# Patient Record
Sex: FEMALE | Race: WHITE | NOT HISPANIC OR LATINO | Employment: OTHER | URBAN - METROPOLITAN AREA
[De-identification: names, ages, dates, MRNs, and addresses within clinical notes are randomized per-mention and may not be internally consistent; named-entity substitution may affect disease eponyms.]

---

## 2017-01-06 ENCOUNTER — HOSPITAL ENCOUNTER (OUTPATIENT)
Dept: PHYSICAL THERAPY | Facility: HOSPITAL | Age: 52
Setting detail: RECURRING SERIES
Discharge: HOME | End: 2017-02-02
Attending: FAMILY MEDICINE | Admitting: FAMILY MEDICINE

## 2017-01-06 ENCOUNTER — HOSPITAL ENCOUNTER (OUTPATIENT)
Dept: PAIN MEDICINE | Facility: HOSPITAL | Age: 52
Discharge: HOME | End: 2017-01-06
Attending: PAIN MEDICINE | Admitting: PAIN MEDICINE

## 2017-02-17 ENCOUNTER — HOSPITAL ENCOUNTER (OUTPATIENT)
Dept: PAIN MEDICINE | Facility: HOSPITAL | Age: 52
Discharge: HOME | End: 2017-02-17
Attending: PAIN MEDICINE | Admitting: PAIN MEDICINE

## 2017-04-11 ENCOUNTER — HOSPITAL ENCOUNTER (OUTPATIENT)
Dept: OTHER | Facility: HOSPITAL | Age: 52
Setting detail: SPECIMEN
Discharge: HOME | End: 2017-04-11
Attending: NURSE PRACTITIONER | Admitting: NURSE PRACTITIONER

## 2017-04-11 LAB
ALBUMIN SERPL-MCNC: 3.6 G/DL (ref 3.5–4.8)
ALBUMIN/GLOB SERPL: 1.2 {RATIO} (ref 1–1.7)
ALP SERPL-CCNC: 101 IU/L (ref 32–91)
ALT SERPL-CCNC: 42 IU/L (ref 14–54)
ANION GAP SERPL CALC-SCNC: 13.3 MMOL/L (ref 10–20)
AST SERPL-CCNC: 38 IU/L (ref 15–41)
BASOPHILS # BLD AUTO: 0.2 10*3/UL (ref 0–0.2)
BASOPHILS NFR BLD AUTO: 1 % (ref 0–2)
BILIRUB SERPL-MCNC: 0.4 MG/DL (ref 0.3–1.2)
BUN SERPL-MCNC: 7 MG/DL (ref 8–20)
BUN/CREAT SERPL: 10 (ref 5.4–26.2)
CALCIUM SERPL-MCNC: 9.6 MG/DL (ref 8.9–10.3)
CHLORIDE SERPL-SCNC: 107 MMOL/L (ref 101–111)
CHOLEST SERPL-MCNC: 192 MG/DL
CHOLEST/HDLC SERPL: 6.8 {RATIO}
CONV CO2: 26 MMOL/L (ref 22–32)
CONV LDL CHOLESTEROL DIRECT: 88 MG/DL (ref 0–100)
CONV TOTAL PROTEIN: 6.6 G/DL (ref 6.1–7.9)
CREAT UR-MCNC: 0.7 MG/DL (ref 0.4–1)
DIFFERENTIAL METHOD BLD: (no result)
EOSINOPHIL # BLD AUTO: 0.2 10*3/UL (ref 0–0.3)
EOSINOPHIL # BLD AUTO: 2 % (ref 0–3)
ERYTHROCYTE [DISTWIDTH] IN BLOOD BY AUTOMATED COUNT: 14.8 % (ref 11.5–14.5)
GLOBULIN UR ELPH-MCNC: 3 G/DL (ref 2.5–3.8)
GLUCOSE SERPL-MCNC: 91 MG/DL (ref 65–99)
HCT VFR BLD AUTO: 45.9 % (ref 35–49)
HDLC SERPL-MCNC: 28 MG/DL
HGB BLD-MCNC: 15.6 G/DL (ref 12–15)
LDLC/HDLC SERPL: 3.1 {RATIO}
LIPID INTERPRETATION: ABNORMAL
LYMPHOCYTES # BLD AUTO: 3.4 10*3/UL (ref 0.8–4.8)
LYMPHOCYTES NFR BLD AUTO: 28 % (ref 18–42)
MCH RBC QN AUTO: 31 PG (ref 26–32)
MCHC RBC AUTO-ENTMCNC: 34 G/DL (ref 32–36)
MCV RBC AUTO: 91.2 FL (ref 80–94)
MONOCYTES # BLD AUTO: 0.5 10*3/UL (ref 0.1–1.3)
MONOCYTES NFR BLD AUTO: 4 % (ref 2–11)
NEUTROPHILS # BLD AUTO: 8.1 10*3/UL (ref 2.3–8.6)
NEUTROPHILS NFR BLD AUTO: 65 % (ref 50–75)
NRBC BLD AUTO-RTO: 0 /100{WBCS}
NRBC/RBC NFR BLD MANUAL: 0 10*3/UL
PLATELET # BLD AUTO: 271 10*3/UL (ref 150–450)
PMV BLD AUTO: 8.6 FL (ref 7.4–10.4)
POTASSIUM SERPL-SCNC: 4.3 MMOL/L (ref 3.6–5.1)
RBC # BLD AUTO: 5.04 10*6/UL (ref 4–5.4)
SODIUM SERPL-SCNC: 142 MMOL/L (ref 136–144)
TRIGL SERPL-MCNC: 476 MG/DL
VLDLC SERPL CALC-MCNC: 75.3 MG/DL
WBC # BLD AUTO: 12.4 10*3/UL (ref 4.5–11.5)

## 2017-04-17 ENCOUNTER — HOSPITAL ENCOUNTER (OUTPATIENT)
Dept: PAIN MEDICINE | Facility: HOSPITAL | Age: 52
Discharge: HOME | End: 2017-04-17
Attending: PAIN MEDICINE | Admitting: PAIN MEDICINE

## 2017-04-24 ENCOUNTER — HOSPITAL ENCOUNTER (OUTPATIENT)
Dept: WOUND CARE | Facility: HOSPITAL | Age: 52
Discharge: HOME | End: 2017-04-24
Attending: SURGERY | Admitting: SURGERY

## 2017-04-25 ENCOUNTER — HOSPITAL ENCOUNTER (OUTPATIENT)
Dept: ONCOLOGY | Facility: CLINIC | Age: 52
Discharge: HOME | End: 2017-04-25
Attending: INTERNAL MEDICINE | Admitting: INTERNAL MEDICINE

## 2017-04-25 LAB
ALBUMIN SERPL-MCNC: 3.9 G/DL (ref 3.5–4.8)
ALBUMIN/GLOB SERPL: 1.1 {RATIO} (ref 1–1.7)
ALP SERPL-CCNC: 102 IU/L (ref 32–91)
ALT SERPL-CCNC: 37 IU/L (ref 14–54)
ANION GAP SERPL CALC-SCNC: 16 MMOL/L (ref 10–20)
AST SERPL-CCNC: 27 IU/L (ref 15–41)
BILIRUB SERPL-MCNC: 0.4 MG/DL (ref 0.3–1.2)
BUN SERPL-MCNC: 10 MG/DL (ref 8–20)
BUN/CREAT SERPL: 14.3 (ref 5.4–26.2)
CALCIUM SERPL-MCNC: 9.6 MG/DL (ref 8.9–10.3)
CHLORIDE SERPL-SCNC: 104 MMOL/L (ref 101–111)
CONV CO2: 25 MMOL/L (ref 22–32)
CONV TOTAL PROTEIN: 7.3 G/DL (ref 6.1–7.9)
CREAT UR-MCNC: 0.7 MG/DL (ref 0.4–1)
GLOBULIN UR ELPH-MCNC: 3.4 G/DL (ref 2.5–3.8)
GLUCOSE SERPL-MCNC: 86 MG/DL (ref 65–99)
LDH SERPL-CCNC: 159 IU/L (ref 98–192)
POTASSIUM SERPL-SCNC: 4 MMOL/L (ref 3.6–5.1)
SODIUM SERPL-SCNC: 141 MMOL/L (ref 136–144)
URATE SERPL-MCNC: 5.3 MG/DL (ref 2.6–8)
VIT B12 SERPL-MCNC: 337 PG/ML (ref 180–914)

## 2017-04-26 LAB
ANA SER QL IA: NORMAL

## 2017-05-01 ENCOUNTER — HOSPITAL ENCOUNTER (OUTPATIENT)
Dept: WOUND CARE | Facility: HOSPITAL | Age: 52
Discharge: HOME | End: 2017-05-01
Attending: SURGERY | Admitting: SURGERY

## 2017-05-08 ENCOUNTER — HOSPITAL ENCOUNTER (OUTPATIENT)
Dept: WOUND CARE | Facility: HOSPITAL | Age: 52
Discharge: HOME | End: 2017-05-08
Attending: SURGERY | Admitting: SURGERY

## 2017-05-09 ENCOUNTER — HOSPITAL ENCOUNTER (OUTPATIENT)
Dept: ONCOLOGY | Facility: CLINIC | Age: 52
Discharge: HOME | End: 2017-05-09
Attending: INTERNAL MEDICINE | Admitting: INTERNAL MEDICINE

## 2017-05-15 ENCOUNTER — HOSPITAL ENCOUNTER (OUTPATIENT)
Dept: WOUND CARE | Facility: HOSPITAL | Age: 52
Discharge: HOME | End: 2017-05-15
Attending: SURGERY | Admitting: SURGERY

## 2017-05-22 ENCOUNTER — HOSPITAL ENCOUNTER (OUTPATIENT)
Dept: WOUND CARE | Facility: HOSPITAL | Age: 52
Discharge: HOME | End: 2017-05-22
Attending: SURGERY | Admitting: SURGERY

## 2017-06-01 ENCOUNTER — HOSPITAL ENCOUNTER (OUTPATIENT)
Dept: OTHER | Facility: HOSPITAL | Age: 52
Setting detail: SPECIMEN
Discharge: HOME | End: 2017-06-01
Attending: NURSE PRACTITIONER | Admitting: NURSE PRACTITIONER

## 2017-06-01 LAB
BACTERIA SPEC AEROBE CULT: NORMAL
GRAM STN SPEC: NORMAL
Lab: NORMAL
MICRO REPORT STATUS: NORMAL
SPECIMEN SOURCE: NORMAL

## 2017-06-05 ENCOUNTER — HOSPITAL ENCOUNTER (OUTPATIENT)
Dept: WOUND CARE | Facility: HOSPITAL | Age: 52
Discharge: HOME | End: 2017-06-05
Attending: SURGERY | Admitting: SURGERY

## 2017-06-19 ENCOUNTER — HOSPITAL ENCOUNTER (OUTPATIENT)
Dept: PAIN MEDICINE | Facility: HOSPITAL | Age: 52
Discharge: HOME | End: 2017-06-19
Attending: PAIN MEDICINE | Admitting: PAIN MEDICINE

## 2017-08-15 ENCOUNTER — HOSPITAL ENCOUNTER (OUTPATIENT)
Dept: ONCOLOGY | Facility: CLINIC | Age: 52
Discharge: HOME | End: 2017-08-15
Attending: INTERNAL MEDICINE | Admitting: INTERNAL MEDICINE

## 2017-08-17 ENCOUNTER — HOSPITAL ENCOUNTER (OUTPATIENT)
Dept: ULTRASOUND IMAGING | Facility: HOSPITAL | Age: 52
Discharge: HOME | End: 2017-08-17
Attending: INTERNAL MEDICINE | Admitting: INTERNAL MEDICINE

## 2017-08-21 ENCOUNTER — HOSPITAL ENCOUNTER (OUTPATIENT)
Dept: PAIN MEDICINE | Facility: HOSPITAL | Age: 52
Discharge: HOME | End: 2017-08-21
Attending: PAIN MEDICINE | Admitting: PAIN MEDICINE

## 2017-10-16 ENCOUNTER — HOSPITAL ENCOUNTER (OUTPATIENT)
Dept: PAIN MEDICINE | Facility: HOSPITAL | Age: 52
Discharge: HOME | End: 2017-10-16
Attending: PAIN MEDICINE | Admitting: PAIN MEDICINE

## 2017-10-25 ENCOUNTER — HOSPITAL ENCOUNTER (OUTPATIENT)
Dept: MRI IMAGING | Facility: HOSPITAL | Age: 52
Discharge: HOME | End: 2017-10-25
Attending: NURSE PRACTITIONER | Admitting: NURSE PRACTITIONER

## 2017-11-15 ENCOUNTER — HOSPITAL ENCOUNTER (OUTPATIENT)
Dept: ORTHOPEDIC SURGERY | Facility: CLINIC | Age: 52
Discharge: HOME | End: 2017-11-15
Attending: ORTHOPAEDIC SURGERY | Admitting: ORTHOPAEDIC SURGERY

## 2017-12-01 ENCOUNTER — HOSPITAL ENCOUNTER (OUTPATIENT)
Dept: ONCOLOGY | Facility: HOSPITAL | Age: 52
Discharge: HOME | End: 2017-12-01
Attending: INTERNAL MEDICINE | Admitting: INTERNAL MEDICINE

## 2017-12-01 ENCOUNTER — HOSPITAL ENCOUNTER (OUTPATIENT)
Dept: ONCOLOGY | Facility: CLINIC | Age: 52
Setting detail: INFUSION SERIES
Discharge: HOME | End: 2017-12-01
Attending: INTERNAL MEDICINE | Admitting: INTERNAL MEDICINE

## 2017-12-01 ENCOUNTER — CLINICAL SUPPORT (OUTPATIENT)
Dept: ONCOLOGY | Facility: HOSPITAL | Age: 52
End: 2017-12-01

## 2017-12-01 NOTE — PROGRESS NOTES
PATIENTS ONCOLOGY RECORD LOCATED IN [facility]      Subjective     Name:  PIOTR NAVARRETE     Date:  2017  [address]  [address] [address]  Home: [phone]  :  1965 AGE:  52 y.o.        RECORDS OBTAINED:  Patients Oncology Record is located in [facility]

## 2017-12-05 ENCOUNTER — HOSPITAL ENCOUNTER (OUTPATIENT)
Dept: OTHER | Facility: HOSPITAL | Age: 52
Setting detail: SPECIMEN
Discharge: HOME | End: 2017-12-05
Attending: NURSE PRACTITIONER | Admitting: NURSE PRACTITIONER

## 2017-12-05 LAB
ALBUMIN SERPL-MCNC: 3.9 G/DL (ref 3.5–4.8)
ALBUMIN/GLOB SERPL: 1.3 {RATIO} (ref 1–1.7)
ALP SERPL-CCNC: 100 IU/L (ref 32–91)
ALT SERPL-CCNC: 29 IU/L (ref 14–54)
ANION GAP SERPL CALC-SCNC: 13.7 MMOL/L (ref 10–20)
AST SERPL-CCNC: 26 IU/L (ref 15–41)
BASOPHILS # BLD AUTO: 0.1 10*3/UL (ref 0–0.2)
BASOPHILS NFR BLD AUTO: 1 % (ref 0–2)
BILIRUB SERPL-MCNC: 0.4 MG/DL (ref 0.3–1.2)
BUN SERPL-MCNC: 9 MG/DL (ref 8–20)
BUN/CREAT SERPL: 11.3 (ref 5.4–26.2)
CALCIUM SERPL-MCNC: 9.1 MG/DL (ref 8.9–10.3)
CHLORIDE SERPL-SCNC: 102 MMOL/L (ref 101–111)
CHOLEST SERPL-MCNC: 206 MG/DL
CHOLEST/HDLC SERPL: 4.8 {RATIO}
CONV CO2: 27 MMOL/L (ref 22–32)
CONV LDL CHOLESTEROL DIRECT: 125 MG/DL (ref 0–100)
CONV TOTAL PROTEIN: 7 G/DL (ref 6.1–7.9)
CREAT UR-MCNC: 0.8 MG/DL (ref 0.4–1)
DIFFERENTIAL METHOD BLD: (no result)
EOSINOPHIL # BLD AUTO: 0.2 10*3/UL (ref 0–0.3)
EOSINOPHIL # BLD AUTO: 2 % (ref 0–3)
ERYTHROCYTE [DISTWIDTH] IN BLOOD BY AUTOMATED COUNT: 15 % (ref 11.5–14.5)
FOLATE SERPL-MCNC: 14.1 NG/ML (ref 5.9–24.8)
GLOBULIN UR ELPH-MCNC: 3.1 G/DL (ref 2.5–3.8)
GLUCOSE SERPL-MCNC: 88 MG/DL (ref 65–99)
HCT VFR BLD AUTO: 49 % (ref 35–49)
HDLC SERPL-MCNC: 43 MG/DL
HGB BLD-MCNC: 16.5 G/DL (ref 12–15)
IRON SATN MFR SERPL: 18 % (ref 15–50)
IRON SERPL-MCNC: 62 UG/DL (ref 28–170)
LDLC/HDLC SERPL: 2.9 {RATIO}
LIPID INTERPRETATION: ABNORMAL
LYMPHOCYTES # BLD AUTO: 3 10*3/UL (ref 0.8–4.8)
LYMPHOCYTES NFR BLD AUTO: 28 % (ref 18–42)
MAGNESIUM SERPL-MCNC: 2.2 MG/DL (ref 1.8–2.5)
MCH RBC QN AUTO: 31 PG (ref 26–32)
MCHC RBC AUTO-ENTMCNC: 33.7 G/DL (ref 32–36)
MCV RBC AUTO: 91.9 FL (ref 80–94)
MONOCYTES # BLD AUTO: 0.5 10*3/UL (ref 0.1–1.3)
MONOCYTES NFR BLD AUTO: 5 % (ref 2–11)
NEUTROPHILS # BLD AUTO: 7 10*3/UL (ref 2.3–8.6)
NEUTROPHILS NFR BLD AUTO: 64 % (ref 50–75)
NRBC BLD AUTO-RTO: 0 /100{WBCS}
NRBC/RBC NFR BLD MANUAL: 0 10*3/UL
PLATELET # BLD AUTO: 288 10*3/UL (ref 150–450)
PMV BLD AUTO: 7.7 FL (ref 7.4–10.4)
POTASSIUM SERPL-SCNC: 4.7 MMOL/L (ref 3.6–5.1)
RBC # BLD AUTO: 5.33 10*6/UL (ref 4–5.4)
SODIUM SERPL-SCNC: 138 MMOL/L (ref 136–144)
TIBC SERPL-MCNC: 353 UG/DL (ref 228–428)
TRIGL SERPL-MCNC: 147 MG/DL
TSH SERPL-ACNC: 1.07 UIU/ML (ref 0.34–5.6)
VIT B12 SERPL-MCNC: 313 PG/ML (ref 180–914)
VLDLC SERPL CALC-MCNC: 38.8 MG/DL
WBC # BLD AUTO: 10.8 10*3/UL (ref 4.5–11.5)

## 2017-12-29 ENCOUNTER — HOSPITAL ENCOUNTER (OUTPATIENT)
Dept: CARDIOLOGY | Facility: HOSPITAL | Age: 52
Discharge: HOME | End: 2017-12-29

## 2018-03-08 ENCOUNTER — HOSPITAL ENCOUNTER (OUTPATIENT)
Dept: OTHER | Facility: HOSPITAL | Age: 53
Discharge: HOME | End: 2018-03-08
Attending: NURSE PRACTITIONER | Admitting: NURSE PRACTITIONER

## 2018-04-02 ENCOUNTER — HOSPITAL ENCOUNTER (OUTPATIENT)
Dept: ONCOLOGY | Facility: CLINIC | Age: 53
Setting detail: INFUSION SERIES
Discharge: HOME | End: 2018-04-02
Attending: INTERNAL MEDICINE | Admitting: INTERNAL MEDICINE

## 2018-04-02 ENCOUNTER — CLINICAL SUPPORT (OUTPATIENT)
Dept: ONCOLOGY | Facility: HOSPITAL | Age: 53
End: 2018-04-02

## 2018-04-02 NOTE — PROGRESS NOTES
PATIENTS ONCOLOGY RECORD LOCATED IN [facility]      Subjective     Name:  PIOTR NAVARRETE     Date:  2018  [address] [address]  Home: [phone]  :  1965 AGE:  52 y.o.        RECORDS OBTAINED:  Patients Oncology Record is located in [facility]

## 2018-04-17 ENCOUNTER — HOSPITAL ENCOUNTER (OUTPATIENT)
Dept: SLEEP MEDICINE | Facility: HOSPITAL | Age: 53
Discharge: HOME | End: 2018-04-17
Attending: PSYCHIATRY & NEUROLOGY | Admitting: PSYCHIATRY & NEUROLOGY

## 2018-06-04 ENCOUNTER — HOSPITAL ENCOUNTER (OUTPATIENT)
Dept: OTHER | Facility: HOSPITAL | Age: 53
Setting detail: SPECIMEN
Discharge: HOME | End: 2018-06-04
Attending: NURSE PRACTITIONER | Admitting: NURSE PRACTITIONER

## 2018-06-04 LAB
ALBUMIN SERPL-MCNC: 4 G/DL (ref 3.5–4.8)
ALBUMIN/GLOB SERPL: 1.3 {RATIO} (ref 1–1.7)
ALP SERPL-CCNC: 102 IU/L (ref 32–91)
ALT SERPL-CCNC: 39 IU/L (ref 14–54)
ANION GAP SERPL CALC-SCNC: 11 MMOL/L (ref 10–20)
AST SERPL-CCNC: 30 IU/L (ref 15–41)
BASOPHILS # BLD AUTO: 0.1 10*3/UL (ref 0–0.2)
BASOPHILS NFR BLD AUTO: 1 % (ref 0–2)
BILIRUB SERPL-MCNC: 0.4 MG/DL (ref 0.3–1.2)
BUN SERPL-MCNC: 7 MG/DL (ref 8–20)
BUN/CREAT SERPL: 11.7 (ref 5.4–26.2)
CALCIUM SERPL-MCNC: 9.5 MG/DL (ref 8.9–10.3)
CHLORIDE SERPL-SCNC: 105 MMOL/L (ref 101–111)
CHOLEST SERPL-MCNC: 204 MG/DL
CHOLEST/HDLC SERPL: 6.3 {RATIO}
CONV CO2: 27 MMOL/L (ref 22–32)
CONV LDL CHOLESTEROL DIRECT: 111 MG/DL (ref 0–100)
CONV TOTAL PROTEIN: 7 G/DL (ref 6.1–7.9)
CREAT UR-MCNC: 0.6 MG/DL (ref 0.4–1)
DIFFERENTIAL METHOD BLD: (no result)
EOSINOPHIL # BLD AUTO: 0.2 10*3/UL (ref 0–0.3)
EOSINOPHIL # BLD AUTO: 2 % (ref 0–3)
ERYTHROCYTE [DISTWIDTH] IN BLOOD BY AUTOMATED COUNT: 14.4 % (ref 11.5–14.5)
GLOBULIN UR ELPH-MCNC: 3 G/DL (ref 2.5–3.8)
GLUCOSE SERPL-MCNC: 96 MG/DL (ref 65–99)
HCT VFR BLD AUTO: 48.8 % (ref 35–49)
HDLC SERPL-MCNC: 32 MG/DL
HGB BLD-MCNC: 16.5 G/DL (ref 12–15)
IRON SATN MFR SERPL: 13 % (ref 15–50)
IRON SERPL-MCNC: 46 UG/DL (ref 28–170)
LDLC/HDLC SERPL: 3.5 {RATIO}
LIPID INTERPRETATION: ABNORMAL
LYMPHOCYTES # BLD AUTO: 3.3 10*3/UL (ref 0.8–4.8)
LYMPHOCYTES NFR BLD AUTO: 28 % (ref 18–42)
MCH RBC QN AUTO: 30.4 PG (ref 26–32)
MCHC RBC AUTO-ENTMCNC: 33.7 G/DL (ref 32–36)
MCV RBC AUTO: 90.1 FL (ref 80–94)
MONOCYTES # BLD AUTO: 0.6 10*3/UL (ref 0.1–1.3)
MONOCYTES NFR BLD AUTO: 5 % (ref 2–11)
NEUTROPHILS # BLD AUTO: 7.5 10*3/UL (ref 2.3–8.6)
NEUTROPHILS NFR BLD AUTO: 64 % (ref 50–75)
NRBC BLD AUTO-RTO: 0 /100{WBCS}
NRBC/RBC NFR BLD MANUAL: 0 10*3/UL
PLATELET # BLD AUTO: 289 10*3/UL (ref 150–450)
PMV BLD AUTO: 8.6 FL (ref 7.4–10.4)
POTASSIUM SERPL-SCNC: 4 MMOL/L (ref 3.6–5.1)
RBC # BLD AUTO: 5.42 10*6/UL (ref 4–5.4)
SODIUM SERPL-SCNC: 139 MMOL/L (ref 136–144)
TIBC SERPL-MCNC: 354 UG/DL (ref 228–428)
TRIGL SERPL-MCNC: 387 MG/DL
VLDLC SERPL CALC-MCNC: 60.6 MG/DL
WBC # BLD AUTO: 11.7 10*3/UL (ref 4.5–11.5)

## 2018-07-12 ENCOUNTER — HOSPITAL ENCOUNTER (OUTPATIENT)
Dept: MAMMOGRAPHY | Facility: HOSPITAL | Age: 53
Discharge: HOME | End: 2018-07-12
Attending: NURSE PRACTITIONER | Admitting: NURSE PRACTITIONER

## 2018-08-06 ENCOUNTER — HOSPITAL ENCOUNTER (OUTPATIENT)
Dept: ONCOLOGY | Facility: CLINIC | Age: 53
Setting detail: INFUSION SERIES
Discharge: HOME | End: 2018-08-06
Attending: INTERNAL MEDICINE | Admitting: INTERNAL MEDICINE

## 2018-08-06 ENCOUNTER — CLINICAL SUPPORT (OUTPATIENT)
Dept: ONCOLOGY | Facility: HOSPITAL | Age: 53
End: 2018-08-06

## 2018-08-06 NOTE — PROGRESS NOTES
PATIENTS ONCOLOGY RECORD LOCATED IN [facility]      Subjective     Name:  PIOTR NAVARRETE     Date:  2018  [address]  [address] [facility] [address]  Home: [phone]  :  1965 AGE:  52 y.o.        RECORDS OBTAINED:  Patients Oncology Record is located in [facility]

## 2018-08-24 ENCOUNTER — CLINICAL SUPPORT (OUTPATIENT)
Dept: ONCOLOGY | Facility: HOSPITAL | Age: 53
End: 2018-08-24

## 2018-08-24 ENCOUNTER — HOSPITAL ENCOUNTER (OUTPATIENT)
Dept: ONCOLOGY | Facility: CLINIC | Age: 53
Setting detail: INFUSION SERIES
Discharge: HOME | End: 2018-08-24
Attending: INTERNAL MEDICINE | Admitting: INTERNAL MEDICINE

## 2018-08-24 NOTE — PROGRESS NOTES
PATIENTS ONCOLOGY RECORD LOCATED IN [facility]      Subjective     Name:  PIOTR NAVARRETE     Date:  2018  [address]  [address] [address]  Home: [phone]  :  1965 AGE:  52 y.o.        RECORDS OBTAINED:  Patients Oncology Record is located in [facility]

## 2018-09-06 ENCOUNTER — CLINICAL SUPPORT (OUTPATIENT)
Dept: ONCOLOGY | Facility: HOSPITAL | Age: 53
End: 2018-09-06

## 2018-09-06 ENCOUNTER — HOSPITAL ENCOUNTER (OUTPATIENT)
Dept: ONCOLOGY | Facility: CLINIC | Age: 53
Setting detail: INFUSION SERIES
Discharge: HOME | End: 2018-09-06
Attending: INTERNAL MEDICINE | Admitting: INTERNAL MEDICINE

## 2018-09-06 NOTE — PROGRESS NOTES
PATIENTS ONCOLOGY RECORD LOCATED IN [facility]      Subjective     Name:  PIOTR NAVARRETE     Date:  2018  [address]  [address] [facility] [address]  Home: [phone]  :  1965 AGE:  52 y.o.        RECORDS OBTAINED:  Patients Oncology Record is located in [facility]

## 2018-12-04 ENCOUNTER — HOSPITAL ENCOUNTER (OUTPATIENT)
Dept: OTHER | Facility: HOSPITAL | Age: 53
Setting detail: SPECIMEN
Discharge: HOME | End: 2018-12-04
Attending: NURSE PRACTITIONER | Admitting: NURSE PRACTITIONER

## 2018-12-04 LAB
ALBUMIN SERPL-MCNC: 3.9 G/DL (ref 3.5–4.8)
ALBUMIN/GLOB SERPL: 1.1 {RATIO} (ref 1–1.7)
ALP SERPL-CCNC: 112 IU/L (ref 32–91)
ALT SERPL-CCNC: 35 IU/L (ref 14–54)
ANION GAP SERPL CALC-SCNC: 13.8 MMOL/L (ref 10–20)
AST SERPL-CCNC: 30 IU/L (ref 15–41)
BASOPHILS # BLD AUTO: 0.1 10*3/UL (ref 0–0.2)
BASOPHILS NFR BLD AUTO: 1 % (ref 0–2)
BILIRUB SERPL-MCNC: 0.9 MG/DL (ref 0.3–1.2)
BUN SERPL-MCNC: 8 MG/DL (ref 8–20)
BUN/CREAT SERPL: 11.4 (ref 5.4–26.2)
CALCIUM SERPL-MCNC: 9.4 MG/DL (ref 8.9–10.3)
CHLORIDE SERPL-SCNC: 100 MMOL/L (ref 101–111)
CHOLEST SERPL-MCNC: 213 MG/DL
CHOLEST/HDLC SERPL: 5.2 {RATIO}
CONV CO2: 27 MMOL/L (ref 22–32)
CONV LDL CHOLESTEROL DIRECT: 124 MG/DL (ref 0–100)
CONV TOTAL PROTEIN: 7.3 G/DL (ref 6.1–7.9)
CREAT UR-MCNC: 0.7 MG/DL (ref 0.4–1)
DIFFERENTIAL METHOD BLD: (no result)
EOSINOPHIL # BLD AUTO: 0.2 10*3/UL (ref 0–0.3)
EOSINOPHIL # BLD AUTO: 1 % (ref 0–3)
ERYTHROCYTE [DISTWIDTH] IN BLOOD BY AUTOMATED COUNT: 14.9 % (ref 11.5–14.5)
GLOBULIN UR ELPH-MCNC: 3.4 G/DL (ref 2.5–3.8)
GLUCOSE SERPL-MCNC: 84 MG/DL (ref 65–99)
HCT VFR BLD AUTO: 49.5 % (ref 35–49)
HDLC SERPL-MCNC: 41 MG/DL
HGB BLD-MCNC: 16.5 G/DL (ref 12–15)
LDLC/HDLC SERPL: 3.1 {RATIO}
LIPID INTERPRETATION: ABNORMAL
LYMPHOCYTES # BLD AUTO: 4.1 10*3/UL (ref 0.8–4.8)
LYMPHOCYTES NFR BLD AUTO: 32 % (ref 18–42)
MCH RBC QN AUTO: 30.2 PG (ref 26–32)
MCHC RBC AUTO-ENTMCNC: 33.4 G/DL (ref 32–36)
MCV RBC AUTO: 90.7 FL (ref 80–94)
MONOCYTES # BLD AUTO: 0.6 10*3/UL (ref 0.1–1.3)
MONOCYTES NFR BLD AUTO: 5 % (ref 2–11)
NEUTROPHILS # BLD AUTO: 7.8 10*3/UL (ref 2.3–8.6)
NEUTROPHILS NFR BLD AUTO: 61 % (ref 50–75)
NRBC BLD AUTO-RTO: 0 /100{WBCS}
NRBC/RBC NFR BLD MANUAL: 0 10*3/UL
PLATELET # BLD AUTO: 321 10*3/UL (ref 150–450)
PMV BLD AUTO: 8 FL (ref 7.4–10.4)
POTASSIUM SERPL-SCNC: 3.8 MMOL/L (ref 3.6–5.1)
RBC # BLD AUTO: 5.46 10*6/UL (ref 4–5.4)
SODIUM SERPL-SCNC: 137 MMOL/L (ref 136–144)
TRIGL SERPL-MCNC: 271 MG/DL
TSH SERPL-ACNC: 0.93 UIU/ML (ref 0.34–5.6)
VIT B12 SERPL-MCNC: 314 PG/ML (ref 180–914)
VLDLC SERPL CALC-MCNC: 48 MG/DL
WBC # BLD AUTO: 12.9 10*3/UL (ref 4.5–11.5)

## 2018-12-05 LAB
25(OH)D3 SERPL-MCNC: 27 NG/ML (ref 30–100)
HBA1C MFR BLD: 6.1 % (ref 0–5.6)

## 2018-12-06 ENCOUNTER — CLINICAL SUPPORT (OUTPATIENT)
Dept: ONCOLOGY | Facility: HOSPITAL | Age: 53
End: 2018-12-06

## 2018-12-06 ENCOUNTER — HOSPITAL ENCOUNTER (OUTPATIENT)
Dept: ONCOLOGY | Facility: CLINIC | Age: 53
Setting detail: INFUSION SERIES
Discharge: HOME | End: 2018-12-06
Attending: INTERNAL MEDICINE | Admitting: INTERNAL MEDICINE

## 2018-12-06 NOTE — PROGRESS NOTES
[facility]      Subjective     Name:  PIOTR NAVARRETE     Date:  2018  Address:  [address] [place] [address]  Home: [unfilled]  :  1965 AGE:  53 y.o.        RECORDS OBTAINED:  Patients Oncology Record is located in [facility]

## 2019-02-22 ENCOUNTER — HOSPITAL ENCOUNTER (OUTPATIENT)
Dept: ONCOLOGY | Facility: CLINIC | Age: 54
Setting detail: INFUSION SERIES
Discharge: HOME | End: 2019-02-22
Attending: INTERNAL MEDICINE | Admitting: INTERNAL MEDICINE

## 2019-02-22 ENCOUNTER — CLINICAL SUPPORT (OUTPATIENT)
Dept: ONCOLOGY | Facility: HOSPITAL | Age: 54
End: 2019-02-22

## 2019-02-22 NOTE — PROGRESS NOTES
[facility]      Subjective     Name:  PIOTR NAVARRETE     Date:  2019  Address:  [address] [place] [address]  Home: [unfilled]  :  1965 AGE:  53 y.o.        RECORDS OBTAINED:  Patients Oncology Record is located in [facility]

## 2019-03-07 ENCOUNTER — CLINICAL SUPPORT (OUTPATIENT)
Dept: ONCOLOGY | Facility: HOSPITAL | Age: 54
End: 2019-03-07

## 2019-03-07 ENCOUNTER — HOSPITAL ENCOUNTER (OUTPATIENT)
Dept: ONCOLOGY | Facility: CLINIC | Age: 54
Setting detail: INFUSION SERIES
Discharge: HOME | End: 2019-03-07
Attending: INTERNAL MEDICINE | Admitting: INTERNAL MEDICINE

## 2019-03-07 NOTE — PROGRESS NOTES
[facility]      Subjective     Name:  PIOTR NAVARRETE     Date:  2019  Address:  [address] [place] [address]  Home: [unfilled]  :  1965 AGE:  53 y.o.        RECORDS OBTAINED:  Patients Oncology Record is located in [facility]

## 2019-03-11 ENCOUNTER — HOSPITAL ENCOUNTER (OUTPATIENT)
Dept: ONCOLOGY | Facility: CLINIC | Age: 54
Setting detail: INFUSION SERIES
Discharge: HOME | End: 2019-03-11
Attending: INTERNAL MEDICINE | Admitting: INTERNAL MEDICINE

## 2019-03-11 ENCOUNTER — CLINICAL SUPPORT (OUTPATIENT)
Dept: ONCOLOGY | Facility: HOSPITAL | Age: 54
End: 2019-03-11

## 2019-03-11 NOTE — PROGRESS NOTES
[facility]      Subjective     Name:  PIOTR NAVARRETE     Date:  2019  Address:  [address] [place] [address]  Home: [unfilled]  :  1965 AGE:  53 y.o.        RECORDS OBTAINED:  Patients Oncology Record is located in [facility]

## 2019-03-19 ENCOUNTER — HOSPITAL ENCOUNTER (OUTPATIENT)
Dept: CARDIOLOGY | Facility: HOSPITAL | Age: 54
Discharge: HOME | End: 2019-03-19
Attending: SURGERY | Admitting: SURGERY

## 2019-04-11 ENCOUNTER — OFFICE (AMBULATORY)
Dept: URBAN - METROPOLITAN AREA CLINIC 64 | Facility: CLINIC | Age: 54
End: 2019-04-11

## 2019-04-11 VITALS
HEART RATE: 64 BPM | SYSTOLIC BLOOD PRESSURE: 144 MMHG | HEIGHT: 69 IN | WEIGHT: 200 LBS | DIASTOLIC BLOOD PRESSURE: 82 MMHG

## 2019-04-11 DIAGNOSIS — E61.1 IRON DEFICIENCY: ICD-10-CM

## 2019-04-11 DIAGNOSIS — R13.10 DYSPHAGIA, UNSPECIFIED: ICD-10-CM

## 2019-04-11 DIAGNOSIS — Z86.010 PERSONAL HISTORY OF COLONIC POLYPS: ICD-10-CM

## 2019-04-11 PROCEDURE — 99213 OFFICE O/P EST LOW 20 MIN: CPT | Performed by: NURSE PRACTITIONER

## 2019-05-16 ENCOUNTER — ON CAMPUS - OUTPATIENT (AMBULATORY)
Dept: URBAN - METROPOLITAN AREA HOSPITAL 85 | Facility: HOSPITAL | Age: 54
End: 2019-05-16

## 2019-05-16 ENCOUNTER — HOSPITAL ENCOUNTER (OUTPATIENT)
Dept: GASTROENTEROLOGY | Facility: HOSPITAL | Age: 54
Setting detail: HOSPITAL OUTPATIENT SURGERY
Discharge: HOME | End: 2019-05-16
Attending: INTERNAL MEDICINE | Admitting: INTERNAL MEDICINE

## 2019-05-16 DIAGNOSIS — D50.8 OTHER IRON DEFICIENCY ANEMIAS: ICD-10-CM

## 2019-05-16 DIAGNOSIS — D12.3 BENIGN NEOPLASM OF TRANSVERSE COLON: ICD-10-CM

## 2019-05-16 DIAGNOSIS — R13.10 DYSPHAGIA, UNSPECIFIED: ICD-10-CM

## 2019-05-16 DIAGNOSIS — D12.2 BENIGN NEOPLASM OF ASCENDING COLON: ICD-10-CM

## 2019-05-16 DIAGNOSIS — K64.4 RESIDUAL HEMORRHOIDAL SKIN TAGS: ICD-10-CM

## 2019-05-16 DIAGNOSIS — K21.0 GASTRO-ESOPHAGEAL REFLUX DISEASE WITH ESOPHAGITIS: ICD-10-CM

## 2019-05-16 DIAGNOSIS — K57.30 DIVERTICULOSIS OF LARGE INTESTINE WITHOUT PERFORATION OR ABS: ICD-10-CM

## 2019-05-16 PROCEDURE — 45380 COLONOSCOPY AND BIOPSY: CPT | Mod: 59 | Performed by: INTERNAL MEDICINE

## 2019-05-16 PROCEDURE — 43239 EGD BIOPSY SINGLE/MULTIPLE: CPT | Performed by: INTERNAL MEDICINE

## 2019-05-16 PROCEDURE — 43450 DILATE ESOPHAGUS 1/MULT PASS: CPT | Performed by: INTERNAL MEDICINE

## 2019-05-16 PROCEDURE — 45385 COLONOSCOPY W/LESION REMOVAL: CPT | Performed by: INTERNAL MEDICINE

## 2019-06-28 RX ORDER — UBIDECARENONE 75 MG
CAPSULE ORAL DAILY
COMMUNITY
Start: 2019-02-27 | End: 2019-08-06 | Stop reason: SDUPTHER

## 2019-06-28 RX ORDER — AMITRIPTYLINE HYDROCHLORIDE 25 MG/1
25 TABLET, FILM COATED ORAL
Refills: 3 | COMMUNITY
Start: 2019-05-07

## 2019-06-28 RX ORDER — METOPROLOL SUCCINATE 25 MG/1
25 TABLET, EXTENDED RELEASE ORAL DAILY
Refills: 3 | COMMUNITY
Start: 2019-04-29 | End: 2020-04-28 | Stop reason: SDUPTHER

## 2019-06-28 RX ORDER — ASPIRIN 81 MG/1
81 TABLET ORAL DAILY
COMMUNITY
Start: 2018-02-13

## 2019-06-28 RX ORDER — LORATADINE 10 MG/1
TABLET ORAL DAILY
COMMUNITY
Start: 2016-11-22 | End: 2020-04-28

## 2019-06-28 RX ORDER — HYDROCODONE BITARTRATE AND ACETAMINOPHEN 10; 325 MG/1; MG/1
1 TABLET ORAL EVERY 6 HOURS PRN
Refills: 0 | COMMUNITY
Start: 2019-05-15 | End: 2023-01-18 | Stop reason: SDUPTHER

## 2019-07-03 ENCOUNTER — APPOINTMENT (OUTPATIENT)
Dept: ONCOLOGY | Facility: CLINIC | Age: 54
End: 2019-07-03

## 2019-08-06 RX ORDER — UBIDECARENONE 75 MG
100 CAPSULE ORAL DAILY
Qty: 30 TABLET | Refills: 5 | Status: SHIPPED | OUTPATIENT
Start: 2019-08-06 | End: 2020-02-02

## 2019-12-09 ENCOUNTER — OFFICE VISIT (OUTPATIENT)
Dept: FAMILY MEDICINE CLINIC | Facility: CLINIC | Age: 54
End: 2019-12-09

## 2019-12-09 VITALS
DIASTOLIC BLOOD PRESSURE: 75 MMHG | SYSTOLIC BLOOD PRESSURE: 114 MMHG | HEIGHT: 69 IN | HEART RATE: 95 BPM | WEIGHT: 203 LBS | BODY MASS INDEX: 30.07 KG/M2 | OXYGEN SATURATION: 96 %

## 2019-12-09 DIAGNOSIS — M54.42 CHRONIC MIDLINE LOW BACK PAIN WITH BILATERAL SCIATICA: ICD-10-CM

## 2019-12-09 DIAGNOSIS — K12.1 STOMATITIS: Primary | ICD-10-CM

## 2019-12-09 DIAGNOSIS — G89.29 CHRONIC MIDLINE LOW BACK PAIN WITH BILATERAL SCIATICA: ICD-10-CM

## 2019-12-09 DIAGNOSIS — R59.0 LYMPHADENOPATHY, OCCIPITAL: ICD-10-CM

## 2019-12-09 DIAGNOSIS — M54.41 CHRONIC MIDLINE LOW BACK PAIN WITH BILATERAL SCIATICA: ICD-10-CM

## 2019-12-09 PROCEDURE — 99214 OFFICE O/P EST MOD 30 MIN: CPT | Performed by: NURSE PRACTITIONER

## 2019-12-09 PROCEDURE — 80053 COMPREHEN METABOLIC PANEL: CPT | Performed by: NURSE PRACTITIONER

## 2019-12-09 PROCEDURE — 85027 COMPLETE CBC AUTOMATED: CPT | Performed by: NURSE PRACTITIONER

## 2019-12-09 RX ORDER — MULTIVIT-MIN/FOLIC ACID/LUTEIN 500-250MCG
1 TABLET,CHEWABLE ORAL DAILY
Qty: 90 EACH | Refills: 0 | Status: SHIPPED | OUTPATIENT
Start: 2019-12-09 | End: 2020-01-09

## 2019-12-09 RX ORDER — FLUTICASONE PROPIONATE 50 MCG
2 SPRAY, SUSPENSION (ML) NASAL DAILY
Qty: 1 BOTTLE | Refills: 3 | Status: SHIPPED | OUTPATIENT
Start: 2019-12-09 | End: 2020-04-03

## 2019-12-09 RX ORDER — FLUTICASONE PROPIONATE 50 MCG
SPRAY, SUSPENSION (ML) NASAL
COMMUNITY
Start: 2017-12-21 | End: 2019-12-09 | Stop reason: SDUPTHER

## 2019-12-09 RX ORDER — CHLORHEXIDINE GLUCONATE 0.12 MG/ML
RINSE ORAL
Qty: 473 ML | Refills: 0 | Status: SHIPPED | OUTPATIENT
Start: 2019-12-09 | End: 2021-09-21

## 2019-12-09 RX ORDER — METHYLPREDNISOLONE 4 MG/1
TABLET ORAL
Qty: 21 TABLET | Refills: 0 | Status: SHIPPED | OUTPATIENT
Start: 2019-12-09 | End: 2020-01-09

## 2019-12-09 RX ORDER — POLYMYXIN B SULFATE AND TRIMETHOPRIM 1; 10000 MG/ML; [USP'U]/ML
SOLUTION OPHTHALMIC
Refills: 0 | COMMUNITY
Start: 2019-11-24 | End: 2021-01-22

## 2019-12-09 NOTE — PROGRESS NOTES
"  Rahel Ramsey is a 54 y.o. female.     Chief Complaint   Patient presents with   • Stye     In left eye, f/u   • Sore     Lesion in mouth and hip.  [facility] gave her eye drops and antibiotic.     • Dizziness   • Cyst     On head, was told she might need an xray   • Excessive Sweating     Says that her feet will be \"ice cold, but she will be pouring sweat\"   • Abnormal Calcium     says that she had trouble swallowing calcium/vit d pills and can't use chewables.  Is there something else oyu recommend?       History of Present Illness Patient seen for complaints above,She reports she reports she had teeth pulled, now has dentures, can't chew gummies, She is concerned about a knot on the back of her head, dizziness and sores in her mouth, Stye improving.         Subjective     Visit Vitals  /75   Pulse 95   Ht 175.3 cm (69.02\")   Wt 92.1 kg (203 lb)   SpO2 96%   BMI 29.96 kg/m²       The following portions of the patient's history were reviewed and updated as appropriate: allergies, current medications, past family history, past medical history, past social history, past surgical history and problem list.    Review of Systems   Constitutional: Negative for chills, fatigue and fever.   HENT: Positive for mouth sores. Negative for dental problem, ear pain, sinus pressure and sore throat.    Eyes: Negative for visual disturbance.   Respiratory: Negative for cough, shortness of breath and wheezing.    Gastrointestinal: Negative for abdominal pain, blood in stool, constipation, diarrhea, nausea, vomiting and GERD.   Genitourinary: Negative for difficulty urinating, frequency, urgency and urinary incontinence.   Musculoskeletal: Positive for arthralgias and back pain. Negative for gait problem, joint swelling, myalgias and neck pain.   Skin: Negative for dry skin, pallor and rash.   Neurological: Negative for dizziness, seizures, speech difficulty and weakness.   Hematological: Negative for adenopathy. "   Psychiatric/Behavioral: Negative for sleep disturbance, depressed mood and stress. The patient is nervous/anxious.        Objective     Physical Exam   Constitutional: She is oriented to person, place, and time. She appears well-developed and well-nourished. No distress.   HENT:   Head: Normocephalic and atraumatic.   Posterior occipital lymph nodes enlarged bilaterally and tender to palpation.  Hard palate with stomatitis, erythemic and painful   Eyes: Pupils are equal, round, and reactive to light. Conjunctivae are normal.   Neck: Normal range of motion. Neck supple. No JVD present. No thyromegaly present.   Cardiovascular: Normal rate, regular rhythm and normal heart sounds.   No murmur heard.  Pulmonary/Chest: Effort normal and breath sounds normal.   Abdominal: Soft. Bowel sounds are normal. She exhibits no distension. There is no tenderness.   Musculoskeletal: Normal range of motion. She exhibits tenderness ( Low back TTP, limited range of motion). She exhibits no edema.   Neurological: She is alert and oriented to person, place, and time. No sensory deficit.   Skin: Skin is warm and dry. No rash noted. She is not diaphoretic. No erythema.   Psychiatric: She has a normal mood and affect. Her behavior is normal. Judgment normal.         Assessment/Plan   Rahel was seen today for stye, sore, dizziness, cyst, excessive sweating and abnormal calcium.    Diagnoses and all orders for this visit:    Stomatitis  Start chlorhexidine mouthwash    Lymphadenopathy, occipital  -     Comprehensive Metabolic Panel  -     CBC (No Diff)  Start Medrol Dosepak for enlarged lymph nodes, return in 2 to 4 weeks if worse or no better    Chronic midline low back pain with bilateral sciatica  -     LYRICA 200 MG capsule; Take 1 capsule by mouth 2 (Two) Times a Day.  Pain is stable with Lyrica, refill    Other orders  -     chlorhexidine (PERIDEX) 0.12 % solution; Swish and spit 15 ml BID for 7 days, then as needed  -     fluticasone  (FLONASE) 50 MCG/ACT nasal spray; 2 sprays into the nostril(s) as directed by provider Daily.  -     methylPREDNISolone (MEDROL, ALEXANDER,) 4 MG tablet; Take as directed on package instructions.  -     Calcium Carbonate-Vit D-Min (CALCIUM 600+D PLUS MINERALS) 600-400 MG-UNIT chewable tablet; Chew 1 tablet Daily.      Cbc, cmp today. Will notify patient results.             Glucose   Date Value Ref Range Status   12/04/2018 84 65 - 99 mg/dL Final     BUN   Date Value Ref Range Status   12/04/2018 8 8 - 20 mg/dL Final     Creatinine   Date Value Ref Range Status   12/04/2018 0.7 0.4 - 1.0 mg/dl Final     Sodium   Date Value Ref Range Status   12/04/2018 137 136 - 144 mmol/L Final     Potassium   Date Value Ref Range Status   12/04/2018 3.8 3.6 - 5.1 mmol/L Final     Chloride   Date Value Ref Range Status   12/04/2018 100 (L) 101 - 111 mmol/L Final     CO2   Date Value Ref Range Status   12/04/2018 27 22 - 32 mmol/L Final     Calcium   Date Value Ref Range Status   12/04/2018 9.4 8.9 - 10.3 mg/dL Final     Total Protein   Date Value Ref Range Status   12/04/2018 7.3 6.1 - 7.9 g/dL Final     Albumin   Date Value Ref Range Status   12/04/2018 3.9 3.5 - 4.8 g/dL Final     ALT (SGPT)   Date Value Ref Range Status   12/04/2018 35 14 - 54 IU/L Final     AST (SGOT)   Date Value Ref Range Status   12/04/2018 30 15 - 41 IU/L Final     Alkaline Phosphatase   Date Value Ref Range Status   12/04/2018 112 (H) 32 - 91 IU/L Final     Total Bilirubin   Date Value Ref Range Status   12/04/2018 0.9 0.3 - 1.2 mg/dL Final     A/G Ratio   Date Value Ref Range Status   12/04/2018 1.1 1.0 - 1.7 Final     BUN/Creatinine Ratio   Date Value Ref Range Status   12/04/2018 11.4 5.4 - 26.2 Final     Anion Gap   Date Value Ref Range Status   12/04/2018 13.8 10 - 20 Final

## 2019-12-10 LAB
ALBUMIN SERPL-MCNC: 4.2 G/DL (ref 3.5–5.2)
ALBUMIN/GLOB SERPL: 1.2 G/DL
ALP SERPL-CCNC: 102 U/L (ref 39–117)
ALT SERPL W P-5'-P-CCNC: 31 U/L (ref 1–33)
ANION GAP SERPL CALCULATED.3IONS-SCNC: 12.3 MMOL/L (ref 5–15)
AST SERPL-CCNC: 24 U/L (ref 1–32)
BILIRUB SERPL-MCNC: 0.3 MG/DL (ref 0.2–1.2)
BUN BLD-MCNC: 11 MG/DL (ref 6–20)
BUN/CREAT SERPL: 14.5 (ref 7–25)
CALCIUM SPEC-SCNC: 9.4 MG/DL (ref 8.6–10.5)
CHLORIDE SERPL-SCNC: 102 MMOL/L (ref 98–107)
CO2 SERPL-SCNC: 26.7 MMOL/L (ref 22–29)
CREAT BLD-MCNC: 0.76 MG/DL (ref 0.57–1)
DEPRECATED RDW RBC AUTO: 46.1 FL (ref 37–54)
ERYTHROCYTE [DISTWIDTH] IN BLOOD BY AUTOMATED COUNT: 13.9 % (ref 12.3–15.4)
GFR SERPL CREATININE-BSD FRML MDRD: 79 ML/MIN/1.73
GLOBULIN UR ELPH-MCNC: 3.5 GM/DL
GLUCOSE BLD-MCNC: 88 MG/DL (ref 65–99)
HCT VFR BLD AUTO: 49 % (ref 34–46.6)
HGB BLD-MCNC: 16.7 G/DL (ref 12–15.9)
MCH RBC QN AUTO: 30.8 PG (ref 26.6–33)
MCHC RBC AUTO-ENTMCNC: 34.1 G/DL (ref 31.5–35.7)
MCV RBC AUTO: 90.4 FL (ref 79–97)
PLATELET # BLD AUTO: 300 10*3/MM3 (ref 140–450)
PMV BLD AUTO: 9.6 FL (ref 6–12)
POTASSIUM BLD-SCNC: 4 MMOL/L (ref 3.5–5.2)
PROT SERPL-MCNC: 7.7 G/DL (ref 6–8.5)
RBC # BLD AUTO: 5.42 10*6/MM3 (ref 3.77–5.28)
SODIUM BLD-SCNC: 141 MMOL/L (ref 136–145)
WBC NRBC COR # BLD: 12.89 10*3/MM3 (ref 3.4–10.8)

## 2020-01-03 ENCOUNTER — HOSPITAL ENCOUNTER (EMERGENCY)
Facility: HOSPITAL | Age: 55
Discharge: HOME | End: 2020-01-04
Attending: EMERGENCY MEDICINE | Admitting: EMERGENCY MEDICINE

## 2020-01-03 ENCOUNTER — APPOINTMENT (OUTPATIENT)
Dept: CT IMAGING | Facility: HOSPITAL | Age: 55
End: 2020-01-03

## 2020-01-03 VITALS
BODY MASS INDEX: 29.58 KG/M2 | RESPIRATION RATE: 17 BRPM | HEIGHT: 69 IN | DIASTOLIC BLOOD PRESSURE: 77 MMHG | OXYGEN SATURATION: 98 % | TEMPERATURE: 98 F | SYSTOLIC BLOOD PRESSURE: 122 MMHG | HEART RATE: 83 BPM | WEIGHT: 199.74 LBS

## 2020-01-03 DIAGNOSIS — M54.2 NECK PAIN: ICD-10-CM

## 2020-01-03 DIAGNOSIS — R91.8 HILAR MASS: Primary | ICD-10-CM

## 2020-01-03 LAB
ANION GAP SERPL CALCULATED.3IONS-SCNC: 13 MMOL/L (ref 5–15)
BASOPHILS # BLD AUTO: 0.1 10*3/MM3 (ref 0–0.2)
BASOPHILS NFR BLD AUTO: 1.4 % (ref 0–1.5)
BUN BLD-MCNC: 10 MG/DL (ref 6–20)
BUN/CREAT SERPL: 12.7 (ref 7–25)
CALCIUM SPEC-SCNC: 10.2 MG/DL (ref 8.6–10.5)
CHLORIDE SERPL-SCNC: 100 MMOL/L (ref 98–107)
CO2 SERPL-SCNC: 28 MMOL/L (ref 22–29)
CREAT BLD-MCNC: 0.79 MG/DL (ref 0.57–1)
CRP SERPL-MCNC: 2.34 MG/DL (ref 0–0.5)
DEPRECATED RDW RBC AUTO: 45.5 FL (ref 37–54)
EOSINOPHIL # BLD AUTO: 0.2 10*3/MM3 (ref 0–0.4)
EOSINOPHIL NFR BLD AUTO: 2 % (ref 0.3–6.2)
ERYTHROCYTE [DISTWIDTH] IN BLOOD BY AUTOMATED COUNT: 14.3 % (ref 12.3–15.4)
ERYTHROCYTE [SEDIMENTATION RATE] IN BLOOD: 15 MM/HR (ref 0–30)
GFR SERPL CREATININE-BSD FRML MDRD: 76 ML/MIN/1.73
GLUCOSE BLD-MCNC: 163 MG/DL (ref 65–99)
HCT VFR BLD AUTO: 49 % (ref 34–46.6)
HGB BLD-MCNC: 16.9 G/DL (ref 12–15.9)
LYMPHOCYTES # BLD AUTO: 3.7 10*3/MM3 (ref 0.7–3.1)
LYMPHOCYTES NFR BLD AUTO: 35.1 % (ref 19.6–45.3)
MCH RBC QN AUTO: 31.1 PG (ref 26.6–33)
MCHC RBC AUTO-ENTMCNC: 34.4 G/DL (ref 31.5–35.7)
MCV RBC AUTO: 90.4 FL (ref 79–97)
MONOCYTES # BLD AUTO: 0.5 10*3/MM3 (ref 0.1–0.9)
MONOCYTES NFR BLD AUTO: 5.2 % (ref 5–12)
NEUTROPHILS # BLD AUTO: 5.9 10*3/MM3 (ref 1.7–7)
NEUTROPHILS NFR BLD AUTO: 56.3 % (ref 42.7–76)
NRBC BLD AUTO-RTO: 0.1 /100 WBC (ref 0–0.2)
PLATELET # BLD AUTO: 316 10*3/MM3 (ref 140–450)
PMV BLD AUTO: 7.4 FL (ref 6–12)
POTASSIUM BLD-SCNC: 3.9 MMOL/L (ref 3.5–5.2)
RBC # BLD AUTO: 5.42 10*6/MM3 (ref 3.77–5.28)
SODIUM BLD-SCNC: 141 MMOL/L (ref 136–145)
WBC NRBC COR # BLD: 10.5 10*3/MM3 (ref 3.4–10.8)

## 2020-01-03 PROCEDURE — 25010000002 ONDANSETRON PER 1 MG: Performed by: NURSE PRACTITIONER

## 2020-01-03 PROCEDURE — 96375 TX/PRO/DX INJ NEW DRUG ADDON: CPT

## 2020-01-03 PROCEDURE — 85652 RBC SED RATE AUTOMATED: CPT | Performed by: NURSE PRACTITIONER

## 2020-01-03 PROCEDURE — 87040 BLOOD CULTURE FOR BACTERIA: CPT | Performed by: NURSE PRACTITIONER

## 2020-01-03 PROCEDURE — 70491 CT SOFT TISSUE NECK W/DYE: CPT

## 2020-01-03 PROCEDURE — 86140 C-REACTIVE PROTEIN: CPT | Performed by: NURSE PRACTITIONER

## 2020-01-03 PROCEDURE — 96374 THER/PROPH/DIAG INJ IV PUSH: CPT

## 2020-01-03 PROCEDURE — 0 IOPAMIDOL PER 1 ML: Performed by: NURSE PRACTITIONER

## 2020-01-03 PROCEDURE — 85025 COMPLETE CBC W/AUTO DIFF WBC: CPT | Performed by: NURSE PRACTITIONER

## 2020-01-03 PROCEDURE — 72126 CT NECK SPINE W/DYE: CPT

## 2020-01-03 PROCEDURE — 80048 BASIC METABOLIC PNL TOTAL CA: CPT | Performed by: NURSE PRACTITIONER

## 2020-01-03 PROCEDURE — 25010000002 LORAZEPAM PER 2 MG: Performed by: NURSE PRACTITIONER

## 2020-01-03 PROCEDURE — 99283 EMERGENCY DEPT VISIT LOW MDM: CPT

## 2020-01-03 RX ORDER — SODIUM CHLORIDE 0.9 % (FLUSH) 0.9 %
10 SYRINGE (ML) INJECTION AS NEEDED
Status: DISCONTINUED | OUTPATIENT
Start: 2020-01-03 | End: 2020-01-04 | Stop reason: HOSPADM

## 2020-01-03 RX ORDER — ONDANSETRON 2 MG/ML
4 INJECTION INTRAMUSCULAR; INTRAVENOUS ONCE
Status: COMPLETED | OUTPATIENT
Start: 2020-01-03 | End: 2020-01-03

## 2020-01-03 RX ORDER — LORAZEPAM 2 MG/ML
1 INJECTION INTRAMUSCULAR ONCE
Status: COMPLETED | OUTPATIENT
Start: 2020-01-03 | End: 2020-01-03

## 2020-01-03 RX ADMIN — SODIUM CHLORIDE 500 ML: 900 INJECTION, SOLUTION INTRAVENOUS at 21:15

## 2020-01-03 RX ADMIN — LORAZEPAM 1 MG: 2 INJECTION INTRAMUSCULAR; INTRAVENOUS at 21:15

## 2020-01-03 RX ADMIN — IOPAMIDOL 100 ML: 755 INJECTION, SOLUTION INTRAVENOUS at 22:47

## 2020-01-03 RX ADMIN — ONDANSETRON 4 MG: 2 INJECTION INTRAMUSCULAR; INTRAVENOUS at 21:15

## 2020-01-04 NOTE — ED NOTES
Pt states neck pain and swelling since starting steroid pack and worsening over the last few days.      Zuleika Arriaga, RN  01/03/20 2043

## 2020-01-04 NOTE — DISCHARGE INSTRUCTIONS
Follow-up with lung doctor via referral or your family doctor early next week.  Outpatient CT of the chest with IV contrast is needed to clarify a right lung mass.  Cancer needs to be ruled out.  If you cannot get this done next week, return to the emergency department for the test.

## 2020-01-04 NOTE — ED PROVIDER NOTES
Subjective   54-year-old female complains of approximately 6 weeks of right lateral neck pain occasionally rating to her occipital scalp.  Worse with movement.  No associated headache or fever.  Patient also complains of fatigue.  No weakness or numbness to extremities.  Pain is moderate.  Patient was unable to get into her family doctor.          Review of Systems   Constitutional: Positive for fatigue.   Musculoskeletal: Positive for neck pain.   All other systems reviewed and are negative.      Past Medical History:   Diagnosis Date   • COPD (chronic obstructive pulmonary disease) (CMS/HCC)    • History of herniated intervertebral disc    • History of skin cancer     Left lower extremity   • History of UTI    • Hypertension    • Leukocytosis        Allergies   Allergen Reactions   • Morphine Anaphylaxis and Nausea And Vomiting       Past Surgical History:   Procedure Laterality Date   • BACK SURGERY     • DILATATION AND CURETTAGE     • LUMBAR DECOMPRESSION      L4-L5    • PARTIAL HYSTERECTOMY     • SKIN CANCER EXCISION Left     Cao       Family History   Problem Relation Age of Onset   • Breast cancer Mother    • Lung cancer Father    • Lung cancer Other    • Skin cancer Other        Social History     Socioeconomic History   • Marital status:      Spouse name: Not on file   • Number of children: Not on file   • Years of education: Not on file   • Highest education level: Not on file   Tobacco Use   • Smoking status: Current Every Day Smoker     Packs/day: 1.00     Years: 40.00     Pack years: 40.00   • Tobacco comment: currently working on cessation    Substance and Sexual Activity   • Alcohol use: Yes     Comment: occasionally   • Drug use: No           Objective   Physical Exam   Constitutional: She is oriented to person, place, and time. She appears well-developed and well-nourished.   HENT:   Head: Normocephalic and atraumatic.   Mouth/Throat: Oropharynx is clear and moist.   Eyes: Pupils are equal,  round, and reactive to light. Conjunctivae and EOM are normal.   Neck:   Neck with normal inspection, no overlying abscess or cellulitis or edema or erythema or warmth seen.  Nontender, pain with range of motion to right.  Supple no meningismus.  Carotids normal bilaterally   Cardiovascular: Normal rate, regular rhythm, normal heart sounds and intact distal pulses.   Pulmonary/Chest: Effort normal and breath sounds normal.   Abdominal: Soft. Bowel sounds are normal. She exhibits no distension. There is no tenderness.   Musculoskeletal: Normal range of motion. She exhibits no edema.   Neurological: She is alert and oriented to person, place, and time. No cranial nerve deficit.   Motor and sensation intact   Skin: Skin is warm and dry. Capillary refill takes less than 2 seconds.   Psychiatric: She has a normal mood and affect. Her behavior is normal.       Procedures           ED Course                      [place] Coma Scale Score: 15                          MDM  Number of Diagnoses or Management Options  Hilar mass:   Neck pain:   Diagnosis management comments: Results for orders placed or performed during the hospital encounter of 01/03/20  -Basic Metabolic Panel       Result                      Value             Ref Range           Glucose                     163 (H)           65 - 99 mg/dL       BUN                         10                6 - 20 mg/dL        Creatinine                  0.79              0.57 - 1.00 *       Sodium                      141               136 - 145 mm*       Potassium                   3.9               3.5 - 5.2 mm*       Chloride                    100               98 - 107 mmo*       CO2                         28.0              22.0 - 29.0 *       Calcium                     10.2              8.6 - 10.5 m*       eGFR Non African Amer       76                >60 mL/min/1*       BUN/Creatinine Ratio        12.7              7.0 - 25.0          Anion Gap                   13.0               5.0 - 15.0 m*  -Sedimentation Rate       Result                      Value             Ref Range           Sed Rate                    15                0 - 30 mm/hr   -C-reactive Protein       Result                      Value             Ref Range           C-Reactive Protein          2.34 (H)          0.00 - 0.50 *  -CBC Auto Differential       Result                      Value             Ref Range           WBC                         10.50             3.40 - 10.80*       RBC                         5.42 (H)          3.77 - 5.28 *       Hemoglobin                  16.9 (H)          12.0 - 15.9 *       Hematocrit                  49.0 (H)          34.0 - 46.6 %       MCV                         90.4              79.0 - 97.0 *       MCH                         31.1              26.6 - 33.0 *       MCHC                        34.4              31.5 - 35.7 *       RDW                         14.3              12.3 - 15.4 %       RDW-SD                      45.5              37.0 - 54.0 *       MPV                         7.4               6.0 - 12.0 fL       Platelets                   316               140 - 450 10*       Neutrophil %                56.3              42.7 - 76.0 %       Lymphocyte %                35.1              19.6 - 45.3 %       Monocyte %                  5.2               5.0 - 12.0 %        Eosinophil %                2.0               0.3 - 6.2 %         Basophil %                  1.4               0.0 - 1.5 %         Neutrophils, Absolute       5.90              1.70 - 7.00 *       Lymphocytes, Absolute       3.70 (H)          0.70 - 3.10 *       Monocytes, Absolute         0.50              0.10 - 0.90 *       Eosinophils, Absolute       0.20              0.00 - 0.40 *       Basophils, Absolute         0.10              0.00 - 0.20 *       nRBC                        0.1               0.0 - 0.2 /1*  Ct Soft Tissue Neck With Contrast    Result Date: 1/3/2020  1. Right hilar  mass measuring 1.7 cm on axial 82, series 3. There is also right hilar lymphadenopathy on axial 85, series 3, measuring 2 cm in diameter. Recommend dedicated CT chest imaging with contrast to provide additional characterization. 2. No acute abnormality evident within the neck. No significant lymphadenopathy identified. 3. No acute fracture or traumatic subluxation the cervical spine. No significant degenerative changes. No high-grade canal or foraminal stenosis identified. Electronically signed by:  Elias Gillis M.D.  1/3/2020 9:44 PM    Ct Cervical Spine With Contrast    Result Date: 1/3/2020  1. Right hilar mass measuring 1.7 cm on axial 82, series 3. There is also right hilar lymphadenopathy on axial 85, series 3, measuring 2 cm in diameter. Recommend dedicated CT chest imaging with contrast to provide additional characterization. 2. No acute abnormality evident within the neck. No significant lymphadenopathy identified. 3. No acute fracture or traumatic subluxation the cervical spine. No significant degenerative changes. No high-grade canal or foraminal stenosis identified. Electronically signed by:  Elias Gillis M.D.  1/3/2020 9:44 PM      Well, no distress, unable to do CT of the chest with IV contrast secondary to prior contrast bolus.  Patient understands the critical nature of having a CT of the chest done to exclude malignancy which is probable given features and smoking history.  Patient to follow-up with PCP and/or pulmonologist early next week to get this test done.  Patient understands she may return to this ED if it is not done next week to expedite evaluation.  Comfortable with plan.       Amount and/or Complexity of Data Reviewed  Clinical lab tests: reviewed  Tests in the radiology section of CPT®: reviewed        Final diagnoses:   Hilar mass   Neck pain            Naman Wagner MD  01/04/20 0025

## 2020-01-06 ENCOUNTER — EPISODE CHANGES (OUTPATIENT)
Dept: CASE MANAGEMENT | Facility: OTHER | Age: 55
End: 2020-01-06

## 2020-01-06 ENCOUNTER — TELEPHONE (OUTPATIENT)
Dept: FAMILY MEDICINE CLINIC | Facility: CLINIC | Age: 55
End: 2020-01-06

## 2020-01-07 ENCOUNTER — EPISODE CHANGES (OUTPATIENT)
Dept: CASE MANAGEMENT | Facility: OTHER | Age: 55
End: 2020-01-07

## 2020-01-08 LAB — BACTERIA SPEC AEROBE CULT: NORMAL

## 2020-01-09 ENCOUNTER — OFFICE VISIT (OUTPATIENT)
Dept: FAMILY MEDICINE CLINIC | Facility: CLINIC | Age: 55
End: 2020-01-09

## 2020-01-09 VITALS
HEART RATE: 87 BPM | DIASTOLIC BLOOD PRESSURE: 81 MMHG | OXYGEN SATURATION: 96 % | SYSTOLIC BLOOD PRESSURE: 127 MMHG | WEIGHT: 198 LBS | HEIGHT: 69 IN | BODY MASS INDEX: 29.33 KG/M2

## 2020-01-09 DIAGNOSIS — E55.9 VITAMIN D DEFICIENCY: ICD-10-CM

## 2020-01-09 DIAGNOSIS — R91.8 HILAR MASS: Primary | ICD-10-CM

## 2020-01-09 DIAGNOSIS — H65.93 BILATERAL SEROUS OTITIS MEDIA, UNSPECIFIED CHRONICITY: ICD-10-CM

## 2020-01-09 DIAGNOSIS — M54.42 CHRONIC MIDLINE LOW BACK PAIN WITH BILATERAL SCIATICA: ICD-10-CM

## 2020-01-09 DIAGNOSIS — G89.29 CHRONIC MIDLINE LOW BACK PAIN WITH BILATERAL SCIATICA: ICD-10-CM

## 2020-01-09 DIAGNOSIS — M54.41 CHRONIC MIDLINE LOW BACK PAIN WITH BILATERAL SCIATICA: ICD-10-CM

## 2020-01-09 DIAGNOSIS — M40.292 OTHER KYPHOSIS OF CERVICAL REGION: ICD-10-CM

## 2020-01-09 PROCEDURE — 99214 OFFICE O/P EST MOD 30 MIN: CPT | Performed by: NURSE PRACTITIONER

## 2020-01-09 RX ORDER — ERGOCALCIFEROL 1.25 MG/1
50000 CAPSULE ORAL WEEKLY
Qty: 12 CAPSULE | Refills: 0 | Status: SHIPPED | OUTPATIENT
Start: 2020-01-09 | End: 2020-03-26

## 2020-01-09 RX ORDER — AZITHROMYCIN 250 MG/1
TABLET, FILM COATED ORAL
Qty: 6 TABLET | Refills: 0 | Status: SHIPPED | OUTPATIENT
Start: 2020-01-09 | End: 2020-04-28

## 2020-01-09 NOTE — PROGRESS NOTES
"  Rahel Ramsey is a 54 y.o. female.     Chief Complaint   Patient presents with   • Edema     Neck is swelling more, says that it feels \"crunchy\" in the middle.  Pain MGMT  ordered her an MRI   • Lung Mass     Needs CT w/ Contrast   • Stye     Left   • Earache     Left   • Fatigue   • Vitamin D Deficiency     Couldn't get VitD/Calcium chewables from the pharmacy.        History of Present Illness     1. Recent ED visit for neck pain, and needs referral to pulmonologist for mass found on CT of the chest. Denies cp, soa    2. 1 week ear pain, nasal/sinus congestion, chills on/off, and just doesn't feel good.       CT neck    IMPRESSION:     1. Right hilar mass measuring 1.7 cm on axial 82, series 3. There is also right hilar lymphadenopathy on axial 85, series 3, measuring 2 cm in diameter. Recommend dedicated CT chest imaging with contrast to provide additional characterization.     2. No acute abnormality evident within the neck. No significant lymphadenopathy identified.     3. No acute fracture or traumatic subluxation the cervical spine. No significant degenerative changes. No high-grade canal or foraminal stenosis identified.       Subjective     Visit Vitals  /81 (BP Location: Left arm, Patient Position: Sitting, Cuff Size: Adult)   Pulse 87   Ht 175.3 cm (69.02\")   Wt 89.8 kg (198 lb)   SpO2 96%   BMI 29.23 kg/m²       The following portions of the patient's history were reviewed and updated as appropriate: allergies, current medications, past family history, past medical history, past social history, past surgical history and problem list.    Review of Systems   Constitutional: Positive for fatigue. Negative for chills and fever.   HENT: Positive for ear pain. Negative for dental problem, mouth sores, sinus pressure and sore throat.    Eyes: Negative for visual disturbance.   Respiratory: Negative for cough, shortness of breath and wheezing.    Gastrointestinal: Negative for abdominal pain, " blood in stool, constipation, diarrhea, nausea, vomiting and GERD.   Genitourinary: Negative for difficulty urinating, frequency, urgency and urinary incontinence.   Musculoskeletal: Positive for neck pain. Negative for arthralgias, back pain, gait problem, joint swelling and myalgias.   Skin: Negative for dry skin, pallor and rash.   Neurological: Negative for dizziness, seizures, speech difficulty and weakness.   Hematological: Negative for adenopathy.   Psychiatric/Behavioral: Negative for sleep disturbance, depressed mood and stress. The patient is not nervous/anxious.        Objective     Physical Exam   Constitutional: She is oriented to person, place, and time. She appears well-developed and well-nourished. No distress.   HENT:   Head: Normocephalic.   Right Ear: Tympanic membrane is erythematous. A middle ear effusion is present.   Left Ear: Tympanic membrane is erythematous. A middle ear effusion is present.   Eyes: Pupils are equal, round, and reactive to light. Conjunctivae are normal.   Neck: Normal range of motion. Neck supple. No JVD present. No thyromegaly present.   Cardiovascular: Normal rate, regular rhythm and normal heart sounds.   No murmur heard.  Pulmonary/Chest: Effort normal and breath sounds normal. No respiratory distress.   Abdominal: Soft. Bowel sounds are normal. She exhibits no distension. There is no tenderness.   Musculoskeletal: Normal range of motion. She exhibits deformity (abnormal kyphosis of the cervical spine. ). She exhibits no edema or tenderness.   Neurological: She is alert and oriented to person, place, and time. No sensory deficit.   Skin: Skin is warm and dry. No rash noted. She is not diaphoretic. No erythema.   Psychiatric: She has a normal mood and affect. Her behavior is normal. Judgment normal.   Nursing note and vitals reviewed.        Assessment/Plan   Rahel was seen today for edema, lung mass, stye, earache, fatigue and vitamin d deficiency.    Diagnoses and all  orders for this visit:    Hilar mass  -     Ambulatory Referral to Pulmonology  -     CT Chest With Contrast; Future    Vitamin D deficiency    Chronic midline low back pain with bilateral sciatica    Bilateral serous otitis media, unspecified chronicity    Other kyphosis of cervical region  -     Ambulatory Referral to Physical Therapy Evaluate and treat; Soft Tissue Mobilizaton; Strengthening, Stretching, ROM    Other orders  -     vitamin D (ERGOCALCIFEROL) 1.25 MG (32605 UT) capsule capsule; Take 1 capsule by mouth 1 (One) Time Per Week.  -     azithromycin (ZITHROMAX) 250 MG tablet; Take 2 tablets the first day, then 1 tablet daily for 4 days.      Continue ibuprofen, norco, lyrica, start PT for abnormal curve of c-spine.   Referral to pulm and get ct w/ IV contrast for hilar mass.   Zpack, mucinex dm and push fluids  Start weekly vit d, get otc calcium in pill form she can swallow  rec pt to see opth if stye continues.              Glucose   Date Value Ref Range Status   01/03/2020 163 (H) 65 - 99 mg/dL Final     BUN   Date Value Ref Range Status   01/03/2020 10 6 - 20 mg/dL Final     Creatinine   Date Value Ref Range Status   01/03/2020 0.79 0.57 - 1.00 mg/dL Final     Sodium   Date Value Ref Range Status   01/03/2020 141 136 - 145 mmol/L Final     Potassium   Date Value Ref Range Status   01/03/2020 3.9 3.5 - 5.2 mmol/L Final     Chloride   Date Value Ref Range Status   01/03/2020 100 98 - 107 mmol/L Final     CO2   Date Value Ref Range Status   01/03/2020 28.0 22.0 - 29.0 mmol/L Final     Calcium   Date Value Ref Range Status   01/03/2020 10.2 8.6 - 10.5 mg/dL Final     Total Protein   Date Value Ref Range Status   12/09/2019 7.7 6.0 - 8.5 g/dL Final     Albumin   Date Value Ref Range Status   12/09/2019 4.20 3.50 - 5.20 g/dL Final     ALT (SGPT)   Date Value Ref Range Status   12/09/2019 31 1 - 33 U/L Final     AST (SGOT)   Date Value Ref Range Status   12/09/2019 24 1 - 32 U/L Final     Alkaline  Phosphatase   Date Value Ref Range Status   12/09/2019 102 39 - 117 U/L Final     Total Bilirubin   Date Value Ref Range Status   12/09/2019 0.3 0.2 - 1.2 mg/dL Final     eGFR Non  Amer   Date Value Ref Range Status   01/03/2020 76 >60 mL/min/1.73 Final     A/G Ratio   Date Value Ref Range Status   12/04/2018 1.1 1.0 - 1.7 Final     BUN/Creatinine Ratio   Date Value Ref Range Status   01/03/2020 12.7 7.0 - 25.0 Final     Anion Gap   Date Value Ref Range Status   01/03/2020 13.0 5.0 - 15.0 mmol/L Final

## 2020-01-14 ENCOUNTER — TREATMENT (OUTPATIENT)
Dept: PHYSICAL THERAPY | Facility: CLINIC | Age: 55
End: 2020-01-14

## 2020-01-14 DIAGNOSIS — M40.292 OTHER KYPHOSIS, CERVICAL REGION: Primary | ICD-10-CM

## 2020-01-14 DIAGNOSIS — M54.2 CERVICALGIA: ICD-10-CM

## 2020-01-14 PROCEDURE — 97162 PT EVAL MOD COMPLEX 30 MIN: CPT | Performed by: PHYSICAL THERAPIST

## 2020-01-14 PROCEDURE — 97110 THERAPEUTIC EXERCISES: CPT | Performed by: PHYSICAL THERAPIST

## 2020-01-14 NOTE — PROGRESS NOTES
"Physical Therapy Initial Evaluation and Plan of Care    Patient: Rahel Ramsey   : 1965  Diagnosis/ICD-10 Code:  Other kyphosis, cervical region [M40.292]  Referring practitioner: FELIX Fenton  Date of Initial Visit: 2020  Today's Date: 2020  Patient seen for 1 sessions           Subjective Questionnaire: NDI: 31      Subjective Evaluation    History of Present Illness  Mechanism of injury: Patient reports that about 6 weeks ago she started having neck pain after being on medication for multiple infections for her body. Her neck swelled up and she went to the ER where they performed a CT scan. The CT scan was negative for any abnormalities of the neck but they found a lung mass. She saw her primary after that who sent her to PT for exercises for the neck. She states that she has active swelling in the neck and the head and when she lifts her head up the two ends meet and cause a lot of pain. She is waiting at the moment to get an MRI of the neck. Looking up/back, moving the neck side to side, sleeping throughout the night, and sitting for long periods of time tend to aggravate symptoms into the neck. Laying down and using stop pain cream tends to decrease the pain in her neck. She states that her face occasionally with go numb but she believes that is due to taking Lyrica as it is a side effect. Patient denies any numbness/tingling into the arms or hands. The patient states that she does not want to do any therapy or any \"crazy\" exercises before she gets the MRI results.    Quality of life: good    Pain  Current pain ratin  At best pain ratin  At worst pain rating: 10  Quality: dull ache and throbbing  Relieving factors: rest and relaxation  Aggravating factors: overhead activity, lifting, prolonged positioning, outstretched reach, movement, sleeping, standing and repetitive movement  Progression: no change    Hand dominance: right    Patient Goals  Patient goals for therapy: " decreased pain, increased motion, increased strength, independence with ADLs/IADLs and return to sport/leisure activities             Objective       Postural Observations  Seated posture: poor  Standing posture: poor    Additional Postural Observation Details  Increased thoracic kyphosis with excessive cervical extension    Palpation   Left   Hypertonic in the cervical interspinals, cervical paraspinals, intercostals, scalenes, sternocleidomastoid, suboccipitals and upper trapezius.   Tenderness of the cervical interspinals, cervical paraspinals, intercostals, suboccipitals and upper trapezius.     Right   Hypertonic in the cervical interspinals, cervical paraspinals, intercostals, scalenes, sternocleidomastoid, suboccipitals and upper trapezius. Tenderness of the cervical interspinals, cervical paraspinals, intercostals, suboccipitals and upper trapezius.     Tenderness     Additional Tenderness Details  Severe tenderness at the base of the skull    Active Range of Motion   Cervical/Thoracic Spine   Cervical    Flexion: 30 degrees with pain  Extension: 10 degrees with pain  Left lateral flexion: 15 degrees with pain  Right lateral flexion: 15 degrees with pain    Strength/Myotome Testing     Left Shoulder     Planes of Motion   Flexion: 4+   Abduction: 4+     Right Shoulder     Planes of Motion   Flexion: 4+   Abduction: 4+     Left Elbow   Flexion: 4+  Extension: 4+    Right Elbow   Flexion: 4+  Extension: 4+    Left Wrist/Hand   Wrist extension: 4+  Wrist flexion: 4+    Right Wrist/Hand   Wrist extension: 4+  Wrist flexion: 4+    Tests   Cervical     Left   Positive active compression (Clifton).     Right   Positive active compression (Clifton).          Assessment & Plan     Assessment  Impairments: abnormal coordination, abnormal muscle firing, abnormal muscle tone, abnormal or restricted ROM, activity intolerance, impaired physical strength, lacks appropriate home exercise program and pain with  function  Assessment details: The patient is a 54 y.o. female who presents to physical therapy today for cervicalgia. Upon initial evaluation, the patient demonstrates the following impairments: increased pain, increased muscle guarding, poor posture, decreased strength, decreased joint mobility, and decreased ROM. Due to these impairments, the patient is unable to sit or stand for long periods of time, turn her head while driving, and sleep throughout the night without an increase in symptoms. The patient would benefit from skilled PT services to address functional limitations and impairments and to improve patient quality of life.    Prognosis: good  Functional Limitations: carrying objects, lifting, sleeping, walking, uncomfortable because of pain, moving in bed, sitting and standing  Goals  Plan Goals: STG's: 2 weeks   1. Patient will report a reduction in pain by >25%  2. Patient will be able to perform HEP with minimal verbal cues     LTG's: By discharge   1. Patient will report a reduction in pain by >90%  2. Patient will have an improvement on the Neck Disability Index to demonstrate overall improvement in daily functional level  3. Patient will be independent with undergarment dressing without pain   4. Patient will be able to tolerate sitting > 45 minutes without increased pain  5. Patient will demonstrate sufficient cervical AROM to allow patient to turn her head to view blindspots when driving  6. Patient will be able to sleep > 5 hours without waking in pain   7. Patient will be independent with HEP        Plan  Therapy options: will be seen for skilled physical therapy services  Planned modality interventions: cryotherapy, electrical stimulation/Russian stimulation, TENS, thermotherapy (hydrocollator packs) and traction  Planned therapy interventions: abdominal trunk stabilization, body mechanics training, fine motor coordination training, flexibility, functional ROM exercises, home exercise program,  IADL retraining, joint mobilization, manual therapy, neuromuscular re-education, postural training, soft tissue mobilization, spinal/joint mobilization, strengthening, stretching and therapeutic activities  Duration in visits: 12  Treatment plan discussed with: patient        History # of Personal Factors and/or Comorbidities: MODERATE (1-2)  Examination of Body System(s): # of elements: MODERATE (3)  Clinical Presentation: EVOLVING  Clinical Decision Making: MODERATE      Timed:         Manual Therapy:         mins  73292;     Therapeutic Exercise:    10     mins  41336;     Neuromuscular Romie:        mins  80808;    Therapeutic Activity:          mins  12339;     Gait Training:           mins  02760;     Ultrasound:          mins  95802;    Ionto                                   mins   85484  Self Care                            mins   97993  Canalith Repos         mins 92384      Un-Timed:  Electrical Stimulation:         mins  00091 ( );  Dry Needling          mins self-pay  Traction          mins 11295  Low Eval          Mins  14585  Mod Eval     30     Mins  70400  High Eval                            Mins  79994  Re-Eval                               mins  63723        Timed Treatment:   10   mins   Total Treatment:     40   mins    PT SIGNATURE: Giselle Major PT   DATE TREATMENT INITIATED: 1/14/2020    Initial Certification  Certification Period: 4/13/2020  I certify that the therapy services are furnished while this patient is under my care.  The services outlined above are required by this patient, and will be reviewed every 90 days.     PHYSICIAN: Vickie Gomez APRN      DATE:     Please sign and return via fax to [phone].. Thank you, [facility].

## 2020-01-16 ENCOUNTER — EPISODE CHANGES (OUTPATIENT)
Dept: CASE MANAGEMENT | Facility: OTHER | Age: 55
End: 2020-01-16

## 2020-01-17 ENCOUNTER — HOSPITAL ENCOUNTER (OUTPATIENT)
Dept: CT IMAGING | Facility: HOSPITAL | Age: 55
Discharge: HOME | End: 2020-01-17
Admitting: NURSE PRACTITIONER

## 2020-01-17 DIAGNOSIS — R91.8 HILAR MASS: ICD-10-CM

## 2020-01-17 PROCEDURE — 71260 CT THORAX DX C+: CPT

## 2020-01-17 PROCEDURE — 0 IOPAMIDOL PER 1 ML: Performed by: NURSE PRACTITIONER

## 2020-01-17 RX ADMIN — IOPAMIDOL 100 ML: 755 INJECTION, SOLUTION INTRAVENOUS at 12:51

## 2020-01-20 NOTE — PROGRESS NOTES
Called Patient. No answer and no VM set up. Made other  know that I did call her if she calls back. I also created letter in Patient chart to mail out today.

## 2020-01-21 ENCOUNTER — OFFICE VISIT (OUTPATIENT)
Dept: FAMILY MEDICINE CLINIC | Facility: CLINIC | Age: 55
End: 2020-01-21

## 2020-01-21 VITALS
HEIGHT: 69 IN | SYSTOLIC BLOOD PRESSURE: 132 MMHG | HEART RATE: 81 BPM | WEIGHT: 200 LBS | DIASTOLIC BLOOD PRESSURE: 75 MMHG | OXYGEN SATURATION: 99 % | BODY MASS INDEX: 29.62 KG/M2

## 2020-01-21 DIAGNOSIS — Z71.6 TOBACCO ABUSE COUNSELING: ICD-10-CM

## 2020-01-21 DIAGNOSIS — R91.8 ABNORMAL CT SCAN OF LUNG: ICD-10-CM

## 2020-01-21 DIAGNOSIS — R91.1 NODULE OF UPPER LOBE OF RIGHT LUNG: Primary | ICD-10-CM

## 2020-01-21 PROCEDURE — 99213 OFFICE O/P EST LOW 20 MIN: CPT | Performed by: NURSE PRACTITIONER

## 2020-01-21 NOTE — PROGRESS NOTES
"Rahel Ramesy is a 54 y.o. female.     Chief Complaint   Patient presents with   • Discuss CT results       History of Present Illness     Patient here to follow-up on abnormal right upper lobe nodule and right perihilar lung nodule suspicious for metastatic disease.  She is still smoking approximately 1 pack of cigarettes per day    Subjective     Visit Vitals  /75 (BP Location: Left arm, Patient Position: Sitting, Cuff Size: Adult)   Pulse 81   Ht 175.3 cm (69.02\")   Wt 90.7 kg (200 lb)   SpO2 99%   BMI 29.52 kg/m²       The following portions of the patient's history were reviewed and updated as appropriate: allergies, current medications, past family history, past medical history, past social history, past surgical history and problem list.    Review of Systems   Constitutional: Positive for fatigue. Negative for chills and fever.   HENT: Negative for dental problem, ear pain, mouth sores, sinus pressure and sore throat.    Eyes: Negative for visual disturbance.   Respiratory: Negative for cough, shortness of breath and wheezing.    Gastrointestinal: Negative for abdominal pain, blood in stool, constipation, diarrhea, nausea, vomiting and GERD.   Genitourinary: Negative for difficulty urinating, frequency, urgency and urinary incontinence.   Musculoskeletal: Positive for neck pain. Negative for arthralgias, back pain, gait problem, joint swelling and myalgias.   Skin: Negative for dry skin, pallor and rash.   Neurological: Negative for dizziness, seizures, speech difficulty and weakness.   Hematological: Negative for adenopathy.   Psychiatric/Behavioral: Negative for sleep disturbance, depressed mood and stress. The patient is not nervous/anxious.        Objective     Physical Exam   Constitutional: She is oriented to person, place, and time. She appears well-developed and well-nourished. No distress.   HENT:   Head: Normocephalic.   Eyes: Pupils are equal, round, and reactive to light. Conjunctivae " are normal.   Neck: Normal range of motion. Neck supple. No JVD present. No thyromegaly present.   Cardiovascular: Normal rate, regular rhythm and normal heart sounds.   No murmur heard.  Pulmonary/Chest: Effort normal and breath sounds normal. No respiratory distress (diminished throughout).   Abdominal: Soft. Bowel sounds are normal. She exhibits no distension. There is no tenderness.   Musculoskeletal: Normal range of motion. She exhibits deformity (abnormal kyphosis of the cervical spine. ). She exhibits no edema or tenderness.   Neurological: She is alert and oriented to person, place, and time. No sensory deficit.   Skin: Skin is warm and dry. No rash noted. She is not diaphoretic. No erythema.   Psychiatric: She has a normal mood and affect. Her behavior is normal. Judgment normal.   Nursing note and vitals reviewed.        Assessment/Plan   Rahel was seen today for discuss ct results.    Diagnoses and all orders for this visit:    Nodule of upper lobe of right lung  -     NM PET Whole Body; Future    Abnormal CT scan of lung  -     NM PET Whole Body; Future    Tobacco abuse counseling      RUL and right hilar nodules, check pet scan, will likely refer Dr. Yeung for eval  Has already been referred to Dr. Rustam olivia with appt scheduled in February.   Discussed smoking cessation with the patient and she is not interested in quitting at this time           Glucose   Date Value Ref Range Status   01/03/2020 163 (H) 65 - 99 mg/dL Final     BUN   Date Value Ref Range Status   01/03/2020 10 6 - 20 mg/dL Final     Creatinine   Date Value Ref Range Status   01/03/2020 0.79 0.57 - 1.00 mg/dL Final     Sodium   Date Value Ref Range Status   01/03/2020 141 136 - 145 mmol/L Final     Potassium   Date Value Ref Range Status   01/03/2020 3.9 3.5 - 5.2 mmol/L Final     Chloride   Date Value Ref Range Status   01/03/2020 100 98 - 107 mmol/L Final     CO2   Date Value Ref Range Status   01/03/2020 28.0 22.0 - 29.0 mmol/L  Final     Calcium   Date Value Ref Range Status   01/03/2020 10.2 8.6 - 10.5 mg/dL Final     Total Protein   Date Value Ref Range Status   12/09/2019 7.7 6.0 - 8.5 g/dL Final     Albumin   Date Value Ref Range Status   12/09/2019 4.20 3.50 - 5.20 g/dL Final     ALT (SGPT)   Date Value Ref Range Status   12/09/2019 31 1 - 33 U/L Final     AST (SGOT)   Date Value Ref Range Status   12/09/2019 24 1 - 32 U/L Final     Alkaline Phosphatase   Date Value Ref Range Status   12/09/2019 102 39 - 117 U/L Final     Total Bilirubin   Date Value Ref Range Status   12/09/2019 0.3 0.2 - 1.2 mg/dL Final     eGFR Non  Amer   Date Value Ref Range Status   01/03/2020 76 >60 mL/min/1.73 Final     A/G Ratio   Date Value Ref Range Status   12/04/2018 1.1 1.0 - 1.7 Final     BUN/Creatinine Ratio   Date Value Ref Range Status   01/03/2020 12.7 7.0 - 25.0 Final     Anion Gap   Date Value Ref Range Status   01/03/2020 13.0 5.0 - 15.0 mmol/L Final

## 2020-01-28 ENCOUNTER — TELEPHONE (OUTPATIENT)
Dept: FAMILY MEDICINE CLINIC | Facility: CLINIC | Age: 55
End: 2020-01-28

## 2020-01-28 ENCOUNTER — TELEPHONE (OUTPATIENT)
Dept: ONCOLOGY | Facility: CLINIC | Age: 55
End: 2020-01-28

## 2020-01-28 NOTE — TELEPHONE ENCOUNTER
Lizzie with medical group calling to see if PET scan needed a prior auth and if so she wanted to go ahead and get it scheduled. Her callback [phone] at the appointment desk

## 2020-02-04 ENCOUNTER — EPISODE CHANGES (OUTPATIENT)
Dept: CASE MANAGEMENT | Facility: OTHER | Age: 55
End: 2020-02-04

## 2020-02-04 ENCOUNTER — TELEPHONE (OUTPATIENT)
Dept: FAMILY MEDICINE CLINIC | Facility: CLINIC | Age: 55
End: 2020-02-04

## 2020-02-04 DIAGNOSIS — R91.8 HILAR MASS: Primary | ICD-10-CM

## 2020-02-04 DIAGNOSIS — R91.1 NODULE OF UPPER LOBE OF RIGHT LUNG: ICD-10-CM

## 2020-02-05 NOTE — TELEPHONE ENCOUNTER
I wanted the PET scan to be done before I referred.... I reordered PET today and will place referral to [facility]. Pet needs to be completed prior to appt with Dr. Yeung. Thank you.

## 2020-02-06 ENCOUNTER — TELEPHONE (OUTPATIENT)
Dept: FAMILY MEDICINE CLINIC | Facility: CLINIC | Age: 55
End: 2020-02-06

## 2020-02-06 DIAGNOSIS — R91.1 NODULE OF UPPER LOBE OF RIGHT LUNG: Primary | ICD-10-CM

## 2020-02-06 DIAGNOSIS — R91.8 HILAR MASS: ICD-10-CM

## 2020-02-06 NOTE — TELEPHONE ENCOUNTER
Shalonda with the cancer care center called this morning and has asked that you put in a new order for [place]. She said everything you need to see can be seen with order- [facility] to [facility]. [place] [address]. She has asked that I call her and let her know when the order is in as Rahel is scheduled tomorrow. Phone [phone]

## 2020-02-07 ENCOUNTER — HOSPITAL ENCOUNTER (OUTPATIENT)
Dept: PET IMAGING | Facility: HOSPITAL | Age: 55
Discharge: HOME | End: 2020-02-07
Admitting: NURSE PRACTITIONER

## 2020-02-07 DIAGNOSIS — R91.1 NODULE OF UPPER LOBE OF RIGHT LUNG: ICD-10-CM

## 2020-02-07 DIAGNOSIS — R91.8 HILAR MASS: ICD-10-CM

## 2020-02-07 LAB — GLUCOSE BLDC GLUCOMTR-MCNC: 116 MG/DL (ref 70–105)

## 2020-02-07 PROCEDURE — A9552 F18 FDG: HCPCS | Performed by: NURSE PRACTITIONER

## 2020-02-07 PROCEDURE — 0 FLUDEOXYGLUCOSE F18 SOLUTION: Performed by: NURSE PRACTITIONER

## 2020-02-07 PROCEDURE — 82962 GLUCOSE BLOOD TEST: CPT

## 2020-02-07 PROCEDURE — 78815 PET IMAGE W/CT SKULL-THIGH: CPT

## 2020-02-07 RX ADMIN — FLUDEOXYGLUCOSE F18 1 DOSE: 300 INJECTION INTRAVENOUS at 11:09

## 2020-02-11 ENCOUNTER — OFFICE VISIT (OUTPATIENT)
Dept: SURGERY | Facility: CLINIC | Age: 55
End: 2020-02-11

## 2020-02-11 VITALS
BODY MASS INDEX: 29.52 KG/M2 | DIASTOLIC BLOOD PRESSURE: 77 MMHG | SYSTOLIC BLOOD PRESSURE: 124 MMHG | HEART RATE: 85 BPM | OXYGEN SATURATION: 96 % | WEIGHT: 200 LBS | TEMPERATURE: 98.8 F

## 2020-02-11 DIAGNOSIS — R53.83 FATIGUE, UNSPECIFIED TYPE: ICD-10-CM

## 2020-02-11 DIAGNOSIS — R59.0 HILAR ADENOPATHY: Primary | ICD-10-CM

## 2020-02-11 DIAGNOSIS — R91.1 RIGHT UPPER LOBE PULMONARY NODULE: ICD-10-CM

## 2020-02-11 DIAGNOSIS — M51.16 LUMBAR DISC DISEASE WITH RADICULOPATHY: ICD-10-CM

## 2020-02-11 DIAGNOSIS — Z72.0 TOBACCO USE: ICD-10-CM

## 2020-02-11 PROBLEM — I65.21 STENOSIS OF RIGHT CAROTID ARTERY: Status: ACTIVE | Noted: 2018-03-09

## 2020-02-11 PROBLEM — Z85.828 HISTORY OF SKIN CANCER: Status: ACTIVE | Noted: 2017-12-05

## 2020-02-11 PROBLEM — I10 HYPERTENSION: Status: ACTIVE | Noted: 2020-02-11

## 2020-02-11 PROBLEM — E78.5 HYPERLIPIDEMIA: Status: ACTIVE | Noted: 2017-04-11

## 2020-02-11 PROBLEM — M19.011 DEGENERATIVE JOINT DISEASE OF RIGHT ACROMIOCLAVICULAR JOINT: Status: ACTIVE | Noted: 2017-11-15

## 2020-02-11 PROBLEM — R73.9 HYPERGLYCEMIA: Status: ACTIVE | Noted: 2017-04-11

## 2020-02-11 PROBLEM — I48.0 PAROXYSMAL ATRIAL FIBRILLATION: Status: ACTIVE | Noted: 2018-05-11

## 2020-02-11 PROCEDURE — 99204 OFFICE O/P NEW MOD 45 MIN: CPT | Performed by: THORACIC SURGERY (CARDIOTHORACIC VASCULAR SURGERY)

## 2020-02-11 RX ORDER — OMEPRAZOLE 20 MG/1
20 CAPSULE, DELAYED RELEASE ORAL DAILY
COMMUNITY
End: 2021-01-22

## 2020-02-11 NOTE — PROGRESS NOTES
Subjective   Chief Complaint   Patient presents with   • Lung Nodule     new patient hilar mass referral brooke Ramsey is a 54 y.o. female.      History of Present Illness  This patient presented with a stye in the left eye and multiple oral lesions and a skin lesion on her hip back in December.  She was given Keflex and over time her symptoms improved although she continued to have sores in her mouth that were small and punctate.  She also had a feeling of discomfort in the nape of her neck is currently significantly improved.  In the process of her work-up she underwent a CT scan on January 17 which revealed a spiculated nodule in the right upper lobe at the bifurcation between the posterior and anterior segmental bronchi.  She also had right hilar adenopathy with a large node approximately 2 cm adjacent to the pulmonary artery.  She also noted sweats without fever.  She was having headaches.  She does have a long-term history of smoking and continues to smoke.  She underwent a PET CT scan that showed PET activity in these 2 areas within the right upper lobe lung region however both the adenopathy and the pulmonary nodule of decreased in size and the pulmonary nodule is begun to cavitate.  I personally examined all the scans and reviewed the radiology reports.  Note was not made that the pulmonary nodule is getting smaller but it clearly is.  The patient does not have any GI symptoms no hematemesis or melena.    The following portions of the patient's history were reviewed and updated as appropriate: allergies, current medications, past family history, past medical history, past social history, past surgical history and problem list.    I have personally reviewed the following radiographs and reviewed the radiology reports.  My independent finds are   see above           I have reviewed the hospital record and reviewed the accompanying physician notes.  I have discussed the case with the  following physicians:     Past Medical History:   Diagnosis Date   • Allergic    • Arthritis    • COPD (chronic obstructive pulmonary disease) (CMS/HCC)    • Headache    • History of herniated intervertebral disc    • History of skin cancer     Left lower extremity   • Hypertension    • Injury of back    • Leukocytosis      Social History     Social History Narrative   • Not on file     Social History     Tobacco Use   Smoking Status Current Every Day Smoker   • Packs/day: 1.00   • Years: 40.00   • Pack years: 40.00   Smokeless Tobacco Never Used   Tobacco Comment    currently working on cessation      Family History   Problem Relation Age of Onset   • Breast cancer Mother    • Lung cancer Father    • Lung cancer Other    • Skin cancer Other        Current Outpatient Medications:   •  amitriptyline (ELAVIL) 25 MG tablet, Take 25 mg by mouth every night at bedtime., Disp: , Rfl: 3  •  chlorhexidine (PERIDEX) 0.12 % solution, Swish and spit 15 ml BID for 7 days, then as needed, Disp: 473 mL, Rfl: 0  •  Cyanocobalamin (VITAMIN B 12 PO), Take  by mouth., Disp: , Rfl:   •  fluticasone (FLONASE) 50 MCG/ACT nasal spray, 2 sprays into the nostril(s) as directed by provider Daily., Disp: 1 bottle, Rfl: 3  •  HYDROcodone-acetaminophen (NORCO)  MG per tablet, Take 1 tablet by mouth Every 6 (Six) Hours As Needed., Disp: , Rfl: 0  •  LYRICA 200 MG capsule, Take 1 capsule by mouth 2 (Two) Times a Day., Disp: 60 capsule, Rfl: 1  •  metoprolol succinate XL (TOPROL-XL) 25 MG 24 hr tablet, Take 25 mg by mouth Daily., Disp: , Rfl: 3  •  omeprazole (priLOSEC) 20 MG capsule, Take 20 mg by mouth Daily., Disp: , Rfl:   •  trimethoprim-polymyxin b (POLYTRIM) 67815-4.1 UNIT/ML-% ophthalmic solution, ADMINISTER 1 DROP INTO THE LEFT EYE EVERY 3 (THREE) HOURS FOR 7 DAYS. MAX 6 DOSES/24 HRS, Disp: , Rfl: 0  •  vitamin D (ERGOCALCIFEROL) 1.25 MG (92626 UT) capsule capsule, Take 1 capsule by mouth 1 (One) Time Per Week., Disp: 12 capsule,  Rfl: 0  •  aspirin (ADULT ASPIRIN EC LOW STRENGTH) 81 MG EC tablet, 81 mg., Disp: , Rfl:   •  azithromycin (ZITHROMAX) 250 MG tablet, Take 2 tablets the first day, then 1 tablet daily for 4 days., Disp: 6 tablet, Rfl: 0  •  loratadine (CLARITIN) 10 MG tablet, Daily., Disp: , Rfl:       REVIEW OF SYSTEMS  All systems have been reviewed.  Results are scanned into the chart.  Positives in addition to those mentioned above include pain in her back shoulders neck occasional visual flashes and dry mouth.  All other systems are negative.    Objective   /77 (BP Location: Left arm, Patient Position: Sitting, Cuff Size: Adult)   Pulse 85   Temp 98.8 °F (37.1 °C) (Oral)   Wt 90.7 kg (200 lb)   SpO2 96%   BMI 29.52 kg/m²   Physical Exam   Constitutional: She is oriented to person, place, and time. She appears well-developed and well-nourished. No distress.   HENT:   Head: Atraumatic.   Right Ear: External ear normal.   Left Ear: External ear normal.   Nose: Nose normal.   Mouth/Throat: No oropharyngeal exudate.   Small punctate clean lesions on the lips and in her mouth.  Edentulous.  Erythematous mucosa.   Eyes: Pupils are equal, round, and reactive to light. EOM are normal. Right eye exhibits no discharge. Left eye exhibits no discharge. No scleral icterus.   Conjunctivae injected particularly on the left.  I do not clearly see a stye but it may have resolved.   Neck: Normal range of motion. Neck supple. No JVD present. No tracheal deviation present. No thyromegaly present.   Cardiovascular: Normal rate.   Pulmonary/Chest: Effort normal. No stridor. No respiratory distress. She has no wheezes.   Abdominal: Soft. She exhibits no distension. There is no tenderness. There is no guarding.   Musculoskeletal: Normal range of motion. She exhibits tenderness and deformity. She exhibits no edema.   Lymphadenopathy:     She has no cervical adenopathy.   Neurological: She is alert and oriented to person, place, and time. No  cranial nerve deficit.   Skin: Skin is warm and dry. Capillary refill takes less than 2 seconds. No rash noted. She is not diaphoretic. No erythema. No pallor.   Psychiatric: She has a normal mood and affect. Her behavior is normal. Thought content normal.   Vitals reviewed.         Assessment/Plan   #1 pulmonary nodule right upper lobe slightly decreased in size cavitating may be benign  2.  Right hilar adenopathy also appears to be smaller PET active.  May represent inflammation or malignancy  3.  Continued smoking encouraged to quit.  Means to do so afforded  4.  Tiny oral lesions of unclear etiology and history of a stye and a skin lesion that are all improving.  Patient may have a systemic reaction to some undefined infectious process.  We will follow-up at the time of repeat examination review of the CT scan.    After full discussion with the patient and his plan to get a repeat CT scan in early March to assess whether or not the pulmonary nodule and lymphadenopathy continued decrease in size a CT scan with IV contrast will be performed to optimally identify the lymph node and the pulmonary nodule.  I have explained both the possibility of malignancy and of inflammatory process.  Overall I favor an inflammatory process.  Procedures    Franklin Yeung MD  2/11/2020  3:28 PM

## 2020-02-12 ENCOUNTER — EPISODE CHANGES (OUTPATIENT)
Dept: CASE MANAGEMENT | Facility: OTHER | Age: 55
End: 2020-02-12

## 2020-02-12 ENCOUNTER — TREATMENT (OUTPATIENT)
Dept: PHYSICAL THERAPY | Facility: CLINIC | Age: 55
End: 2020-02-12

## 2020-02-12 DIAGNOSIS — M54.2 CERVICALGIA: ICD-10-CM

## 2020-02-12 DIAGNOSIS — M40.292 OTHER KYPHOSIS, CERVICAL REGION: Primary | ICD-10-CM

## 2020-02-12 PROCEDURE — 97140 MANUAL THERAPY 1/> REGIONS: CPT | Performed by: PHYSICAL THERAPIST

## 2020-02-12 PROCEDURE — 97110 THERAPEUTIC EXERCISES: CPT | Performed by: PHYSICAL THERAPIST

## 2020-02-12 NOTE — PROGRESS NOTES
Physical Therapy Daily Progress Note    VISIT#: 2    Subjective   Rahel Ramsey reports that they found slight arthritis in the neck but did not find anything where the spots that stick out are. She has had some pain with the exercises but tries to do them occassionally.  Pain Ratin    Objective     See Exercise, Manual, and Modality Logs for complete treatment.     Patient Education: HEP changes, exercise cueing and rationale    Assessment & Plan     Assessment  Assessment details: The patient presents to therapy today with hypertonicity and tenderness along the cervical extensors which could be due to posture and thoracic kyphosis. The patient responded well to myofascial release with no increase in symptoms. PT and patient discussed patient's HEP which was corrected.        Progress per Plan of Care and Progress strengthening /stabilization /functional activity          Timed:         Manual Therapy:    23     mins  30260;     Therapeutic Exercise:    10     mins  13464;     Neuromuscular Romie:        mins  49126;    Therapeutic Activity:          mins  81631;     Gait Training:           mins  60425;     Ultrasound:          mins  76318;    Ionto                                   mins   46894  Self Care                            mins   41716  Canalith Repos                   mins  75133    Un-Timed:  Electrical Stimulation:         mins  17118 ( );  Dry Needling          mins self-pay  Traction          mins 62538  Low Eval          Mins  86793  Mod Eval          Mins  61623  High Eval                            Mins  28054  Re-Eval                               mins  29625    Timed Treatment:   33   mins   Total Treatment:     43   mins    Giselle Major PT  Physical Therapist  [place] License # [id number]

## 2020-02-21 ENCOUNTER — TELEPHONE (OUTPATIENT)
Dept: FAMILY MEDICINE CLINIC | Facility: CLINIC | Age: 55
End: 2020-02-21

## 2020-02-21 NOTE — TELEPHONE ENCOUNTER
Patient has a standing order for a full body PET scan.  Do you still want her to complete it?  Thanks.

## 2020-02-25 ENCOUNTER — TREATMENT (OUTPATIENT)
Dept: PHYSICAL THERAPY | Facility: CLINIC | Age: 55
End: 2020-02-25

## 2020-02-25 DIAGNOSIS — M40.292 OTHER KYPHOSIS, CERVICAL REGION: Primary | ICD-10-CM

## 2020-02-25 DIAGNOSIS — M54.2 CERVICALGIA: ICD-10-CM

## 2020-02-25 PROCEDURE — 97110 THERAPEUTIC EXERCISES: CPT | Performed by: PHYSICAL THERAPIST

## 2020-02-25 PROCEDURE — 97140 MANUAL THERAPY 1/> REGIONS: CPT | Performed by: PHYSICAL THERAPIST

## 2020-02-25 NOTE — PROGRESS NOTES
Physical Therapy Daily Progress Note    VISIT#: 3    Subjective   Rahel Ramsey reports that her swelling seems to be getting worse in the neck. Her neck is sore today.  Pain Ratin    Objective     See Exercise, Manual, and Modality Logs for complete treatment.     Patient Education: importance of HEP    Assessment & Plan     Assessment  Assessment details: The patient demonstrated tenderness and hypertonicity along the cervical extensors and suboccipitals which responded well to myofascial release. PT to continue progressing per patient tolerance.        Progress per Plan of Care and Progress strengthening /stabilization /functional activity          Timed:         Manual Therapy:    23     mins  92421;     Therapeutic Exercise:    10     mins  91708;     Neuromuscular Romie:        mins  31048;    Therapeutic Activity:          mins  66761;     Gait Training:           mins  36279;     Ultrasound:          mins  79112;    Ionto                                   mins   33998  Self Care                            mins   06502  Canalith Repos                   mins  12153    Un-Timed:  Electrical Stimulation:         mins  48300 ( );  Dry Needling          mins self-pay  Traction          mins 55102  Low Eval          Mins  08429  Mod Eval          Mins  31633  High Eval                            Mins  26914  Re-Eval                               mins  39208    Timed Treatment:   33   mins   Total Treatment:     45   mins    Giselle Major PT  Physical Therapist  [place] License # [id number]

## 2020-02-28 ENCOUNTER — TRANSCRIBE ORDERS (OUTPATIENT)
Dept: ADMINISTRATIVE | Facility: HOSPITAL | Age: 55
End: 2020-02-28

## 2020-02-28 DIAGNOSIS — R06.00 DYSPNEA, UNSPECIFIED TYPE: Primary | ICD-10-CM

## 2020-03-03 ENCOUNTER — TREATMENT (OUTPATIENT)
Dept: PHYSICAL THERAPY | Facility: CLINIC | Age: 55
End: 2020-03-03

## 2020-03-03 ENCOUNTER — TRANSCRIBE ORDERS (OUTPATIENT)
Dept: ADMINISTRATIVE | Facility: HOSPITAL | Age: 55
End: 2020-03-03

## 2020-03-03 DIAGNOSIS — M54.2 CERVICALGIA: ICD-10-CM

## 2020-03-03 DIAGNOSIS — R06.00 DYSPNEA, UNSPECIFIED TYPE: Primary | ICD-10-CM

## 2020-03-03 DIAGNOSIS — M40.292 OTHER KYPHOSIS, CERVICAL REGION: Primary | ICD-10-CM

## 2020-03-03 PROCEDURE — 97140 MANUAL THERAPY 1/> REGIONS: CPT | Performed by: PHYSICAL THERAPIST

## 2020-03-03 PROCEDURE — 97110 THERAPEUTIC EXERCISES: CPT | Performed by: PHYSICAL THERAPIST

## 2020-03-03 NOTE — PROGRESS NOTES
Physical Therapy Daily Progress Note    VISIT#: 4    Subjective   Rahel Ramsey reports that she has some pain today, she doesn't feel as swollen today.  Pain Ratin    Objective     See Exercise, Manual, and Modality Logs for complete treatment.     Patient Education: HEP, exercise cueing    Assessment & Plan     Assessment  Assessment details: The patient was able to tolerate deeper manual therapy well today with no increase in pain. Patient tolerated added exercises in sitting well with no increase in symptoms. PT to continue progressing patient per patient tolerance.        Progress per Plan of Care and Progress strengthening /stabilization /functional activity          Timed:         Manual Therapy:    25     mins  39015;     Therapeutic Exercise:    13     mins  26219;     Neuromuscular Romie:        mins  09462;    Therapeutic Activity:          mins  11228;     Gait Training:           mins  16363;     Ultrasound:          mins  01481;    Ionto                                   mins   81287  Self Care                            mins   18892  Canalith Repos                   mins  56422    Un-Timed:  Electrical Stimulation:         mins  78647 ( );  Dry Needling          mins self-pay  Traction          mins 83543  Low Eval          Mins  27488  Mod Eval          Mins  82310  High Eval                            Mins  05592  Re-Eval                               mins  30463    Timed Treatment:   38   mins   Total Treatment:     48   mins    Giselle Major PT  Physical Therapist  [place] License # [id number]

## 2020-03-05 ENCOUNTER — TREATMENT (OUTPATIENT)
Dept: PHYSICAL THERAPY | Facility: CLINIC | Age: 55
End: 2020-03-05

## 2020-03-05 DIAGNOSIS — M40.292 OTHER KYPHOSIS, CERVICAL REGION: Primary | ICD-10-CM

## 2020-03-05 DIAGNOSIS — M54.2 CERVICALGIA: ICD-10-CM

## 2020-03-05 PROCEDURE — 97110 THERAPEUTIC EXERCISES: CPT | Performed by: PHYSICAL THERAPIST

## 2020-03-05 PROCEDURE — 97140 MANUAL THERAPY 1/> REGIONS: CPT | Performed by: PHYSICAL THERAPIST

## 2020-03-05 NOTE — PROGRESS NOTES
Physical Therapy Daily Progress Note    VISIT#: 5    Subjective   Rahel Ramsey reports that she feels like the swelling in the back of her neck has gone down. She doesn't have as much pain today.  Pain Ratin    Objective     See Exercise, Manual, and Modality Logs for complete treatment.     Patient Education: importance of HEP    Assessment & Plan     Assessment  Assessment details: The patient was able to tolerate deeper manual therapy at today's visit compared to previous visits. She tolerated exercises well, did note that the yellow band is starting to get easier for her. PT to progress at next visit.        Progress per Plan of Care and Progress strengthening /stabilization /functional activity          Timed:         Manual Therapy:    20     mins  47664;     Therapeutic Exercise:    20     mins  38189;     Neuromuscular Romie:        mins  78234;    Therapeutic Activity:          mins  24224;     Gait Training:           mins  30586;     Ultrasound:          mins  22672;    Ionto                                   mins   69007  Self Care                            mins   40482  Canalith Repos                   mins  22826    Un-Timed:  Electrical Stimulation:         mins  01313 ( );  Dry Needling          mins self-pay  Traction          mins 18220  Low Eval          Mins  52825  Mod Eval          Mins  02468  High Eval                            Mins  92448  Re-Eval                               mins  94184    Timed Treatment:   40   mins   Total Treatment:     50   mins    Giselle Major, PT  Physical Therapist  [place] License # [id number]

## 2020-03-09 ENCOUNTER — TREATMENT (OUTPATIENT)
Dept: PHYSICAL THERAPY | Facility: CLINIC | Age: 55
End: 2020-03-09

## 2020-03-09 DIAGNOSIS — M54.2 CERVICALGIA: ICD-10-CM

## 2020-03-09 DIAGNOSIS — M40.292 OTHER KYPHOSIS, CERVICAL REGION: Primary | ICD-10-CM

## 2020-03-09 PROCEDURE — 97140 MANUAL THERAPY 1/> REGIONS: CPT | Performed by: PHYSICAL THERAPIST

## 2020-03-09 PROCEDURE — 97110 THERAPEUTIC EXERCISES: CPT | Performed by: PHYSICAL THERAPIST

## 2020-03-09 NOTE — PROGRESS NOTES
Physical Therapy Daily Progress Note    VISIT#: 6    Subjective   Rahel Ramsey reports that her neck has still been sore. Some of th pain she has today is her normal pain.  Pain Ratin    Objective     See Exercise, Manual, and Modality Logs for complete treatment.     Patient Education: importance of HEP, exercise rationale    Assessment & Plan     Assessment  Assessment details: The patient presents to therapy today with a reduction in cervical spine pain, did have 7/10 overall body pain. She demonstrated tenderness along the cervical extensors which responded well to myofascial release. Patient denied use of ice or heat at PT, will do heat at home.        Progress per Plan of Care and Progress strengthening /stabilization /functional activity          Timed:         Manual Therapy:    23     mins  18694;     Therapeutic Exercise:    17     mins  56301;     Neuromuscular Romie:        mins  75107;    Therapeutic Activity:          mins  42843;     Gait Training:           mins  89336;     Ultrasound:          mins  51423;    Ionto                                   mins   90848  Self Care                            mins   98653  Canalith Repos                   mins  59749    Un-Timed:  Electrical Stimulation:         mins  93729 ( );  Dry Needling          mins self-pay  Traction          mins 79091  Low Eval          Mins  34544  Mod Eval          Mins  96742  High Eval                            Mins  89331  Re-Eval                               mins  83237    Timed Treatment:   40   mins   Total Treatment:     43   mins    Giselle Major, PT  Physical Therapist  [place] License # [id number]

## 2020-03-10 ENCOUNTER — HOSPITAL ENCOUNTER (OUTPATIENT)
Dept: CT IMAGING | Facility: HOSPITAL | Age: 55
Discharge: HOME | End: 2020-03-10
Admitting: THORACIC SURGERY (CARDIOTHORACIC VASCULAR SURGERY)

## 2020-03-10 DIAGNOSIS — R91.1 RIGHT UPPER LOBE PULMONARY NODULE: ICD-10-CM

## 2020-03-10 DIAGNOSIS — R59.0 HILAR ADENOPATHY: ICD-10-CM

## 2020-03-10 LAB — CREAT BLDA-MCNC: 0.7 MG/DL (ref 0.6–1.3)

## 2020-03-10 PROCEDURE — 82565 ASSAY OF CREATININE: CPT

## 2020-03-10 PROCEDURE — 0 IOPAMIDOL PER 1 ML: Performed by: THORACIC SURGERY (CARDIOTHORACIC VASCULAR SURGERY)

## 2020-03-10 PROCEDURE — 71260 CT THORAX DX C+: CPT

## 2020-03-10 RX ADMIN — IOPAMIDOL 100 ML: 755 INJECTION, SOLUTION INTRAVENOUS at 09:10

## 2020-03-11 ENCOUNTER — APPOINTMENT (OUTPATIENT)
Dept: RESPIRATORY THERAPY | Facility: HOSPITAL | Age: 55
End: 2020-03-11

## 2020-03-11 ENCOUNTER — HOSPITAL ENCOUNTER (OUTPATIENT)
Dept: RESPIRATORY THERAPY | Facility: HOSPITAL | Age: 55
Discharge: HOME | End: 2020-03-11
Admitting: INTERNAL MEDICINE

## 2020-03-11 DIAGNOSIS — R06.00 DYSPNEA, UNSPECIFIED TYPE: ICD-10-CM

## 2020-03-11 PROCEDURE — 94729 DIFFUSING CAPACITY: CPT

## 2020-03-11 PROCEDURE — 94640 AIRWAY INHALATION TREATMENT: CPT

## 2020-03-11 PROCEDURE — 94060 EVALUATION OF WHEEZING: CPT

## 2020-03-11 PROCEDURE — 94726 PLETHYSMOGRAPHY LUNG VOLUMES: CPT

## 2020-03-11 RX ORDER — ALBUTEROL SULFATE 2.5 MG/3ML
2.5 SOLUTION RESPIRATORY (INHALATION) ONCE AS NEEDED
Status: COMPLETED | OUTPATIENT
Start: 2020-03-11 | End: 2020-03-11

## 2020-03-11 RX ADMIN — ALBUTEROL SULFATE 2.5 MG: 2.5 SOLUTION RESPIRATORY (INHALATION) at 15:48

## 2020-03-12 ENCOUNTER — OFFICE VISIT (OUTPATIENT)
Dept: SURGERY | Facility: CLINIC | Age: 55
End: 2020-03-12

## 2020-03-12 VITALS
HEIGHT: 69 IN | SYSTOLIC BLOOD PRESSURE: 112 MMHG | TEMPERATURE: 98.3 F | WEIGHT: 198 LBS | DIASTOLIC BLOOD PRESSURE: 76 MMHG | BODY MASS INDEX: 29.33 KG/M2 | OXYGEN SATURATION: 96 % | HEART RATE: 92 BPM

## 2020-03-12 DIAGNOSIS — R91.1 RIGHT UPPER LOBE PULMONARY NODULE: Primary | ICD-10-CM

## 2020-03-12 PROCEDURE — 99214 OFFICE O/P EST MOD 30 MIN: CPT | Performed by: THORACIC SURGERY (CARDIOTHORACIC VASCULAR SURGERY)

## 2020-03-12 NOTE — PROGRESS NOTES
Thoracic surgery progress note    Chief complaint follow-up of pulmonary nodules and hilar adenopathy    The patient returns with a follow-up CT scan dated March 10, 2020 that I personally examined.  I have also reviewed the radiology report.  I concur with it.  The patient has reduction of the right upper lobe pulmonary nodule and mild reduction in size of the hilar adenopathy.  There are no new nodules.    The patient's oral lesions have resolved.  Her skin rash is resolved.  Overall she is feeling better.  No fevers chills or night sweats.  No hemoptysis.    The patient does continue to smoke.  I encouraged her to quit.  Means to do so have been afforded.    On review of systems she has arthritic discomfort in her legs and hot flashes all other systems are reported negative and are scanned into the chart.    On physical examination she is in no distress she walks with a cane in her left hand.  Symmetrical chest expansion no cyanosis clubbing or edema.    Impression #1 right upper lobe pulmonary nodule has decreased in size from 14 mm to 8 mm and is likely benign  2.  Hilar adenopathy remains in place slightly decreased.  3.  Continued smoking encouraged to quit we will follow-up with the patient in that regard.    Plan follow-up CT scan in 6 months to follow-up on numbers 1 and 2

## 2020-03-26 RX ORDER — ERGOCALCIFEROL 1.25 MG/1
50000 CAPSULE ORAL WEEKLY
Qty: 12 CAPSULE | Refills: 0 | Status: SHIPPED | OUTPATIENT
Start: 2020-03-26 | End: 2020-06-11

## 2020-04-03 RX ORDER — FLUTICASONE PROPIONATE 50 MCG
SPRAY, SUSPENSION (ML) NASAL
Qty: 48 ML | Refills: 1 | Status: SHIPPED | OUTPATIENT
Start: 2020-04-03 | End: 2020-09-29

## 2020-04-07 ENCOUNTER — TELEPHONE (OUTPATIENT)
Dept: FAMILY MEDICINE CLINIC | Facility: CLINIC | Age: 55
End: 2020-04-07

## 2020-04-07 RX ORDER — CIPROFLOXACIN 250 MG/1
250 TABLET, FILM COATED ORAL 2 TIMES DAILY
Qty: 10 TABLET | Refills: 0 | Status: SHIPPED | OUTPATIENT
Start: 2020-04-07 | End: 2020-04-12

## 2020-04-07 NOTE — TELEPHONE ENCOUNTER
Patient reports UTI symptoms including urgency, dysuria, and feeling like she has to use the bathroom even though she just used it.  She asked if there is anything you can prescribe without her needing to come in to the office to leave a sample?

## 2020-04-07 NOTE — TELEPHONE ENCOUNTER
I sent cipro for 5 days, she needs to also increase water intake.  Limit caffeine, carbonation, citrus and chocolate.

## 2020-04-08 DIAGNOSIS — M54.42 CHRONIC MIDLINE LOW BACK PAIN WITH BILATERAL SCIATICA: ICD-10-CM

## 2020-04-08 DIAGNOSIS — M54.41 CHRONIC MIDLINE LOW BACK PAIN WITH BILATERAL SCIATICA: ICD-10-CM

## 2020-04-08 DIAGNOSIS — G89.29 CHRONIC MIDLINE LOW BACK PAIN WITH BILATERAL SCIATICA: ICD-10-CM

## 2020-04-28 ENCOUNTER — OFFICE VISIT (OUTPATIENT)
Dept: CARDIOLOGY | Facility: CLINIC | Age: 55
End: 2020-04-28

## 2020-04-28 VITALS
DIASTOLIC BLOOD PRESSURE: 82 MMHG | SYSTOLIC BLOOD PRESSURE: 130 MMHG | WEIGHT: 198 LBS | HEIGHT: 69 IN | BODY MASS INDEX: 29.33 KG/M2 | HEART RATE: 78 BPM

## 2020-04-28 DIAGNOSIS — I10 ESSENTIAL HYPERTENSION: Primary | ICD-10-CM

## 2020-04-28 DIAGNOSIS — E78.2 MIXED HYPERLIPIDEMIA: ICD-10-CM

## 2020-04-28 DIAGNOSIS — I48.0 PAROXYSMAL ATRIAL FIBRILLATION (HCC): ICD-10-CM

## 2020-04-28 PROCEDURE — 99442 PR PHYS/QHP TELEPHONE EVALUATION 11-20 MIN: CPT | Performed by: INTERNAL MEDICINE

## 2020-04-28 RX ORDER — METOPROLOL SUCCINATE 25 MG/1
25 TABLET, EXTENDED RELEASE ORAL DAILY
Qty: 90 TABLET | Refills: 3 | Status: SHIPPED | OUTPATIENT
Start: 2020-04-28 | End: 2022-03-08 | Stop reason: SDUPTHER

## 2020-04-28 NOTE — ASSESSMENT & PLAN NOTE
Currently maintained in sinus rhythm on low-dose metoprolol succinate; not on anticoagulation therapy

## 2020-04-28 NOTE — PROGRESS NOTES
Cardiology Office Visit Note      Referring physician:  Vickie WASHINGTON  You have chosen to receive care through a telephone visit. Do you consent to use a telephone visit for your medical care today? Yes    Reason For Followup: 1 year follow up    HPI:  Rahel Ramsey is a 54 y.o. female. Presents today via telephone for follow up  afib/RVR,  fibromyalgia, HTN, continued tobacco abuse;    cardiac cath, 2016, normal coronary anatomy and well preserved LV function.    Patient states he b/p ranges in the 130's, while heart rate remains recently low controlled in the 70s 80s range.  Patient does little to no exercising daily, due to very severe chronic back pain for which she is medically disabled and walks with a cane.    Today,  denies chest pain, dyspnea, PND, orthopnea, palpitations, near syncope, lower extremity edema or feelings of her heart racing  She reports she has been compliant with prescribed medications. This visit has been rescheduled as a phone visit to comply with patient safety concerns in accordance with CDC recommendations. Total time of discussion was 12 minutes.        Past Medical History:   Diagnosis Date   • Allergic    • Arthritis    • Atrial fibrillation (CMS/HCC)    • COPD (chronic obstructive pulmonary disease) (CMS/HCC)    • Headache    • History of herniated intervertebral disc    • History of skin cancer     Left lower extremity   • Hyperlipidemia    • Hypertension    • Injury of back    • Leukocytosis        Past Surgical History:   Procedure Laterality Date   • BACK SURGERY     • DILATATION AND CURETTAGE     • LUMBAR DECOMPRESSION      L4-L5    • SKIN CANCER EXCISION Left     Cao   • SUBTOTAL HYSTERECTOMY           Current Outpatient Medications:   •  amitriptyline (ELAVIL) 25 MG tablet, Take 25 mg by mouth every night at bedtime., Disp: , Rfl: 3  •  aspirin (ADULT ASPIRIN EC LOW STRENGTH) 81 MG EC tablet, 81 mg., Disp: , Rfl:   •  chlorhexidine (PERIDEX) 0.12 % solution, Swish  and spit 15 ml BID for 7 days, then as needed, Disp: 473 mL, Rfl: 0  •  Cyanocobalamin (VITAMIN B 12 PO), Take  by mouth., Disp: , Rfl:   •  fluticasone (FLONASE) 50 MCG/ACT nasal spray, INHALE 2 PUFFS INTO EACH NOSTRIL EVERY DAY, Disp: 48 mL, Rfl: 1  •  HYDROcodone-acetaminophen (NORCO)  MG per tablet, Take 1 tablet by mouth Every 6 (Six) Hours As Needed., Disp: , Rfl: 0  •  LYRICA 200 MG capsule, TAKE 1 CAPSULE BY MOUTH TWICE A DAY, Disp: 60 capsule, Rfl: 2  •  omeprazole (priLOSEC) 20 MG capsule, Take 20 mg by mouth Daily., Disp: , Rfl:   •  trimethoprim-polymyxin b (POLYTRIM) 16834-7.1 UNIT/ML-% ophthalmic solution, ADMINISTER 1 DROP INTO THE LEFT EYE EVERY 3 (THREE) HOURS FOR 7 DAYS. MAX 6 DOSES/24 HRS, Disp: , Rfl: 0  •  umeclidinium-vilanterol (ANORO ELLIPTA) 62.5-25 MCG/INH aerosol powder  inhaler, Inhale 1 puff Daily., Disp: , Rfl:   •  vitamin D (ERGOCALCIFEROL) 1.25 MG (96058 UT) capsule capsule, TAKE 1 CAPSULE BY MOUTH 1 (ONE) TIME PER WEEK., Disp: 12 capsule, Rfl: 0  •  metoprolol succinate XL (TOPROL-XL) 25 MG 24 hr tablet, Take 1 tablet by mouth Daily., Disp: 90 tablet, Rfl: 3    Social History     Socioeconomic History   • Marital status:      Spouse name: Not on file   • Number of children: Not on file   • Years of education: Not on file   • Highest education level: Not on file   Tobacco Use   • Smoking status: Current Every Day Smoker     Packs/day: 1.00     Years: 40.00     Pack years: 40.00   • Smokeless tobacco: Never Used   • Tobacco comment: currently working on cessation    Substance and Sexual Activity   • Alcohol use: Yes     Comment: occasionally   • Drug use: No   • Sexual activity: Defer       Family History   Problem Relation Age of Onset   • Breast cancer Mother    • Lung cancer Father    • Lung cancer Other    • Skin cancer Other          Review of Systems   General: denies fever, chills, anorexia, weight loss  Eyes: denies blurring, diplopia  Ear/Nose/Throat: denies ear  "pain, nosebleeds, hoarseness  Cardiovascular: See HPI  Respiratory: denies excessive sputum, hemoptysis, wheezing  Gastrointestinal: denies nausea, vomiting, change in bowel habits, abdominal pain  Genitourinary: Denies hematuria or dysuria  Musculoskeletal: Severe chronic back pain-see HPI  Skin: denies rashes, itching, suspicious lesions  Neurologic: denies focal neuro deficits  Psychiatric: denies depression, anxiety  Endocrine: denies cold intolerance, heat intolerance  Hematologic/Lymphatic: denies abnormal bruising, bleeding  Allergic/Immunologic: denies urticaria or persistent infections      Objective     Visit Vitals  /82   Pulse 78   Ht 175.3 cm (69.02\")   Wt 89.8 kg (198 lb)   BMI 29.23 kg/m²           Physical Exam-limited clinical exam description based on patient's observations and reporting to me during today's phone office visit  General:   Borderline   Obese, well developed,, in no acute distress.   HEENT unremarkable; patient denies scleral icterus or changes in visual acuity or oropharyngeal lesions  Neck: Supple without JVD or masses  Lungs: Appears to have satisfactory respiratory rate and pattern with no conversational dyspnea  Cardiac: Regular heart rhythm with normal heart rates and no reports of breakthrough irregular heart beating patterns  Abdomen: Soft without tenderness or masses   Extremities:    no, cyanosis, edema   Neurologic:    no focal sensory or motor deficits  Skin:    intact without lesions or rashes.    Psych:    alert and cooperative; normal mood and affect; normal attention span and concentration.          Procedures      Assessment:   Problems Addressed this Visit        Cardiovascular and Mediastinum    Essential hypertension - Primary     Hypertension is well regulated on metoprolol succinate 25 mg p.o. daily         Mixed hyperlipidemia     Lipid abnormalities are managed by PCP; currently not on statin therapy.         Paroxysmal atrial fibrillation (CMS/HCC)     " Currently maintained in sinus rhythm on low-dose metoprolol succinate; not on anticoagulation therapy                 Plan:  Continue current medications as listed reviewed in detail today.  We will refill her prescription for metoprolol succinate 25 mg p.o. Daily.  I am not advocating anticoagulant therapy with chads score of 2 and risk for falling  We hope to seeing Ms. Ramsey for follow-up in 1 year or sooner if needed    KYRA Yeager MD  4/28/2020 16:24    This report was generated using the Dragon voice recognition system.

## 2020-05-04 ENCOUNTER — TELEPHONE (OUTPATIENT)
Dept: PHYSICAL THERAPY | Facility: CLINIC | Age: 55
End: 2020-05-04

## 2020-05-07 ENCOUNTER — OFFICE VISIT (OUTPATIENT)
Dept: FAMILY MEDICINE CLINIC | Facility: CLINIC | Age: 55
End: 2020-05-07

## 2020-05-07 VITALS
WEIGHT: 204.5 LBS | DIASTOLIC BLOOD PRESSURE: 76 MMHG | BODY MASS INDEX: 30.29 KG/M2 | HEART RATE: 81 BPM | SYSTOLIC BLOOD PRESSURE: 125 MMHG | TEMPERATURE: 98.7 F | HEIGHT: 69 IN | OXYGEN SATURATION: 97 %

## 2020-05-07 DIAGNOSIS — M54.41 CHRONIC MIDLINE LOW BACK PAIN WITH BILATERAL SCIATICA: Primary | ICD-10-CM

## 2020-05-07 DIAGNOSIS — I10 ESSENTIAL HYPERTENSION: ICD-10-CM

## 2020-05-07 DIAGNOSIS — M54.42 CHRONIC MIDLINE LOW BACK PAIN WITH BILATERAL SCIATICA: Primary | ICD-10-CM

## 2020-05-07 DIAGNOSIS — E55.9 VITAMIN D DEFICIENCY: ICD-10-CM

## 2020-05-07 DIAGNOSIS — E78.2 MIXED HYPERLIPIDEMIA: ICD-10-CM

## 2020-05-07 DIAGNOSIS — Z00.00 PREVENTATIVE HEALTH CARE: ICD-10-CM

## 2020-05-07 DIAGNOSIS — Z12.31 BREAST CANCER SCREENING BY MAMMOGRAM: ICD-10-CM

## 2020-05-07 DIAGNOSIS — J44.9 CHRONIC OBSTRUCTIVE PULMONARY DISEASE, UNSPECIFIED COPD TYPE (HCC): ICD-10-CM

## 2020-05-07 DIAGNOSIS — G89.29 CHRONIC MIDLINE LOW BACK PAIN WITH BILATERAL SCIATICA: Primary | ICD-10-CM

## 2020-05-07 DIAGNOSIS — R59.0 HILAR ADENOPATHY: ICD-10-CM

## 2020-05-07 LAB
DEPRECATED RDW RBC AUTO: 43.2 FL (ref 37–54)
ERYTHROCYTE [DISTWIDTH] IN BLOOD BY AUTOMATED COUNT: 13.3 % (ref 12.3–15.4)
HCT VFR BLD AUTO: 47.7 % (ref 34–46.6)
HGB BLD-MCNC: 16.3 G/DL (ref 12–15.9)
MCH RBC QN AUTO: 30.5 PG (ref 26.6–33)
MCHC RBC AUTO-ENTMCNC: 34.2 G/DL (ref 31.5–35.7)
MCV RBC AUTO: 89.3 FL (ref 79–97)
PLATELET # BLD AUTO: 237 10*3/MM3 (ref 140–450)
PMV BLD AUTO: 10.5 FL (ref 6–12)
RBC # BLD AUTO: 5.34 10*6/MM3 (ref 3.77–5.28)
WBC NRBC COR # BLD: 10.25 10*3/MM3 (ref 3.4–10.8)

## 2020-05-07 PROCEDURE — 90715 TDAP VACCINE 7 YRS/> IM: CPT | Performed by: NURSE PRACTITIONER

## 2020-05-07 PROCEDURE — 36415 COLL VENOUS BLD VENIPUNCTURE: CPT | Performed by: NURSE PRACTITIONER

## 2020-05-07 PROCEDURE — 80053 COMPREHEN METABOLIC PANEL: CPT | Performed by: NURSE PRACTITIONER

## 2020-05-07 PROCEDURE — 80061 LIPID PANEL: CPT | Performed by: NURSE PRACTITIONER

## 2020-05-07 PROCEDURE — 90471 IMMUNIZATION ADMIN: CPT | Performed by: NURSE PRACTITIONER

## 2020-05-07 PROCEDURE — 85027 COMPLETE CBC AUTOMATED: CPT | Performed by: NURSE PRACTITIONER

## 2020-05-07 PROCEDURE — 99214 OFFICE O/P EST MOD 30 MIN: CPT | Performed by: NURSE PRACTITIONER

## 2020-05-07 PROCEDURE — 83036 HEMOGLOBIN GLYCOSYLATED A1C: CPT | Performed by: NURSE PRACTITIONER

## 2020-05-07 PROCEDURE — 84443 ASSAY THYROID STIM HORMONE: CPT | Performed by: NURSE PRACTITIONER

## 2020-05-07 PROCEDURE — 82306 VITAMIN D 25 HYDROXY: CPT | Performed by: NURSE PRACTITIONER

## 2020-05-07 NOTE — PROGRESS NOTES
"  Rahel Ramsey is a 54 y.o. female.     Chief Complaint   Patient presents with   • Knee Pain   • Follow-up       Knee Pain    The incident occurred more than 1 week ago. Incident location: grocery store. The injury mechanism was a direct blow. The pain is present in the right knee. The quality of the pain is described as stabbing. The pain is at a severity of 8/10. The pain has been intermittent since onset. Pertinent negatives include no inability to bear weight, loss of motion, loss of sensation, muscle weakness, numbness or tingling. The symptoms are aggravated by movement. She has tried acetaminophen and NSAIDs for the symptoms. The treatment provided mild relief.      Recent UTI resolved after abx, denies dysuria, hematuria, frequency and hesitancy.     Chronic back pain, lumbar epidural every other month at Dr. Dixon office.  Pain is stable, on hydrocodone and lyrica. Patient reports weakness/radiation of pain into LLE, with numbness, tingling, stiffness, denies loss of motion, bowel changes, bladder changes, pain with cough, pain with sneeze, pain with straining, pain with defecation, fever, chills, rash and groin pain.  Reports her insurance is no longer covering and would like a new referral to pain management.    COPD: The patient has been previously diagnosed with COPD. Symptoms include dyspnea, non-productive cough and wheezing, especially with exertion.   Symptoms have been unchanged and currently stable since onset.  She is not using inhalers as directed. She does smoke 1 ppd, for approx 40 years now.     Hilar mass, patient is following with Dr. Yeung, next follow-up CT scan scheduled for September.      Subjective     Visit Vitals  /76 (BP Location: Left arm, Patient Position: Sitting, Cuff Size: Adult)   Pulse 81   Temp 98.7 °F (37.1 °C)   Ht 175.3 cm (69.02\")   Wt 92.8 kg (204 lb 8 oz)   SpO2 97%   BMI 30.19 kg/m²       The following portions of the patient's history were reviewed " and updated as appropriate: allergies, current medications, past family history, past medical history, past social history, past surgical history and problem list.    Review of Systems   Constitutional: Negative for chills, fatigue and fever.   HENT: Negative for dental problem, ear pain, sinus pressure and sore throat.    Eyes: Negative for visual disturbance.   Respiratory: Positive for cough (smoker), shortness of breath and wheezing (with exertion).    Cardiovascular: Negative for chest pain, palpitations and leg swelling.   Gastrointestinal: Negative for abdominal pain, blood in stool, constipation, diarrhea, nausea, vomiting and GERD.   Genitourinary: Negative for difficulty urinating, frequency, urgency and urinary incontinence.   Musculoskeletal: Positive for arthralgias, back pain and gait problem. Negative for joint swelling, myalgias and neck pain.   Skin: Negative for dry skin, pallor and rash.   Neurological: Negative for dizziness, tingling, seizures, speech difficulty, weakness and numbness.   Hematological: Negative for adenopathy.   Psychiatric/Behavioral: Negative for sleep disturbance, depressed mood and stress. The patient is not nervous/anxious.        Objective     Physical Exam   Constitutional: She is oriented to person, place, and time. She appears well-developed and well-nourished. No distress.   HENT:   Head: Normocephalic.   Eyes: Pupils are equal, round, and reactive to light. Conjunctivae are normal.   Neck: Normal range of motion. Neck supple. No JVD present. No thyromegaly present.   Cardiovascular: Normal rate, regular rhythm and normal heart sounds.   No murmur heard.  Pulmonary/Chest: Effort normal and breath sounds normal. No respiratory distress.   Prolonged expiration   Abdominal: Soft. Bowel sounds are normal. She exhibits no distension. There is no tenderness.   Musculoskeletal: Normal range of motion. She exhibits tenderness (low back ttp, mod garcia rom, gait ataxia, uses cane  for mobility.  Right knee acute TTP, no swelling or erythema). She exhibits no edema.   Neurological: She is alert and oriented to person, place, and time. No sensory deficit.   Skin: Skin is warm and dry. No rash noted. She is not diaphoretic. No erythema.   Psychiatric: She has a normal mood and affect. Her behavior is normal. Judgment normal.         Assessment/Plan   Rahel was seen today for knee pain and follow-up.    Diagnoses and all orders for this visit:    Chronic midline low back pain with bilateral sciatica  -     Ambulatory Referral to Pain Management  Referral to new pain management within insurance network  Continue hydrocodone and Lyrica  Refill Lyrica when needed  Started PT, on hold currently.    Hilar adenopathy  Keep follow-ups with Dr. Sosa  CT scheduled for September 2020    Essential hypertension  Stable, continue metoprolol daily    Preventative health care  -     Comprehensive Metabolic Panel  -     CBC (No Diff)  -     TSH  -     Hemoglobin A1c  Mammogram overdue: ordered for BHF  Colonoscopy 2019 UTD, due 3 years   Flu vaccine: due fall 2020  Update Tdap today  recc shingles vaccines through pharmacy  Labs today, pt had coffee and pepsi this am.   Pneumonia vaccine: next visit    Mixed hyperlipidemia  -     Lipid Panel  Reviewed previous lipid panel from 2018, discussed healthy heart diet, limiting fatty, fried, greasy foods and increasing exercise habits  -Check and update all labs today, notify patient results    Breast cancer screening by mammogram  -     Mammo Screening Digital Tomosynthesis Bilateral With CAD; Future  Over due sine 2017    Vitamin D deficiency  -     Vitamin D 25 Hydroxy  Cont weekly vit d, check level    COPD  Keep f/u with Dr. Easton  Cut back on cigs  Start anoro as ordered, rinse mouth after use.     Other orders  -     Tdap Vaccine Greater Than or Equal To 6yo IM  Update tdap today         rtc in 3mo for lyrica rf, or earlier if needed           Glucose   Date  Value Ref Range Status   01/03/2020 163 (H) 65 - 99 mg/dL Final     BUN   Date Value Ref Range Status   01/03/2020 10 6 - 20 mg/dL Final     Creatinine   Date Value Ref Range Status   03/10/2020 0.70 0.60 - 1.30 mg/dL Final     Comment:     Serial Number: [id number]:  [id number]     Sodium   Date Value Ref Range Status   01/03/2020 141 136 - 145 mmol/L Final     Potassium   Date Value Ref Range Status   01/03/2020 3.9 3.5 - 5.2 mmol/L Final     Chloride   Date Value Ref Range Status   01/03/2020 100 98 - 107 mmol/L Final     CO2   Date Value Ref Range Status   01/03/2020 28.0 22.0 - 29.0 mmol/L Final     Calcium   Date Value Ref Range Status   01/03/2020 10.2 8.6 - 10.5 mg/dL Final     Total Protein   Date Value Ref Range Status   12/09/2019 7.7 6.0 - 8.5 g/dL Final     Albumin   Date Value Ref Range Status   12/09/2019 4.20 3.50 - 5.20 g/dL Final     ALT (SGPT)   Date Value Ref Range Status   12/09/2019 31 1 - 33 U/L Final     AST (SGOT)   Date Value Ref Range Status   12/09/2019 24 1 - 32 U/L Final     Alkaline Phosphatase   Date Value Ref Range Status   12/09/2019 102 39 - 117 U/L Final     Total Bilirubin   Date Value Ref Range Status   12/09/2019 0.3 0.2 - 1.2 mg/dL Final     eGFR Non  Amer   Date Value Ref Range Status   01/03/2020 76 >60 mL/min/1.73 Final     A/G Ratio   Date Value Ref Range Status   12/04/2018 1.1 1.0 - 1.7 Final     BUN/Creatinine Ratio   Date Value Ref Range Status   01/03/2020 12.7 7.0 - 25.0 Final     Anion Gap   Date Value Ref Range Status   01/03/2020 13.0 5.0 - 15.0 mmol/L Final

## 2020-05-08 LAB
25(OH)D3 SERPL-MCNC: 25 NG/ML (ref 30–100)
ALBUMIN SERPL-MCNC: 4 G/DL (ref 3.5–5.2)
ALBUMIN/GLOB SERPL: 1.3 G/DL
ALP SERPL-CCNC: 101 U/L (ref 39–117)
ALT SERPL W P-5'-P-CCNC: 42 U/L (ref 1–33)
ANION GAP SERPL CALCULATED.3IONS-SCNC: 14.9 MMOL/L (ref 5–15)
AST SERPL-CCNC: 33 U/L (ref 1–32)
BILIRUB SERPL-MCNC: 0.5 MG/DL (ref 0.2–1.2)
BUN BLD-MCNC: 9 MG/DL (ref 6–20)
BUN/CREAT SERPL: 13.8 (ref 7–25)
CALCIUM SPEC-SCNC: 9.3 MG/DL (ref 8.6–10.5)
CHLORIDE SERPL-SCNC: 100 MMOL/L (ref 98–107)
CHOLEST SERPL-MCNC: 190 MG/DL (ref 0–200)
CO2 SERPL-SCNC: 24.1 MMOL/L (ref 22–29)
CREAT BLD-MCNC: 0.65 MG/DL (ref 0.57–1)
GFR SERPL CREATININE-BSD FRML MDRD: 95 ML/MIN/1.73
GLOBULIN UR ELPH-MCNC: 3.2 GM/DL
GLUCOSE BLD-MCNC: 78 MG/DL (ref 65–99)
HBA1C MFR BLD: 6 % (ref 3.5–5.6)
HDLC SERPL-MCNC: 32 MG/DL (ref 40–60)
LDLC SERPL CALC-MCNC: 99 MG/DL (ref 0–100)
LDLC/HDLC SERPL: 3.08 {RATIO}
POTASSIUM BLD-SCNC: 4 MMOL/L (ref 3.5–5.2)
PROT SERPL-MCNC: 7.2 G/DL (ref 6–8.5)
SODIUM BLD-SCNC: 139 MMOL/L (ref 136–145)
TRIGL SERPL-MCNC: 297 MG/DL (ref 0–150)
TSH SERPL DL<=0.05 MIU/L-ACNC: 1.49 UIU/ML (ref 0.27–4.2)
VLDLC SERPL-MCNC: 59.4 MG/DL (ref 5–40)

## 2020-05-11 RX ORDER — FENOFIBRATE 145 MG/1
145 TABLET, COATED ORAL DAILY
Qty: 30 TABLET | Refills: 3 | Status: SHIPPED | OUTPATIENT
Start: 2020-05-11 | End: 2020-09-02

## 2020-05-19 ENCOUNTER — HOSPITAL ENCOUNTER (OUTPATIENT)
Dept: MAMMOGRAPHY | Facility: HOSPITAL | Age: 55
Discharge: HOME | End: 2020-05-19
Admitting: NURSE PRACTITIONER

## 2020-05-19 DIAGNOSIS — Z12.31 BREAST CANCER SCREENING BY MAMMOGRAM: ICD-10-CM

## 2020-05-19 PROCEDURE — 77067 SCR MAMMO BI INCL CAD: CPT

## 2020-05-19 PROCEDURE — 77063 BREAST TOMOSYNTHESIS BI: CPT

## 2020-06-11 RX ORDER — ERGOCALCIFEROL 1.25 MG/1
CAPSULE ORAL
Qty: 12 CAPSULE | Refills: 0 | Status: SHIPPED | OUTPATIENT
Start: 2020-06-11 | End: 2020-08-28

## 2020-06-18 ENCOUNTER — DOCUMENTATION (OUTPATIENT)
Dept: PHYSICAL THERAPY | Facility: CLINIC | Age: 55
End: 2020-06-18

## 2020-06-18 NOTE — PROGRESS NOTES
Discharge Summary  Discharge Summary from Physical Therapy Report      Dates  PT visit: 1/14/2020 to 3/9/2020  Number of Visits: 6     Discharge Status of Patient: discharged    Goals: unable to assess    Discharge Plan: Continue with current home exercise program as instructed    Comments: The patient is being discharged today due to patient self discharge from PT program. The patient was not seen due to stay at home orders during Covid-19. She was called to schedule appointments, she stated she has too many other health issues at this time and she wishes to be discharged from PT.    Date of Discharge 6/18/2020        Giselle Major, PT  Physical Therapist

## 2020-08-11 ENCOUNTER — TELEPHONE (OUTPATIENT)
Dept: FAMILY MEDICINE CLINIC | Facility: CLINIC | Age: 55
End: 2020-08-11

## 2020-08-11 RX ORDER — CIPROFLOXACIN 250 MG/1
250 TABLET, FILM COATED ORAL 2 TIMES DAILY
Qty: 10 TABLET | Refills: 0 | Status: SHIPPED | OUTPATIENT
Start: 2020-08-11 | End: 2020-11-25

## 2020-08-11 NOTE — TELEPHONE ENCOUNTER
Patient has a uti she is certain of it. Can you call an antibiotic in for her or do you need to see her?

## 2020-08-28 RX ORDER — ERGOCALCIFEROL 1.25 MG/1
CAPSULE ORAL
Qty: 12 CAPSULE | Refills: 0 | Status: SHIPPED | OUTPATIENT
Start: 2020-08-28 | End: 2020-11-18

## 2020-09-02 RX ORDER — FENOFIBRATE 145 MG/1
TABLET, COATED ORAL
Qty: 90 TABLET | Refills: 1 | Status: SHIPPED | OUTPATIENT
Start: 2020-09-02 | End: 2021-02-22 | Stop reason: SDUPTHER

## 2020-09-07 ENCOUNTER — APPOINTMENT (OUTPATIENT)
Dept: CT IMAGING | Facility: HOSPITAL | Age: 55
End: 2020-09-07

## 2020-09-08 ENCOUNTER — APPOINTMENT (OUTPATIENT)
Dept: CT IMAGING | Facility: HOSPITAL | Age: 55
End: 2020-09-08

## 2020-09-11 DIAGNOSIS — G89.29 CHRONIC MIDLINE LOW BACK PAIN WITH BILATERAL SCIATICA: ICD-10-CM

## 2020-09-11 DIAGNOSIS — M54.42 CHRONIC MIDLINE LOW BACK PAIN WITH BILATERAL SCIATICA: ICD-10-CM

## 2020-09-11 DIAGNOSIS — M54.41 CHRONIC MIDLINE LOW BACK PAIN WITH BILATERAL SCIATICA: ICD-10-CM

## 2020-09-14 ENCOUNTER — TELEPHONE (OUTPATIENT)
Dept: FAMILY MEDICINE CLINIC | Facility: CLINIC | Age: 55
End: 2020-09-14

## 2020-09-14 NOTE — TELEPHONE ENCOUNTER
PT would like to update all medications to go through [facility][id number] - [facility] [place] - [address] [place] [address] & [facility] - [phone]  - [phone] FX from now on.  Pt states grievances with [facility] and does not like them anymore.

## 2020-09-29 RX ORDER — FLUTICASONE PROPIONATE 50 MCG
SPRAY, SUSPENSION (ML) NASAL
Qty: 48 ML | Refills: 1 | Status: SHIPPED | OUTPATIENT
Start: 2020-09-29 | End: 2021-11-02

## 2020-11-03 ENCOUNTER — APPOINTMENT (OUTPATIENT)
Dept: CT IMAGING | Facility: HOSPITAL | Age: 55
End: 2020-11-03

## 2020-11-04 ENCOUNTER — TELEPHONE (OUTPATIENT)
Dept: SURGERY | Facility: CLINIC | Age: 55
End: 2020-11-04

## 2020-11-04 NOTE — TELEPHONE ENCOUNTER
Called patient to see if she needed help rescheduling the CT  Chest and office follow up with Dr. Yeung. Left a message to return my call

## 2020-11-18 RX ORDER — ERGOCALCIFEROL 1.25 MG/1
CAPSULE ORAL
Qty: 12 CAPSULE | Refills: 0 | Status: SHIPPED | OUTPATIENT
Start: 2020-11-18 | End: 2021-02-03

## 2020-11-24 ENCOUNTER — OFFICE VISIT (OUTPATIENT)
Dept: SURGERY | Facility: CLINIC | Age: 55
End: 2020-11-24

## 2020-11-24 VITALS
HEART RATE: 73 BPM | DIASTOLIC BLOOD PRESSURE: 77 MMHG | BODY MASS INDEX: 29.33 KG/M2 | SYSTOLIC BLOOD PRESSURE: 122 MMHG | TEMPERATURE: 97.3 F | HEIGHT: 69 IN | OXYGEN SATURATION: 96 % | WEIGHT: 198 LBS

## 2020-11-24 DIAGNOSIS — R59.0 HILAR ADENOPATHY: Primary | ICD-10-CM

## 2020-11-24 DIAGNOSIS — R91.1 RIGHT UPPER LOBE PULMONARY NODULE: ICD-10-CM

## 2020-11-24 PROCEDURE — 99212 OFFICE O/P EST SF 10 MIN: CPT | Performed by: THORACIC SURGERY (CARDIOTHORACIC VASCULAR SURGERY)

## 2020-11-24 NOTE — PROGRESS NOTES
Thoracic surgery progress note    Chief complaint follow-up of right upper lobe pulmonary nodule    Patient returns with a follow-up CT [facility] performed [facility] I personally examined the scan reviewed the results from the radiologist and went over the film with the patient.    Findings include bibasilar hazy opacities consistent with atelectasis or pneumonitis and a stable right upper lobe pulmonary nodule and borderline hilar adenopathy.  The patient cannot recall any recent respiratory illness around the time of her CT scan.  No fevers chills or night sweats.  No cough no hemoptysis.    Physical examination she is well-appearing and in no distress she has symmetrical chest expansion no central or peripheral cyanosis.  Normal speech gait and station she does watch walk with a cane.    Patient also follows up with Dr. Easton.  Plans have been made for a follow-up CT scan and to be seen by him.  She can be discharged from further follow-up with thoracic surgery.

## 2020-11-25 ENCOUNTER — TELEMEDICINE (OUTPATIENT)
Dept: FAMILY MEDICINE CLINIC | Facility: CLINIC | Age: 55
End: 2020-11-25

## 2020-11-25 DIAGNOSIS — R59.0 HILAR ADENOPATHY: ICD-10-CM

## 2020-11-25 DIAGNOSIS — E78.2 MIXED HYPERLIPIDEMIA: ICD-10-CM

## 2020-11-25 DIAGNOSIS — E55.9 VITAMIN D DEFICIENCY: ICD-10-CM

## 2020-11-25 DIAGNOSIS — M54.41 CHRONIC MIDLINE LOW BACK PAIN WITH BILATERAL SCIATICA: ICD-10-CM

## 2020-11-25 DIAGNOSIS — I10 ESSENTIAL HYPERTENSION: Primary | ICD-10-CM

## 2020-11-25 DIAGNOSIS — Z71.6 TOBACCO ABUSE COUNSELING: ICD-10-CM

## 2020-11-25 DIAGNOSIS — Z00.00 PREVENTATIVE HEALTH CARE: ICD-10-CM

## 2020-11-25 DIAGNOSIS — G89.29 CHRONIC MIDLINE LOW BACK PAIN WITH BILATERAL SCIATICA: ICD-10-CM

## 2020-11-25 DIAGNOSIS — M54.42 CHRONIC MIDLINE LOW BACK PAIN WITH BILATERAL SCIATICA: ICD-10-CM

## 2020-11-25 DIAGNOSIS — J44.9 CHRONIC OBSTRUCTIVE PULMONARY DISEASE, UNSPECIFIED COPD TYPE (HCC): ICD-10-CM

## 2020-11-25 PROCEDURE — 99396 PREV VISIT EST AGE 40-64: CPT | Performed by: NURSE PRACTITIONER

## 2020-11-25 NOTE — PROGRESS NOTES
Chief Complaint   Patient presents with   • Mass   • Back Pain   • COPD   • Annual Exam       You have chosen to receive care through a telemedicine visit. Do you consent to use a telemedicine visit for your medical care today? Yes      HPI     Patient seen today for an annual/preventative exam and follow-up on chronic conditions.    Chronic back pain, patient still following with Dr. Dixon, epidural injections every few mo, Pain is stable on hydrocodone and lyrica, she is using Lyrica 1-2 times daily, reports she tried and failed duloxetine and gabapentin in the past developing multiple UTIs due to the medication.  Since being on Lyrica has had 1-2 UTIs over the last year.  Patient reports improvement in weakness/radiation of pain into LLE, numbness, tingling, stiffness.  Patient denies loss of motion, bowel changes, bladder changes, pain with cough, pain with sneeze, pain with straining, pain with defecation, fever, chills, rash and groin pain.      COPD: The patient has been previously diagnosed with COPD. Symptoms include dyspnea, non-productive cough and wheezing, especially with exertion. Symptoms have been unchanged and currently stable, she is following with pulmonology/Dr. Easton.has appointment for sleep study upcoming.  She is using inhalers as directed. She does continue to smoke 1 ppd, for approx 40 years now.      Hilar mass, patient is following with Dr. Yeung, (however he is soon to be leaving the [facility] [place] group) recent follow-up CT scan showed stable/no change in hilar mass.      The following portions of the patient's history were reviewed and updated as appropriate: allergies, current medications, past family history, past medical history, past social history, past surgical history and problem list.      Past Medical History:   Diagnosis Date   • Allergic    • Arthritis    • Atrial fibrillation (CMS/HCC)    • COPD (chronic obstructive pulmonary disease) (CMS/[facility])    • Headache    • History of  herniated intervertebral disc    • History of skin cancer    • Hyperlipidemia    • Hypertension    • Injury of back    • Leukocytosis      Past Surgical History:   Procedure Laterality Date   • BACK SURGERY     • DILATATION AND CURETTAGE     • LUMBAR DECOMPRESSION      L4-L5    • SKIN CANCER EXCISION Left     [place]ao   • SUBTOTAL HYSTERECTOMY       Family History   Problem Relation Age of Onset   • Breast cancer Mother    • Lung cancer Father    • Lung cancer Other    • Colon cancer Other    • Skin cancer Other      Social History     Tobacco Use   • Smoking status: Current Every Day Smoker     Packs/day: 1.00     Years: 40.00     Pack years: 40.00   • Smokeless tobacco: Never Used   • Tobacco comment: currently working on cessation    Substance Use Topics   • Alcohol use: Yes     Comment: occasionally         Current Outpatient Medications:   •  amitriptyline (ELAVIL) 25 MG tablet, Take 25 mg by mouth every night at bedtime., Disp: , Rfl: 3  •  aspirin (ADULT ASPIRIN EC LOW STRENGTH) 81 MG EC tablet, 81 mg., Disp: , Rfl:   •  chlorhexidine (PERIDEX) 0.12 % solution, Swish and spit 15 ml BID for 7 days, then as needed, Disp: 473 mL, Rfl: 0  •  Cyanocobalamin (VITAMIN B 12 PO), Take  by mouth., Disp: , Rfl:   •  fenofibrate (TRICOR) 145 MG tablet, TAKE 1 TABLET BY MOUTH EVERY DAY, Disp: 90 tablet, Rfl: 1  •  fluticasone (FLONASE) 50 MCG/ACT nasal spray, INHALE 2 PUFFS INTO EACH NOSTRIL EVERY DAY, Disp: 48 mL, Rfl: 1  •  HYDROcodone-acetaminophen (NORCO)  MG per tablet, Take 1 tablet by mouth Every 6 (Six) Hours As Needed., Disp: , Rfl: 0  •  Lyrica 200 MG capsule, Take 1 capsule by mouth 2 (Two) Times a Day., Disp: 180 capsule, Rfl: 1  •  metoprolol succinate XL (TOPROL-XL) 25 MG 24 hr tablet, Take 1 tablet by mouth Daily., Disp: 90 tablet, Rfl: 3  •  omeprazole (priLOSEC) 20 MG capsule, Take 20 mg by mouth Daily., Disp: , Rfl:   •  trimethoprim-polymyxin b (POLYTRIM) 82250-2.1 UNIT/ML-% ophthalmic solution,  ADMINISTER 1 DROP INTO THE LEFT EYE EVERY 3 (THREE) HOURS FOR 7 DAYS. MAX 6 DOSES/24 HRS, Disp: , Rfl: 0  •  umeclidinium-vilanterol (ANORO ELLIPTA) 62.5-25 MCG/INH aerosol powder  inhaler, Inhale 1 puff Daily., Disp: , Rfl:   •  vitamin D (ERGOCALCIFEROL) 1.25 MG (93744 UT) capsule capsule, TAKE 1 CAPSULE BY MOUTH ONCE PER WEEK, Disp: 12 capsule, Rfl: 0      The following portions of the patient's history were reviewed and updated as appropriate: allergies, current medications, past family history, past medical history, past social history, past surgical history and problem list.    Review of Systems      Obtained as mentioned in HPI, otherwise negative.         There were no vitals filed for this visit.  There is no height or weight on file to calculate BMI.    Physical Exam  Constitutional:       General: She is not in acute distress.     Comments: Exam otherwise deferred through video/audio visit   Pulmonary:      Effort: Pulmonary effort is normal.   Neurological:      Mental Status: She is alert and oriented to person, place, and time.   Psychiatric:         Behavior: Behavior normal.         Thought Content: Thought content normal.         Judgment: Judgment normal.         No visits with results within 7 Day(s) from this visit.   Latest known visit with results is:   Office Visit on 05/07/2020   Component Date Value Ref Range Status   • Total Cholesterol 05/07/2020 190  0 - 200 mg/dL Final   • Triglycerides 05/07/2020 297* 0 - 150 mg/dL Final   • HDL Cholesterol 05/07/2020 32* 40 - 60 mg/dL Final   • LDL Cholesterol  05/07/2020 99  0 - 100 mg/dL Final   • VLDL Cholesterol 05/07/2020 59.4* 5 - 40 mg/dL Final   • LDL/HDL Ratio 05/07/2020 3.08   Final   • Glucose 05/07/2020 78  65 - 99 mg/dL Final   • BUN 05/07/2020 9  6 - 20 mg/dL Final   • Creatinine 05/07/2020 0.65  0.57 - 1.00 mg/dL Final   • Sodium 05/07/2020 139  136 - 145 mmol/L Final   • Potassium 05/07/2020 4.0  3.5 - 5.2 mmol/L Final   • Chloride  05/07/2020 100  98 - 107 mmol/L Final   • CO2 05/07/2020 24.1  22.0 - 29.0 mmol/L Final   • Calcium 05/07/2020 9.3  8.6 - 10.5 mg/dL Final   • Total Protein 05/07/2020 7.2  6.0 - 8.5 g/dL Final   • Albumin 05/07/2020 4.00  3.50 - 5.20 g/dL Final   • ALT (SGPT) 05/07/2020 42* 1 - 33 U/L Final   • AST (SGOT) 05/07/2020 33* 1 - 32 U/L Final   • Alkaline Phosphatase 05/07/2020 101  39 - 117 U/L Final   • Total Bilirubin 05/07/2020 0.5  0.2 - 1.2 mg/dL Final   • eGFR Non African Amer 05/07/2020 95  >60 mL/min/1.73 Final   • Globulin 05/07/2020 3.2  gm/dL Final   • A/G Ratio 05/07/2020 1.3  g/dL Final   • BUN/Creatinine Ratio 05/07/2020 13.8  7.0 - 25.0 Final   • Anion Gap 05/07/2020 14.9  5.0 - 15.0 mmol/L Final   • WBC 05/07/2020 10.25  3.40 - 10.80 10*3/mm3 Final   • RBC 05/07/2020 5.34* 3.77 - 5.28 10*6/mm3 Final   • Hemoglobin 05/07/2020 16.3* 12.0 - 15.9 g/dL Final   • Hematocrit 05/07/2020 47.7* 34.0 - 46.6 % Final   • MCV 05/07/2020 89.3  79.0 - 97.0 fL Final   • MCH 05/07/2020 30.5  26.6 - 33.0 pg Final   • MCHC 05/07/2020 34.2  31.5 - 35.7 g/dL Final   • RDW 05/07/2020 13.3  12.3 - 15.4 % Final   • RDW-SD 05/07/2020 43.2  37.0 - 54.0 fl Final   • MPV 05/07/2020 10.5  6.0 - 12.0 fL Final   • Platelets 05/07/2020 237  140 - 450 10*3/mm3 Final   • TSH 05/07/2020 1.490  0.270 - 4.200 uIU/mL Final    TSH results may be falsely decreased if patient taking Biotin.   • Hemoglobin A1C 05/07/2020 6.0* 3.5 - 5.6 % Final   • 25 Hydroxy, Vitamin D 05/07/2020 25.0* 30.0 - 100.0 ng/ml Final       Diagnoses and all orders for this visit:    1. Essential hypertension (Primary)  Comments:  Stable at home, continue medications, refill when needed  Orders:  -     Comprehensive Metabolic Panel; Future  -     CBC (No Diff); Future  -     TSH; Future    2. Chronic midline low back pain with bilateral sciatica  Comments:  Stable, keep follow-up with pain mgmt, rf lyrica 90 day supply d/t ins chg at 1st of year. can not rocio dulox or  em d/t UTI. consider gen lyrica.   Orders:  -     Lyrica 200 MG capsule; Take 1 capsule by mouth 2 (Two) Times a Day.  Dispense: 180 capsule; Refill: 1    3. Vitamin D deficiency  Comments:  Repeat labs next week as ordered, continue vitamin D weekly  Orders:  -     Vitamin D 25 Hydroxy; Future    4. Mixed hyperlipidemia  Comments:  Mostly hypertriglyceridemia, continue fenofibrate, labs as ordered fasting next week, will call results  Orders:  -     Lipid Panel; Future    5. Hilar adenopathy  Comments:  Dr. Yeung leaving the practice, CT chest stable without growth, repeat in 6 months.  Keep f/u with Dr. Easton, consider referral to Dr. Guzman    6. Chronic obstructive pulmonary disease, unspecified COPD type (CMS/[facility])  Comments:  Stable, recommend quit smoking, continue inhaler/nebs as directed, follow-up with pulm as directed    7. Tobacco abuse counseling  Comments:  Recommend quit smoking, not interested still smoking 1 pack/day    8. Preventative health care  Comments:  Flu shot up-to-date  Labs fasting next week, will call results  Mammo normal, repeat 5/21  Orders:  -     Vitamin B12; Future  -     Hemoglobin A1c; Future      Return in about 3 months (around 2/25/2021) for COPD, hilar mass, pain, pt needs scheduled for labs next wk, .      This was an audio and video enabled telemedicine encounter.  Total time of discussion was 25 minutes

## 2020-11-30 ENCOUNTER — LAB (OUTPATIENT)
Dept: FAMILY MEDICINE CLINIC | Facility: CLINIC | Age: 55
End: 2020-11-30

## 2020-11-30 DIAGNOSIS — E78.2 MIXED HYPERLIPIDEMIA: ICD-10-CM

## 2020-11-30 DIAGNOSIS — I10 ESSENTIAL HYPERTENSION: ICD-10-CM

## 2020-11-30 DIAGNOSIS — Z00.00 PREVENTATIVE HEALTH CARE: ICD-10-CM

## 2020-11-30 DIAGNOSIS — E55.9 VITAMIN D DEFICIENCY: ICD-10-CM

## 2020-11-30 LAB — HBA1C MFR BLD: 6.3 % (ref 3.5–5.6)

## 2020-11-30 PROCEDURE — 80061 LIPID PANEL: CPT | Performed by: NURSE PRACTITIONER

## 2020-11-30 PROCEDURE — 85027 COMPLETE CBC AUTOMATED: CPT | Performed by: NURSE PRACTITIONER

## 2020-11-30 PROCEDURE — 80053 COMPREHEN METABOLIC PANEL: CPT | Performed by: NURSE PRACTITIONER

## 2020-11-30 PROCEDURE — 36415 COLL VENOUS BLD VENIPUNCTURE: CPT | Performed by: NURSE PRACTITIONER

## 2020-11-30 PROCEDURE — 82306 VITAMIN D 25 HYDROXY: CPT | Performed by: NURSE PRACTITIONER

## 2020-11-30 PROCEDURE — 84443 ASSAY THYROID STIM HORMONE: CPT | Performed by: NURSE PRACTITIONER

## 2020-11-30 PROCEDURE — 82607 VITAMIN B-12: CPT | Performed by: NURSE PRACTITIONER

## 2020-11-30 PROCEDURE — 83036 HEMOGLOBIN GLYCOSYLATED A1C: CPT | Performed by: NURSE PRACTITIONER

## 2020-12-01 LAB
25(OH)D3 SERPL-MCNC: 37.7 NG/ML (ref 30–100)
ALBUMIN SERPL-MCNC: 4.4 G/DL (ref 3.5–5.2)
ALBUMIN/GLOB SERPL: 1.6 G/DL
ALP SERPL-CCNC: 87 U/L (ref 39–117)
ALT SERPL W P-5'-P-CCNC: 27 U/L (ref 1–33)
ANION GAP SERPL CALCULATED.3IONS-SCNC: 7.4 MMOL/L (ref 5–15)
AST SERPL-CCNC: 23 U/L (ref 1–32)
BILIRUB SERPL-MCNC: 0.4 MG/DL (ref 0–1.2)
BUN SERPL-MCNC: 10 MG/DL (ref 6–20)
BUN/CREAT SERPL: 14.7 (ref 7–25)
CALCIUM SPEC-SCNC: 9.2 MG/DL (ref 8.6–10.5)
CHLORIDE SERPL-SCNC: 105 MMOL/L (ref 98–107)
CHOLEST SERPL-MCNC: 161 MG/DL (ref 0–200)
CO2 SERPL-SCNC: 28.6 MMOL/L (ref 22–29)
CREAT SERPL-MCNC: 0.68 MG/DL (ref 0.57–1)
DEPRECATED RDW RBC AUTO: 44.9 FL (ref 37–54)
ERYTHROCYTE [DISTWIDTH] IN BLOOD BY AUTOMATED COUNT: 13.4 % (ref 12.3–15.4)
GFR SERPL CREATININE-BSD FRML MDRD: 90 ML/MIN/1.73
GLOBULIN UR ELPH-MCNC: 2.7 GM/DL
GLUCOSE SERPL-MCNC: 108 MG/DL (ref 65–99)
HCT VFR BLD AUTO: 49.6 % (ref 34–46.6)
HDLC SERPL-MCNC: 38 MG/DL (ref 40–60)
HGB BLD-MCNC: 16.7 G/DL (ref 12–15.9)
LDLC SERPL CALC-MCNC: 93 MG/DL (ref 0–100)
LDLC/HDLC SERPL: 2.34 {RATIO}
MCH RBC QN AUTO: 30.7 PG (ref 26.6–33)
MCHC RBC AUTO-ENTMCNC: 33.7 G/DL (ref 31.5–35.7)
MCV RBC AUTO: 91.2 FL (ref 79–97)
PLATELET # BLD AUTO: 274 10*3/MM3 (ref 140–450)
PMV BLD AUTO: 10.6 FL (ref 6–12)
POTASSIUM SERPL-SCNC: 4.2 MMOL/L (ref 3.5–5.2)
PROT SERPL-MCNC: 7.1 G/DL (ref 6–8.5)
RBC # BLD AUTO: 5.44 10*6/MM3 (ref 3.77–5.28)
SODIUM SERPL-SCNC: 141 MMOL/L (ref 136–145)
TRIGL SERPL-MCNC: 170 MG/DL (ref 0–150)
TSH SERPL DL<=0.05 MIU/L-ACNC: 1.21 UIU/ML (ref 0.27–4.2)
VIT B12 BLD-MCNC: 747 PG/ML (ref 211–946)
VLDLC SERPL-MCNC: 30 MG/DL (ref 5–40)
WBC # BLD AUTO: 12.47 10*3/MM3 (ref 3.4–10.8)

## 2021-01-22 ENCOUNTER — OFFICE VISIT (OUTPATIENT)
Dept: FAMILY MEDICINE CLINIC | Facility: CLINIC | Age: 56
End: 2021-01-22

## 2021-01-22 VITALS
HEART RATE: 83 BPM | TEMPERATURE: 97.1 F | DIASTOLIC BLOOD PRESSURE: 72 MMHG | HEIGHT: 69 IN | SYSTOLIC BLOOD PRESSURE: 114 MMHG | BODY MASS INDEX: 29.33 KG/M2 | WEIGHT: 198 LBS | OXYGEN SATURATION: 98 %

## 2021-01-22 DIAGNOSIS — R31.9 URINARY TRACT INFECTION WITH HEMATURIA, SITE UNSPECIFIED: ICD-10-CM

## 2021-01-22 DIAGNOSIS — N39.0 URINARY TRACT INFECTION WITH HEMATURIA, SITE UNSPECIFIED: ICD-10-CM

## 2021-01-22 DIAGNOSIS — R30.9 PAIN WITH URINATION: Primary | ICD-10-CM

## 2021-01-22 LAB
BILIRUB BLD-MCNC: NEGATIVE MG/DL
CLARITY, POC: ABNORMAL
COLOR UR: ABNORMAL
GLUCOSE UR STRIP-MCNC: NEGATIVE MG/DL
KETONES UR QL: ABNORMAL
LEUKOCYTE EST, POC: ABNORMAL
NITRITE UR-MCNC: NEGATIVE MG/ML
PH UR: 6 [PH] (ref 5–8)
PROT UR STRIP-MCNC: ABNORMAL MG/DL
RBC # UR STRIP: ABNORMAL /UL
SP GR UR: 1.02 (ref 1–1.03)
UROBILINOGEN UR QL: NORMAL

## 2021-01-22 PROCEDURE — 99213 OFFICE O/P EST LOW 20 MIN: CPT | Performed by: NURSE PRACTITIONER

## 2021-01-22 PROCEDURE — 81002 URINALYSIS NONAUTO W/O SCOPE: CPT | Performed by: NURSE PRACTITIONER

## 2021-01-22 PROCEDURE — 87086 URINE CULTURE/COLONY COUNT: CPT | Performed by: NURSE PRACTITIONER

## 2021-01-22 RX ORDER — CIPROFLOXACIN 500 MG/1
500 TABLET, FILM COATED ORAL 2 TIMES DAILY
Qty: 14 TABLET | Refills: 0 | Status: SHIPPED | OUTPATIENT
Start: 2021-01-22 | End: 2021-01-29

## 2021-01-22 RX ORDER — ONDANSETRON 4 MG/1
4 TABLET, FILM COATED ORAL EVERY 8 HOURS PRN
Qty: 15 TABLET | Refills: 0 | Status: SHIPPED | OUTPATIENT
Start: 2021-01-22 | End: 2021-01-27

## 2021-01-22 NOTE — ASSESSMENT & PLAN NOTE
1. Urine dip + for hematuria, leukocytes, ketones, protein  2. Send for culture  3. Start Cipro 500mg BID x 7 days  4. Zofran 4mg TID PRN nausea  5. Call with fever, flank pain, vomiting,worsening symptoms

## 2021-01-22 NOTE — PROGRESS NOTES
"Chief Complaint  Urinary Tract Infection (pain with urination, strong smelling urine, pressure in lower abdomen x2-3 weeks now. )    Subjective          Rahel Ramsey presents to [facility] for   Difficulty Urinating  This is a new problem. The current episode started 1 to 4 weeks ago. The problem occurs constantly. The problem has been gradually worsening. Associated symptoms include abdominal pain, nausea and urinary symptoms (dysuria, frequency). Pertinent negatives include no chills or fever. Nothing aggravates the symptoms. She has tried nothing for the symptoms.       Objective   Vital Signs:   /72 (BP Location: Left arm, Patient Position: Sitting, Cuff Size: Adult)   Pulse 83   Temp 97.1 °F (36.2 °C) (Skin)   Ht 175.3 cm (69\")   Wt 89.8 kg (198 lb)   SpO2 98%   BMI 29.24 kg/m²     Physical Exam  Constitutional:       Appearance: Normal appearance.   HENT:      Head: Normocephalic.   Neck:      Musculoskeletal: Neck supple.   Cardiovascular:      Rate and Rhythm: Normal rate and regular rhythm.   Pulmonary:      Effort: Pulmonary effort is normal.      Breath sounds: Normal breath sounds.   Abdominal:      General: Abdomen is flat. Bowel sounds are normal.      Palpations: Abdomen is soft.      Tenderness: There is no right CVA tenderness or left CVA tenderness.   Musculoskeletal: Normal range of motion.      Right lower leg: No edema.      Left lower leg: No edema.   Skin:     General: Skin is warm and dry.   Neurological:      Mental Status: She is alert and oriented to person, place, and time.      Gait: Gait is intact.   Psychiatric:         Attention and Perception: Attention normal.         Mood and Affect: Mood normal.         Speech: Speech normal.        Result Review :                 Assessment and Plan    Problem List Items Addressed This Visit        Genitourinary and Reproductive     Urinary tract infection with hematuria    Current Assessment & " Plan     1. Urine dip + for hematuria, leukocytes, ketones, protein  2. Send for culture  3. Start Cipro 500mg BID x 7 days  4. Zofran 4mg TID PRN nausea  5. Call with fever, flank pain, vomiting,worsening symptoms         Relevant Medications    ciprofloxacin (Cipro) 500 MG tablet      Other Visit Diagnoses     Pain with urination    -  Primary    Relevant Orders    POCT urinalysis dipstick, manual (Completed)    Urine Culture - Urine, Urine, Clean Catch        I spent 15 minutes caring for [place] on this date of service. This time includes time spent by me in the following activities:preparing for the visit, obtaining and/or reviewing a separately obtained history, performing a medically appropriate examination and/or evaluation , counseling and educating the patient/family/caregiver, ordering medications, tests, or procedures, documenting information in the medical record and care coordination  Follow Up   Return if symptoms worsen or fail to improve.      Patient was given instructions and counseling regarding her condition or for health maintenance advice. Please see specific information pulled into the AVS if appropriate.

## 2021-01-23 LAB — BACTERIA SPEC AEROBE CULT: ABNORMAL

## 2021-01-25 ENCOUNTER — TELEPHONE (OUTPATIENT)
Dept: FAMILY MEDICINE CLINIC | Facility: CLINIC | Age: 56
End: 2021-01-25

## 2021-02-01 ENCOUNTER — TELEPHONE (OUTPATIENT)
Dept: FAMILY MEDICINE CLINIC | Facility: CLINIC | Age: 56
End: 2021-02-01

## 2021-02-01 RX ORDER — CIPROFLOXACIN 500 MG/1
500 TABLET, FILM COATED ORAL 2 TIMES DAILY
Qty: 10 TABLET | Refills: 0 | Status: SHIPPED | OUTPATIENT
Start: 2021-02-01 | End: 2021-02-06

## 2021-02-01 NOTE — TELEPHONE ENCOUNTER
Provider: LAUREN WASHINGTON    Caller: Rahel Ramsey     Relationship to Patient: (Self)    Pharmacy: [id number] [address] - [phone] [facility] [phone]     Phone Number: [phone] (H)    Reason for Call: PATIENT CALLED TO ADVISE THAT SHE FEELS BETTER, BUT SHE DOES NOT THINK THAT THE UTI IS GONE. PATIENT WAS HOPING THAT EDUARDO WASHINGTON COULD EXTEND THE ANTIBIOTIC FOR A FEW MORE DAYS TO SEE IF SHE CAN GET RID OF THE UTI.     When was the patient last seen: 01/22/21     PLEASE CONTACT PATIENT TO ADVISE.    THANKS

## 2021-02-03 RX ORDER — ERGOCALCIFEROL 1.25 MG/1
CAPSULE ORAL
Qty: 12 CAPSULE | Refills: 0 | Status: SHIPPED | OUTPATIENT
Start: 2021-02-03 | End: 2021-04-21

## 2021-02-17 DIAGNOSIS — G47.10 HYPERSOMNIA: Primary | ICD-10-CM

## 2021-02-22 ENCOUNTER — TELEPHONE (OUTPATIENT)
Dept: FAMILY MEDICINE CLINIC | Facility: CLINIC | Age: 56
End: 2021-02-22

## 2021-02-22 ENCOUNTER — OFFICE VISIT (OUTPATIENT)
Dept: FAMILY MEDICINE CLINIC | Facility: CLINIC | Age: 56
End: 2021-02-22

## 2021-02-22 VITALS
BODY MASS INDEX: 29.68 KG/M2 | SYSTOLIC BLOOD PRESSURE: 120 MMHG | TEMPERATURE: 96.9 F | WEIGHT: 200.4 LBS | HEIGHT: 69 IN | DIASTOLIC BLOOD PRESSURE: 77 MMHG | OXYGEN SATURATION: 98 % | HEART RATE: 89 BPM

## 2021-02-22 DIAGNOSIS — I10 ESSENTIAL HYPERTENSION: ICD-10-CM

## 2021-02-22 DIAGNOSIS — M54.41 CHRONIC MIDLINE LOW BACK PAIN WITH BILATERAL SCIATICA: Primary | ICD-10-CM

## 2021-02-22 DIAGNOSIS — R31.9 HEMATURIA, UNSPECIFIED TYPE: ICD-10-CM

## 2021-02-22 DIAGNOSIS — E78.2 MIXED HYPERLIPIDEMIA: ICD-10-CM

## 2021-02-22 DIAGNOSIS — M54.42 CHRONIC MIDLINE LOW BACK PAIN WITH BILATERAL SCIATICA: Primary | ICD-10-CM

## 2021-02-22 DIAGNOSIS — R59.0 HILAR ADENOPATHY: ICD-10-CM

## 2021-02-22 DIAGNOSIS — E55.9 VITAMIN D DEFICIENCY: ICD-10-CM

## 2021-02-22 DIAGNOSIS — Z00.00 PREVENTATIVE HEALTH CARE: ICD-10-CM

## 2021-02-22 DIAGNOSIS — G89.29 CHRONIC MIDLINE LOW BACK PAIN WITH BILATERAL SCIATICA: Primary | ICD-10-CM

## 2021-02-22 DIAGNOSIS — B37.31 VAGINAL CANDIDIASIS: ICD-10-CM

## 2021-02-22 DIAGNOSIS — J44.9 CHRONIC OBSTRUCTIVE PULMONARY DISEASE, UNSPECIFIED COPD TYPE (HCC): ICD-10-CM

## 2021-02-22 LAB
BILIRUB BLD-MCNC: NEGATIVE MG/DL
CLARITY, POC: CLEAR
COLOR UR: ABNORMAL
GLUCOSE UR STRIP-MCNC: NEGATIVE MG/DL
KETONES UR QL: NEGATIVE
LEUKOCYTE EST, POC: NEGATIVE
NITRITE UR-MCNC: NEGATIVE MG/ML
PH UR: 5 [PH] (ref 5–8)
PROT UR STRIP-MCNC: ABNORMAL MG/DL
RBC # UR STRIP: ABNORMAL /UL
SP GR UR: 1.02 (ref 1–1.03)
UROBILINOGEN UR QL: NORMAL

## 2021-02-22 PROCEDURE — 81002 URINALYSIS NONAUTO W/O SCOPE: CPT | Performed by: NURSE PRACTITIONER

## 2021-02-22 PROCEDURE — 99214 OFFICE O/P EST MOD 30 MIN: CPT | Performed by: NURSE PRACTITIONER

## 2021-02-22 PROCEDURE — 87086 URINE CULTURE/COLONY COUNT: CPT | Performed by: NURSE PRACTITIONER

## 2021-02-22 RX ORDER — FLUCONAZOLE 150 MG/1
150 TABLET ORAL ONCE
Qty: 1 TABLET | Refills: 0 | Status: SHIPPED | OUTPATIENT
Start: 2021-02-22 | End: 2021-02-22

## 2021-02-22 RX ORDER — FENOFIBRATE 145 MG/1
145 TABLET, COATED ORAL DAILY
Qty: 90 TABLET | Refills: 0 | Status: SHIPPED | OUTPATIENT
Start: 2021-02-22 | End: 2021-05-24

## 2021-02-22 NOTE — PROGRESS NOTES
Chief Complaint  Hypertension, Follow-up (from 5/7/20), and Urinary Tract Infection (pt was treated by adilson for UTI.  she completed one round of abx, but 4 days into 2nd round she stopped taking d/t yeast infection s/s.  she reports she feels fine now, UA results from today are entered.)    Subjective          Rahel Ramsey presents to [facility] for   History of Present Illness     HTN, stable on meds and takes as directed, denies chest pain, headache, shortness of air, palpitations and swelling of extremities.     Chronic back pain, patient following with Dr. Dixon, epidural injections every 2-3 mo, Pain stable on hydrocodone per pain mgmt and lyrica 1-2 times daily, Patient reports improvement in weakness/radiation of pain into LLE, had recent UTI, denies numbness, tingling, stiffness. Patient denies loss of motion, bowel changes, bladder changes, pain with cough, pain with sneeze, pain with straining, pain with defecation, fever, chills, rash and groin pain.       COPD: The patient has been previously diagnosed with COPD. Symptoms include dyspnea, non-productive cough and wheezing, especially with exertion. Symptoms are unchanged and currently stable, she is following with pulmonology/Dr. Easton. She completed sleep study and is awaiting nocturnal o2. She is using inhalers as directed. She does continue to smoke 1 ppd, for approx 40 years now.      Hilar mass, has followed with thoracic surgery, now pulm is monitoring and next follow-up CT scan planned for march 2021    Recent UTI, took 3 days of Cipro, developed yeast infection and stopped the antibiotic.  She reports her symptoms have resolved, denies dysuria, hematuria, incontinence and nocturia.  She has attempted OTC vaginal creams, but still reports vaginal burning/itching.         The following portions of the patient's history were reviewed and updated as appropriate: allergies, current medications, past family  history, past medical history, past social history, past surgical history and problem list.    Past Medical History:   Diagnosis Date   • Allergic    • Arthritis    • Atrial fibrillation (CMS/HCC)    • COPD (chronic obstructive pulmonary disease) (CMS/HCC)    • Headache    • History of herniated intervertebral disc    • History of skin cancer    • Hyperlipidemia    • Hypertension    • Injury of back    • Leukocytosis    • Urinary tract infection with hematuria 1/22/2021     Past Surgical History:   Procedure Laterality Date   • BACK SURGERY     • DILATATION AND CURETTAGE     • LUMBAR DECOMPRESSION      L4-L5    • SKIN CANCER EXCISION Left     Cao   • SUBTOTAL HYSTERECTOMY       Family History   Problem Relation Age of Onset   • Breast cancer Mother    • Lung cancer Father    • Lung cancer Other    • Colon cancer Other    • Skin cancer Other      Social History     Tobacco Use   • Smoking status: Current Every Day Smoker     Packs/day: 1.00     Years: 40.00     Pack years: 40.00   • Smokeless tobacco: Never Used   • Tobacco comment: currently working on cessation    Substance Use Topics   • Alcohol use: Yes     Comment: occasionally       Current Outpatient Medications:   •  amitriptyline (ELAVIL) 25 MG tablet, Take 25 mg by mouth every night at bedtime., Disp: , Rfl: 3  •  aspirin (ADULT ASPIRIN EC LOW STRENGTH) 81 MG EC tablet, 81 mg., Disp: , Rfl:   •  chlorhexidine (PERIDEX) 0.12 % solution, Swish and spit 15 ml BID for 7 days, then as needed, Disp: 473 mL, Rfl: 0  •  Cyanocobalamin (VITAMIN B 12 PO), Take  by mouth., Disp: , Rfl:   •  fenofibrate (TRICOR) 145 MG tablet, Take 1 tablet by mouth Daily., Disp: 90 tablet, Rfl: 0  •  fluticasone (FLONASE) 50 MCG/ACT nasal spray, INHALE 2 PUFFS INTO EACH NOSTRIL EVERY DAY, Disp: 48 mL, Rfl: 1  •  HYDROcodone-acetaminophen (NORCO)  MG per tablet, Take 1 tablet by mouth Every 6 (Six) Hours As Needed., Disp: , Rfl: 0  •  Lyrica 200 MG capsule, Take 1 capsule by  "mouth 2 (Two) Times a Day., Disp: 180 capsule, Rfl: 1  •  metoprolol succinate XL (TOPROL-XL) 25 MG 24 hr tablet, Take 1 tablet by mouth Daily., Disp: 90 tablet, Rfl: 3  •  umeclidinium-vilanterol (ANORO ELLIPTA) 62.5-25 MCG/INH aerosol powder  inhaler, Inhale 1 puff Daily., Disp: , Rfl:   •  vitamin D (ERGOCALCIFEROL) 1.25 MG (00412 UT) capsule capsule, TAKE 1 CAPSULE BY MOUTH ONCE PER WEEK, Disp: 12 capsule, Rfl: 0  •  fluconazole (Diflucan) 150 MG tablet, Take 1 tablet by mouth 1 (One) Time for 1 dose., Disp: 1 tablet, Rfl: 0    Objective   Vital Signs:   /77 (BP Location: Left arm, Patient Position: Sitting, Cuff Size: Adult)   Pulse 89   Temp 96.9 °F (36.1 °C) (Infrared)   Ht 175.3 cm (69.02\")   Wt 90.9 kg (200 lb 6.4 oz)   SpO2 98%   BMI 29.58 kg/m²     Physical Exam  Vitals signs and nursing note reviewed.   Constitutional:       General: She is not in acute distress.     Appearance: She is well-developed. She is not diaphoretic.   Eyes:      Pupils: Pupils are equal, round, and reactive to light.   Neck:      Musculoskeletal: Normal range of motion and neck supple.      Thyroid: No thyromegaly.      Vascular: No JVD.   Cardiovascular:      Rate and Rhythm: Normal rate and regular rhythm.      Heart sounds: Normal heart sounds. No murmur.   Pulmonary:      Effort: Pulmonary effort is normal. No respiratory distress.      Breath sounds: Normal breath sounds.   Abdominal:      General: Bowel sounds are normal. There is no distension.      Palpations: Abdomen is soft.      Tenderness: There is no abdominal tenderness.   Musculoskeletal: Normal range of motion.         General: Tenderness (lbp, chronic) present.   Skin:     General: Skin is warm and dry.   Neurological:      Mental Status: She is alert and oriented to person, place, and time.      Sensory: No sensory deficit.      Motor: Weakness present.      Gait: Gait abnormal (uses cane for mobility).   Psychiatric:         Behavior: Behavior " normal.         Thought Content: Thought content normal.         Judgment: Judgment normal.          Result Review :     Office Visit on 02/22/2021   Component Date Value Ref Range Status   • Color 02/22/2021 Dark Yellow  Yellow, Straw, Dark Yellow, Kim Final   • Clarity, UA 02/22/2021 Clear  Clear Final   • Glucose, UA 02/22/2021 Negative  Negative, 1000 mg/dL (3+) mg/dL Final   • Bilirubin 02/22/2021 Negative  Negative Final   • Ketones, UA 02/22/2021 Negative  Negative Final   • Specific Gravity  02/22/2021 1.025  1.005 - 1.030 Final   • Blood, UA 02/22/2021 Large* Negative Final    5-10   • pH, Urine 02/22/2021 5.0  5.0 - 8.0 Final   • Protein, POC 02/22/2021 Trace* Negative mg/dL Final   • Urobilinogen, UA 02/22/2021 Normal  Normal Final   • Leukocytes 02/22/2021 Negative  Negative Final   • Nitrite, UA 02/22/2021 Negative  Negative Final                       Assessment and Plan    Diagnoses and all orders for this visit:    1. Chronic midline low back pain with bilateral sciatica (Primary)  Comments:  Continue with pain management, will refill Lyrica when needed    2. Hematuria, unspecified type  Comments:  Urine dip today negative for leuks, recommend patient increase water.  Can recheck urine dip in 1 to 2 weeks if symptoms recur or worsen  Orders:  -     POC Urinalysis Dipstick    3. Mixed hyperlipidemia  Comments:  Previous labs essentially stable, continue fenofibrate for elevated Trigs and healthy heart diet.  Plan to repeat lipids 1 week prior to visit in 3 months  Orders:  -     fenofibrate (TRICOR) 145 MG tablet; Take 1 tablet by mouth Daily.  Dispense: 90 tablet; Refill: 0    4. Essential hypertension  Comments:  BP stable, no medication refills needed at this time    5. Vitamin D deficiency    6. Preventative health care  Comments:  Will update vaccines on the chart  Recommend pneumonia vaccine at next visit    7. Vaginal candidiasis  Comments:  Take 1 dose of Diflucan, notify if s/s  won  Orders:  -     fluconazole (Diflucan) 150 MG tablet; Take 1 tablet by mouth 1 (One) Time for 1 dose.  Dispense: 1 tablet; Refill: 0    8. Hilar adenopathy  Comments:  Follow-up CT scan of the chest scheduled for March per Dr. Easton    9. Chronic obstructive pulmonary disease, unspecified COPD type (CMS/[facility])  Comments:  Continue inhalers and follow-up with Dr. Easton, anticipating nocturnal oxygen and/or cpap following recent sleep study      I spent 25 minutes caring for Rahel on this date of service. This time includes time spent by me in the following activities:preparing for the visit, reviewing tests, counseling and educating the patient/family/caregiver, ordering medications, tests, or procedures and documenting information in the medical record    Follow Up   Return in about 3 months (around 5/22/2021) for lyrica refill, htn panel and hgba1c 1 week prior. [employer].  Patient was given instructions and counseling regarding her condition or for health maintenance advice. Please see specific information pulled into the AVS if appropriate.

## 2021-02-23 LAB — BACTERIA SPEC AEROBE CULT: NORMAL

## 2021-02-25 NOTE — PROGRESS NOTES
Please notify pt of probable contaminated urine specimen but likely no bacteria, if no improvement in s/s after increasing water intake would recommend recollection. Otherwise, Will see patient back at scheduled/recommended follow up, or earlier if needed. Thank you

## 2021-03-22 ENCOUNTER — HOSPITAL ENCOUNTER (OUTPATIENT)
Dept: SLEEP MEDICINE | Facility: HOSPITAL | Age: 56
Discharge: HOME | End: 2021-03-22
Admitting: INTERNAL MEDICINE

## 2021-03-22 DIAGNOSIS — G47.10 HYPERSOMNIA: ICD-10-CM

## 2021-03-22 PROCEDURE — 95810 POLYSOM 6/> YRS 4/> PARAM: CPT

## 2021-03-24 ENCOUNTER — HOSPITAL ENCOUNTER (EMERGENCY)
Facility: HOSPITAL | Age: 56
Discharge: HOME | End: 2021-03-24
Admitting: EMERGENCY MEDICINE

## 2021-03-24 VITALS
BODY MASS INDEX: 29.62 KG/M2 | WEIGHT: 200 LBS | RESPIRATION RATE: 18 BRPM | HEART RATE: 94 BPM | SYSTOLIC BLOOD PRESSURE: 166 MMHG | HEIGHT: 69 IN | DIASTOLIC BLOOD PRESSURE: 78 MMHG | OXYGEN SATURATION: 98 % | TEMPERATURE: 97.9 F

## 2021-03-24 DIAGNOSIS — S05.02XA ABRASION OF LEFT CORNEA, INITIAL ENCOUNTER: Primary | ICD-10-CM

## 2021-03-24 PROCEDURE — 99283 EMERGENCY DEPT VISIT LOW MDM: CPT

## 2021-03-24 RX ORDER — PROPARACAINE HYDROCHLORIDE 5 MG/ML
SOLUTION/ DROPS OPHTHALMIC
Status: COMPLETED
Start: 2021-03-24 | End: 2021-03-24

## 2021-03-24 RX ORDER — ERYTHROMYCIN 5 MG/G
OINTMENT OPHTHALMIC EVERY 6 HOURS
Qty: 3.5 G | Refills: 0 | Status: SHIPPED | OUTPATIENT
Start: 2021-03-24 | End: 2021-09-21

## 2021-03-24 RX ADMIN — FLUORESCEIN SODIUM: 1 STRIP OPHTHALMIC at 12:34

## 2021-03-24 RX ADMIN — PROPARACAINE HYDROCHLORIDE: 5 SOLUTION/ DROPS OPHTHALMIC at 12:34

## 2021-03-24 NOTE — ED NOTES
Pt reports that yesterday when she woke up her eye was hurting and as the day when on it cont to get worse. pts left eye is red and watery. Pt reports pain in left eye as well as behind her eye. Reports she does not know if she got something in it or if she scratched. Pt is unable to open left eye.      Evelyn Morrison, LPN  03/24/21 115

## 2021-03-24 NOTE — DISCHARGE INSTRUCTIONS
Take pain medicine as previously directed.  Use eyedrops as directed for the next 5 days.    Follow-up with your ophthalmologist or [facility] for further evaluation management.    Follow-up with your primary care provider in 3-5 days.  If you do not have a primary care provider call [phone] for help in finding one, or you may follow up with [facility] at [phone].    Return to ED for any new or worsening symptoms

## 2021-03-24 NOTE — ED PROVIDER NOTES
Subjective   Patient is a 55-year-old female who presents with complaints of left eye pain for the past 2 days.  She is unsure if she got something in her eye or scratched her eye.  She rates it as moderate in severity.  States her pain continues to get worse today.  She said it is red and watery.  She does report some rhinorrhea.  She denies any chest pain shortness of breath or fever.  No nausea vomiting or abdominal pain.  No rash.  Patient is currently on Norco for chronic back pain.          Review of Systems   Constitutional: Negative.    HENT: Positive for rhinorrhea. Negative for congestion, dental problem, ear discharge, ear pain, sinus pressure, sinus pain, sore throat, trouble swallowing and voice change.    Eyes: Positive for photophobia, pain, discharge and redness.   Respiratory: Negative.    Cardiovascular: Negative.    Gastrointestinal: Negative for abdominal distention, abdominal pain, diarrhea, nausea and vomiting.   Musculoskeletal: Negative for neck pain and neck stiffness.   Skin: Negative.    Neurological: Negative for dizziness, seizures, syncope, facial asymmetry, speech difficulty, weakness and numbness.   Hematological: Negative.        Past Medical History:   Diagnosis Date   • Allergic    • Arthritis    • Atrial fibrillation (CMS/HCC)    • COPD (chronic obstructive pulmonary disease) (CMS/HCC)    • Headache    • History of herniated intervertebral disc    • History of skin cancer     Left lower extremity   • Hyperlipidemia    • Hypertension    • Injury of back    • Leukocytosis    • Urinary tract infection with hematuria 1/22/2021       Allergies   Allergen Reactions   • Morphine Anaphylaxis and Nausea And Vomiting       Past Surgical History:   Procedure Laterality Date   • BACK SURGERY     • DILATATION AND CURETTAGE     • LUMBAR DECOMPRESSION      L4-L5    • SKIN CANCER EXCISION Left     Cao   • SUBTOTAL HYSTERECTOMY         Family History   Problem Relation Age of Onset   • Breast  cancer Mother    • Lung cancer Father    • Lung cancer Other    • Colon cancer Other    • Skin cancer Other        Social History     Socioeconomic History   • Marital status:      Spouse name: Not on file   • Number of children: Not on file   • Years of education: Not on file   • Highest education level: Not on file   Tobacco Use   • Smoking status: Current Every Day Smoker     Packs/day: 1.00     Years: 40.00     Pack years: 40.00   • Smokeless tobacco: Never Used   • Tobacco comment: currently working on cessation    Substance and Sexual Activity   • Alcohol use: Yes     Comment: occasionally   • Drug use: No   • Sexual activity: Defer           Objective   Physical Exam  Vitals and nursing note reviewed.   Constitutional:       General: She is not in acute distress.     Appearance: She is well-developed. She is not ill-appearing, toxic-appearing or diaphoretic.   HENT:      Head: Normocephalic and atraumatic.      Nose: Nose normal.      Mouth/Throat:      Mouth: Mucous membranes are moist.      Pharynx: Oropharynx is clear.   Eyes:      General: Lids are everted, no foreign bodies appreciated. No scleral icterus.     Extraocular Movements: Extraocular movements intact.      Pupils: Pupils are equal, round, and reactive to light. Pupils are equal.      Left eye: Corneal abrasion present. Ailyn exam negative.     Comments: Left conjunctive is erythematous.  Corneal abrasion noted ED outer lower sclera.  Negative Ailyn sign.  Lids everted with no foreign body noted.  Visual acuity  Right 20/25  Left unable to be obtained  Both 20/25   Cardiovascular:      Rate and Rhythm: Normal rate and regular rhythm.      Heart sounds: No murmur heard.   No friction rub. No gallop.    Pulmonary:      Effort: Pulmonary effort is normal. No respiratory distress.      Breath sounds: Normal breath sounds. No stridor. No wheezing, rhonchi or rales.   Chest:      Chest wall: No tenderness.   Abdominal:      General: Bowel  "sounds are normal. There is no distension. There are no signs of injury.      Palpations: Abdomen is soft. There is no fluid wave, hepatomegaly, splenomegaly or mass.      Tenderness: There is no abdominal tenderness. There is no right CVA tenderness, left CVA tenderness, guarding or rebound.      Hernia: No hernia is present.   Musculoskeletal:      Cervical back: Normal range of motion and neck supple. No rigidity.   Lymphadenopathy:      Cervical: No cervical adenopathy.   Skin:     General: Skin is warm.      Capillary Refill: Capillary refill takes less than 2 seconds.      Coloration: Skin is not cyanotic, jaundiced or pale.      Findings: No rash.   Neurological:      General: No focal deficit present.      Mental Status: She is alert and oriented to person, place, and time.   Psychiatric:         Mood and Affect: Mood normal.         Behavior: Behavior normal.         Procedures           ED Course      /78 (BP Location: Left arm, Patient Position: Sitting)   Pulse 94   Temp 97.9 °F (36.6 °C) (Temporal)   Resp 18   Ht 175.3 cm (69\")   Wt 90.7 kg (200 lb)   SpO2 98%   BMI 29.53 kg/m²   Medications   fluorescein 1 MG ophthalmic strip  - ADS Override Pull (has no administration in time range)   proparacaine (ALCAINE) 0.5 % ophthalmic solution  - ADS Override Pull (has no administration in time range)     Labs Reviewed - No data to display  No radiology results for the last day                                       MDM  Number of Diagnoses or Management Options  Abrasion of left cornea, initial encounter  Diagnosis management comments: Chart Review:  Comorbidity: As per past medical history  Disposition/Treatment:  Appropriate PPE was worn during exam and throughout all encounters with the patient.  Patient had eye exam and then was found to have a corneal abrasion.  Patient was given prescription for erythromycin eyedrops.  Patient is chronically on Norco for low back pain she was advised to take " as needed for pain and follow-up with her ophthalmologist for further evaluation management.  Patient voiced understanding and discharge instructions along with signs and symptoms to return to the ED.  Patient was stable at time of discharge and in agreement with plan      Final diagnoses:   Abrasion of left cornea, initial encounter       ED Disposition  ED Disposition     ED Disposition Condition Comment    Discharge Stable           Vickie Gomez, APRN  [address]  [place] [address] [phone]    Schedule an appointment as soon as possible for a visit in 3 days      [facility] EMERGENCY DEPARTMENT  [address] [facility] [address] [facility] [address] [phone]  Go to   If symptoms worsen    [facility] [place]  [address]  [facility] [address] [phone]  Schedule an appointment as soon as possible for a visit            Medication List      New Prescriptions    erythromycin 5 MG/GM ophthalmic ointment  Commonly known as: ROMYCIN  Administer  to the right eye Every 6 (Six) Hours. For 5 days           Where to Get Your Medications      These medications were sent to [facility][id number] - [address] AT [address] [place] [address] - [phone] [facility] [phone] [address] [place] [address]    Phone: [phone]   · erythromycin 5 MG/GM ophthalmic ointment          Camila Peter PA  03/24/21 1059

## 2021-03-24 NOTE — ED NOTES
"Visual acuity:  R: 20/25  L: could not obtain, patient stated \"everything is blurry\"  Both: 20/25     Holly Simms  03/24/21 1207    "

## 2021-04-21 RX ORDER — ERGOCALCIFEROL 1.25 MG/1
CAPSULE ORAL
Qty: 12 CAPSULE | Refills: 0 | Status: SHIPPED | OUTPATIENT
Start: 2021-04-21 | End: 2021-07-06

## 2021-04-22 DIAGNOSIS — M54.41 CHRONIC MIDLINE LOW BACK PAIN WITH BILATERAL SCIATICA: ICD-10-CM

## 2021-04-22 DIAGNOSIS — G89.29 CHRONIC MIDLINE LOW BACK PAIN WITH BILATERAL SCIATICA: ICD-10-CM

## 2021-04-22 DIAGNOSIS — M54.42 CHRONIC MIDLINE LOW BACK PAIN WITH BILATERAL SCIATICA: ICD-10-CM

## 2021-05-04 ENCOUNTER — OFFICE VISIT (OUTPATIENT)
Dept: CARDIOLOGY | Facility: CLINIC | Age: 56
End: 2021-05-04

## 2021-05-04 VITALS
SYSTOLIC BLOOD PRESSURE: 150 MMHG | WEIGHT: 201 LBS | DIASTOLIC BLOOD PRESSURE: 92 MMHG | OXYGEN SATURATION: 97 % | HEART RATE: 68 BPM | HEIGHT: 69 IN | BODY MASS INDEX: 29.77 KG/M2

## 2021-05-04 DIAGNOSIS — I10 ESSENTIAL HYPERTENSION: ICD-10-CM

## 2021-05-04 DIAGNOSIS — I65.21 STENOSIS OF RIGHT CAROTID ARTERY: ICD-10-CM

## 2021-05-04 DIAGNOSIS — I48.0 PAROXYSMAL ATRIAL FIBRILLATION (HCC): Primary | ICD-10-CM

## 2021-05-04 PROCEDURE — 99214 OFFICE O/P EST MOD 30 MIN: CPT | Performed by: INTERNAL MEDICINE

## 2021-05-04 PROCEDURE — 93000 ELECTROCARDIOGRAM COMPLETE: CPT | Performed by: INTERNAL MEDICINE

## 2021-05-04 RX ORDER — LOSARTAN POTASSIUM AND HYDROCHLOROTHIAZIDE 12.5; 1 MG/1; MG/1
1 TABLET ORAL DAILY
Qty: 30 TABLET | Refills: 0 | OUTPATIENT
Start: 2021-05-04 | End: 2021-06-02 | Stop reason: SDUPTHER

## 2021-05-04 NOTE — PROGRESS NOTES
Cardiology Office Visit Note      Referring physician:  Vickie Gomez APRN    Reason For Followup: Annual check up    HPI:  Rahel Ramsey is a 55 y.o. female who returns to the office today for an annual check up. She has a known medical history of  afib/RVR, fibromyalgia, HTN, tobacco abuse. Her last Cardiac cath in 2016 revealed normal coronary anatomy and well preserved LV function.    She reports her heart beat is pounding at night- it is only lasting a few seconds. She has completed a recent outpatient sleep study, but has not been diagnosed with sleep apnea as of yet.  Her sleep study is not yet been reported.  Meanwhile, she denies chest pain, increased shortness of air, edema or PND. She does get dizzy on occasion.    Unfortunately, she did continue her smoking habit that has reduced her tobacco habit down to approximately 1/2 pack of cigarettes per day.  She drinks typically 3 servings of caffeine daily.  She denies alcohol use.    Ms. Ramsey is very functionally limited by her left lower extremity pain and weakness with left foot drop from previous unsuccessful spine surgery.  She struggles to walk one half block at a time but still maintains routine activities of daily living.    Her medication were reviewed during her visit      Past Medical History:   Diagnosis Date   • Allergic    • Arthritis    • Atrial fibrillation (CMS/HCC)    • COPD (chronic obstructive pulmonary disease) (CMS/HCC)    • Headache    • History of herniated intervertebral disc    • History of skin cancer     Left lower extremity   • Hyperlipidemia    • Hypertension    • Injury of back    • Leukocytosis    • Lung nodule    • Urinary tract infection with hematuria 1/22/2021       Past Surgical History:   Procedure Laterality Date   • BACK SURGERY     • DILATATION AND CURETTAGE     • LUMBAR DECOMPRESSION      L4-L5    • SKIN CANCER EXCISION Left     Cao   • SUBTOTAL HYSTERECTOMY           Current Outpatient Medications:   •   amitriptyline (ELAVIL) 25 MG tablet, Take 25 mg by mouth every night at bedtime., Disp: , Rfl: 3  •  aspirin (ADULT ASPIRIN EC LOW STRENGTH) 81 MG EC tablet, 81 mg., Disp: , Rfl:   •  chlorhexidine (PERIDEX) 0.12 % solution, Swish and spit 15 ml BID for 7 days, then as needed, Disp: 473 mL, Rfl: 0  •  Cyanocobalamin (VITAMIN B 12 PO), Take  by mouth., Disp: , Rfl:   •  erythromycin (ROMYCIN) 5 MG/GM ophthalmic ointment, Administer  to the right eye Every 6 (Six) Hours. For 5 days, Disp: 3.5 g, Rfl: 0  •  fenofibrate (TRICOR) 145 MG tablet, Take 1 tablet by mouth Daily., Disp: 90 tablet, Rfl: 0  •  fluticasone (FLONASE) 50 MCG/ACT nasal spray, INHALE 2 PUFFS INTO EACH NOSTRIL EVERY DAY, Disp: 48 mL, Rfl: 1  •  HYDROcodone-acetaminophen (NORCO)  MG per tablet, Take 1 tablet by mouth Every 6 (Six) Hours As Needed., Disp: , Rfl: 0  •  Lyrica 200 MG capsule, TAKE 1 CAPSULE BY MOUTH TWICE DAILY, Disp: 180 capsule, Rfl: 0  •  metoprolol succinate XL (TOPROL-XL) 25 MG 24 hr tablet, Take 1 tablet by mouth Daily., Disp: 90 tablet, Rfl: 3  •  polyethyl glycol-propyl glycol (SYSTANE) 0.4-0.3 % solution ophthalmic solution, Every 1 (One) Hour As Needed., Disp: , Rfl:   •  umeclidinium-vilanterol (ANORO ELLIPTA) 62.5-25 MCG/INH aerosol powder  inhaler, Inhale 1 puff Daily., Disp: , Rfl:   •  vitamin D (ERGOCALCIFEROL) 1.25 MG (51289 UT) capsule capsule, TAKE 1 CAPSULE BY MOUTH ONCE PER WEEK, Disp: 12 capsule, Rfl: 0    Social History     Socioeconomic History   • Marital status:      Spouse name: Not on file   • Number of children: Not on file   • Years of education: Not on file   • Highest education level: Not on file   Tobacco Use   • Smoking status: Current Every Day Smoker     Packs/day: 0.50     Years: 40.00     Pack years: 20.00   • Smokeless tobacco: Never Used   • Tobacco comment: currently working on cessation    Substance and Sexual Activity   • Alcohol use: Yes     Comment: occasionally   • Drug use: No  "  • Sexual activity: Defer       Family History   Problem Relation Age of Onset   • Breast cancer Mother    • Lung cancer Father    • Lung cancer Other    • Colon cancer Other    • Skin cancer Other          Review of Systems   General: denies fever, chills, anorexia, weight loss  Eyes: denies blurring, diplopia  Ear/Nose/Throat: denies ear pain, nosebleeds, hoarseness  Cardiovascular: See HPI  Respiratory: denies excessive sputum, hemoptysis, wheezing  Gastrointestinal: denies nausea, vomiting, change in bowel habits, abdominal pain  Genitourinary: Denies hematuria dysuria  Musculoskeletal: Left leg weakness and pain with radiculopathy and left foot drop  Skin: denies rashes, itching, suspicious lesions  Neurologic: denies focal neuro deficits  Psychiatric: denies depression, anxiety  Endocrine: denies cold intolerance, heat intolerance  Hematologic/Lymphatic: denies abnormal bruising, bleeding  Allergic/Immunologic: denies urticaria or persistent infections      Objective     Visit Vitals  /92   Pulse 68   Ht 175.3 cm (69\")   Wt 91.2 kg (201 lb)   SpO2 97%   BMI 29.68 kg/m²           Physical Exam  General:     Mildly Obese, well developed,, in no acute distress.    Head:     normocephalic and atraumatic.    Eyes:    PERRL/EOM intact, conjunctiva and sclera clear with out nystagmus.    Neck:    no jvd or bruits  Chest Wall:    no deformities   Lungs:    clear bilaterally to auscultation with adequate global airflow   Heart:    non-displaced PMI; regular rate and rhythm, normal S1, S2 without murmurs, rubs, or gallops  Abdomen:  Soft, nontender without HSM  Msk:    no deformity; adequate R OM  Pulses:    pulses normal in all 4 extremities.    Extremities:    no clubbing, cyanosis, or edema, though has profound left lower extremity weakness and left foot drop and walks with a cane.  Neurologic:     Left lower extremity weakness and left foot drop noted  Skin:    intact without lesions or rashes.    Psych:    " alert and cooperative; normal mood and affect; normal attention span and concentration.            ECG 12 Lead    Date/Time: 5/4/2021 7:24 AM  Performed by: KYRA Yeager MD  Authorized by: KYRA Yeager MD   Comparison: compared with previous ECG from 4/4/2018  Similar to previous ECG  Rhythm: sinus rhythm  Rate: normal    Clinical impression: normal ECG              Assessment:   Problems Addressed this Visit        Other    Essential hypertension  --Marginally well-regulated on low-dose metoprolol      Paroxysmal atrial fibrillation (CMS/HCC) - Primary  -Currently maintaining sinus rhythm on Toprol-XL; not anticoagulated      Stenosis of right carotid artery-remains asymptomatic    Longstanding tobacco dependency with probable COPD    Chronic back pain with left radiculopathy and foot drop      Diagnoses       Codes Comments    Paroxysmal atrial fibrillation (CMS/HCC)    -  Primary ICD-10-CM: I48.0  ICD-9-CM: 427.31     Essential hypertension     ICD-10-CM: I10  ICD-9-CM: 401.9     Stenosis of right carotid artery     ICD-10-CM: I65.21  ICD-9-CM: 433.10             Plan:  I am advising Ms. Ramsey to increase her antihypertensive regimen by adding losartan 100 mg with 12 point milligrams HCTZ in addition to continuing on current metoprolol dose.  She is being given a home BP/HR log to maintain and follow-up with me in 2 months or sooner if needed.  I may consider Holter monitor or even 30-day event monitor but will await sleep study before further cardiac investigation.    KYRA Yeager MD  5/4/2021 11:21 EDT    This report was generated using the Dragon voice recognition system.

## 2021-05-17 ENCOUNTER — LAB (OUTPATIENT)
Dept: FAMILY MEDICINE CLINIC | Facility: CLINIC | Age: 56
End: 2021-05-17

## 2021-05-17 DIAGNOSIS — R73.9 HYPERGLYCEMIA: ICD-10-CM

## 2021-05-17 DIAGNOSIS — E78.2 MIXED HYPERLIPIDEMIA: ICD-10-CM

## 2021-05-17 DIAGNOSIS — I10 ESSENTIAL HYPERTENSION: Primary | ICD-10-CM

## 2021-05-17 DIAGNOSIS — D72.829 LEUKOCYTOSIS, UNSPECIFIED TYPE: ICD-10-CM

## 2021-05-17 LAB — HBA1C MFR BLD: 6.6 % (ref 3.5–5.6)

## 2021-05-17 PROCEDURE — 84443 ASSAY THYROID STIM HORMONE: CPT | Performed by: NURSE PRACTITIONER

## 2021-05-17 PROCEDURE — 85027 COMPLETE CBC AUTOMATED: CPT | Performed by: NURSE PRACTITIONER

## 2021-05-17 PROCEDURE — 80053 COMPREHEN METABOLIC PANEL: CPT | Performed by: NURSE PRACTITIONER

## 2021-05-17 PROCEDURE — 80061 LIPID PANEL: CPT | Performed by: NURSE PRACTITIONER

## 2021-05-17 PROCEDURE — 36415 COLL VENOUS BLD VENIPUNCTURE: CPT | Performed by: NURSE PRACTITIONER

## 2021-05-17 PROCEDURE — 83036 HEMOGLOBIN GLYCOSYLATED A1C: CPT | Performed by: NURSE PRACTITIONER

## 2021-05-18 LAB
ALBUMIN SERPL-MCNC: 4.1 G/DL (ref 3.5–5.2)
ALBUMIN/GLOB SERPL: 1.5 G/DL
ALP SERPL-CCNC: 86 U/L (ref 39–117)
ALT SERPL W P-5'-P-CCNC: 29 U/L (ref 1–33)
ANION GAP SERPL CALCULATED.3IONS-SCNC: 9.2 MMOL/L (ref 5–15)
AST SERPL-CCNC: 23 U/L (ref 1–32)
BILIRUB SERPL-MCNC: 0.4 MG/DL (ref 0–1.2)
BUN SERPL-MCNC: 9 MG/DL (ref 6–20)
BUN/CREAT SERPL: 14.8 (ref 7–25)
CALCIUM SPEC-SCNC: 9.1 MG/DL (ref 8.6–10.5)
CHLORIDE SERPL-SCNC: 105 MMOL/L (ref 98–107)
CHOLEST SERPL-MCNC: 170 MG/DL (ref 0–200)
CO2 SERPL-SCNC: 25.8 MMOL/L (ref 22–29)
CREAT SERPL-MCNC: 0.61 MG/DL (ref 0.57–1)
DEPRECATED RDW RBC AUTO: 46.1 FL (ref 37–54)
ERYTHROCYTE [DISTWIDTH] IN BLOOD BY AUTOMATED COUNT: 13.8 % (ref 12.3–15.4)
GFR SERPL CREATININE-BSD FRML MDRD: 101 ML/MIN/1.73
GLOBULIN UR ELPH-MCNC: 2.8 GM/DL
GLUCOSE SERPL-MCNC: 88 MG/DL (ref 65–99)
HCT VFR BLD AUTO: 50.3 % (ref 34–46.6)
HDLC SERPL-MCNC: 31 MG/DL (ref 40–60)
HGB BLD-MCNC: 16.9 G/DL (ref 12–15.9)
LDLC SERPL CALC-MCNC: 93 MG/DL (ref 0–100)
LDLC/HDLC SERPL: 2.71 {RATIO}
MCH RBC QN AUTO: 30.7 PG (ref 26.6–33)
MCHC RBC AUTO-ENTMCNC: 33.6 G/DL (ref 31.5–35.7)
MCV RBC AUTO: 91.5 FL (ref 79–97)
PLATELET # BLD AUTO: 264 10*3/MM3 (ref 140–450)
PMV BLD AUTO: 10.7 FL (ref 6–12)
POTASSIUM SERPL-SCNC: 4.4 MMOL/L (ref 3.5–5.2)
PROT SERPL-MCNC: 6.9 G/DL (ref 6–8.5)
RBC # BLD AUTO: 5.5 10*6/MM3 (ref 3.77–5.28)
SODIUM SERPL-SCNC: 140 MMOL/L (ref 136–145)
TRIGL SERPL-MCNC: 275 MG/DL (ref 0–150)
TSH SERPL DL<=0.05 MIU/L-ACNC: 1.16 UIU/ML (ref 0.27–4.2)
VLDLC SERPL-MCNC: 46 MG/DL (ref 5–40)
WBC # BLD AUTO: 12.36 10*3/MM3 (ref 3.4–10.8)

## 2021-05-22 DIAGNOSIS — E78.2 MIXED HYPERLIPIDEMIA: ICD-10-CM

## 2021-05-24 ENCOUNTER — OFFICE VISIT (OUTPATIENT)
Dept: FAMILY MEDICINE CLINIC | Facility: CLINIC | Age: 56
End: 2021-05-24

## 2021-05-24 VITALS
SYSTOLIC BLOOD PRESSURE: 136 MMHG | HEIGHT: 69 IN | BODY MASS INDEX: 29.62 KG/M2 | HEART RATE: 86 BPM | DIASTOLIC BLOOD PRESSURE: 71 MMHG | OXYGEN SATURATION: 99 % | WEIGHT: 200 LBS

## 2021-05-24 DIAGNOSIS — R73.9 HYPERGLYCEMIA: ICD-10-CM

## 2021-05-24 DIAGNOSIS — E78.2 MIXED HYPERLIPIDEMIA: Primary | ICD-10-CM

## 2021-05-24 DIAGNOSIS — Z12.31 BREAST CANCER SCREENING BY MAMMOGRAM: ICD-10-CM

## 2021-05-24 DIAGNOSIS — G89.29 CHRONIC MIDLINE LOW BACK PAIN WITH BILATERAL SCIATICA: ICD-10-CM

## 2021-05-24 DIAGNOSIS — S29.011A MUSCLE STRAIN OF CHEST WALL, INITIAL ENCOUNTER: ICD-10-CM

## 2021-05-24 DIAGNOSIS — M54.41 CHRONIC MIDLINE LOW BACK PAIN WITH BILATERAL SCIATICA: ICD-10-CM

## 2021-05-24 DIAGNOSIS — R20.2 FACIAL PARESTHESIA: ICD-10-CM

## 2021-05-24 DIAGNOSIS — M54.42 CHRONIC MIDLINE LOW BACK PAIN WITH BILATERAL SCIATICA: ICD-10-CM

## 2021-05-24 DIAGNOSIS — I10 ESSENTIAL HYPERTENSION: ICD-10-CM

## 2021-05-24 DIAGNOSIS — J44.9 CHRONIC OBSTRUCTIVE PULMONARY DISEASE, UNSPECIFIED COPD TYPE (HCC): ICD-10-CM

## 2021-05-24 DIAGNOSIS — R59.0 HILAR ADENOPATHY: ICD-10-CM

## 2021-05-24 DIAGNOSIS — D72.829 LEUKOCYTOSIS, UNSPECIFIED TYPE: ICD-10-CM

## 2021-05-24 PROCEDURE — 99214 OFFICE O/P EST MOD 30 MIN: CPT | Performed by: NURSE PRACTITIONER

## 2021-05-24 RX ORDER — METHYLPREDNISOLONE 4 MG/1
TABLET ORAL
Qty: 21 TABLET | Refills: 0 | Status: SHIPPED | OUTPATIENT
Start: 2021-05-24 | End: 2021-07-22

## 2021-05-24 RX ORDER — FENOFIBRATE 145 MG/1
145 TABLET, COATED ORAL DAILY
Qty: 90 TABLET | Refills: 0 | Status: SHIPPED | OUTPATIENT
Start: 2021-05-24 | End: 2021-08-19

## 2021-05-24 NOTE — PATIENT INSTRUCTIONS
Diabetes Mellitus and Nutrition, Adult  When you have diabetes, or diabetes mellitus, it is very important to have healthy eating habits because your blood sugar (glucose) levels are greatly affected by what you eat and drink. Eating healthy foods in the right amounts, at about the same times every day, can help you:  · Control your blood glucose.  · Lower your risk of heart disease.  · Improve your blood pressure.  · Reach or maintain a healthy weight.  What can affect my meal plan?  Every person with diabetes is different, and each person has different needs for a meal plan. Your health care provider may recommend that you work with a dietitian to make a meal plan that is best for you. Your meal plan may vary depending on factors such as:  · The calories you need.  · The medicines you take.  · Your weight.  · Your blood glucose, blood pressure, and cholesterol levels.  · Your activity level.  · Other health conditions you have, such as heart or kidney disease.  How do carbohydrates affect me?  Carbohydrates, also called carbs, affect your blood glucose level more than any other type of food. Eating carbs naturally raises the amount of glucose in your blood. Carb counting is a method for keeping track of how many carbs you eat. Counting carbs is important to keep your blood glucose at a healthy level, especially if you use insulin or take certain oral diabetes medicines.  It is important to know how many carbs you can safely have in each meal. This is different for every person. Your dietitian can help you calculate how many carbs you should have at each meal and for each snack.  How does alcohol affect me?  Alcohol can cause a sudden decrease in blood glucose (hypoglycemia), especially if you use insulin or take certain oral diabetes medicines. Hypoglycemia can be a life-threatening condition. Symptoms of hypoglycemia, such as sleepiness, dizziness, and confusion, are similar to symptoms of having too much  "alcohol.  · Do not drink alcohol if:  ? Your health care provider tells you not to drink.  ? You are pregnant, may be pregnant, or are planning to become pregnant.  · If you drink alcohol:  ? Do not drink on an empty stomach.  ? Limit how much you use to:  § 0-1 drink a day for women.  § 0-2 drinks a day for men.  ? Be aware of how much alcohol is in your drink. In the [place] one drink equals one 12 oz bottle of beer (355 mL), one 5 oz glass of wine (148 mL), or one 1½ oz glass of hard liquor (44 mL).  ? Keep yourself hydrated with water, diet soda, or unsweetened iced tea.  § Keep in mind that regular soda, juice, and other mixers may contain a lot of sugar and must be counted as carbs.  What are tips for following this plan?    Reading food labels  · Start by checking the serving size on the \"Nutrition Facts\" label of packaged foods and drinks. The amount of calories, carbs, fats, and other nutrients listed on the label is based on one serving of the item. Many items contain more than one serving per package.  · Check the total grams (g) of carbs in one serving. You can calculate the number of servings of carbs in one serving by dividing the total carbs by 15. For example, if a food has 30 g of total carbs per serving, it would be equal to 2 servings of carbs.  · Check the number of grams (g) of saturated fats and trans fats in one serving. Choose foods that have a low amount or none of these fats.  · Check the number of milligrams (mg) of salt (sodium) in one serving. Most people should limit total sodium intake to less than 2,300 mg per day.  · Always check the nutrition information of foods labeled as \"low-fat\" or \"nonfat.\" These foods may be higher in added sugar or refined carbs and should be avoided.  · Talk to your dietitian to identify your daily goals for nutrients listed on the label.  Shopping  · Avoid buying canned, pre-made, or processed foods. These foods tend to be high in fat, sodium, and added " sugar.  · Shop around the outside edge of the grocery store. This is where you will most often find fresh fruits and vegetables, bulk grains, fresh meats, and fresh dairy.  Cooking  · Use low-heat cooking methods, such as baking, instead of high-heat cooking methods like deep frying.  · Cook using healthy oils, such as olive, canola, or sunflower oil.  · Avoid cooking with butter, cream, or high-fat meats.  Meal planning  · Eat meals and snacks regularly, preferably at the same times every day. Avoid going long periods of time without eating.  · Eat foods that are high in fiber, such as fresh fruits, vegetables, beans, and whole grains. Talk with your dietitian about how many servings of carbs you can eat at each meal.  · Eat 4-6 oz (112-168 g) of lean protein each day, such as lean meat, chicken, fish, eggs, or tofu. One ounce (oz) of lean protein is equal to:  ? 1 oz (28 g) of meat, chicken, or fish.  ? 1 egg.  ? ¼ cup (62 g) of tofu.  · Eat some foods each day that contain healthy fats, such as avocado, nuts, seeds, and fish.  What foods should I eat?  Fruits  Berries. Apples. Oranges. Peaches. Apricots. Plums. Grapes. [place]. Papaya. Pomegranate. Kiwi. Cherries.  Vegetables  Lettuce. Spinach. Leafy greens, including kale, chard, [place] greens, and mustard greens. Beets. Cauliflower. Cabbage. Broccoli. Carrots. Green beans. Tomatoes. Peppers. Onions. Cucumbers. [place] sprouts.  Grains  Whole grains, such as whole-wheat or whole-grain bread, crackers, tortillas, cereal, and pasta. Unsweetened oatmeal. Quinoa. Brown or wild rice.  Meats and other proteins  Seafood. Poultry without skin. Lean cuts of poultry and beef. Tofu. Nuts. Seeds.  Dairy  Low-fat or fat-free dairy products such as milk, yogurt, and cheese.  The items listed above may not be a complete list of foods and beverages you can eat. Contact a dietitian for more information.  What foods should I avoid?  Fruits  Fruits canned with  syrup.  Vegetables  Canned vegetables. Frozen vegetables with butter or cream sauce.  Grains  Refined white flour and flour products such as bread, pasta, snack foods, and cereals. Avoid all processed foods.  Meats and other proteins  Fatty cuts of meat. Poultry with skin. Breaded or fried meats. Processed meat. Avoid saturated fats.  Dairy  Full-fat yogurt, cheese, or milk.  Beverages  Sweetened drinks, such as soda or iced tea.  The items listed above may not be a complete list of foods and beverages you should avoid. Contact a dietitian for more information.  Questions to ask a health care provider  · Do I need to meet with a diabetes educator?  · Do I need to meet with a dietitian?  · What number can I call if I have questions?  · When are the best times to check my blood glucose?  Where to find more information:  · American Diabetes Association: diabetes.org  · Academy of Nutrition and Dietetics: www.eatright.org  · National [place] of Diabetes and Digestive and Kidney Diseases: www.niddk.nih.gov  · Association of Diabetes Care and Education Specialists: www.diabeteseducator.org  Summary  · It is important to have healthy eating habits because your blood sugar (glucose) levels are greatly affected by what you eat and drink.  · A healthy meal plan will help you control your blood glucose and maintain a healthy lifestyle.  · Your health care provider may recommend that you work with a dietitian to make a meal plan that is best for you.  · Keep in mind that carbohydrates (carbs) and alcohol have immediate effects on your blood glucose levels. It is important to count carbs and to use alcohol carefully.  This information is not intended to replace advice given to you by your health care provider. Make sure you discuss any questions you have with your health care provider.  Document Revised: 11/24/2020 Document Reviewed: 11/24/2020  Elsevier Patient Education © 2021 [employer]

## 2021-05-24 NOTE — PROGRESS NOTES
Chief Complaint  Hypertension (pt reports dr espana started losartan with hctz, but it caused muscle cramps so she stopped taking it.  she said she will call dr espana.), COPD (pt would like to know if safe for her to fly d/t oxygen levels), Elevated A1C Recheck, Follow-up (3 mo), Results (labs 5/17), Chest Pain, and Numbness (in face, wonders if d/t lyrica use)    Subjective          Rahel Ramsey presents to [facility] for   History of Present Illness     HTN, stable on meds and takes as directed, denies chest pain, headache, shortness of air, palpitations and swelling of extremities. had s/e with losartan and stopped taking.      Chronic back pain, follows with Dr. Dixon, receives epidural injections, reports pain stable on hydrocodone and lyrica 1-2 times daily, patient reports weakness/radiation/stiffness LLE but stable, denies numbness, tingling, bowel changes, bladder changes, pain with cough, pain with sneeze, pain with straining, pain with defecation, fever, chills, rash and groin pain.       COPD: The patient has been previously diagnosed with COPD. Symptoms include dyspnea, non-productive cough and wheezing, especially with exertion. Symptoms are unchanged and currently stable, she is following with pulmonology/Dr. Easton. She completed sleep study, CT chest to f/u on [facility], however she has not had f/u to discuss with Dr. Easton. She is using inhalers as directed. She does continue to smoke 1 ppd, for approx 40 years now.      Hilar mass, has followed with thoracic surgery, now Dr. Easton is monitoring CT chest, recently completed [facility]    Chest wall muscle strain, due to painting in difficult position. Improving over the last 3 days with occasional ibuprofen.     Facial numbness, mostly left-sided occasionally both cheeks, has been off of lyrica d/t running out several days, restarted asa and s/s resolved.          The following  portions of the patient's history were reviewed and updated as appropriate: allergies, current medications, past family history, past medical history, past social history, past surgical history and problem list.    Past Medical History:   Diagnosis Date   • Allergic    • Arthritis    • Atrial fibrillation (CMS/HCC)    • COPD (chronic obstructive pulmonary disease) (CMS/HCC)    • Headache    • History of herniated intervertebral disc    • History of skin cancer    • Hyperlipidemia    • Hypertension    • Injury of back    • Leukocytosis    • Lung nodule    • Urinary tract infection with hematuria 1/22/2021     Past Surgical History:   Procedure Laterality Date   • BACK SURGERY     • DILATATION AND CURETTAGE     • LUMBAR DECOMPRESSION      L4-L5    • SKIN CANCER EXCISION Left     [place]   • SUBTOTAL HYSTERECTOMY       Family History   Problem Relation Age of Onset   • Breast cancer Mother    • Lung cancer Father    • Lung cancer Other    • Colon cancer Other    • Skin cancer Other      Social History     Tobacco Use   • Smoking status: Current Every Day Smoker     Packs/day: 0.50     Years: 40.00     Pack years: 20.00   • Smokeless tobacco: Never Used   • Tobacco comment: currently working on cessation    Substance Use Topics   • Alcohol use: Yes     Comment: occasionally       Current Outpatient Medications:   •  amitriptyline (ELAVIL) 25 MG tablet, Take 25 mg by mouth every night at bedtime., Disp: , Rfl: 3  •  aspirin (ADULT ASPIRIN EC LOW STRENGTH) 81 MG EC tablet, 81 mg., Disp: , Rfl:   •  chlorhexidine (PERIDEX) 0.12 % solution, Swish and spit 15 ml BID for 7 days, then as needed, Disp: 473 mL, Rfl: 0  •  Cyanocobalamin (VITAMIN B 12 PO), Take  by mouth., Disp: , Rfl:   •  erythromycin (ROMYCIN) 5 MG/GM ophthalmic ointment, Administer  to the right eye Every 6 (Six) Hours. For 5 days, Disp: 3.5 g, Rfl: 0  •  fluticasone (FLONASE) 50 MCG/ACT nasal spray, INHALE 2 PUFFS INTO EACH NOSTRIL EVERY DAY, Disp: 48 mL, Rfl:  "1  •  HYDROcodone-acetaminophen (NORCO)  MG per tablet, Take 1 tablet by mouth Every 6 (Six) Hours As Needed., Disp: , Rfl: 0  •  Lyrica 200 MG capsule, TAKE 1 CAPSULE BY MOUTH TWICE DAILY, Disp: 180 capsule, Rfl: 0  •  metoprolol succinate XL (TOPROL-XL) 25 MG 24 hr tablet, Take 1 tablet by mouth Daily., Disp: 90 tablet, Rfl: 3  •  polyethyl glycol-propyl glycol (SYSTANE) 0.4-0.3 % solution ophthalmic solution, Every 1 (One) Hour As Needed., Disp: , Rfl:   •  umeclidinium-vilanterol (ANORO ELLIPTA) 62.5-25 MCG/INH aerosol powder  inhaler, Inhale 1 puff Daily., Disp: , Rfl:   •  vitamin D (ERGOCALCIFEROL) 1.25 MG (74474 UT) capsule capsule, TAKE 1 CAPSULE BY MOUTH ONCE PER WEEK, Disp: 12 capsule, Rfl: 0  •  fenofibrate (TRICOR) 145 MG tablet, TAKE 1 TABLET BY MOUTH DAILY, Disp: 90 tablet, Rfl: 0  •  losartan-hydrochlorothiazide (HYZAAR) 100-12.5 MG per tablet, Take 1 tablet by mouth Daily., Disp: 30 tablet, Rfl: 0  •  methylPREDNISolone (MEDROL) 4 MG dose pack, Take as directed on package instructions., Disp: 21 tablet, Rfl: 0    Objective   Vital Signs:   /71 (BP Location: Left arm, Patient Position: Sitting, Cuff Size: Adult)   Pulse 86   Ht 175.3 cm (69.02\")   Wt 90.7 kg (200 lb)   SpO2 99%   BMI 29.52 kg/m²       Physical Exam  Vitals and nursing note reviewed.   Constitutional:       General: She is not in acute distress.     Appearance: She is well-developed. She is not diaphoretic.   Eyes:      Pupils: Pupils are equal, round, and reactive to light.   Neck:      Thyroid: No thyromegaly.      Vascular: No JVD.   Cardiovascular:      Rate and Rhythm: Normal rate and regular rhythm.      Heart sounds: Normal heart sounds. No murmur heard.     Pulmonary:      Effort: Pulmonary effort is normal. No respiratory distress.      Breath sounds: Normal breath sounds. No wheezing.      Comments: Prolonged expiration on room air  Abdominal:      General: Bowel sounds are normal. There is no distension.    "   Palpations: Abdomen is soft.      Tenderness: There is no abdominal tenderness.   Musculoskeletal:         General: Tenderness (lbp, chronic) present. Normal range of motion.      Cervical back: Normal range of motion and neck supple.   Skin:     General: Skin is warm and dry.   Neurological:      Mental Status: She is alert and oriented to person, place, and time.      Sensory: No sensory deficit.      Motor: Weakness present.      Gait: Gait abnormal (uses cane for mobility).   Psychiatric:         Behavior: Behavior normal.         Thought Content: Thought content normal.         Judgment: Judgment normal.          Result Review :     No visits with results within 7 Day(s) from this visit.   Latest known visit with results is:   Lab on 05/17/2021   Component Date Value Ref Range Status   • Glucose 05/17/2021 88  65 - 99 mg/dL Final   • BUN 05/17/2021 9  6 - 20 mg/dL Final   • Creatinine 05/17/2021 0.61  0.57 - 1.00 mg/dL Final   • Sodium 05/17/2021 140  136 - 145 mmol/L Final   • Potassium 05/17/2021 4.4  3.5 - 5.2 mmol/L Final   • Chloride 05/17/2021 105  98 - 107 mmol/L Final   • CO2 05/17/2021 25.8  22.0 - 29.0 mmol/L Final   • Calcium 05/17/2021 9.1  8.6 - 10.5 mg/dL Final   • Total Protein 05/17/2021 6.9  6.0 - 8.5 g/dL Final   • Albumin 05/17/2021 4.10  3.50 - 5.20 g/dL Final   • ALT (SGPT) 05/17/2021 29  1 - 33 U/L Final   • AST (SGOT) 05/17/2021 23  1 - 32 U/L Final   • Alkaline Phosphatase 05/17/2021 86  39 - 117 U/L Final   • Total Bilirubin 05/17/2021 0.4  0.0 - 1.2 mg/dL Final   • eGFR Non African Amer 05/17/2021 101  >60 mL/min/1.73 Final   • Globulin 05/17/2021 2.8  gm/dL Final   • A/G Ratio 05/17/2021 1.5  g/dL Final   • BUN/Creatinine Ratio 05/17/2021 14.8  7.0 - 25.0 Final   • Anion Gap 05/17/2021 9.2  5.0 - 15.0 mmol/L Final   • WBC 05/17/2021 12.36* 3.40 - 10.80 10*3/mm3 Final   • RBC 05/17/2021 5.50* 3.77 - 5.28 10*6/mm3 Final   • Hemoglobin 05/17/2021 16.9* 12.0 - 15.9 g/dL Final   •  Hematocrit 05/17/2021 50.3* 34.0 - 46.6 % Final   • MCV 05/17/2021 91.5  79.0 - 97.0 fL Final   • MCH 05/17/2021 30.7  26.6 - 33.0 pg Final   • MCHC 05/17/2021 33.6  31.5 - 35.7 g/dL Final   • RDW 05/17/2021 13.8  12.3 - 15.4 % Final   • RDW-SD 05/17/2021 46.1  37.0 - 54.0 fl Final   • MPV 05/17/2021 10.7  6.0 - 12.0 fL Final   • Platelets 05/17/2021 264  140 - 450 10*3/mm3 Final   • Total Cholesterol 05/17/2021 170  0 - 200 mg/dL Final   • Triglycerides 05/17/2021 275* 0 - 150 mg/dL Final   • HDL Cholesterol 05/17/2021 31* 40 - 60 mg/dL Final   • LDL Cholesterol  05/17/2021 93  0 - 100 mg/dL Final   • VLDL Cholesterol 05/17/2021 46* 5 - 40 mg/dL Final   • LDL/HDL Ratio 05/17/2021 2.71   Final   • TSH 05/17/2021 1.160  0.270 - 4.200 uIU/mL Final   • Hemoglobin A1C 05/17/2021 6.6* 3.5 - 5.6 % Final                       Assessment and Plan    Diagnoses and all orders for this visit:    1. Mixed hyperlipidemia (Primary)  Comments:  Reviewed labs, elevated triglycerides, discussed improving lifestyle modifications, HHD, continue fenofibrate.      2. Leukocytosis, unspecified type  Comments:  Chronic, likely secondary to smoking.  Recommend cessation    3. Chronic obstructive pulmonary disease, unspecified COPD type (CMS/HCC)  Comments:  Continue with pulmonary, Dr. Easton.  meds/inhalers as directed.  Recommend patient make appointment to review CT and overnight ox, rec smoking cess.     4. Breast cancer screening by mammogram  -     Mammo Screening Digital Tomosynthesis Bilateral With CAD; Future    5. Facial paresthesia  Comments:  Continue aspirin, trial Medrol Dosepak.  To ED if symptoms worsen or no better    6. Chronic midline low back pain with bilateral sciatica  Comments:  Continue with pain management, will continue refilling Lyrica when needed    7. Hyperglycemia  Comments:  A1c 6.6, in diabetic range, pt declines meds at this time, she would like to work on diet/lifestyle improvements. given DM education.  start met in 3mo if wonb    8. Hilar adenopathy  Comments:  Continue follow-ups with Dr. Easton as directed, needs to schedule follow-up to discuss recent CT scan.  We will request results from [facility]    9. Muscle strain of chest wall, initial encounter  Comments:  Improving, okay to continue Tylenol or ibuprofen and rec heat as needed.  Trial Medrol Dosepak as well    10. Essential hypertension  Comments:  BP stable, continue metoprolol, patient to discuss stopping losartan with Dr. Yeager    Other orders  -     methylPREDNISolone (MEDROL) 4 MG dose pack; Take as directed on package instructions.  Dispense: 21 tablet; Refill: 0        I spent 35 minutes caring for Rahel Ramsey on this date of service. This time includes time spent by me in the following activities: preparing for the visit, reviewing tests, performing a medically appropriate examination and/or evaluation , counseling and educating the patient/family/caregiver, ordering medications, tests, or procedures and documenting information in the medical record        Follow Up     Return in about 3 months (around 8/24/2021) for HTN, DM, copd. diabetic panel, b12, vit d 1 wk prior .  Patient was given instructions and counseling regarding her condition or for health maintenance advice. Please see specific information pulled into the AVS if appropriate.

## 2021-06-02 RX ORDER — LOSARTAN POTASSIUM AND HYDROCHLOROTHIAZIDE 12.5; 1 MG/1; MG/1
1 TABLET ORAL DAILY
Qty: 30 TABLET | Refills: 1 | OUTPATIENT
Start: 2021-06-02 | End: 2021-07-07 | Stop reason: SDUPTHER

## 2021-07-06 RX ORDER — ERGOCALCIFEROL 1.25 MG/1
CAPSULE ORAL
Qty: 12 CAPSULE | Refills: 0 | Status: SHIPPED | OUTPATIENT
Start: 2021-07-06 | End: 2021-09-21 | Stop reason: SDUPTHER

## 2021-07-07 RX ORDER — LOSARTAN POTASSIUM AND HYDROCHLOROTHIAZIDE 12.5; 1 MG/1; MG/1
1 TABLET ORAL DAILY
Qty: 30 TABLET | Refills: 0 | OUTPATIENT
Start: 2021-07-07 | End: 2021-07-16

## 2021-07-17 RX ORDER — LOSARTAN POTASSIUM AND HYDROCHLOROTHIAZIDE 12.5; 1 MG/1; MG/1
TABLET ORAL
Qty: 30 TABLET | Refills: 2 | Status: SHIPPED | OUTPATIENT
Start: 2021-07-17 | End: 2021-09-21 | Stop reason: SINTOL

## 2021-07-22 ENCOUNTER — OFFICE VISIT (OUTPATIENT)
Dept: CARDIOLOGY | Facility: CLINIC | Age: 56
End: 2021-07-22

## 2021-07-22 VITALS
BODY MASS INDEX: 28.61 KG/M2 | OXYGEN SATURATION: 98 % | HEART RATE: 72 BPM | SYSTOLIC BLOOD PRESSURE: 104 MMHG | WEIGHT: 193.8 LBS | DIASTOLIC BLOOD PRESSURE: 72 MMHG

## 2021-07-22 DIAGNOSIS — I10 ESSENTIAL HYPERTENSION: Primary | ICD-10-CM

## 2021-07-22 DIAGNOSIS — I48.0 PAROXYSMAL ATRIAL FIBRILLATION (HCC): ICD-10-CM

## 2021-07-22 PROCEDURE — 93000 ELECTROCARDIOGRAM COMPLETE: CPT | Performed by: INTERNAL MEDICINE

## 2021-07-22 PROCEDURE — 99214 OFFICE O/P EST MOD 30 MIN: CPT | Performed by: INTERNAL MEDICINE

## 2021-07-22 RX ORDER — NEOMYCIN SULFATE, POLYMYXIN B SULFATE, AND DEXAMETHASONE 3.5; 10000; 1 MG/G; [USP'U]/G; MG/G
OINTMENT OPHTHALMIC EVERY 6 HOURS SCHEDULED
COMMUNITY
End: 2021-09-21

## 2021-08-19 DIAGNOSIS — E78.2 MIXED HYPERLIPIDEMIA: ICD-10-CM

## 2021-08-19 RX ORDER — FENOFIBRATE 145 MG/1
TABLET, COATED ORAL
Qty: 90 TABLET | Refills: 0 | Status: SHIPPED | OUTPATIENT
Start: 2021-08-19 | End: 2021-09-21 | Stop reason: SDUPTHER

## 2021-08-23 ENCOUNTER — TELEPHONE (OUTPATIENT)
Dept: FAMILY MEDICINE CLINIC | Facility: CLINIC | Age: 56
End: 2021-08-23

## 2021-08-23 NOTE — TELEPHONE ENCOUNTER
Caller: Rahel Ramsey    Relationship: Self    Best call back number: [phone]    What medication are you requesting: COUGH , DECONGESTANT    What are your current symptoms: COUGH, SORE THROAT HEAD CONGESTION       If a prescription is needed, what is your preferred pharmacy and phone number: [id number] [address] - [phone] [facility] [phone]      Additional notes:   PATIENT HAS APPT SCHEDULED 8-24-21. PLEASE ADVISE IF SHE SHOULD COME IN TO OFFICE.

## 2021-08-24 ENCOUNTER — OFFICE VISIT (OUTPATIENT)
Dept: FAMILY MEDICINE CLINIC | Facility: CLINIC | Age: 56
End: 2021-08-24

## 2021-08-24 VITALS — BODY MASS INDEX: 28.61 KG/M2 | HEART RATE: 83 BPM | HEIGHT: 69 IN | TEMPERATURE: 98.2 F | OXYGEN SATURATION: 98 %

## 2021-08-24 DIAGNOSIS — J18.9 PNEUMONIA OF BOTH LOWER LOBES DUE TO INFECTIOUS ORGANISM: Primary | ICD-10-CM

## 2021-08-24 DIAGNOSIS — E55.9 VITAMIN D DEFICIENCY: ICD-10-CM

## 2021-08-24 DIAGNOSIS — R73.03 PREDIABETES: ICD-10-CM

## 2021-08-24 DIAGNOSIS — E53.8 VITAMIN B12 DEFICIENCY: ICD-10-CM

## 2021-08-24 DIAGNOSIS — I10 ESSENTIAL HYPERTENSION: ICD-10-CM

## 2021-08-24 DIAGNOSIS — E78.2 MIXED HYPERLIPIDEMIA: ICD-10-CM

## 2021-08-24 DIAGNOSIS — R09.81 HEAD CONGESTION: ICD-10-CM

## 2021-08-24 LAB
B PARAPERT DNA SPEC QL NAA+PROBE: NOT DETECTED
B PERT DNA SPEC QL NAA+PROBE: NOT DETECTED
C PNEUM DNA NPH QL NAA+NON-PROBE: NOT DETECTED
FLUAV SUBTYP SPEC NAA+PROBE: NOT DETECTED
FLUBV RNA ISLT QL NAA+PROBE: NOT DETECTED
HADV DNA SPEC NAA+PROBE: NOT DETECTED
HCOV 229E RNA SPEC QL NAA+PROBE: NOT DETECTED
HCOV HKU1 RNA SPEC QL NAA+PROBE: NOT DETECTED
HCOV NL63 RNA SPEC QL NAA+PROBE: NOT DETECTED
HCOV OC43 RNA SPEC QL NAA+PROBE: NOT DETECTED
HMPV RNA NPH QL NAA+NON-PROBE: NOT DETECTED
HPIV1 RNA SPEC QL NAA+PROBE: NOT DETECTED
HPIV2 RNA SPEC QL NAA+PROBE: NOT DETECTED
HPIV3 RNA NPH QL NAA+PROBE: NOT DETECTED
HPIV4 P GENE NPH QL NAA+PROBE: NOT DETECTED
M PNEUMO IGG SER IA-ACNC: NOT DETECTED
RHINOVIRUS RNA SPEC NAA+PROBE: DETECTED
RSV RNA NPH QL NAA+NON-PROBE: NOT DETECTED

## 2021-08-24 PROCEDURE — U0003 INFECTIOUS AGENT DETECTION BY NUCLEIC ACID (DNA OR RNA); SEVERE ACUTE RESPIRATORY SYNDROME CORONAVIRUS 2 (SARS-COV-2) (CORONAVIRUS DISEASE [COVID-19]), AMPLIFIED PROBE TECHNIQUE, MAKING USE OF HIGH THROUGHPUT TECHNOLOGIES AS DESCRIBED BY CMS-2020-01-R: HCPCS | Performed by: NURSE PRACTITIONER

## 2021-08-24 PROCEDURE — 87633 RESP VIRUS 12-25 TARGETS: CPT | Performed by: NURSE PRACTITIONER

## 2021-08-24 PROCEDURE — 99213 OFFICE O/P EST LOW 20 MIN: CPT | Performed by: NURSE PRACTITIONER

## 2021-08-24 RX ORDER — PREDNISONE 10 MG/1
TABLET ORAL
Qty: 33 TABLET | Refills: 0 | Status: SHIPPED | OUTPATIENT
Start: 2021-08-24 | End: 2021-09-21

## 2021-08-24 RX ORDER — LEVOFLOXACIN 750 MG/1
750 TABLET ORAL DAILY
Qty: 7 TABLET | Refills: 0 | Status: SHIPPED | OUTPATIENT
Start: 2021-08-24 | End: 2021-09-21

## 2021-08-24 RX ORDER — ALBUTEROL SULFATE 90 UG/1
2 AEROSOL, METERED RESPIRATORY (INHALATION) EVERY 4 HOURS PRN
Qty: 18 G | Refills: 2 | Status: SHIPPED | OUTPATIENT
Start: 2021-08-24 | End: 2021-10-04

## 2021-08-24 NOTE — PROGRESS NOTES
Chief Complaint  Cough (mostly dry; symptoms started 8/16) and Nasal Congestion (pt reports she feels like she can't cough up the congestion.  pt reports she took flonase and 20 mins later she took anoro.  she took a nap and started having chest burning.  she asked if medication caused that.)    Subjective          Rahel Ramsey presents to [facility] for   History of Present Illness       Acute visit for s/s as mentioned in CC. She reports known exposure to anyone ill.  She reports worsening of cough and has now intermittently become productive with yellow thick sputum, she reports fatigue and dyspnea with exertion.  Patient has history of COPD, Follows with Dr. Easton, she is still smoking cigarettes.       The following portions of the patient's history were reviewed and updated as appropriate: allergies, current medications, past family history, past medical history, past social history, past surgical history and problem list.    Past Medical History:   Diagnosis Date   • Allergic    • Arthritis    • Atrial fibrillation (CMS/HCC)    • COPD (chronic obstructive pulmonary disease) (CMS/HCC)    • Headache    • History of herniated intervertebral disc    • History of skin cancer    • Hyperlipidemia    • Hypertension    • Injury of back    • Leukocytosis    • Lung nodule    • Urinary tract infection with hematuria 1/22/2021     Past Surgical History:   Procedure Laterality Date   • BACK SURGERY     • DILATATION AND CURETTAGE     • LUMBAR DECOMPRESSION      L4-L5    • SKIN CANCER EXCISION Left     [place]   • SUBTOTAL HYSTERECTOMY       Family History   Problem Relation Age of Onset   • Breast cancer Mother    • Lung cancer Father    • Lung cancer Other    • Colon cancer Other    • Skin cancer Other      Social History     Tobacco Use   • Smoking status: Current Every Day Smoker     Packs/day: 1.00     Years: 40.00     Pack years: 40.00   • Smokeless tobacco: Never Used   Substance Use  Topics   • Alcohol use: Yes     Comment: rare       Current Outpatient Medications:   •  amitriptyline (ELAVIL) 25 MG tablet, Take 12.5 mg by mouth every night at bedtime., Disp: , Rfl: 3  •  aspirin (ADULT ASPIRIN EC LOW STRENGTH) 81 MG EC tablet, 81 mg., Disp: , Rfl:   •  Cyanocobalamin (VITAMIN B 12 PO), Take  by mouth., Disp: , Rfl:   •  fenofibrate (TRICOR) 145 MG tablet, TAKE 1 TABLET BY MOUTH EVERY DAY, Disp: 90 tablet, Rfl: 0  •  fluticasone (FLONASE) 50 MCG/ACT nasal spray, INHALE 2 PUFFS INTO EACH NOSTRIL EVERY DAY, Disp: 48 mL, Rfl: 1  •  HYDROcodone-acetaminophen (NORCO)  MG per tablet, Take 1 tablet by mouth Every 6 (Six) Hours As Needed., Disp: , Rfl: 0  •  metoprolol succinate XL (TOPROL-XL) 25 MG 24 hr tablet, Take 1 tablet by mouth Daily., Disp: 90 tablet, Rfl: 3  •  umeclidinium-vilanterol (ANORO ELLIPTA) 62.5-25 MCG/INH aerosol powder  inhaler, Inhale 1 puff Daily., Disp: , Rfl:   •  vitamin D (ERGOCALCIFEROL) 1.25 MG (00565 UT) capsule capsule, TAKE 1 CAPSULE BY MOUTH ONCE PER WEEK, Disp: 12 capsule, Rfl: 0  •  albuterol sulfate  (90 Base) MCG/ACT inhaler, Inhale 2 puffs Every 4 (Four) Hours As Needed for Wheezing or Shortness of Air., Disp: 18 g, Rfl: 2  •  chlorhexidine (PERIDEX) 0.12 % solution, Swish and spit 15 ml BID for 7 days, then as needed, Disp: 473 mL, Rfl: 0  •  erythromycin (ROMYCIN) 5 MG/GM ophthalmic ointment, Administer  to the right eye Every 6 (Six) Hours. For 5 days, Disp: 3.5 g, Rfl: 0  •  levoFLOXacin (Levaquin) 750 MG tablet, Take 1 tablet by mouth Daily., Disp: 7 tablet, Rfl: 0  •  losartan-hydrochlorothiazide (HYZAAR) 100-12.5 MG per tablet, TAKE 1 TABLET BY MOUTH EVERY DAY, Disp: 30 tablet, Rfl: 2  •  Lyrica 200 MG capsule, TAKE 1 CAPSULE BY MOUTH TWICE DAILY, Disp: 180 capsule, Rfl: 0  •  neomycin-polymyxin-dexamethamethasone (POLYDEX) 3.5-50038-2.1 ointment ophthalmic ointment, Every 6 (Six) Hours., Disp: , Rfl:   •  polyethyl glycol-propyl glycol  "(SYSTANE) 0.4-0.3 % solution ophthalmic solution, Every 1 (One) Hour As Needed., Disp: , Rfl:   •  predniSONE (DELTASONE) 10 MG tablet, Take 5 po for 2 days, Take 4 for 2 days, then 3 for 2 days, then 2 for 2 days, then 1 for 5 days, then stop, Disp: 33 tablet, Rfl: 0    Objective   Vital Signs:   Pulse 83   Temp 98.2 °F (36.8 °C) (Infrared)   Ht 175.3 cm (69.02\")   SpO2 98%   BMI 28.61 kg/m²       Physical Exam  Vitals and nursing note reviewed.   Constitutional:       General: She is not in acute distress.     Appearance: She is well-developed. She is not diaphoretic.   Eyes:      Pupils: Pupils are equal, round, and reactive to light.   Neck:      Thyroid: No thyromegaly.      Vascular: No JVD.   Cardiovascular:      Rate and Rhythm: Normal rate and regular rhythm.      Heart sounds: Normal heart sounds. No murmur heard.     Pulmonary:      Effort: Pulmonary effort is normal. No respiratory distress.      Breath sounds: Wheezing and rhonchi present.      Comments: Harsh, congested and nonproductive cough throughout exam.  Deep breath elicits cough  Abdominal:      General: Bowel sounds are normal. There is no distension.      Palpations: Abdomen is soft.      Tenderness: There is no abdominal tenderness.   Musculoskeletal:         General: No tenderness. Normal range of motion.      Cervical back: Normal range of motion and neck supple.   Skin:     General: Skin is warm and dry.   Neurological:      Mental Status: She is alert and oriented to person, place, and time.      Sensory: No sensory deficit.   Psychiatric:         Behavior: Behavior normal.         Thought Content: Thought content normal.         Judgment: Judgment normal.          Result Review :     No visits with results within 7 Day(s) from this visit.   Latest known visit with results is:   Lab on 05/17/2021   Component Date Value Ref Range Status   • Glucose 05/17/2021 88  65 - 99 mg/dL Final   • BUN 05/17/2021 9  6 - 20 mg/dL Final   • " Creatinine 05/17/2021 0.61  0.57 - 1.00 mg/dL Final   • Sodium 05/17/2021 140  136 - 145 mmol/L Final   • Potassium 05/17/2021 4.4  3.5 - 5.2 mmol/L Final   • Chloride 05/17/2021 105  98 - 107 mmol/L Final   • CO2 05/17/2021 25.8  22.0 - 29.0 mmol/L Final   • Calcium 05/17/2021 9.1  8.6 - 10.5 mg/dL Final   • Total Protein 05/17/2021 6.9  6.0 - 8.5 g/dL Final   • Albumin 05/17/2021 4.10  3.50 - 5.20 g/dL Final   • ALT (SGPT) 05/17/2021 29  1 - 33 U/L Final   • AST (SGOT) 05/17/2021 23  1 - 32 U/L Final   • Alkaline Phosphatase 05/17/2021 86  39 - 117 U/L Final   • Total Bilirubin 05/17/2021 0.4  0.0 - 1.2 mg/dL Final   • eGFR Non African Amer 05/17/2021 101  >60 mL/min/1.73 Final   • Globulin 05/17/2021 2.8  gm/dL Final   • A/G Ratio 05/17/2021 1.5  g/dL Final   • BUN/Creatinine Ratio 05/17/2021 14.8  7.0 - 25.0 Final   • Anion Gap 05/17/2021 9.2  5.0 - 15.0 mmol/L Final   • WBC 05/17/2021 12.36* 3.40 - 10.80 10*3/mm3 Final   • RBC 05/17/2021 5.50* 3.77 - 5.28 10*6/mm3 Final   • Hemoglobin 05/17/2021 16.9* 12.0 - 15.9 g/dL Final   • Hematocrit 05/17/2021 50.3* 34.0 - 46.6 % Final   • MCV 05/17/2021 91.5  79.0 - 97.0 fL Final   • MCH 05/17/2021 30.7  26.6 - 33.0 pg Final   • MCHC 05/17/2021 33.6  31.5 - 35.7 g/dL Final   • RDW 05/17/2021 13.8  12.3 - 15.4 % Final   • RDW-SD 05/17/2021 46.1  37.0 - 54.0 fl Final   • MPV 05/17/2021 10.7  6.0 - 12.0 fL Final   • Platelets 05/17/2021 264  140 - 450 10*3/mm3 Final   • Total Cholesterol 05/17/2021 170  0 - 200 mg/dL Final   • Triglycerides 05/17/2021 275* 0 - 150 mg/dL Final   • HDL Cholesterol 05/17/2021 31* 40 - 60 mg/dL Final   • LDL Cholesterol  05/17/2021 93  0 - 100 mg/dL Final   • VLDL Cholesterol 05/17/2021 46* 5 - 40 mg/dL Final   • LDL/HDL Ratio 05/17/2021 2.71   Final   • TSH 05/17/2021 1.160  0.270 - 4.200 uIU/mL Final   • Hemoglobin A1C 05/17/2021 6.6* 3.5 - 5.6 % Final                       Assessment and Plan    Diagnoses and all orders for this visit:    1.  Pneumonia of both lower lobes due to infectious organism (Primary)  -     COVID-19,[facility],NP/OP Swab in Transport Media or ESwab 72 HR TAT - Swab, Anterior nasal  -     Respiratory Panel, PCR (WITHOUT COVID) - Swab, Nasopharynx    2. Head congestion  -     COVID-19,[facility],NP/OP Swab in Transport Media or ESwab 72 HR TAT - Swab, Anterior nasal  -     Respiratory Panel, PCR (WITHOUT COVID) - Swab, Nasopharynx    3. Vitamin D deficiency  -     Vitamin D 25 Hydroxy; Future    4. Vitamin B12 deficiency  -     Vitamin B12; Future    5. Mixed hyperlipidemia  -     Lipid Panel; Future    6. Essential hypertension  -     CBC (No Diff); Future  -     Comprehensive Metabolic Panel; Future  -     Lipid Panel; Future  -     TSH; Future    7. Prediabetes  -     Hemoglobin A1c; Future    Other orders  -     levoFLOXacin (Levaquin) 750 MG tablet; Take 1 tablet by mouth Daily.  Dispense: 7 tablet; Refill: 0  -     predniSONE (DELTASONE) 10 MG tablet; Take 5 po for 2 days, Take 4 for 2 days, then 3 for 2 days, then 2 for 2 days, then 1 for 5 days, then stop  Dispense: 33 tablet; Refill: 0  -     albuterol sulfate  (90 Base) MCG/ACT inhaler; Inhale 2 puffs Every 4 (Four) Hours As Needed for Wheezing or Shortness of Air.  Dispense: 18 g; Refill: 2    1.  Start Levaquin, prednisone and reordered albuterol inhaler for pneumonia  2.  Recommend smoking cessation  3.  Collected respiratory and Covid nasal swabs today, will notify results.  4.  Patient instructed to quarantine until notified of results  5.  Treat the symptoms with DayQuil/NyQuil, Mucinex DM, push fluids  6.  Todays follow up appt switched to acute visit and appt moved out for Labs for chronic conditions, patient will return in 3 weeks for labs and follow-up in 4 weeks      I spent 20 minutes caring for Rahel Ramsey on this date of service. This time includes time spent by me in the following activities: preparing for the visit, reviewing tests, performing a  medically appropriate examination and/or evaluation , counseling and educating the patient/family/caregiver, ordering medications, tests, or procedures and documenting information in the medical record        Follow Up     Return in about 4 weeks (around 9/21/2021), or if symptoms worsen or fail to improve, for labs prior as ordered.  Patient was given instructions and counseling regarding her condition or for health maintenance advice. Please see specific information pulled into the AVS if appropriate.      EMR Dragon transcription disclaimer:  Some of this encounter note is an electronic transcription translation of spoken language to printed text. The electronic translation of spoken language may permit erroneous, or at times, nonsensical words or phrases to be inadvertently transcribed; Although I have reviewed the note for such errors some may still exist.

## 2021-08-25 LAB
LABCORP SARS-COV-2, NAA 2 DAY TAT: NORMAL
SARS-COV-2 RNA RESP QL NAA+PROBE: NOT DETECTED

## 2021-09-14 ENCOUNTER — LAB (OUTPATIENT)
Dept: FAMILY MEDICINE CLINIC | Facility: CLINIC | Age: 56
End: 2021-09-14

## 2021-09-14 DIAGNOSIS — E55.9 VITAMIN D DEFICIENCY: ICD-10-CM

## 2021-09-14 DIAGNOSIS — R73.03 PREDIABETES: ICD-10-CM

## 2021-09-14 DIAGNOSIS — E78.2 MIXED HYPERLIPIDEMIA: ICD-10-CM

## 2021-09-14 DIAGNOSIS — I10 ESSENTIAL HYPERTENSION: ICD-10-CM

## 2021-09-14 DIAGNOSIS — E53.8 VITAMIN B12 DEFICIENCY: ICD-10-CM

## 2021-09-14 PROCEDURE — 36415 COLL VENOUS BLD VENIPUNCTURE: CPT | Performed by: NURSE PRACTITIONER

## 2021-09-14 PROCEDURE — 82306 VITAMIN D 25 HYDROXY: CPT | Performed by: NURSE PRACTITIONER

## 2021-09-14 PROCEDURE — 80053 COMPREHEN METABOLIC PANEL: CPT | Performed by: NURSE PRACTITIONER

## 2021-09-14 PROCEDURE — 83036 HEMOGLOBIN GLYCOSYLATED A1C: CPT | Performed by: NURSE PRACTITIONER

## 2021-09-14 PROCEDURE — 84443 ASSAY THYROID STIM HORMONE: CPT | Performed by: NURSE PRACTITIONER

## 2021-09-14 PROCEDURE — 80061 LIPID PANEL: CPT | Performed by: NURSE PRACTITIONER

## 2021-09-14 PROCEDURE — 85027 COMPLETE CBC AUTOMATED: CPT | Performed by: NURSE PRACTITIONER

## 2021-09-14 PROCEDURE — 82607 VITAMIN B-12: CPT | Performed by: NURSE PRACTITIONER

## 2021-09-15 LAB
25(OH)D3 SERPL-MCNC: 39.3 NG/ML
ALBUMIN SERPL-MCNC: 4.1 G/DL (ref 3.5–5.2)
ALBUMIN/GLOB SERPL: 1.4 G/DL
ALP SERPL-CCNC: 84 U/L (ref 39–117)
ALT SERPL W P-5'-P-CCNC: 30 U/L (ref 1–33)
ANION GAP SERPL CALCULATED.3IONS-SCNC: 11.8 MMOL/L (ref 5–15)
AST SERPL-CCNC: 26 U/L (ref 1–32)
BILIRUB SERPL-MCNC: 0.4 MG/DL (ref 0–1.2)
BUN SERPL-MCNC: 11 MG/DL (ref 6–20)
BUN/CREAT SERPL: 13.1 (ref 7–25)
CALCIUM SPEC-SCNC: 9.7 MG/DL (ref 8.6–10.5)
CHLORIDE SERPL-SCNC: 101 MMOL/L (ref 98–107)
CHOLEST SERPL-MCNC: 210 MG/DL (ref 0–200)
CO2 SERPL-SCNC: 24.2 MMOL/L (ref 22–29)
CREAT SERPL-MCNC: 0.84 MG/DL (ref 0.57–1)
DEPRECATED RDW RBC AUTO: 45.9 FL (ref 37–54)
ERYTHROCYTE [DISTWIDTH] IN BLOOD BY AUTOMATED COUNT: 13.6 % (ref 12.3–15.4)
GFR SERPL CREATININE-BSD FRML MDRD: 70 ML/MIN/1.73
GLOBULIN UR ELPH-MCNC: 3 GM/DL
GLUCOSE SERPL-MCNC: 105 MG/DL (ref 65–99)
HBA1C MFR BLD: 6.6 % (ref 3.5–5.6)
HCT VFR BLD AUTO: 48.2 % (ref 34–46.6)
HDLC SERPL-MCNC: 37 MG/DL (ref 40–60)
HGB BLD-MCNC: 16.2 G/DL (ref 12–15.9)
LDLC SERPL CALC-MCNC: 125 MG/DL (ref 0–100)
LDLC/HDLC SERPL: 3.22 {RATIO}
MCH RBC QN AUTO: 30.5 PG (ref 26.6–33)
MCHC RBC AUTO-ENTMCNC: 33.6 G/DL (ref 31.5–35.7)
MCV RBC AUTO: 90.8 FL (ref 79–97)
PLATELET # BLD AUTO: 235 10*3/MM3 (ref 140–450)
PMV BLD AUTO: 10.4 FL (ref 6–12)
POTASSIUM SERPL-SCNC: 4 MMOL/L (ref 3.5–5.2)
PROT SERPL-MCNC: 7.1 G/DL (ref 6–8.5)
RBC # BLD AUTO: 5.31 10*6/MM3 (ref 3.77–5.28)
SODIUM SERPL-SCNC: 137 MMOL/L (ref 136–145)
TRIGL SERPL-MCNC: 269 MG/DL (ref 0–150)
TSH SERPL DL<=0.05 MIU/L-ACNC: 1.17 UIU/ML (ref 0.27–4.2)
VIT B12 BLD-MCNC: 669 PG/ML (ref 211–946)
VLDLC SERPL-MCNC: 48 MG/DL (ref 5–40)
WBC # BLD AUTO: 14.72 10*3/MM3 (ref 3.4–10.8)

## 2021-09-21 ENCOUNTER — OFFICE VISIT (OUTPATIENT)
Dept: FAMILY MEDICINE CLINIC | Facility: CLINIC | Age: 56
End: 2021-09-21

## 2021-09-21 VITALS
DIASTOLIC BLOOD PRESSURE: 73 MMHG | SYSTOLIC BLOOD PRESSURE: 115 MMHG | HEIGHT: 69 IN | WEIGHT: 197.6 LBS | HEART RATE: 84 BPM | BODY MASS INDEX: 29.27 KG/M2 | OXYGEN SATURATION: 98 %

## 2021-09-21 DIAGNOSIS — E55.9 VITAMIN D DEFICIENCY: ICD-10-CM

## 2021-09-21 DIAGNOSIS — M54.42 CHRONIC MIDLINE LOW BACK PAIN WITH BILATERAL SCIATICA: ICD-10-CM

## 2021-09-21 DIAGNOSIS — E78.2 MIXED HYPERLIPIDEMIA: ICD-10-CM

## 2021-09-21 DIAGNOSIS — G89.29 CHRONIC MIDLINE LOW BACK PAIN WITH BILATERAL SCIATICA: ICD-10-CM

## 2021-09-21 DIAGNOSIS — B37.0 ORAL THRUSH: ICD-10-CM

## 2021-09-21 DIAGNOSIS — E11.9 DIET-CONTROLLED DIABETES MELLITUS (HCC): ICD-10-CM

## 2021-09-21 DIAGNOSIS — M54.41 CHRONIC MIDLINE LOW BACK PAIN WITH BILATERAL SCIATICA: ICD-10-CM

## 2021-09-21 DIAGNOSIS — Z00.00 PREVENTATIVE HEALTH CARE: ICD-10-CM

## 2021-09-21 DIAGNOSIS — D72.829 LEUKOCYTOSIS, UNSPECIFIED TYPE: ICD-10-CM

## 2021-09-21 DIAGNOSIS — I10 ESSENTIAL HYPERTENSION: Primary | ICD-10-CM

## 2021-09-21 PROCEDURE — 90732 PPSV23 VACC 2 YRS+ SUBQ/IM: CPT | Performed by: NURSE PRACTITIONER

## 2021-09-21 PROCEDURE — 99214 OFFICE O/P EST MOD 30 MIN: CPT | Performed by: NURSE PRACTITIONER

## 2021-09-21 PROCEDURE — G0009 ADMIN PNEUMOCOCCAL VACCINE: HCPCS | Performed by: NURSE PRACTITIONER

## 2021-09-21 RX ORDER — FENOFIBRATE 145 MG/1
145 TABLET, COATED ORAL DAILY
Qty: 90 TABLET | Refills: 1 | Status: SHIPPED | OUTPATIENT
Start: 2021-09-21 | End: 2022-03-22 | Stop reason: SDUPTHER

## 2021-09-21 RX ORDER — FLUCONAZOLE 150 MG/1
150 TABLET ORAL DAILY
Qty: 3 TABLET | Refills: 0 | Status: SHIPPED | OUTPATIENT
Start: 2021-09-21 | End: 2021-09-24

## 2021-09-21 RX ORDER — ERGOCALCIFEROL 1.25 MG/1
50000 CAPSULE ORAL
Qty: 12 CAPSULE | Refills: 1 | Status: SHIPPED | OUTPATIENT
Start: 2021-09-21 | End: 2022-03-22 | Stop reason: SDUPTHER

## 2021-09-21 NOTE — PROGRESS NOTES
Chief Complaint  Pneumonia, Follow-up (4 wk; pt reports that she became lightheaded in the waiting room but ok now), and Hoarse (pt reports that she lost voice the day she stopped abx, she went to  and was given another abx which she finished on 19th.  pt wonders if she has thrush or something.)        Subjective          Rahel Ramsey presents to [facility] for   History of Present Illness     HTN, stable on meds and takes as directed, denies chest pain, headache, shortness of air, palpitations and swelling of extremities.     Hyperlipidemia, The patient denies muscle aches, constipation, diarrhea, GI upset, fatigue, chest pain/pressure, exercise intolerance, dyspnea, palpitations, syncope and pedal edema.      COPD: Recent pneumonia, treated with x2 rounds of Levaquin and prednisone taper.  Reports sore throat and white patches, but overall symptoms improved, still with exertional dyspnea.  Using inhalers as directed.  She is still smoking     Diet controlled Diabetes mellitus type II, not currently on medications.  Denies any signs/symptoms of hyper/hypoglycemia, blurry vision, polydipsia, polyuria, nocturia, and unintentional weight loss    Chronic low back pain, stable on Lyrica    Here to review labs.       The following portions of the patient's history were reviewed and updated as appropriate: allergies, current medications, past family history, past medical history, past social history, past surgical history and problem list.    Past Medical History:   Diagnosis Date   • Allergic    • Arthritis    • Atrial fibrillation (CMS/HCC)    • COPD (chronic obstructive pulmonary disease) (CMS/HCC)    • Headache    • History of herniated intervertebral disc    • History of skin cancer    • Hyperlipidemia    • Hypertension    • Injury of back    • Leukocytosis    • Lung nodule    • Urinary tract infection with hematuria 1/22/2021     Past Surgical History:   Procedure Laterality  Date   • BACK SURGERY     • DILATATION AND CURETTAGE     • LUMBAR DECOMPRESSION      L4-L5    • SKIN CANCER EXCISION Left     [place]ao   • SUBTOTAL HYSTERECTOMY       Family History   Problem Relation Age of Onset   • Breast cancer Mother    • Lung cancer Father    • Lung cancer Other    • Colon cancer Other    • Skin cancer Other      Social History     Tobacco Use   • Smoking status: Current Every Day Smoker     Packs/day: 1.00     Years: 40.00     Pack years: 40.00   • Smokeless tobacco: Never Used   Substance Use Topics   • Alcohol use: Yes     Comment: rare       Current Outpatient Medications:   •  albuterol sulfate  (90 Base) MCG/ACT inhaler, Inhale 2 puffs Every 4 (Four) Hours As Needed for Wheezing or Shortness of Air., Disp: 18 g, Rfl: 2  •  amitriptyline (ELAVIL) 25 MG tablet, Take 12.5 mg by mouth every night at bedtime., Disp: , Rfl: 3  •  aspirin (ADULT ASPIRIN EC LOW STRENGTH) 81 MG EC tablet, 81 mg., Disp: , Rfl:   •  Cyanocobalamin (VITAMIN B 12 PO), Take  by mouth., Disp: , Rfl:   •  fluticasone (FLONASE) 50 MCG/ACT nasal spray, INHALE 2 PUFFS INTO EACH NOSTRIL EVERY DAY, Disp: 48 mL, Rfl: 1  •  HYDROcodone-acetaminophen (NORCO)  MG per tablet, Take 1 tablet by mouth Every 6 (Six) Hours As Needed., Disp: , Rfl: 0  •  Lyrica 200 MG capsule, Take 1 capsule by mouth 2 (Two) Times a Day., Disp: 180 capsule, Rfl: 1  •  metoprolol succinate XL (TOPROL-XL) 25 MG 24 hr tablet, Take 1 tablet by mouth Daily., Disp: 90 tablet, Rfl: 3  •  umeclidinium-vilanterol (ANORO ELLIPTA) 62.5-25 MCG/INH aerosol powder  inhaler, Inhale 1 puff Daily., Disp: , Rfl:   •  vitamin D (ERGOCALCIFEROL) 1.25 MG (51421 UT) capsule capsule, Take 1 capsule by mouth Every 7 (Seven) Days., Disp: 12 capsule, Rfl: 1  •  fenofibrate (TRICOR) 145 MG tablet, Take 1 tablet by mouth Daily., Disp: 90 tablet, Rfl: 1  •  fluconazole (Diflucan) 150 MG tablet, Take 1 tablet by mouth Daily for 3 doses., Disp: 3 tablet, Rfl: 0  •  nystatin  "(MYCOSTATIN) 237345 UNIT/ML suspension, Swish and swallow 5 mL 4 (Four) Times a Day. And may stop once symptoms have resolved for 48 hours., Disp: 473 mL, Rfl: 0    Objective   Vital Signs:   /73 (BP Location: Left arm, Patient Position: Sitting, Cuff Size: Adult)   Pulse 84   Ht 175.3 cm (69.02\")   Wt 89.6 kg (197 lb 9.6 oz)   SpO2 98%   BMI 29.17 kg/m²       Physical Exam  Vitals and nursing note reviewed.   Constitutional:       General: She is not in acute distress.     Appearance: She is well-developed. She is not diaphoretic.   HENT:      Mouth/Throat:      Pharynx: Oropharyngeal exudate (thrush of OP) present.      Comments: Hoarse voice  Eyes:      Pupils: Pupils are equal, round, and reactive to light.   Neck:      Thyroid: No thyromegaly.      Vascular: No JVD.   Cardiovascular:      Rate and Rhythm: Normal rate and regular rhythm.      Heart sounds: Normal heart sounds. No murmur heard.     Pulmonary:      Effort: Pulmonary effort is normal. No respiratory distress.      Breath sounds: Normal breath sounds. No wheezing or rhonchi.      Comments: Prolonged expiration  Abdominal:      General: Bowel sounds are normal. There is no distension.      Palpations: Abdomen is soft.      Tenderness: There is no abdominal tenderness.   Musculoskeletal:         General: No tenderness. Normal range of motion.      Cervical back: Normal range of motion and neck supple.   Skin:     General: Skin is warm and dry.   Neurological:      Mental Status: She is alert and oriented to person, place, and time.      Sensory: No sensory deficit.      Gait: Gait abnormal (Cane for mobility).   Psychiatric:         Behavior: Behavior normal.         Thought Content: Thought content normal.         Judgment: Judgment normal.          Result Review :     No visits with results within 7 Day(s) from this visit.   Latest known visit with results is:   Lab on 09/14/2021   Component Date Value Ref Range Status   • WBC 09/14/2021 " 14.72* 3.40 - 10.80 10*3/mm3 Final   • RBC 09/14/2021 5.31* 3.77 - 5.28 10*6/mm3 Final   • Hemoglobin 09/14/2021 16.2* 12.0 - 15.9 g/dL Final   • Hematocrit 09/14/2021 48.2* 34.0 - 46.6 % Final   • MCV 09/14/2021 90.8  79.0 - 97.0 fL Final   • MCH 09/14/2021 30.5  26.6 - 33.0 pg Final   • MCHC 09/14/2021 33.6  31.5 - 35.7 g/dL Final   • RDW 09/14/2021 13.6  12.3 - 15.4 % Final   • RDW-SD 09/14/2021 45.9  37.0 - 54.0 fl Final   • MPV 09/14/2021 10.4  6.0 - 12.0 fL Final   • Platelets 09/14/2021 235  140 - 450 10*3/mm3 Final   • Glucose 09/14/2021 105* 65 - 99 mg/dL Final   • BUN 09/14/2021 11  6 - 20 mg/dL Final   • Creatinine 09/14/2021 0.84  0.57 - 1.00 mg/dL Final   • Sodium 09/14/2021 137  136 - 145 mmol/L Final   • Potassium 09/14/2021 4.0  3.5 - 5.2 mmol/L Final   • Chloride 09/14/2021 101  98 - 107 mmol/L Final   • CO2 09/14/2021 24.2  22.0 - 29.0 mmol/L Final   • Calcium 09/14/2021 9.7  8.6 - 10.5 mg/dL Final   • Total Protein 09/14/2021 7.1  6.0 - 8.5 g/dL Final   • Albumin 09/14/2021 4.10  3.50 - 5.20 g/dL Final   • ALT (SGPT) 09/14/2021 30  1 - 33 U/L Final   • AST (SGOT) 09/14/2021 26  1 - 32 U/L Final   • Alkaline Phosphatase 09/14/2021 84  39 - 117 U/L Final   • Total Bilirubin 09/14/2021 0.4  0.0 - 1.2 mg/dL Final   • eGFR Non  Amer 09/14/2021 70  >60 mL/min/1.73 Final   • Globulin 09/14/2021 3.0  gm/dL Final   • A/G Ratio 09/14/2021 1.4  g/dL Final   • BUN/Creatinine Ratio 09/14/2021 13.1  7.0 - 25.0 Final   • Anion Gap 09/14/2021 11.8  5.0 - 15.0 mmol/L Final   • Hemoglobin A1C 09/14/2021 6.6* 3.5 - 5.6 % Final   • Total Cholesterol 09/14/2021 210* 0 - 200 mg/dL Final   • Triglycerides 09/14/2021 269* 0 - 150 mg/dL Final   • HDL Cholesterol 09/14/2021 37* 40 - 60 mg/dL Final   • LDL Cholesterol  09/14/2021 125* 0 - 100 mg/dL Final   • VLDL Cholesterol 09/14/2021 48* 5 - 40 mg/dL Final   • LDL/HDL Ratio 09/14/2021 3.22   Final   • TSH 09/14/2021 1.170  0.270 - 4.200 uIU/mL Final   • Vitamin  B-12 09/14/2021 669  211 - 946 pg/mL Final   • 25 Hydroxy, Vitamin D 09/14/2021 39.3  ng/ml Final                       Assessment and Plan    Diagnoses and all orders for this visit:    1. Essential hypertension (Primary)  Comments:  BP stable, losartan has been discontinued, continue metoprolol.  Follow-up with cardiology as directed    2. Chronic midline low back pain with bilateral sciatica  Comments:  Stable, keep follow-up with pain mgmt, rf lyrica 90 day supply d/t ins chg at 1st of year. can not rocio dulox or em d/t UTI.   Orders:  -     Lyrica 200 MG capsule; Take 1 capsule by mouth 2 (Two) Times a Day.  Dispense: 180 capsule; Refill: 1    3. Mixed hyperlipidemia  Comments:  Reviewed labs, not fasting.  Cont fenofibrate for elevated Trigs and healthy heart diet.  Plan to repeat lipids fasting 1 week prior to visit in 3 months  Orders:  -     fenofibrate (TRICOR) 145 MG tablet; Take 1 tablet by mouth Daily.  Dispense: 90 tablet; Refill: 1    4. Diet-controlled diabetes mellitus (CMS/HCC)  Comments:  hgba1c 6.6, keep carbs <100/day, recheck A1c in 3mo, will start metformin if worse or no improvement.     5. Leukocytosis, unspecified type  Comments:  likely reactive to prednisone and KAROLINA last week.  We will continue to monitor, referral to hematology if indicated in 3 months    6. Preventative health care  Comments:  Pneumo 23 vaccine today  Labs reviewed      7. Oral thrush  Comments:  Start nystatin swish and swallow QID, diflucan x3 days for vaginal involvement.  Notify if worse or no better in 1 to 2 weeks    8. Vitamin D deficiency  Comments:  Vitamin D in good range, continue weekly vitamin D    Other orders  -     vitamin D (ERGOCALCIFEROL) 1.25 MG (78752 UT) capsule capsule; Take 1 capsule by mouth Every 7 (Seven) Days.  Dispense: 12 capsule; Refill: 1  -     nystatin (MYCOSTATIN) 921367 UNIT/ML suspension; Swish and swallow 5 mL 4 (Four) Times a Day. And may stop once symptoms have resolved for 48  hours.  Dispense: 473 mL; Refill: 0  -     fluconazole (Diflucan) 150 MG tablet; Take 1 tablet by mouth Daily for 3 doses.  Dispense: 3 tablet; Refill: 0  -     Pneumococcal Polysaccharide Vaccine 23-Valent Greater Than or Equal To 3yo Subcutaneous / IM        I spent 30 minutes caring for Rahel Ramsey on this date of service. This time includes time spent by me in the following activities: preparing for the visit, reviewing tests, performing a medically appropriate examination and/or evaluation , counseling and educating the patient/family/caregiver, ordering medications, tests, or procedures and documenting information in the medical record        Follow Up     Return in about 3 months (around 12/21/2021) for DM, HTN. DM panel prior to appt fasting.  Patient was given instructions and counseling regarding her condition or for health maintenance advice. Please see specific information pulled into the AVS if appropriate.      EMR Dragon transcription disclaimer:  Some of this encounter note is an electronic transcription translation of spoken language to printed text. The electronic translation of spoken language may permit erroneous, or at times, nonsensical words or phrases to be inadvertently transcribed; Although I have reviewed the note for such errors some may still exist.

## 2021-10-01 RX ORDER — ERGOCALCIFEROL 1.25 MG/1
CAPSULE ORAL
Qty: 12 CAPSULE | Refills: 1 | OUTPATIENT
Start: 2021-10-01

## 2021-10-04 RX ORDER — ALBUTEROL SULFATE 90 UG/1
AEROSOL, METERED RESPIRATORY (INHALATION)
Qty: 18 G | Refills: 2 | Status: SHIPPED | OUTPATIENT
Start: 2021-10-04 | End: 2021-11-22

## 2021-11-02 RX ORDER — FLUTICASONE PROPIONATE 50 MCG
SPRAY, SUSPENSION (ML) NASAL
Qty: 48 G | Refills: 0 | Status: SHIPPED | OUTPATIENT
Start: 2021-11-02 | End: 2022-02-07

## 2021-11-02 RX ORDER — FLUTICASONE PROPIONATE 50 MCG
SPRAY, SUSPENSION (ML) NASAL
Qty: 48 G | Refills: 0 | OUTPATIENT
Start: 2021-11-02

## 2021-11-02 NOTE — TELEPHONE ENCOUNTER
Caller: Rahel Ramsey    Relationship: Self      Requested Prescriptions:   Requested Prescriptions     Pending Prescriptions Disp Refills   • fluticasone (FLONASE) 50 MCG/ACT nasal spray 48 g 0        Pharmacy where request should be sent: [id number] [address] - [phone]  - [phone] FX    Additional details provided by patient: PATIENT SAID SHE TRIED TO REFILL THIS AND IT HAD BEEN DENIED BY THE PHARMACY. SHE DOES NOT HAVE MUCH LEFT     Best call back number: [phone]     Does the patient have less than a 3 day supply:  [x] Yes  [] No    Jethro PEARL Rep   11/02/21 13:18 EDT

## 2021-11-22 RX ORDER — ALBUTEROL SULFATE 90 UG/1
AEROSOL, METERED RESPIRATORY (INHALATION)
Qty: 18 G | Refills: 2 | Status: SHIPPED | OUTPATIENT
Start: 2021-11-22 | End: 2022-01-04

## 2021-11-23 DIAGNOSIS — E78.2 MIXED HYPERLIPIDEMIA: ICD-10-CM

## 2021-11-23 RX ORDER — FENOFIBRATE 145 MG/1
TABLET, COATED ORAL
Qty: 90 TABLET | Refills: 1 | OUTPATIENT
Start: 2021-11-23

## 2021-12-14 ENCOUNTER — CLINICAL SUPPORT (OUTPATIENT)
Dept: FAMILY MEDICINE CLINIC | Facility: CLINIC | Age: 56
End: 2021-12-14

## 2021-12-14 DIAGNOSIS — E11.9 DIET-CONTROLLED DIABETES MELLITUS: ICD-10-CM

## 2021-12-14 DIAGNOSIS — E78.2 MIXED HYPERLIPIDEMIA: ICD-10-CM

## 2021-12-14 DIAGNOSIS — I10 ESSENTIAL HYPERTENSION: Primary | ICD-10-CM

## 2021-12-14 PROCEDURE — 36415 COLL VENOUS BLD VENIPUNCTURE: CPT | Performed by: NURSE PRACTITIONER

## 2021-12-14 PROCEDURE — 80053 COMPREHEN METABOLIC PANEL: CPT | Performed by: NURSE PRACTITIONER

## 2021-12-14 PROCEDURE — 83036 HEMOGLOBIN GLYCOSYLATED A1C: CPT | Performed by: NURSE PRACTITIONER

## 2021-12-14 PROCEDURE — 85027 COMPLETE CBC AUTOMATED: CPT | Performed by: NURSE PRACTITIONER

## 2021-12-14 PROCEDURE — 80061 LIPID PANEL: CPT | Performed by: NURSE PRACTITIONER

## 2021-12-15 LAB
ALBUMIN SERPL-MCNC: 4.3 G/DL (ref 3.5–5.2)
ALBUMIN/GLOB SERPL: 1.5 G/DL
ALP SERPL-CCNC: 93 U/L (ref 39–117)
ALT SERPL W P-5'-P-CCNC: 25 U/L (ref 1–33)
ANION GAP SERPL CALCULATED.3IONS-SCNC: 8.8 MMOL/L (ref 5–15)
AST SERPL-CCNC: 26 U/L (ref 1–32)
BILIRUB SERPL-MCNC: 0.5 MG/DL (ref 0–1.2)
BUN SERPL-MCNC: 7 MG/DL (ref 6–20)
BUN/CREAT SERPL: 11.3 (ref 7–25)
CALCIUM SPEC-SCNC: 9.6 MG/DL (ref 8.6–10.5)
CHLORIDE SERPL-SCNC: 102 MMOL/L (ref 98–107)
CHOLEST SERPL-MCNC: 188 MG/DL (ref 0–200)
CO2 SERPL-SCNC: 28.2 MMOL/L (ref 22–29)
CREAT SERPL-MCNC: 0.62 MG/DL (ref 0.57–1)
DEPRECATED RDW RBC AUTO: 42.6 FL (ref 37–54)
ERYTHROCYTE [DISTWIDTH] IN BLOOD BY AUTOMATED COUNT: 13 % (ref 12.3–15.4)
GFR SERPL CREATININE-BSD FRML MDRD: 99 ML/MIN/1.73
GLOBULIN UR ELPH-MCNC: 2.9 GM/DL
GLUCOSE SERPL-MCNC: 107 MG/DL (ref 65–99)
HBA1C MFR BLD: 6.4 % (ref 3.5–5.6)
HCT VFR BLD AUTO: 49.1 % (ref 34–46.6)
HDLC SERPL-MCNC: 34 MG/DL (ref 40–60)
HGB BLD-MCNC: 16.7 G/DL (ref 12–15.9)
LDLC SERPL CALC-MCNC: 117 MG/DL (ref 0–100)
LDLC/HDLC SERPL: 3.29 {RATIO}
MCH RBC QN AUTO: 30.4 PG (ref 26.6–33)
MCHC RBC AUTO-ENTMCNC: 34 G/DL (ref 31.5–35.7)
MCV RBC AUTO: 89.4 FL (ref 79–97)
PLATELET # BLD AUTO: 222 10*3/MM3 (ref 140–450)
PMV BLD AUTO: 10.8 FL (ref 6–12)
POTASSIUM SERPL-SCNC: 3.9 MMOL/L (ref 3.5–5.2)
PROT SERPL-MCNC: 7.2 G/DL (ref 6–8.5)
RBC # BLD AUTO: 5.49 10*6/MM3 (ref 3.77–5.28)
SODIUM SERPL-SCNC: 139 MMOL/L (ref 136–145)
TRIGL SERPL-MCNC: 210 MG/DL (ref 0–150)
VLDLC SERPL-MCNC: 37 MG/DL (ref 5–40)
WBC NRBC COR # BLD: 11.86 10*3/MM3 (ref 3.4–10.8)

## 2021-12-21 ENCOUNTER — OFFICE VISIT (OUTPATIENT)
Dept: FAMILY MEDICINE CLINIC | Facility: CLINIC | Age: 56
End: 2021-12-21

## 2021-12-21 VITALS
BODY MASS INDEX: 29.15 KG/M2 | HEART RATE: 80 BPM | OXYGEN SATURATION: 97 % | HEIGHT: 69 IN | WEIGHT: 196.8 LBS | SYSTOLIC BLOOD PRESSURE: 107 MMHG | DIASTOLIC BLOOD PRESSURE: 71 MMHG

## 2021-12-21 DIAGNOSIS — D72.829 LEUKOCYTOSIS, UNSPECIFIED TYPE: ICD-10-CM

## 2021-12-21 DIAGNOSIS — E11.9 DIET-CONTROLLED DIABETES MELLITUS (HCC): ICD-10-CM

## 2021-12-21 DIAGNOSIS — R91.1 NODULE OF UPPER LOBE OF RIGHT LUNG: ICD-10-CM

## 2021-12-21 DIAGNOSIS — E78.2 MIXED HYPERLIPIDEMIA: ICD-10-CM

## 2021-12-21 DIAGNOSIS — J44.9 CHRONIC OBSTRUCTIVE PULMONARY DISEASE, UNSPECIFIED COPD TYPE (HCC): ICD-10-CM

## 2021-12-21 DIAGNOSIS — I10 ESSENTIAL HYPERTENSION: Primary | ICD-10-CM

## 2021-12-21 PROCEDURE — 99214 OFFICE O/P EST MOD 30 MIN: CPT | Performed by: NURSE PRACTITIONER

## 2021-12-21 NOTE — PROGRESS NOTES
Chief Complaint  Diabetes, Hypertension, Follow-up (3 mo), and Results (12/14 labs)    Subjective          Rahel Ramsey presents to [facility] for   History of Present Illness     HTN, stable on meds and takes as directed, denies chest pain, headache, shortness of air, palpitations and swelling of extremities.      Hyperlipidemia, The patient denies muscle aches, constipation, diarrhea, GI upset, fatigue, chest pain/pressure, exercise intolerance, dyspnea, palpitations, syncope and pedal edema.       COPD: unchanged, using inhalers as directed.  She is still smoking. She is following with Dr. Easton for a right upper lobe perihilar lung nodule measuring 9 x 7 mm 11/12/2020, she reports having updated CT but not available for review at this time. She is having PET next week     Diet controlled Diabetes mellitus type II, not currently on medications.  Denies any signs/symptoms of hyper/hypoglycemia, blurry vision, polydipsia, polyuria, nocturia, and unintentional weight loss     Chronic low back pain, stable on Lyrica     Here to review labs       The following portions of the patient's history were reviewed and updated as appropriate: allergies, current medications, past family history, past medical history, past social history, past surgical history and problem list.    Past Medical History:   Diagnosis Date   • Allergic    • Arthritis    • Atrial fibrillation (HCC)    • COPD (chronic obstructive pulmonary disease) (HCC)    • Headache    • History of herniated intervertebral disc    • History of skin cancer    • Hyperlipidemia    • Hypertension    • Injury of back    • Leukocytosis    • Lung nodule    • Urinary tract infection with hematuria 1/22/2021     Past Surgical History:   Procedure Laterality Date   • BACK SURGERY     • DILATATION AND CURETTAGE     • LUMBAR DECOMPRESSION      L4-L5    • SKIN CANCER EXCISION Left     Cao   • SUBTOTAL HYSTERECTOMY       Family History  "  Problem Relation Age of Onset   • Breast cancer Mother    • Lung cancer Father    • Lung cancer Other    • Colon cancer Other    • Skin cancer Other      Social History     Tobacco Use   • Smoking status: Current Every Day Smoker     Packs/day: 1.00     Years: 40.00     Pack years: 40.00   • Smokeless tobacco: Never Used   Substance Use Topics   • Alcohol use: Yes     Comment: rare       Current Outpatient Medications:   •  albuterol sulfate  (90 Base) MCG/ACT inhaler, INHALE TWO PUFFS BY MOUTH EVERY 4 HOURS AS NEEDED, Disp: 18 g, Rfl: 2  •  amitriptyline (ELAVIL) 25 MG tablet, Take 12.5 mg by mouth every night at bedtime., Disp: , Rfl: 3  •  aspirin (ADULT ASPIRIN EC LOW STRENGTH) 81 MG EC tablet, 81 mg., Disp: , Rfl:   •  Cyanocobalamin (VITAMIN B 12 PO), Take  by mouth., Disp: , Rfl:   •  fenofibrate (TRICOR) 145 MG tablet, Take 1 tablet by mouth Daily., Disp: 90 tablet, Rfl: 1  •  fluticasone (FLONASE) 50 MCG/ACT nasal spray, SHAKE LIQUID WELL AND INSTILL 2 SPRAYS IN EACH NOSTRIL EVERY DAY, Disp: 48 g, Rfl: 0  •  HYDROcodone-acetaminophen (NORCO)  MG per tablet, Take 1 tablet by mouth Every 6 (Six) Hours As Needed., Disp: , Rfl: 0  •  Lyrica 200 MG capsule, Take 1 capsule by mouth 2 (Two) Times a Day., Disp: 180 capsule, Rfl: 1  •  metoprolol succinate XL (TOPROL-XL) 25 MG 24 hr tablet, Take 1 tablet by mouth Daily., Disp: 90 tablet, Rfl: 3  •  umeclidinium-vilanterol (ANORO ELLIPTA) 62.5-25 MCG/INH aerosol powder  inhaler, Inhale 1 puff Daily., Disp: , Rfl:   •  vitamin D (ERGOCALCIFEROL) 1.25 MG (10277 UT) capsule capsule, Take 1 capsule by mouth Every 7 (Seven) Days., Disp: 12 capsule, Rfl: 1    Objective   Vital Signs:   /71 (BP Location: Left arm, Patient Position: Sitting, Cuff Size: Large Adult)   Pulse 80   Ht 175.3 cm (69.02\")   Wt 89.3 kg (196 lb 12.8 oz)   SpO2 97%   BMI 29.05 kg/m²       Physical Exam  Vitals and nursing note reviewed.   Constitutional:       General: She is " not in acute distress.     Appearance: She is well-developed. She is not diaphoretic.   HENT:      Mouth/Throat:      Pharynx: No oropharyngeal exudate.   Eyes:      Pupils: Pupils are equal, round, and reactive to light.   Neck:      Thyroid: No thyromegaly.      Vascular: No JVD.   Cardiovascular:      Rate and Rhythm: Normal rate and regular rhythm.      Heart sounds: Normal heart sounds. No murmur heard.      Pulmonary:      Effort: Pulmonary effort is normal. No respiratory distress.      Breath sounds: Normal breath sounds. No wheezing or rhonchi.      Comments: Prolonged expiration  Abdominal:      General: Bowel sounds are normal. There is no distension.      Palpations: Abdomen is soft.      Tenderness: There is no abdominal tenderness.   Musculoskeletal:         General: No tenderness. Normal range of motion.      Cervical back: Normal range of motion and neck supple.   Skin:     General: Skin is warm and dry.   Neurological:      Mental Status: She is alert and oriented to person, place, and time.      Sensory: No sensory deficit.      Gait: Gait abnormal (Cane for mobility ).   Psychiatric:         Behavior: Behavior normal.         Thought Content: Thought content normal.         Judgment: Judgment normal.          Result Review :     No visits with results within 7 Day(s) from this visit.   Latest known visit with results is:   Appointment on 12/14/2021   Component Date Value Ref Range Status   • WBC 12/14/2021 11.86* 3.40 - 10.80 10*3/mm3 Final   • RBC 12/14/2021 5.49* 3.77 - 5.28 10*6/mm3 Final   • Hemoglobin 12/14/2021 16.7* 12.0 - 15.9 g/dL Final   • Hematocrit 12/14/2021 49.1* 34.0 - 46.6 % Final   • MCV 12/14/2021 89.4  79.0 - 97.0 fL Final   • MCH 12/14/2021 30.4  26.6 - 33.0 pg Final   • MCHC 12/14/2021 34.0  31.5 - 35.7 g/dL Final   • RDW 12/14/2021 13.0  12.3 - 15.4 % Final   • RDW-SD 12/14/2021 42.6  37.0 - 54.0 fl Final   • MPV 12/14/2021 10.8  6.0 - 12.0 fL Final   • Platelets 12/14/2021  222  140 - 450 10*3/mm3 Final   • Glucose 12/14/2021 107* 65 - 99 mg/dL Final   • BUN 12/14/2021 7  6 - 20 mg/dL Final   • Creatinine 12/14/2021 0.62  0.57 - 1.00 mg/dL Final   • Sodium 12/14/2021 139  136 - 145 mmol/L Final   • Potassium 12/14/2021 3.9  3.5 - 5.2 mmol/L Final   • Chloride 12/14/2021 102  98 - 107 mmol/L Final   • CO2 12/14/2021 28.2  22.0 - 29.0 mmol/L Final   • Calcium 12/14/2021 9.6  8.6 - 10.5 mg/dL Final   • Total Protein 12/14/2021 7.2  6.0 - 8.5 g/dL Final   • Albumin 12/14/2021 4.30  3.50 - 5.20 g/dL Final   • ALT (SGPT) 12/14/2021 25  1 - 33 U/L Final   • AST (SGOT) 12/14/2021 26  1 - 32 U/L Final   • Alkaline Phosphatase 12/14/2021 93  39 - 117 U/L Final   • Total Bilirubin 12/14/2021 0.5  0.0 - 1.2 mg/dL Final   • eGFR Non African Amer 12/14/2021 99  >60 mL/min/1.73 Final   • Globulin 12/14/2021 2.9  gm/dL Final   • A/G Ratio 12/14/2021 1.5  g/dL Final   • BUN/Creatinine Ratio 12/14/2021 11.3  7.0 - 25.0 Final   • Anion Gap 12/14/2021 8.8  5.0 - 15.0 mmol/L Final   • Total Cholesterol 12/14/2021 188  0 - 200 mg/dL Final   • Triglycerides 12/14/2021 210* 0 - 150 mg/dL Final   • HDL Cholesterol 12/14/2021 34* 40 - 60 mg/dL Final   • LDL Cholesterol  12/14/2021 117* 0 - 100 mg/dL Final   • VLDL Cholesterol 12/14/2021 37  5 - 40 mg/dL Final   • LDL/HDL Ratio 12/14/2021 3.29   Final   • Hemoglobin A1C 12/14/2021 6.4* 3.5 - 5.6 % Final                       Assessment and Plan    Diagnoses and all orders for this visit:    1. Essential hypertension (Primary)    2. Diet-controlled diabetes mellitus (HCC)    3. Mixed hyperlipidemia    4. Nodule of upper lobe of right lung    5. Chronic obstructive pulmonary disease, unspecified COPD type (HCC)    6. Leukocytosis, unspecified type    1.  Condition stable, no medication refills needed at this time  2.  Reviewed labs.  A1c 6.4 and lipids slight improvement.  Continue fenofibrate.  No additional diabetic medications at this time.  3.  Continue to  work on low-carb and healthy heart diet, increasing exercise as able. carbs <100/day.   4.  We will see patient back in 3 months to reevaluate   5.  Follow-up with Dr. Easton for lung nodule eval. Will request reports.   6.  Still with persistent leukocytosis, may be 2/2 smoking vs lung mass. Will cont to monitor, consider referral.   7.  Continue to attempt smoking cessation, continue nicotine patch and cut back on cigarettes as able    I spent 30 minutes caring for Rahel Ramsey on this date of service. This time includes time spent by me in the following activities: preparing for the visit, reviewing tests, performing a medically appropriate examination and/or evaluation , counseling and educating the patient/family/caregiver, ordering medications, tests, or procedures and documenting information in the medical record        Follow Up     Return in about 3 months (around 3/21/2022) for HTN, DM. DM panel 1 week prior fasting.  Patient was given instructions and counseling regarding her condition or for health maintenance advice. Please see specific information pulled into the AVS if appropriate.      EMR Dragon transcription disclaimer:  Some of this encounter note is an electronic transcription translation of spoken language to printed text. The electronic translation of spoken language may permit erroneous, or at times, nonsensical words or phrases to be inadvertently transcribed; Although I have reviewed the note for such errors some may still exist.

## 2021-12-22 ENCOUNTER — TRANSCRIBE ORDERS (OUTPATIENT)
Dept: ADMINISTRATIVE | Facility: HOSPITAL | Age: 56
End: 2021-12-22

## 2021-12-22 DIAGNOSIS — R06.02 SHORTNESS OF BREATH: Primary | ICD-10-CM

## 2021-12-29 ENCOUNTER — TRANSCRIBE ORDERS (OUTPATIENT)
Dept: ADMINISTRATIVE | Facility: HOSPITAL | Age: 56
End: 2021-12-29

## 2021-12-29 DIAGNOSIS — Z01.818 OTHER SPECIFIED PRE-OPERATIVE EXAMINATION: Primary | ICD-10-CM

## 2022-01-04 RX ORDER — ALBUTEROL SULFATE 90 UG/1
AEROSOL, METERED RESPIRATORY (INHALATION)
Qty: 18 G | Refills: 2 | Status: SHIPPED | OUTPATIENT
Start: 2022-01-04 | End: 2022-09-29 | Stop reason: SDUPTHER

## 2022-01-18 ENCOUNTER — TRANSCRIBE ORDERS (OUTPATIENT)
Dept: ADMINISTRATIVE | Facility: HOSPITAL | Age: 57
End: 2022-01-18

## 2022-01-18 DIAGNOSIS — R06.02 SHORTNESS OF BREATH: Primary | ICD-10-CM

## 2022-01-19 ENCOUNTER — APPOINTMENT (OUTPATIENT)
Dept: LAB | Facility: HOSPITAL | Age: 57
End: 2022-01-19

## 2022-01-21 ENCOUNTER — APPOINTMENT (OUTPATIENT)
Dept: RESPIRATORY THERAPY | Facility: HOSPITAL | Age: 57
End: 2022-01-21

## 2022-02-02 ENCOUNTER — APPOINTMENT (OUTPATIENT)
Dept: LAB | Facility: HOSPITAL | Age: 57
End: 2022-02-02

## 2022-02-04 ENCOUNTER — APPOINTMENT (OUTPATIENT)
Dept: RESPIRATORY THERAPY | Facility: HOSPITAL | Age: 57
End: 2022-02-04

## 2022-02-07 RX ORDER — FLUTICASONE PROPIONATE 50 MCG
SPRAY, SUSPENSION (ML) NASAL
Qty: 48 G | Refills: 3 | Status: SHIPPED | OUTPATIENT
Start: 2022-02-07 | End: 2023-01-27

## 2022-03-02 ENCOUNTER — LAB (OUTPATIENT)
Dept: LAB | Facility: HOSPITAL | Age: 57
End: 2022-03-02

## 2022-03-02 LAB — SARS-COV-2 ORF1AB RESP QL NAA+PROBE: NOT DETECTED

## 2022-03-02 PROCEDURE — C9803 HOPD COVID-19 SPEC COLLECT: HCPCS | Performed by: INTERNAL MEDICINE

## 2022-03-02 PROCEDURE — U0004 COV-19 TEST NON-CDC HGH THRU: HCPCS | Performed by: INTERNAL MEDICINE

## 2022-03-04 ENCOUNTER — HOSPITAL ENCOUNTER (OUTPATIENT)
Dept: RESPIRATORY THERAPY | Facility: HOSPITAL | Age: 57
Discharge: HOME | End: 2022-03-04
Admitting: INTERNAL MEDICINE

## 2022-03-04 DIAGNOSIS — R06.02 SHORTNESS OF BREATH: ICD-10-CM

## 2022-03-04 PROCEDURE — 94727 GAS DIL/WSHOT DETER LNG VOL: CPT

## 2022-03-04 PROCEDURE — 94729 DIFFUSING CAPACITY: CPT

## 2022-03-04 PROCEDURE — 94060 EVALUATION OF WHEEZING: CPT

## 2022-03-04 RX ORDER — ALBUTEROL SULFATE 90 UG/1
2 AEROSOL, METERED RESPIRATORY (INHALATION) ONCE
Status: COMPLETED | OUTPATIENT
Start: 2022-03-04 | End: 2022-03-04

## 2022-03-04 RX ADMIN — ALBUTEROL SULFATE 2 PUFF: 108 INHALANT RESPIRATORY (INHALATION) at 15:05

## 2022-03-08 RX ORDER — METOPROLOL SUCCINATE 25 MG/1
25 TABLET, EXTENDED RELEASE ORAL DAILY
Qty: 90 TABLET | Refills: 1 | Status: SHIPPED | OUTPATIENT
Start: 2022-03-08 | End: 2023-03-13

## 2022-03-17 ENCOUNTER — CLINICAL SUPPORT (OUTPATIENT)
Dept: FAMILY MEDICINE CLINIC | Facility: CLINIC | Age: 57
End: 2022-03-17
Payer: MEDICARE

## 2022-03-17 DIAGNOSIS — E78.2 MIXED HYPERLIPIDEMIA: ICD-10-CM

## 2022-03-17 DIAGNOSIS — I10 ESSENTIAL HYPERTENSION: Primary | ICD-10-CM

## 2022-03-17 DIAGNOSIS — E11.9 DIET-CONTROLLED DIABETES MELLITUS: ICD-10-CM

## 2022-03-17 LAB — HBA1C MFR BLD: 6.4 % (ref 3.5–5.6)

## 2022-03-17 PROCEDURE — 80061 LIPID PANEL: CPT | Performed by: NURSE PRACTITIONER

## 2022-03-17 PROCEDURE — 83036 HEMOGLOBIN GLYCOSYLATED A1C: CPT | Performed by: NURSE PRACTITIONER

## 2022-03-17 PROCEDURE — 85027 COMPLETE CBC AUTOMATED: CPT | Performed by: NURSE PRACTITIONER

## 2022-03-17 PROCEDURE — 36415 COLL VENOUS BLD VENIPUNCTURE: CPT | Performed by: NURSE PRACTITIONER

## 2022-03-17 PROCEDURE — 80053 COMPREHEN METABOLIC PANEL: CPT | Performed by: NURSE PRACTITIONER

## 2022-03-18 LAB
ALBUMIN SERPL-MCNC: 4.7 G/DL (ref 3.5–5.2)
ALBUMIN/GLOB SERPL: 1.6 G/DL
ALP SERPL-CCNC: 87 U/L (ref 39–117)
ALT SERPL W P-5'-P-CCNC: 38 U/L (ref 1–33)
ANION GAP SERPL CALCULATED.3IONS-SCNC: 9 MMOL/L (ref 5–15)
AST SERPL-CCNC: 27 U/L (ref 1–32)
BILIRUB SERPL-MCNC: 0.6 MG/DL (ref 0–1.2)
BUN SERPL-MCNC: 11 MG/DL (ref 6–20)
BUN/CREAT SERPL: 13.9 (ref 7–25)
CALCIUM SPEC-SCNC: 9.6 MG/DL (ref 8.6–10.5)
CHLORIDE SERPL-SCNC: 102 MMOL/L (ref 98–107)
CHOLEST SERPL-MCNC: 194 MG/DL (ref 0–200)
CO2 SERPL-SCNC: 28 MMOL/L (ref 22–29)
CREAT SERPL-MCNC: 0.79 MG/DL (ref 0.57–1)
DEPRECATED RDW RBC AUTO: 46.3 FL (ref 37–54)
EGFRCR SERPLBLD CKD-EPI 2021: 87.4 ML/MIN/1.73
ERYTHROCYTE [DISTWIDTH] IN BLOOD BY AUTOMATED COUNT: 13.6 % (ref 12.3–15.4)
GLOBULIN UR ELPH-MCNC: 2.9 GM/DL
GLUCOSE SERPL-MCNC: 88 MG/DL (ref 65–99)
HCT VFR BLD AUTO: 50.2 % (ref 34–46.6)
HDLC SERPL-MCNC: 40 MG/DL (ref 40–60)
HGB BLD-MCNC: 16.8 G/DL (ref 12–15.9)
LDLC SERPL CALC-MCNC: 115 MG/DL (ref 0–100)
LDLC/HDLC SERPL: 2.75 {RATIO}
MCH RBC QN AUTO: 30.6 PG (ref 26.6–33)
MCHC RBC AUTO-ENTMCNC: 33.5 G/DL (ref 31.5–35.7)
MCV RBC AUTO: 91.4 FL (ref 79–97)
PLATELET # BLD AUTO: 279 10*3/MM3 (ref 140–450)
PMV BLD AUTO: 10.9 FL (ref 6–12)
POTASSIUM SERPL-SCNC: 4.3 MMOL/L (ref 3.5–5.2)
PROT SERPL-MCNC: 7.6 G/DL (ref 6–8.5)
RBC # BLD AUTO: 5.49 10*6/MM3 (ref 3.77–5.28)
SODIUM SERPL-SCNC: 139 MMOL/L (ref 136–145)
TRIGL SERPL-MCNC: 221 MG/DL (ref 0–150)
VLDLC SERPL-MCNC: 39 MG/DL (ref 5–40)
WBC NRBC COR # BLD: 14.37 10*3/MM3 (ref 3.4–10.8)

## 2022-03-22 ENCOUNTER — OFFICE VISIT (OUTPATIENT)
Dept: FAMILY MEDICINE CLINIC | Facility: CLINIC | Age: 57
End: 2022-03-22

## 2022-03-22 VITALS
WEIGHT: 198.6 LBS | BODY MASS INDEX: 29.41 KG/M2 | SYSTOLIC BLOOD PRESSURE: 124 MMHG | DIASTOLIC BLOOD PRESSURE: 79 MMHG | OXYGEN SATURATION: 99 % | HEART RATE: 75 BPM | HEIGHT: 69 IN

## 2022-03-22 DIAGNOSIS — E78.2 MIXED HYPERLIPIDEMIA: ICD-10-CM

## 2022-03-22 DIAGNOSIS — D75.1 POLYCYTHEMIA: ICD-10-CM

## 2022-03-22 DIAGNOSIS — M54.42 CHRONIC MIDLINE LOW BACK PAIN WITH BILATERAL SCIATICA: ICD-10-CM

## 2022-03-22 DIAGNOSIS — G89.29 CHRONIC MIDLINE LOW BACK PAIN WITH BILATERAL SCIATICA: ICD-10-CM

## 2022-03-22 DIAGNOSIS — D72.829 LEUKOCYTOSIS, UNSPECIFIED TYPE: ICD-10-CM

## 2022-03-22 DIAGNOSIS — M54.41 CHRONIC MIDLINE LOW BACK PAIN WITH BILATERAL SCIATICA: ICD-10-CM

## 2022-03-22 DIAGNOSIS — M79.674 PAIN AROUND TOENAIL, RIGHT FOOT: Primary | ICD-10-CM

## 2022-03-22 DIAGNOSIS — Z71.6 TOBACCO ABUSE COUNSELING: ICD-10-CM

## 2022-03-22 DIAGNOSIS — J44.9 CHRONIC OBSTRUCTIVE PULMONARY DISEASE, UNSPECIFIED COPD TYPE: ICD-10-CM

## 2022-03-22 DIAGNOSIS — R91.1 NODULE OF UPPER LOBE OF RIGHT LUNG: ICD-10-CM

## 2022-03-22 DIAGNOSIS — B35.1 ONYCHOMYCOSIS: ICD-10-CM

## 2022-03-22 DIAGNOSIS — E11.9 DIET-CONTROLLED DIABETES MELLITUS: ICD-10-CM

## 2022-03-22 PROCEDURE — 99214 OFFICE O/P EST MOD 30 MIN: CPT | Performed by: NURSE PRACTITIONER

## 2022-03-22 RX ORDER — FENOFIBRATE 145 MG/1
145 TABLET, COATED ORAL DAILY
Qty: 90 TABLET | Refills: 1 | Status: SHIPPED | OUTPATIENT
Start: 2022-03-22 | End: 2022-05-24

## 2022-03-22 RX ORDER — MICONAZOLE NITRATE 20 MG/G
POWDER TOPICAL
COMMUNITY
Start: 2022-02-01 | End: 2022-08-31

## 2022-03-22 RX ORDER — ERGOCALCIFEROL 1.25 MG/1
50000 CAPSULE ORAL
Qty: 12 CAPSULE | Refills: 1 | Status: SHIPPED | OUTPATIENT
Start: 2022-03-22 | End: 2022-09-21

## 2022-03-22 RX ORDER — TERBINAFINE HYDROCHLORIDE 250 MG/1
250 TABLET ORAL DAILY
Qty: 90 TABLET | Refills: 1 | Status: SHIPPED | OUTPATIENT
Start: 2022-03-22 | End: 2022-08-31

## 2022-03-22 RX ORDER — NYSTATIN 100000 U/G
1 CREAM TOPICAL AS NEEDED
COMMUNITY
Start: 2022-02-01

## 2022-03-22 NOTE — PROGRESS NOTES
Chief Complaint  Hypertension, Diabetes, Follow-up (3 mo), Toe Pain (Rt 3rd toe), Numbness (face), and Results (3/17 labs)    Subjective          Rahel Ramsey presents to [facility] for   History of Present Illness     HTN, stable on meds and takes as directed, denies chest pain, headache, shortness of air, palpitations and swelling of extremities.      Hyperlipidemia, The patient denies muscle aches, constipation, diarrhea, GI upset, fatigue, chest pain/pressure, exercise intolerance, dyspnea, palpitations, syncope and pedal edema.       COPD: unchanged, using inhalers as directed.  She is still smoking. She is following with Dr. Easton for a right upper lobe perihilar lung nodule measuring 9 x 7 mm 11/12/2020.  PET scan has been negative    Diet controlled Diabetes mellitus type II, not currently on medications.  Denies any signs/symptoms of hyper/hypoglycemia, blurry vision, polydipsia, polyuria, nocturia, and unintentional weight loss     Chronic low back pain, stable on Lyrica    She reports occasional numbness of the face, denies headache, chest pain, palpitations, upper extremity weakness or numbness.      Right third and fourth toe pain, thick toenails, patient reports pain of especially the third toe has been present for over a year.    Leukocytosis, chronic, persistent over the last 1 year along with elevated hemoglobin.  She saw hematology years ago but did not go back     Here to review labs       The following portions of the patient's history were reviewed and updated as appropriate: allergies, current medications, past family history, past medical history, past social history, past surgical history and problem list.    Past Medical History:   Diagnosis Date   • Allergic    • Arthritis    • Atrial fibrillation (HCC)    • COPD (chronic obstructive pulmonary disease) (HCC)    • Headache    • History of herniated intervertebral disc    • History of skin cancer    •  Hyperlipidemia    • Hypertension    • Injury of back    • Leukocytosis    • Lung nodule    • Urinary tract infection with hematuria 1/22/2021     Past Surgical History:   Procedure Laterality Date   • BACK SURGERY     • DILATATION AND CURETTAGE     • LUMBAR DECOMPRESSION      L4-L5    • SKIN CANCER EXCISION Left     [place]   • SUBTOTAL HYSTERECTOMY       Family History   Problem Relation Age of Onset   • Breast cancer Mother    • Lung cancer Father    • Lung cancer Other    • Colon cancer Other    • Skin cancer Other      Social History     Tobacco Use   • Smoking status: Current Every Day Smoker     Packs/day: 1.00     Years: 40.00     Pack years: 40.00   • Smokeless tobacco: Never Used   Substance Use Topics   • Alcohol use: Yes     Comment: rare       Current Outpatient Medications:   •  albuterol sulfate  (90 Base) MCG/ACT inhaler, INHALE 2 PUFFS BY MOUTH EVERY 4 HOURS AS NEEDED, Disp: 18 g, Rfl: 2  •  amitriptyline (ELAVIL) 25 MG tablet, Take 12.5 mg by mouth every night at bedtime., Disp: , Rfl: 3  •  aspirin (aspirin) 81 MG EC tablet, 81 mg., Disp: , Rfl:   •  Cyanocobalamin (VITAMIN B 12 PO), Take  by mouth., Disp: , Rfl:   •  fenofibrate (TRICOR) 145 MG tablet, Take 1 tablet by mouth Daily., Disp: 90 tablet, Rfl: 1  •  fluticasone (FLONASE) 50 MCG/ACT nasal spray, INSTILL 2 SPRAYS IN EACH NOSTRIL EVERY DAY, Disp: 48 g, Rfl: 3  •  HYDROcodone-acetaminophen (NORCO)  MG per tablet, Take 1 tablet by mouth Every 6 (Six) Hours As Needed., Disp: , Rfl: 0  •  Lyrica 200 MG capsule, Take 1 capsule by mouth 2 (Two) Times a Day., Disp: 180 capsule, Rfl: 1  •  metoprolol succinate XL (TOPROL-XL) 25 MG 24 hr tablet, Take 1 tablet by mouth Daily., Disp: 90 tablet, Rfl: 1  •  nystatin (MYCOSTATIN) 692823 UNIT/GM cream, , Disp: , Rfl:   •  umeclidinium-vilanterol (ANORO ELLIPTA) 62.5-25 MCG/INH aerosol powder  inhaler, Inhale 1 puff Daily., Disp: , Rfl:   •  vitamin D (ERGOCALCIFEROL) 1.25 MG (30498 UT) capsule  "capsule, Take 1 capsule by mouth Every 7 (Seven) Days., Disp: 12 capsule, Rfl: 1  •  Zeasorb-AF 2 % powder, APPLY TO AFFECTED AREA DAILY, Disp: , Rfl:   •  terbinafine (lamiSIL) 250 MG tablet, Take 1 tablet by mouth Daily., Disp: 90 tablet, Rfl: 1    Objective   Vital Signs:   /79 (BP Location: Left arm, Patient Position: Sitting, Cuff Size: Large Adult)   Pulse 75   Ht 175.3 cm (69.02\")   Wt 90.1 kg (198 lb 9.6 oz)   SpO2 99%   BMI 29.31 kg/m²       Physical Exam  Vitals and nursing note reviewed.   Constitutional:       General: She is not in acute distress.     Appearance: She is well-developed. She is not diaphoretic.   HENT:      Mouth/Throat:      Pharynx: No oropharyngeal exudate.   Eyes:      Pupils: Pupils are equal, round, and reactive to light.   Neck:      Thyroid: No thyromegaly.      Vascular: No JVD.   Cardiovascular:      Rate and Rhythm: Normal rate and regular rhythm.      Heart sounds: Normal heart sounds. No murmur heard.  Pulmonary:      Effort: Pulmonary effort is normal. No respiratory distress.      Breath sounds: Normal breath sounds. No wheezing or rhonchi.      Comments: Prolonged expiration  Abdominal:      General: Bowel sounds are normal. There is no distension.      Palpations: Abdomen is soft.      Tenderness: There is no abdominal tenderness.   Musculoskeletal:         General: No tenderness. Normal range of motion.      Cervical back: Normal range of motion and neck supple.   Skin:     General: Skin is warm and dry.   Neurological:      Mental Status: She is alert and oriented to person, place, and time.      Sensory: No sensory deficit.      Gait: Gait abnormal (Cane for mobility ).   Psychiatric:         Behavior: Behavior normal.         Thought Content: Thought content normal.         Judgment: Judgment normal.          Result Review :     Appointment on 03/17/2022   Component Date Value Ref Range Status   • WBC 03/17/2022 14.37 (A) 3.40 - 10.80 10*3/mm3 Final   • RBC " 03/17/2022 5.49 (A) 3.77 - 5.28 10*6/mm3 Final   • Hemoglobin 03/17/2022 16.8 (A) 12.0 - 15.9 g/dL Final   • Hematocrit 03/17/2022 50.2 (A) 34.0 - 46.6 % Final   • MCV 03/17/2022 91.4  79.0 - 97.0 fL Final   • MCH 03/17/2022 30.6  26.6 - 33.0 pg Final   • MCHC 03/17/2022 33.5  31.5 - 35.7 g/dL Final   • RDW 03/17/2022 13.6  12.3 - 15.4 % Final   • RDW-SD 03/17/2022 46.3  37.0 - 54.0 fl Final   • MPV 03/17/2022 10.9  6.0 - 12.0 fL Final   • Platelets 03/17/2022 279  140 - 450 10*3/mm3 Final   • Glucose 03/17/2022 88  65 - 99 mg/dL Final   • BUN 03/17/2022 11  6 - 20 mg/dL Final   • Creatinine 03/17/2022 0.79  0.57 - 1.00 mg/dL Final   • Sodium 03/17/2022 139  136 - 145 mmol/L Final   • Potassium 03/17/2022 4.3  3.5 - 5.2 mmol/L Final   • Chloride 03/17/2022 102  98 - 107 mmol/L Final   • CO2 03/17/2022 28.0  22.0 - 29.0 mmol/L Final   • Calcium 03/17/2022 9.6  8.6 - 10.5 mg/dL Final   • Total Protein 03/17/2022 7.6  6.0 - 8.5 g/dL Final   • Albumin 03/17/2022 4.70  3.50 - 5.20 g/dL Final   • ALT (SGPT) 03/17/2022 38 (A) 1 - 33 U/L Final   • AST (SGOT) 03/17/2022 27  1 - 32 U/L Final   • Alkaline Phosphatase 03/17/2022 87  39 - 117 U/L Final   • Total Bilirubin 03/17/2022 0.6  0.0 - 1.2 mg/dL Final   • Globulin 03/17/2022 2.9  gm/dL Final   • A/G Ratio 03/17/2022 1.6  g/dL Final   • BUN/Creatinine Ratio 03/17/2022 13.9  7.0 - 25.0 Final   • Anion Gap 03/17/2022 9.0  5.0 - 15.0 mmol/L Final   • eGFR 03/17/2022 87.4  >60.0 mL/min/1.73 Final    National Kidney Foundation and American Society of Nephrology (ASN) Task Force recommended calculation based on the Chronic Kidney Disease Epidemiology Collaboration (CKD-EPI) equation refit without adjustment for race.   • Total Cholesterol 03/17/2022 194  0 - 200 mg/dL Final   • Triglycerides 03/17/2022 221 (A) 0 - 150 mg/dL Final   • HDL Cholesterol 03/17/2022 40  40 - 60 mg/dL Final   • LDL Cholesterol  03/17/2022 115 (A) 0 - 100 mg/dL Final   • VLDL Cholesterol 03/17/2022 39   5 - 40 mg/dL Final   • LDL/HDL Ratio 03/17/2022 2.75   Final   • Hemoglobin A1C 03/17/2022 6.4 (A) 3.5 - 5.6 % Final                       Assessment and Plan    Diagnoses and all orders for this visit:    1. Pain around toenail, right foot (Primary)  -     Ambulatory Referral to Podiatry    2. Onychomycosis  Comments:  start terbinafine, will refer to podiatry for eval. recheck LFT's in 4 weeks.   Orders:  -     Ambulatory Referral to Podiatry    3. Nodule of upper lobe of right lung  Comments:  following with Dr. Easton    4. Leukocytosis, unspecified type  Comments:  Referral to hematology for further evaluation  Orders:  -     Ambulatory Referral to Hematology    5. Diet-controlled diabetes mellitus (HCC)  Comments:  hgba1c 6.4, cont watching diet and lower carb/sugars  Orders:  -     Ambulatory Referral to Podiatry    6. Chronic obstructive pulmonary disease, unspecified COPD type ([facility])  Comments:  keep f/u with Dr. Easton, continue inhalers as directed    7. Chronic midline low back pain with bilateral sciatica  Comments:  Stable, keep follow-up with pain mgmt, rf lyrica 90 day supply. can not rocio dulox or em d/t UTI.   Orders:  -     Lyrica 200 MG capsule; Take 1 capsule by mouth 2 (Two) Times a Day.  Dispense: 180 capsule; Refill: 1    8. Mixed hyperlipidemia  Comments:  improved, Cont fenofibrate and healthy heart diet.    Orders:  -     fenofibrate (TRICOR) 145 MG tablet; Take 1 tablet by mouth Daily.  Dispense: 90 tablet; Refill: 1    9. Polycythemia  Comments:  Symptoms present over 1 year, likely secondary to smoking.  However appreciate further evaluation, will refer to hematology  Orders:  -     Ambulatory Referral to Hematology    10. Tobacco abuse counseling    Other orders  -     terbinafine (lamiSIL) 250 MG tablet; Take 1 tablet by mouth Daily.  Dispense: 90 tablet; Refill: 1  -     vitamin D (ERGOCALCIFEROL) 1.25 MG (48154 UT) capsule capsule; Take 1 capsule by mouth Every 7 (Seven) Days.   Dispense: 12 capsule; Refill: 1        I spent 30 minutes caring for Rahel Ramsey on this date of service. This time includes time spent by me in the following activities: preparing for the visit, reviewing tests, performing a medically appropriate examination and/or evaluation , counseling and educating the patient/family/caregiver, ordering medications, tests, or procedures and documenting information in the medical record        Follow Up {Instructions Charge Capture  Follow-up Communications :23}    Return in about 6 months (around 9/22/2022) for Recheck, DM panel prior to visit. lab only for CMP in 4 weeks. .  Patient was given instructions and counseling regarding her condition or for health maintenance advice. Please see specific information pulled into the AVS if appropriate.        Part of this note may be an electronic transcription/translation of spoken language to printed text using the Dragon Dictation System

## 2022-04-08 ENCOUNTER — OFFICE VISIT (OUTPATIENT)
Dept: PODIATRY | Facility: CLINIC | Age: 57
End: 2022-04-08

## 2022-04-08 VITALS
HEIGHT: 69 IN | DIASTOLIC BLOOD PRESSURE: 72 MMHG | SYSTOLIC BLOOD PRESSURE: 111 MMHG | WEIGHT: 198 LBS | BODY MASS INDEX: 29.33 KG/M2 | HEART RATE: 79 BPM

## 2022-04-08 DIAGNOSIS — M20.41 HAMMER TOE OF RIGHT FOOT: ICD-10-CM

## 2022-04-08 DIAGNOSIS — M79.671 RIGHT FOOT PAIN: Primary | ICD-10-CM

## 2022-04-08 DIAGNOSIS — B35.1 ONYCHOMYCOSIS: ICD-10-CM

## 2022-04-08 DIAGNOSIS — L60.0 INGROWN TOENAIL: ICD-10-CM

## 2022-04-08 PROCEDURE — 99213 OFFICE O/P EST LOW 20 MIN: CPT

## 2022-04-08 NOTE — PROGRESS NOTES
04/08/2022  Foot and Ankle Surgery - New Patient   Provider: FELIX Anglin   Location: [facility]    Subjective:  Rahel Ramsey is a 56 y.o. female.     Chief Complaint   Patient presents with   • Right Foot - Pain   • Initial Evaluation     LAST PCP APPT WITH FANI WASHINGTON  3/22/2022       HPI: Patient presents to office today with complaints of pain to right lateral third toe.  Patient reports she has been having pain for approximately 1 year.  Pain is rated about a 4 out of 10 and is worse with activity.  Patient describes pain as throbbing in nature.  Patient denies any known trauma to the toe.  Patient is diet-controlled diabetic and reports no other significant foot problems or wounds in the past.  Patient was recently placed on oral medication for nail fungus by primary care provider.    Allergies   Allergen Reactions   • Morphine Anaphylaxis and Nausea And Vomiting       Past Medical History:   Diagnosis Date   • Allergic    • Arthritis    • Atrial fibrillation (HCC)    • COPD (chronic obstructive pulmonary disease) (HCC)    • Headache    • History of herniated intervertebral disc    • History of skin cancer     Left lower extremity   • Hyperlipidemia    • Hypertension    • Injury of back    • Leukocytosis    • Lung nodule    • Urinary tract infection with hematuria 1/22/2021       Past Surgical History:   Procedure Laterality Date   • BACK SURGERY     • DILATATION AND CURETTAGE     • LUMBAR DECOMPRESSION      L4-L5    • SKIN CANCER EXCISION Left     [place]   • SUBTOTAL HYSTERECTOMY         Family History   Problem Relation Age of Onset   • Breast cancer Mother    • Lung cancer Father    • Lung cancer Other    • Colon cancer Other    • Skin cancer Other        Social History     Socioeconomic History   • Marital status:    Tobacco Use   • Smoking status: Current Every Day Smoker     Packs/day: 1.00     Years: 40.00     Pack years: 40.00   • Smokeless tobacco: Never Used   Vaping  Use   • Vaping Use: Never used   Substance and Sexual Activity   • Alcohol use: Yes     Comment: rare   • Drug use: No   • Sexual activity: Defer        Current Outpatient Medications on File Prior to Visit   Medication Sig Dispense Refill   • albuterol sulfate  (90 Base) MCG/ACT inhaler INHALE 2 PUFFS BY MOUTH EVERY 4 HOURS AS NEEDED 18 g 2   • amitriptyline (ELAVIL) 25 MG tablet Take 12.5 mg by mouth every night at bedtime.  3   • aspirin (aspirin) 81 MG EC tablet 81 mg.     • Cyanocobalamin (VITAMIN B 12 PO) Take  by mouth.     • fenofibrate (TRICOR) 145 MG tablet Take 1 tablet by mouth Daily. 90 tablet 1   • fluticasone (FLONASE) 50 MCG/ACT nasal spray INSTILL 2 SPRAYS IN EACH NOSTRIL EVERY DAY 48 g 3   • HYDROcodone-acetaminophen (NORCO)  MG per tablet Take 1 tablet by mouth Every 6 (Six) Hours As Needed.  0   • Lyrica 200 MG capsule Take 1 capsule by mouth 2 (Two) Times a Day. 180 capsule 1   • metoprolol succinate XL (TOPROL-XL) 25 MG 24 hr tablet Take 1 tablet by mouth Daily. 90 tablet 1   • nystatin (MYCOSTATIN) 722642 UNIT/GM cream      • terbinafine (lamiSIL) 250 MG tablet Take 1 tablet by mouth Daily. 90 tablet 1   • umeclidinium-vilanterol (ANORO ELLIPTA) 62.5-25 MCG/INH aerosol powder  inhaler Inhale 1 puff Daily.     • vitamin D (ERGOCALCIFEROL) 1.25 MG (10236 UT) capsule capsule Take 1 capsule by mouth Every 7 (Seven) Days. 12 capsule 1   • Zeasorb-AF 2 % powder APPLY TO AFFECTED AREA DAILY       No current facility-administered medications on file prior to visit.       Review of Systems:  General: Denies fever, chills, fatigue, and weakness.  Eyes: Denies vision loss, blurry vision, and excessive redness.  ENT: Denies hearing issues and difficulty swallowing.  Cardiovascular: Denies palpitations, chest pain, or syncopal episodes.  Respiratory: Denies shortness of breath, wheezing, and coughing.  GI: Denies abdominal pain, nausea, and vomiting.   : Denies frequency, hematuria, and  "urgency.  Musculoskeletal: Denies muscle cramps, joint pains, and stiffness.  Derm: Denies rash, open wounds, or suspicious lesions.  Neuro: Denies headaches, numbness, loss of coordination, and tremors.  Psych: Denies anxiety and depression.  Endocrine: Denies temperature intolerance and changes in appetite.  Heme: Denies bleeding disorders or abnormal bruising.     Objective   /72   Pulse 79   Ht 175.3 cm (69\")   Wt 89.8 kg (198 lb)   BMI 29.24 kg/m²     Foot/Ankle Exam:       General:   Appearance: appears stated age and healthy    Orientation: AAOx3    Affect: appropriate    Gait: unimpaired    Assistance: independent    Shoe Gear:  Casual shoes and socks    VASCULAR      Right Foot Vascularity   Dorsalis pedis:  2+  Skin Temperature: warm    Edema Grading:  None  CFT:  < 3 seconds      NEUROLOGIC     Right Foot Neurologic   Light touch sensation:  Normal     MUSCULOSKELETAL      Right Foot Musculoskeletal   Ecchymosis:  None  Tenderness: toe 3       DERMATOLOGIC     Right Foot Dermatologic   Skin: skin intact    Nails: onychomycosis, abnormally thick and dystrophic nails       Image:       Assessment/Plan   Diagnoses and all orders for this visit:    1. Right foot pain (Primary)  -     Cancel: XR Foot 3+ View Right    2. Hammer toe of right foot    3. Onychomycosis    4. Ingrown toenail      On exam patient does have mild pain with palpation to right lateral third toe.  Patient does have slight ingrown toenail with onychomycosis to the nail.  Patient has slight hammertoe as well and do feel patient is walking on the lateral aspect of her toe.  Do feel pain is related to continued pressure and friction.  Pathophysiology was discussed with patient.  We did discuss treatment options and I do recommend patient start with conservative treatment of trying crest pad to offload pressure and put toe and better alignment.  Recommend patient wear crest pad in a pair of tennis shoes with mesh top or something " that does not crowd the toes.  Patient verbalized understanding.  Also recommend patient try Epsom salts soaks to help with ingrown toenail symptoms as this may be contributing to discomfort though per patient report it does not sound as though the toenail is causing much discomfort.  Would like for patient to follow-up in 3 weeks and to call with any questions or concerns should they arise before scheduled appointment.  Patient verbalized understanding and is agreeable to plan at this time.    No orders of the defined types were placed in this encounter.       Note is dictated utilizing voice recognition software. Unfortunately this leads to occasional typographical errors. I apologize in advance if the situation occurs. If questions occur please do not hesitate to call our office.

## 2022-04-08 NOTE — PATIENT INSTRUCTIONS
Obtain crest pad and wear to right third toe in pair of tennis shoes.    Try Epsom salts soaks once daily for a week make sure water is not too hot, very feet well after soaking.  Follow-up in 3 weeks

## 2022-04-29 ENCOUNTER — LAB (OUTPATIENT)
Dept: FAMILY MEDICINE CLINIC | Facility: CLINIC | Age: 57
End: 2022-04-29

## 2022-04-29 DIAGNOSIS — R74.8 ELEVATED LIVER ENZYMES: Primary | ICD-10-CM

## 2022-04-29 PROCEDURE — 36415 COLL VENOUS BLD VENIPUNCTURE: CPT | Performed by: NURSE PRACTITIONER

## 2022-04-29 PROCEDURE — 80053 COMPREHEN METABOLIC PANEL: CPT | Performed by: NURSE PRACTITIONER

## 2022-04-30 LAB
ALBUMIN SERPL-MCNC: 4.3 G/DL (ref 3.5–5.2)
ALBUMIN/GLOB SERPL: 1.9 G/DL
ALP SERPL-CCNC: 86 U/L (ref 39–117)
ALT SERPL W P-5'-P-CCNC: 27 U/L (ref 1–33)
ANION GAP SERPL CALCULATED.3IONS-SCNC: 12 MMOL/L (ref 5–15)
AST SERPL-CCNC: 23 U/L (ref 1–32)
BILIRUB SERPL-MCNC: 0.3 MG/DL (ref 0–1.2)
BUN SERPL-MCNC: 8 MG/DL (ref 6–20)
BUN/CREAT SERPL: 11.3 (ref 7–25)
CALCIUM SPEC-SCNC: 9.1 MG/DL (ref 8.6–10.5)
CHLORIDE SERPL-SCNC: 104 MMOL/L (ref 98–107)
CO2 SERPL-SCNC: 24 MMOL/L (ref 22–29)
CREAT SERPL-MCNC: 0.71 MG/DL (ref 0.57–1)
EGFRCR SERPLBLD CKD-EPI 2021: 99.3 ML/MIN/1.73
GLOBULIN UR ELPH-MCNC: 2.3 GM/DL
GLUCOSE SERPL-MCNC: 95 MG/DL (ref 65–99)
POTASSIUM SERPL-SCNC: 4.3 MMOL/L (ref 3.5–5.2)
PROT SERPL-MCNC: 6.6 G/DL (ref 6–8.5)
SODIUM SERPL-SCNC: 140 MMOL/L (ref 136–145)

## 2022-05-20 ENCOUNTER — LAB (OUTPATIENT)
Dept: LAB | Facility: HOSPITAL | Age: 57
End: 2022-05-20

## 2022-05-20 ENCOUNTER — CONSULT (OUTPATIENT)
Dept: ONCOLOGY | Facility: CLINIC | Age: 57
End: 2022-05-20

## 2022-05-20 VITALS
HEIGHT: 69 IN | TEMPERATURE: 96.6 F | BODY MASS INDEX: 28.64 KG/M2 | WEIGHT: 193.4 LBS | DIASTOLIC BLOOD PRESSURE: 73 MMHG | OXYGEN SATURATION: 95 % | RESPIRATION RATE: 18 BRPM | HEART RATE: 75 BPM | SYSTOLIC BLOOD PRESSURE: 115 MMHG

## 2022-05-20 DIAGNOSIS — D72.829 LEUKOCYTOSIS, UNSPECIFIED TYPE: ICD-10-CM

## 2022-05-20 DIAGNOSIS — R53.83 FATIGUE, UNSPECIFIED TYPE: ICD-10-CM

## 2022-05-20 DIAGNOSIS — D72.829 LEUKOCYTOSIS, UNSPECIFIED TYPE: Primary | ICD-10-CM

## 2022-05-20 LAB
ALBUMIN SERPL-MCNC: 4.4 G/DL (ref 3.5–5.2)
ALBUMIN/GLOB SERPL: 1.3 G/DL
ALP SERPL-CCNC: 101 U/L (ref 39–117)
ALT SERPL W P-5'-P-CCNC: 30 U/L (ref 1–33)
ANION GAP SERPL CALCULATED.3IONS-SCNC: 13 MMOL/L (ref 5–15)
AST SERPL-CCNC: 30 U/L (ref 1–32)
BASOPHILS # BLD AUTO: 0.07 10*3/MM3 (ref 0–0.2)
BASOPHILS NFR BLD AUTO: 0.5 % (ref 0–1.5)
BILIRUB SERPL-MCNC: 0.4 MG/DL (ref 0–1.2)
BUN SERPL-MCNC: 11 MG/DL (ref 6–20)
BUN/CREAT SERPL: 18 (ref 7–25)
CALCIUM SPEC-SCNC: 9.8 MG/DL (ref 8.6–10.5)
CHLORIDE SERPL-SCNC: 101 MMOL/L (ref 98–107)
CO2 SERPL-SCNC: 27 MMOL/L (ref 22–29)
CREAT SERPL-MCNC: 0.61 MG/DL (ref 0.57–1)
DEPRECATED RDW RBC AUTO: 48.9 FL (ref 37–54)
EGFRCR SERPLBLD CKD-EPI 2021: 104.4 ML/MIN/1.73
EOSINOPHIL # BLD AUTO: 0.23 10*3/MM3 (ref 0–0.4)
EOSINOPHIL NFR BLD AUTO: 1.5 % (ref 0.3–6.2)
ERYTHROCYTE [DISTWIDTH] IN BLOOD BY AUTOMATED COUNT: 14.7 % (ref 12.3–15.4)
ERYTHROCYTE [SEDIMENTATION RATE] IN BLOOD: 28 MM/HR (ref 0–30)
GLOBULIN UR ELPH-MCNC: 3.4 GM/DL
GLUCOSE SERPL-MCNC: 81 MG/DL (ref 65–99)
HCT VFR BLD AUTO: 53.2 % (ref 34–46.6)
HGB BLD-MCNC: 17.9 G/DL (ref 12–15.9)
LDH SERPL-CCNC: 185 U/L (ref 135–214)
LYMPHOCYTES # BLD AUTO: 4.53 10*3/MM3 (ref 0.7–3.1)
LYMPHOCYTES NFR BLD AUTO: 30.5 % (ref 19.6–45.3)
MCH RBC QN AUTO: 31.2 PG (ref 26.6–33)
MCHC RBC AUTO-ENTMCNC: 33.6 G/DL (ref 31.5–35.7)
MCV RBC AUTO: 92.8 FL (ref 79–97)
MONOCYTES # BLD AUTO: 0.71 10*3/MM3 (ref 0.1–0.9)
MONOCYTES NFR BLD AUTO: 4.8 % (ref 5–12)
NEUTROPHILS NFR BLD AUTO: 62.7 % (ref 42.7–76)
NEUTROPHILS NFR BLD AUTO: 9.32 10*3/MM3 (ref 1.7–7)
PATHOLOGY REVIEW: YES
PLATELET # BLD AUTO: 222 10*3/MM3 (ref 140–450)
PMV BLD AUTO: 10.3 FL (ref 6–12)
POTASSIUM SERPL-SCNC: 4.3 MMOL/L (ref 3.5–5.2)
PROT SERPL-MCNC: 7.8 G/DL (ref 6–8.5)
RBC # BLD AUTO: 5.73 10*6/MM3 (ref 3.77–5.28)
SODIUM SERPL-SCNC: 141 MMOL/L (ref 136–145)
URATE SERPL-MCNC: 5.5 MG/DL (ref 2.4–5.7)
WBC NRBC COR # BLD: 14.86 10*3/MM3 (ref 3.4–10.8)

## 2022-05-20 PROCEDURE — 99204 OFFICE O/P NEW MOD 45 MIN: CPT | Performed by: INTERNAL MEDICINE

## 2022-05-20 PROCEDURE — 85652 RBC SED RATE AUTOMATED: CPT | Performed by: INTERNAL MEDICINE

## 2022-05-20 PROCEDURE — 83615 LACTATE (LD) (LDH) ENZYME: CPT | Performed by: INTERNAL MEDICINE

## 2022-05-20 PROCEDURE — 36415 COLL VENOUS BLD VENIPUNCTURE: CPT | Performed by: INTERNAL MEDICINE

## 2022-05-20 PROCEDURE — 84550 ASSAY OF BLOOD/URIC ACID: CPT | Performed by: INTERNAL MEDICINE

## 2022-05-20 PROCEDURE — 80053 COMPREHEN METABOLIC PANEL: CPT | Performed by: INTERNAL MEDICINE

## 2022-05-20 PROCEDURE — 85025 COMPLETE CBC W/AUTO DIFF WBC: CPT

## 2022-05-20 NOTE — PROGRESS NOTES
Hematology/Oncology Outpatient Consultation    Patient name: Rahel Ramsey  : 1965  MRN: [id number]  Primary Care Physician: Vickie Gomez APRN  Referring Physician: Vickie Gomez APRN  Reason For Consult:     Chief Complaint   Patient presents with   • Appointment     Leukocytosis, unspecified type       History of Present Illness:    This is a 53-year-old female who has been noted to have leukocytosis   from routine blood count.  Patient was also recently diagnosed with skin cancer on the left lower   extremity.  She underwent wire excisional biopsy of the lesion.  Her course was complicated by   wound infection.  She has been on two rounds of antibiotics and currently on Keflex which will be   finished in another three to four days.  She denies any fevers or chills, rigors.  Patient had no   prior history of any hematologic problems.  Review of her CBC from 17 revealed WBC of 12.4,   hemoglobin 15.6, platelet count 271,000.  Differentials were 65% neutrophils, 28% lymphocytes.    There was no basophilia, eosinophilia or monocytosis.  Her chemistry panel had BUN 7, creatinine   0.7.  Alkaline phosphatase was 11.  The rest was unremarkable.  Patient had a CBC repeated on   17 with WBC of 12.6, hemoglobin 15.4, platelet count of 310,000.  Differentials were   unremarkable.  B12 (N) 441.  Folate (N) 13.9.  BUN 5, creatinine 0.7.  Normal thyroid at 0.9   [range 0.3-5.5].  Patient denied any repeated history of infection in the past.  She was alone   for this appointment on 17.  · 17 - Patient had labs done to further evaluate her leukocytosis.  Her WBC was 14.4,   hemoglobin 16.6, platelet count 312,000, ANC 9.3.  Differentials were 64% neutrophils, 28%   lymphocytes.  CHRISTOPHE was negative.  B12 was 337.  LDH (N) 159.  BUN 10, creatinine 0.7.  Uric acid   5.3 (N).  MIRTA-2 was not mutated.  No evidence of BCR-abl gene rearrangement.  Sed rate 32.    Peripheral smear showed absolute  neutrophilia and leukocytosis and polycythemia, reactive vs.   myeloproliferative condition.  · 5/9/17 - Erythropoietin level was low-normal at 3.92.    · 8/17/17 - Ultrasound of the abdomen showed normal spleen size at 11.3 cm.  There was no splenic   lesion identified.    · 4/2/18 - Erythropoietin level 3.79 [range 2.59-18.5].   · 8/24/18 - Bone marrow biopsy showed normocellular marrow with no morphologic evidence of   myeloproliferative neoplasm, but she was found to have low level MRITA-2 point mutation detected at   less than 10%.  The low level of MIRTA-2 mutation in assessment of leukocytosis and erythrocytosis   is worrisome for early and evolving myeloproliferative neoplasm.  Interval repeat marrow has been   recommended.  BCR-abl RT-PCR was negative.  Flow cytometry was negative.  Cellularity was 50%.    Iron stain was present.  Cytogenetics also showed normal female karyotype.    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  MIRTA-2 analysis was not detected on the   current bone marrow.  Close follow up has been recommended.  Flowy cytometry was negative.    Histopathology had normal maturation sequence.  There were normal megakaryocytes.  Erythroid   maturation was complete.  Myeloid maturation was also complete.  Negative iron stain on the   marrow aspirate.  Recommendation is for close surveillance.  Absent iron stores on bone marrow.          Smoker 1/2 ppd, does not drink alcohol    See pulmonologist for lung nodule    Family hx of breast cancer with breast cancer age 69  Father with lung cancer      · Patient has been lost to follow-up since 2019.    Past Medical History:   Diagnosis Date   • Allergic    • Arthritis    • Atrial fibrillation (HCC)    • COPD (chronic obstructive pulmonary disease) (HCC)    • Headache    • History of herniated intervertebral disc    • History of skin cancer     Left  lower extremity   • Hyperlipidemia    • Hypertension    • Injury of back    • Leukocytosis    • Lung nodule    • Urinary tract infection with hematuria 1/22/2021       Past Surgical History:   Procedure Laterality Date   • BACK SURGERY     • DILATATION AND CURETTAGE     • LUMBAR DECOMPRESSION      L4-L5    • SKIN CANCER EXCISION Left     [place]   • SUBTOTAL HYSTERECTOMY           Current Outpatient Medications:   •  albuterol sulfate  (90 Base) MCG/ACT inhaler, INHALE 2 PUFFS BY MOUTH EVERY 4 HOURS AS NEEDED, Disp: 18 g, Rfl: 2  •  amitriptyline (ELAVIL) 25 MG tablet, Take 12.5 mg by mouth every night at bedtime., Disp: , Rfl: 3  •  aspirin (aspirin) 81 MG EC tablet, 81 mg., Disp: , Rfl:   •  Cyanocobalamin (VITAMIN B 12 PO), Take  by mouth., Disp: , Rfl:   •  fenofibrate (TRICOR) 145 MG tablet, Take 1 tablet by mouth Daily., Disp: 90 tablet, Rfl: 1  •  fluticasone (FLONASE) 50 MCG/ACT nasal spray, INSTILL 2 SPRAYS IN EACH NOSTRIL EVERY DAY, Disp: 48 g, Rfl: 3  •  HYDROcodone-acetaminophen (NORCO)  MG per tablet, Take 1 tablet by mouth Every 6 (Six) Hours As Needed., Disp: , Rfl: 0  •  Lyrica 200 MG capsule, Take 1 capsule by mouth 2 (Two) Times a Day., Disp: 180 capsule, Rfl: 1  •  metoprolol succinate XL (TOPROL-XL) 25 MG 24 hr tablet, Take 1 tablet by mouth Daily., Disp: 90 tablet, Rfl: 1  •  nystatin (MYCOSTATIN) 273114 UNIT/GM cream, , Disp: , Rfl:   •  terbinafine (lamiSIL) 250 MG tablet, Take 1 tablet by mouth Daily., Disp: 90 tablet, Rfl: 1  •  umeclidinium-vilanterol (ANORO ELLIPTA) 62.5-25 MCG/INH aerosol powder  inhaler, Inhale 1 puff Daily., Disp: , Rfl:   •  vitamin D (ERGOCALCIFEROL) 1.25 MG (92450 UT) capsule capsule, Take 1 capsule by mouth Every 7 (Seven) Days., Disp: 12 capsule, Rfl: 1  •  Zeasorb-AF 2 % powder, APPLY TO AFFECTED AREA DAILY, Disp: , Rfl:     Allergies   Allergen Reactions   • Morphine Anaphylaxis and Nausea And Vomiting       Immunization History   Administered Date(s)  "Administered   • COVID-19 (AMELIA) 03/12/2021   • Flu Vaccine Quad PF >36MO 09/17/2018   • FluLaval/Fluarix/Fluzone >6 09/03/2020   • Influenza, Unspecified 09/27/2016, 10/12/2017   • Pneumococcal Polysaccharide (PPSV23) 09/21/2021   • Tdap 05/07/2020   • flucelvax quad pfs =>4 YRS 10/03/2019       Family History   Problem Relation Age of Onset   • Breast cancer Mother    • Lung cancer Father    • Lung cancer Other    • Colon cancer Other    • Skin cancer Other        Cancer-related family history includes Breast cancer in her mother; Colon cancer in an other family member; Lung cancer in her father and another family member; Skin cancer in an other family member.    Social History     Tobacco Use   • Smoking status: Current Every Day Smoker     Packs/day: 1.00     Years: 40.00     Pack years: 40.00   • Smokeless tobacco: Never Used   Vaping Use   • Vaping Use: Never used   Substance Use Topics   • Alcohol use: Yes     Comment: rare   • Drug use: No       ROS:    Review of Systems   Constitutional: Positive for fatigue.   Neurological: Positive for weakness.   Chronic back pain due to disc disease    Objective:    Vitals:    05/20/22 1041   BP: 115/73   Pulse: 75   Resp: 18   Temp: 96.6 °F (35.9 °C)   SpO2: 95%   Weight: 87.7 kg (193 lb 6.4 oz)   Height: 175.3 cm (69\")   PainSc:   6   PainLoc: Back     Body mass index is 28.56 kg/m².    ECOG  (1) Restricted in physically strenuous activity, ambulatory and able to do work of light nature    Physical Exam:  Physical Exam  Vitals and nursing note reviewed.   Constitutional:       General: She is not in acute distress.     Appearance: She is not diaphoretic.   HENT:      Head: Normocephalic and atraumatic.   Eyes:      General: No scleral icterus.        Right eye: No discharge.         Left eye: No discharge.      Conjunctiva/sclera: Conjunctivae normal.   Neck:      Thyroid: No thyromegaly.   Cardiovascular:      Rate and Rhythm: Normal rate and regular rhythm.      " Heart sounds: Normal heart sounds.     No friction rub. No gallop.   Pulmonary:      Effort: Pulmonary effort is normal. No respiratory distress.      Breath sounds: No stridor. No wheezing.   Abdominal:      General: Bowel sounds are normal.      Palpations: Abdomen is soft. There is no mass.      Tenderness: There is no abdominal tenderness. There is no guarding or rebound.   Musculoskeletal:         General: No tenderness. Normal range of motion.      Cervical back: Normal range of motion and neck supple.   Lymphadenopathy:      Cervical: No cervical adenopathy.   Skin:     General: Skin is warm.      Findings: No erythema or rash.   Neurological:      Mental Status: She is alert and oriented to person, place, and time.      Motor: No abnormal muscle tone.   Psychiatric:         Behavior: Behavior normal.     I have reexamined the patient and the results are consistent with the previously documented exam. Cheri Prabhakar MD     RECENT LABS  WBC   Date Value Ref Range Status   05/20/2022 14.86 (H) 3.40 - 10.80 10*3/mm3 Final     RBC   Date Value Ref Range Status   05/20/2022 5.73 (H) 3.77 - 5.28 10*6/mm3 Final     Hemoglobin   Date Value Ref Range Status   05/20/2022 17.9 (H) 12.0 - 15.9 g/dL Final     Hematocrit   Date Value Ref Range Status   05/20/2022 53.2 (H) 34.0 - 46.6 % Final     MCV   Date Value Ref Range Status   05/20/2022 92.8 79.0 - 97.0 fL Final     MCH   Date Value Ref Range Status   05/20/2022 31.2 26.6 - 33.0 pg Final     MCHC   Date Value Ref Range Status   05/20/2022 33.6 31.5 - 35.7 g/dL Final     RDW   Date Value Ref Range Status   05/20/2022 14.7 12.3 - 15.4 % Final     RDW-SD   Date Value Ref Range Status   05/20/2022 48.9 37.0 - 54.0 fl Final     MPV   Date Value Ref Range Status   05/20/2022 10.3 6.0 - 12.0 fL Final     Platelets   Date Value Ref Range Status   05/20/2022 222 140 - 450 10*3/mm3 Final     Neutrophil %   Date Value Ref Range Status   05/20/2022 62.7 42.7 - 76.0 %  Final     Lymphocyte %   Date Value Ref Range Status   05/20/2022 30.5 19.6 - 45.3 % Final     Monocyte %   Date Value Ref Range Status   05/20/2022 4.8 (L) 5.0 - 12.0 % Final     Eosinophil %   Date Value Ref Range Status   05/20/2022 1.5 0.3 - 6.2 % Final     Basophil %   Date Value Ref Range Status   05/20/2022 0.5 0.0 - 1.5 % Final     Neutrophils, Absolute   Date Value Ref Range Status   05/20/2022 9.32 (H) 1.70 - 7.00 10*3/mm3 Final     Lymphocytes, Absolute   Date Value Ref Range Status   05/20/2022 4.53 (H) 0.70 - 3.10 10*3/mm3 Final     Monocytes, Absolute   Date Value Ref Range Status   05/20/2022 0.71 0.10 - 0.90 10*3/mm3 Final     Eosinophils, Absolute   Date Value Ref Range Status   05/20/2022 0.23 0.00 - 0.40 10*3/mm3 Final     Basophils, Absolute   Date Value Ref Range Status   05/20/2022 0.07 0.00 - 0.20 10*3/mm3 Final     nRBC   Date Value Ref Range Status   01/03/2020 0.1 0.0 - 0.2 /100 WBC Final       Lab Results   Component Value Date    GLUCOSE 95 04/29/2022    BUN 8 04/29/2022    CREATININE 0.71 04/29/2022    EGFRIFNONA 99 12/14/2021    BCR 11.3 04/29/2022    K 4.3 04/29/2022    CO2 24.0 04/29/2022    CALCIUM 9.1 04/29/2022    ALBUMIN 4.30 04/29/2022    LABIL2 1.1 12/04/2018    AST 23 04/29/2022    ALT 27 04/29/2022         Assessment & Plan   Leukocytosis, unspecified type  - CBC & Differential      · Leukocytosis and polycythemia.  Rule out myeloproliferative disease.  She has a prior history of this in the  past  · Possible evolving myeloproliferative neoplasm with low level MIRTA-2 positive at less than 10%.    BCR-abl negative.  Low normal erythropoietin level.  Status post bone marrow aspiration and   biopsy 8/24/18.  Repeat bone marrow aspiration and biopsy 2/22/19 showed translocation [2q/11q]   has been associated with myeloid neoplasm, but no morphologic evidence seen on histopathology  · Tobacco use disorder    Plans    · Work-up for thrombocytosis and polycythemia today  · Discussed  tobacco cessation  · Peripheral smear  · Schedule ultrasound of the spleen to assess for splenomegaly  · Follow-up 4 weeks or earlier as needed    I counselled Rahel of the risks of continuing to use tobacco and cessation.    During this visit, I spent 3-10 minutes counseling the patient regarding tobacco cessation.     Patient verbalized understanding and is in agreement of the above plan.          I spent 45 total minutes, face-to-face, caring for [place] today.  80% of this time involved counseling and/or coordination of care as documented within this note.

## 2022-05-21 LAB
ANA SER QL: POSITIVE
DSDNA AB SER-ACNC: 13 IU/ML (ref 0–9)
Lab: ABNORMAL
VIT B12 SERPL-MCNC: 621 PG/ML (ref 232–1245)

## 2022-05-22 DIAGNOSIS — E78.2 MIXED HYPERLIPIDEMIA: ICD-10-CM

## 2022-05-23 LAB
EPO SERPL-ACNC: 5.6 MIU/ML (ref 2.6–18.5)
LAB AP CASE REPORT: NORMAL
PATH REPORT.FINAL DX SPEC: NORMAL

## 2022-05-24 RX ORDER — FENOFIBRATE 145 MG/1
145 TABLET, COATED ORAL DAILY
Qty: 90 TABLET | Refills: 1 | Status: SHIPPED | OUTPATIENT
Start: 2022-05-24 | End: 2022-09-29 | Stop reason: SDUPTHER

## 2022-05-25 LAB
CELLS ANALYZED: 200
CELLS COUNTED: 200
CHROM ANALY RESULT (ISCN): NORMAL
CLINICAL CYTOGENETICIST SPEC: NORMAL
DIAGNOSTIC IMP SPEC-IMP: NORMAL
SPECIMEN SOURCE: NORMAL

## 2022-05-31 LAB
JAK2 P.V617F BLD/T QL: NORMAL
LAB DIRECTOR NAME PROVIDER: NORMAL
LABORATORY COMMENT REPORT: NORMAL

## 2022-06-01 ENCOUNTER — HOSPITAL ENCOUNTER (OUTPATIENT)
Dept: ULTRASOUND IMAGING | Facility: HOSPITAL | Age: 57
Discharge: HOME | End: 2022-06-01
Admitting: INTERNAL MEDICINE

## 2022-06-01 DIAGNOSIS — D72.829 LEUKOCYTOSIS, UNSPECIFIED TYPE: ICD-10-CM

## 2022-06-01 PROCEDURE — 76705 ECHO EXAM OF ABDOMEN: CPT

## 2022-06-03 ENCOUNTER — OFFICE VISIT (OUTPATIENT)
Dept: FAMILY MEDICINE CLINIC | Facility: CLINIC | Age: 57
End: 2022-06-03

## 2022-06-03 VITALS
HEART RATE: 88 BPM | HEIGHT: 69 IN | BODY MASS INDEX: 29.77 KG/M2 | OXYGEN SATURATION: 97 % | DIASTOLIC BLOOD PRESSURE: 80 MMHG | WEIGHT: 201 LBS | SYSTOLIC BLOOD PRESSURE: 140 MMHG

## 2022-06-03 DIAGNOSIS — R82.90 ABNORMAL URINALYSIS: ICD-10-CM

## 2022-06-03 DIAGNOSIS — R39.9 UTI SYMPTOMS: Primary | ICD-10-CM

## 2022-06-03 LAB
BILIRUB BLD-MCNC: NEGATIVE MG/DL
CLARITY, POC: CLEAR
COLOR UR: YELLOW
GLUCOSE UR STRIP-MCNC: NEGATIVE MG/DL
KETONES UR QL: NEGATIVE
LEUKOCYTE EST, POC: ABNORMAL
NITRITE UR-MCNC: NEGATIVE MG/ML
PH UR: 7.5 [PH] (ref 5–8)
PROT UR STRIP-MCNC: NEGATIVE MG/DL
RBC # UR STRIP: ABNORMAL /UL
SP GR UR: 1.01 (ref 1–1.03)
UROBILINOGEN UR QL: NORMAL

## 2022-06-03 PROCEDURE — 87088 URINE BACTERIA CULTURE: CPT | Performed by: NURSE PRACTITIONER

## 2022-06-03 PROCEDURE — 81002 URINALYSIS NONAUTO W/O SCOPE: CPT | Performed by: NURSE PRACTITIONER

## 2022-06-03 PROCEDURE — 87086 URINE CULTURE/COLONY COUNT: CPT | Performed by: NURSE PRACTITIONER

## 2022-06-03 PROCEDURE — 99213 OFFICE O/P EST LOW 20 MIN: CPT | Performed by: NURSE PRACTITIONER

## 2022-06-03 PROCEDURE — 87186 SC STD MICRODIL/AGAR DIL: CPT | Performed by: NURSE PRACTITIONER

## 2022-06-03 RX ORDER — CIPROFLOXACIN 500 MG/1
500 TABLET, FILM COATED ORAL 2 TIMES DAILY
Qty: 14 TABLET | Refills: 0 | Status: SHIPPED | OUTPATIENT
Start: 2022-06-03 | End: 2022-08-16

## 2022-06-03 RX ORDER — PHENAZOPYRIDINE HYDROCHLORIDE 100 MG/1
100 TABLET, FILM COATED ORAL 3 TIMES DAILY PRN
Qty: 30 TABLET | Refills: 0 | Status: SHIPPED | OUTPATIENT
Start: 2022-06-03 | End: 2022-08-31

## 2022-06-03 NOTE — PROGRESS NOTES
"Chief Complaint  Dysuria (Pain and burning with urination) and Back Pain (With bladder  pain)    Subjective    {Problem List  Visit Diagnosis   Encounters  Notes  Medications  Labs  Result Review Imaging  Media :23}    Rahel Ramsey presents to [facility]  History of Present Illness    Patient presents with lower abdominal pain, burning with urination, and urinary urgency. She denies fever, flank pain or nausea.  Symptoms reportedly started yesterday.    Objective   Vital Signs:  /80 (BP Location: Left arm, Patient Position: Sitting, Cuff Size: Adult)   Pulse 88   Ht 175.3 cm (69.02\")   Wt 91.2 kg (201 lb)   SpO2 97%   BMI 29.67 kg/m²           Physical Exam  Constitutional:       Appearance: Normal appearance.   HENT:      Head: Normocephalic.   Cardiovascular:      Rate and Rhythm: Normal rate and regular rhythm.   Pulmonary:      Effort: Pulmonary effort is normal.      Breath sounds: Normal breath sounds.   Abdominal:      General: Abdomen is flat. Bowel sounds are normal.      Palpations: Abdomen is soft.      Tenderness: There is no right CVA tenderness or left CVA tenderness.   Musculoskeletal:         General: Normal range of motion.      Cervical back: Neck supple.      Right lower leg: No edema.      Left lower leg: No edema.   Skin:     General: Skin is warm and dry.   Neurological:      Mental Status: She is alert and oriented to person, place, and time.      Gait: Gait is intact.   Psychiatric:         Attention and Perception: Attention normal.         Mood and Affect: Mood normal.         Speech: Speech normal.        Result Review :                Assessment and Plan   Diagnoses and all orders for this visit:    1. UTI symptoms (Primary)  Assessment & Plan:  1.  Urine dip is + for blood and trace leukocytes  2.  Send for culture, will notify patient of results  3.  Start Cipro 500 mg twice daily x7 days  4.  Pyridium 3 times daily as needed for " urinary symptoms  5.  Call if symptoms persist or worsen      Other orders  -     ciprofloxacin (Cipro) 500 MG tablet; Take 1 tablet by mouth 2 (Two) Times a Day.  Dispense: 14 tablet; Refill: 0  -     phenazopyridine (Pyridium) 100 MG tablet; Take 1 tablet by mouth 3 (Three) Times a Day As Needed for Bladder Spasms.  Dispense: 30 tablet; Refill: 0         I spent 20 minutes caring for [place] on this date of service. This time includes time spent by me in the following activities:preparing for the visit, reviewing tests, obtaining and/or reviewing a separately obtained history, performing a medically appropriate examination and/or evaluation , counseling and educating the patient/family/caregiver, ordering medications, tests, or procedures, documenting information in the medical record and care coordination  Follow Up   Return if symptoms worsen or fail to improve.  Patient was given instructions and counseling regarding her condition or for health maintenance advice. Please see specific information pulled into the AVS if appropriate.

## 2022-06-03 NOTE — ASSESSMENT & PLAN NOTE
1.  Urine dip is + for blood and trace leukocytes  2.  Send for culture, will notify patient of results  3.  Start Cipro 500 mg twice daily x7 days  4.  Pyridium 3 times daily as needed for urinary symptoms  5.  Call if symptoms persist or worsen

## 2022-06-05 LAB — BACTERIA SPEC AEROBE CULT: ABNORMAL

## 2022-06-27 ENCOUNTER — TELEPHONE (OUTPATIENT)
Dept: ONCOLOGY | Facility: CLINIC | Age: 57
End: 2022-06-27

## 2022-06-27 NOTE — TELEPHONE ENCOUNTER
SPOKE WITH PATIENT REGARDING 6-28-22 MD F/U APPT.  INFORMED PATIENT DR. BLAIR HAD MEDICAL EMERGENCY AND WE NEEDED TO RESCHEDULE THIS APPT.  APPT RESCHEDULED.  PATIENT V/U ON DATE AND TIME.

## 2022-06-28 ENCOUNTER — APPOINTMENT (OUTPATIENT)
Dept: LAB | Facility: HOSPITAL | Age: 57
End: 2022-06-28

## 2022-06-28 ENCOUNTER — OFFICE VISIT (OUTPATIENT)
Dept: ONCOLOGY | Facility: CLINIC | Age: 57
End: 2022-06-28

## 2022-06-28 ENCOUNTER — LAB (OUTPATIENT)
Dept: LAB | Facility: HOSPITAL | Age: 57
End: 2022-06-28

## 2022-06-28 VITALS
BODY MASS INDEX: 29.47 KG/M2 | TEMPERATURE: 97.1 F | DIASTOLIC BLOOD PRESSURE: 69 MMHG | OXYGEN SATURATION: 96 % | SYSTOLIC BLOOD PRESSURE: 104 MMHG | WEIGHT: 199 LBS | HEART RATE: 83 BPM | HEIGHT: 69 IN

## 2022-06-28 DIAGNOSIS — M32.9 SYSTEMIC LUPUS ERYTHEMATOSUS, UNSPECIFIED SLE TYPE, UNSPECIFIED ORGAN INVOLVEMENT STATUS: Primary | ICD-10-CM

## 2022-06-28 DIAGNOSIS — R53.83 FATIGUE, UNSPECIFIED TYPE: ICD-10-CM

## 2022-06-28 DIAGNOSIS — D72.829 LEUKOCYTOSIS, UNSPECIFIED TYPE: ICD-10-CM

## 2022-06-28 DIAGNOSIS — D72.829 LEUKOCYTOSIS, UNSPECIFIED TYPE: Primary | ICD-10-CM

## 2022-06-28 LAB
BASOPHILS # BLD AUTO: 0.05 10*3/MM3 (ref 0–0.2)
BASOPHILS NFR BLD AUTO: 0.5 % (ref 0–1.5)
DEPRECATED RDW RBC AUTO: 49.1 FL (ref 37–54)
EOSINOPHIL # BLD AUTO: 0.16 10*3/MM3 (ref 0–0.4)
EOSINOPHIL NFR BLD AUTO: 1.6 % (ref 0.3–6.2)
ERYTHROCYTE [DISTWIDTH] IN BLOOD BY AUTOMATED COUNT: 14.5 % (ref 12.3–15.4)
HCT VFR BLD AUTO: 47.9 % (ref 34–46.6)
HGB BLD-MCNC: 15.8 G/DL (ref 12–15.9)
HOLD SPECIMEN: NORMAL
HOLD SPECIMEN: NORMAL
LYMPHOCYTES # BLD AUTO: 3.52 10*3/MM3 (ref 0.7–3.1)
LYMPHOCYTES NFR BLD AUTO: 34.7 % (ref 19.6–45.3)
MCH RBC QN AUTO: 31.3 PG (ref 26.6–33)
MCHC RBC AUTO-ENTMCNC: 33 G/DL (ref 31.5–35.7)
MCV RBC AUTO: 94.9 FL (ref 79–97)
MONOCYTES # BLD AUTO: 0.5 10*3/MM3 (ref 0.1–0.9)
MONOCYTES NFR BLD AUTO: 4.9 % (ref 5–12)
NEUTROPHILS NFR BLD AUTO: 5.91 10*3/MM3 (ref 1.7–7)
NEUTROPHILS NFR BLD AUTO: 58.3 % (ref 42.7–76)
PLATELET # BLD AUTO: 226 10*3/MM3 (ref 140–450)
PMV BLD AUTO: 9.3 FL (ref 6–12)
RBC # BLD AUTO: 5.05 10*6/MM3 (ref 3.77–5.28)
WBC NRBC COR # BLD: 10.14 10*3/MM3 (ref 3.4–10.8)
WHOLE BLOOD HOLD SPECIMEN: NORMAL
WHOLE BLOOD HOLD SPECIMEN: NORMAL

## 2022-06-28 PROCEDURE — 85025 COMPLETE CBC W/AUTO DIFF WBC: CPT

## 2022-06-28 PROCEDURE — 99214 OFFICE O/P EST MOD 30 MIN: CPT | Performed by: NURSE PRACTITIONER

## 2022-06-28 PROCEDURE — 36415 COLL VENOUS BLD VENIPUNCTURE: CPT

## 2022-06-28 NOTE — PROGRESS NOTES
Hematology/Oncology Outpatient Consultation    Patient name: Rahel Ramsey  : 1965  MRN: [id number]  Primary Care Physician: Vickie Gomez APRN  Referring Physician: Vickie Gomez APRN  Reason For Consult:     Chief Complaint   Patient presents with   • Follow-up     Leukocytosis and polycythemia   • Systemic lupus erythematous       History of Present Illness:    This is a 53-year-old female who has been noted to have leukocytosis   from routine blood count.  Patient was also recently diagnosed with skin cancer on the left lower   extremity.  She underwent wire excisional biopsy of the lesion.  Her course was complicated by   wound infection.  She has been on two rounds of antibiotics and currently on Keflex which will be   finished in another three to four days.  She denies any fevers or chills, rigors.  Patient had no   prior history of any hematologic problems.  Review of her CBC from 17 revealed WBC of 12.4,   hemoglobin 15.6, platelet count 271,000.  Differentials were 65% neutrophils, 28% lymphocytes.    There was no basophilia, eosinophilia or monocytosis.  Her chemistry panel had BUN 7, creatinine   0.7.  Alkaline phosphatase was 11.  The rest was unremarkable.  Patient had a CBC repeated on   17 with WBC of 12.6, hemoglobin 15.4, platelet count of 310,000.  Differentials were   unremarkable.  B12 (N) 441.  Folate (N) 13.9.  BUN 5, creatinine 0.7.  Normal thyroid at 0.9   [range 0.3-5.5].  Patient denied any repeated history of infection in the past.  She was alone   for this appointment on 17.  · 17 - Patient had labs done to further evaluate her leukocytosis.  Her WBC was 14.4,   hemoglobin 16.6, platelet count 312,000, ANC 9.3.  Differentials were 64% neutrophils, 28%   lymphocytes.  CHRISTOPHE was negative.  B12 was 337.  LDH (N) 159.  BUN 10, creatinine 0.7.  Uric acid   5.3 (N).  MIRTA-2 was not mutated.  No evidence of BCR-abl gene rearrangement.  Sed rate 32.     Peripheral smear showed absolute neutrophilia and leukocytosis and polycythemia, reactive vs.   myeloproliferative condition.  · 5/9/17 - Erythropoietin level was low-normal at 3.92.    · 8/17/17 - Ultrasound of the abdomen showed normal spleen size at 11.3 cm.  There was no splenic   lesion identified.    · 4/2/18 - Erythropoietin level 3.79 [range 2.59-18.5].   · 8/24/18 - Bone marrow biopsy showed normocellular marrow with no morphologic evidence of   myeloproliferative neoplasm, but she was found to have low level MIRTA-2 point mutation detected at   less than 10%.  The low level of MIRTA-2 mutation in assessment of leukocytosis and erythrocytosis   is worrisome for early and evolving myeloproliferative neoplasm.  Interval repeat marrow has been   recommended.  BCR-abl RT-PCR was negative.  Flow cytometry was negative.  Cellularity was 50%.    Iron stain was present.  Cytogenetics also showed normal female karyotype.    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  MIRTA-2 analysis was not detected on the   current bone marrow.  Close follow up has been recommended.  Flowy cytometry was negative.    Histopathology had normal maturation sequence.  There were normal megakaryocytes.  Erythroid   maturation was complete.  Myeloid maturation was also complete.  Negative iron stain on the   marrow aspirate.  Recommendation is for close surveillance.  Absent iron stores on bone marrow.      Smoker 1/2 ppd, does not drink alcohol    See pulmonologist for lung nodule    Family hx of breast cancer with breast cancer age 69  Father with lung cancer      · Patient has been lost to follow-up since 2019.    · 6/28/2022: Follow-up appointment leukocytosis, thrombocytosis, polycythemia, CHRISTOPHE positive for SLE, anti-DNA elevated at 13.  US spleen- normal size.    History of present illness was reviewed and is unchanged from the  previous visit. 06/28/22      Subjective  Patient presents today for follow-up appointment.  Patient denies chest pain, shortness of breath, lumps or bumps, excessive night sweats, fevers, recent infections.  Patient is positive for fatigue    Past Medical History:   Diagnosis Date   • Allergic    • Arthritis    • Atrial fibrillation (HCC)    • COPD (chronic obstructive pulmonary disease) (HCC)    • Headache    • History of herniated intervertebral disc    • History of skin cancer     Left lower extremity   • Hyperlipidemia    • Hypertension    • Injury of back    • Leukocytosis    • Lung nodule    • Urinary tract infection with hematuria 1/22/2021       Past Surgical History:   Procedure Laterality Date   • BACK SURGERY     • DILATATION AND CURETTAGE     • LUMBAR DECOMPRESSION      L4-L5    • SKIN CANCER EXCISION Left     [place]   • SUBTOTAL HYSTERECTOMY           Current Outpatient Medications:   •  albuterol sulfate  (90 Base) MCG/ACT inhaler, INHALE 2 PUFFS BY MOUTH EVERY 4 HOURS AS NEEDED, Disp: 18 g, Rfl: 2  •  amitriptyline (ELAVIL) 25 MG tablet, Take 12.5 mg by mouth every night at bedtime., Disp: , Rfl: 3  •  aspirin (aspirin) 81 MG EC tablet, 81 mg., Disp: , Rfl:   •  Cyanocobalamin (VITAMIN B 12 PO), Take  by mouth., Disp: , Rfl:   •  fenofibrate (TRICOR) 145 MG tablet, TAKE 1 TABLET BY MOUTH DAILY, Disp: 90 tablet, Rfl: 1  •  fluticasone (FLONASE) 50 MCG/ACT nasal spray, INSTILL 2 SPRAYS IN EACH NOSTRIL EVERY DAY, Disp: 48 g, Rfl: 3  •  HYDROcodone-acetaminophen (NORCO)  MG per tablet, Take 1 tablet by mouth Every 6 (Six) Hours As Needed., Disp: , Rfl: 0  •  Lyrica 200 MG capsule, Take 1 capsule by mouth 2 (Two) Times a Day., Disp: 180 capsule, Rfl: 1  •  metoprolol succinate XL (TOPROL-XL) 25 MG 24 hr tablet, Take 1 tablet by mouth Daily., Disp: 90 tablet, Rfl: 1  •  nystatin (MYCOSTATIN) 603128 UNIT/GM cream, , Disp: , Rfl:   •  phenazopyridine (Pyridium) 100 MG tablet, Take 1 tablet by  mouth 3 (Three) Times a Day As Needed for Bladder Spasms., Disp: 30 tablet, Rfl: 0  •  terbinafine (lamiSIL) 250 MG tablet, Take 1 tablet by mouth Daily., Disp: 90 tablet, Rfl: 1  •  umeclidinium-vilanterol (ANORO ELLIPTA) 62.5-25 MCG/INH aerosol powder  inhaler, Inhale 1 puff Daily., Disp: , Rfl:   •  vitamin D (ERGOCALCIFEROL) 1.25 MG (83868 UT) capsule capsule, Take 1 capsule by mouth Every 7 (Seven) Days., Disp: 12 capsule, Rfl: 1  •  Zeasorb-AF 2 % powder, APPLY TO AFFECTED AREA DAILY, Disp: , Rfl:   •  ciprofloxacin (Cipro) 500 MG tablet, Take 1 tablet by mouth 2 (Two) Times a Day., Disp: 14 tablet, Rfl: 0    Allergies   Allergen Reactions   • Morphine Anaphylaxis and Nausea And Vomiting       Immunization History   Administered Date(s) Administered   • COVID-19 ([employer]) 03/12/2021   • Flu Vaccine Quad PF >36MO 09/17/2018   • FluLaval/Fluarix/Fluzone >6 09/03/2020   • Influenza, Unspecified 09/27/2016, 10/12/2017   • Pneumococcal Polysaccharide (PPSV23) 09/21/2021   • Tdap 05/07/2020   • flucelvax quad pfs =>4 YRS 10/03/2019       Family History   Problem Relation Age of Onset   • Breast cancer Mother    • Lung cancer Father    • Lung cancer Other    • Colon cancer Other    • Skin cancer Other        Cancer-related family history includes Breast cancer in her mother; Colon cancer in an other family member; Lung cancer in her father and another family member; Skin cancer in an other family member.    Social History     Tobacco Use   • Smoking status: Current Every Day Smoker     Packs/day: 1.00     Years: 40.00     Pack years: 40.00   • Smokeless tobacco: Never Used   Vaping Use   • Vaping Use: Never used   Substance Use Topics   • Alcohol use: Yes     Comment: rare   • Drug use: No       ROS:    Review of Systems   Constitutional: Positive for fatigue. Negative for chills and fever.   HENT: Negative.    Eyes: Negative for visual disturbance.   Respiratory: Negative for shortness of breath.    Cardiovascular:  "Negative for chest pain and palpitations.   Gastrointestinal: Negative for abdominal pain.   Endocrine: Negative.    Genitourinary: Negative.    Skin: Negative for rash and wound.   Neurological: Positive for weakness.   Hematological: Negative for adenopathy.   Psychiatric/Behavioral: Negative for agitation and confusion.   Chronic back pain due to disc disease    Objective:    Vitals:    06/28/22 1154   BP: 104/69   Pulse: 83   Temp: 97.1 °F (36.2 °C)   SpO2: 96%   Weight: 90.3 kg (199 lb)   Height: 175.3 cm (69\")   PainSc: 0-No pain     Body mass index is 29.39 kg/m².    ECOG  (1) Restricted in physically strenuous activity, ambulatory and able to do work of light nature    Physical Exam:  Physical Exam  Vitals and nursing note reviewed.   Constitutional:       General: She is not in acute distress.     Appearance: She is not diaphoretic.   HENT:      Head: Normocephalic and atraumatic.   Eyes:      General: No scleral icterus.        Right eye: No discharge.         Left eye: No discharge.      Conjunctiva/sclera: Conjunctivae normal.   Neck:      Thyroid: No thyromegaly.   Cardiovascular:      Rate and Rhythm: Normal rate and regular rhythm.      Heart sounds: Normal heart sounds.     No friction rub. No gallop.   Pulmonary:      Effort: Pulmonary effort is normal. No respiratory distress.      Breath sounds: No stridor. No wheezing.   Abdominal:      General: Bowel sounds are normal.      Palpations: Abdomen is soft. There is no mass.      Tenderness: There is no abdominal tenderness. There is no guarding or rebound.   Musculoskeletal:         General: No tenderness. Normal range of motion.      Cervical back: Normal range of motion and neck supple.   Lymphadenopathy:      Cervical: No cervical adenopathy.   Skin:     General: Skin is warm.      Findings: No erythema or rash.   Neurological:      Mental Status: She is alert and oriented to person, place, and time.      Motor: No abnormal muscle tone. "   Psychiatric:         Behavior: Behavior normal.     I have reexamined the patient and the results are consistent with the previously documented exam. Leona Kulkarni, FELIX     RECENT LABS  WBC   Date Value Ref Range Status   06/28/2022 10.14 3.40 - 10.80 10*3/mm3 Final     RBC   Date Value Ref Range Status   06/28/2022 5.05 3.77 - 5.28 10*6/mm3 Final     Hemoglobin   Date Value Ref Range Status   06/28/2022 15.8 12.0 - 15.9 g/dL Final     Hematocrit   Date Value Ref Range Status   06/28/2022 47.9 (H) 34.0 - 46.6 % Final     MCV   Date Value Ref Range Status   06/28/2022 94.9 79.0 - 97.0 fL Final     MCH   Date Value Ref Range Status   06/28/2022 31.3 26.6 - 33.0 pg Final     MCHC   Date Value Ref Range Status   06/28/2022 33.0 31.5 - 35.7 g/dL Final     RDW   Date Value Ref Range Status   06/28/2022 14.5 12.3 - 15.4 % Final     RDW-SD   Date Value Ref Range Status   06/28/2022 49.1 37.0 - 54.0 fl Final     MPV   Date Value Ref Range Status   06/28/2022 9.3 6.0 - 12.0 fL Final     Platelets   Date Value Ref Range Status   06/28/2022 226 140 - 450 10*3/mm3 Final     Neutrophil %   Date Value Ref Range Status   06/28/2022 58.3 42.7 - 76.0 % Final     Lymphocyte %   Date Value Ref Range Status   06/28/2022 34.7 19.6 - 45.3 % Final     Monocyte %   Date Value Ref Range Status   06/28/2022 4.9 (L) 5.0 - 12.0 % Final     Eosinophil %   Date Value Ref Range Status   06/28/2022 1.6 0.3 - 6.2 % Final     Basophil %   Date Value Ref Range Status   06/28/2022 0.5 0.0 - 1.5 % Final     Neutrophils, Absolute   Date Value Ref Range Status   06/28/2022 5.91 1.70 - 7.00 10*3/mm3 Final     Lymphocytes, Absolute   Date Value Ref Range Status   06/28/2022 3.52 (H) 0.70 - 3.10 10*3/mm3 Final     Monocytes, Absolute   Date Value Ref Range Status   06/28/2022 0.50 0.10 - 0.90 10*3/mm3 Final     Eosinophils, Absolute   Date Value Ref Range Status   06/28/2022 0.16 0.00 - 0.40 10*3/mm3 Final     Basophils, Absolute   Date Value Ref Range  Status   06/28/2022 0.05 0.00 - 0.20 10*3/mm3 Final     nRBC   Date Value Ref Range Status   01/03/2020 0.1 0.0 - 0.2 /100 WBC Final       Lab Results   Component Value Date    GLUCOSE 81 05/20/2022    BUN 11 05/20/2022    CREATININE 0.61 05/20/2022    EGFRIFNONA 99 12/14/2021    BCR 18.0 05/20/2022    K 4.3 05/20/2022    CO2 27.0 05/20/2022    CALCIUM 9.8 05/20/2022    ALBUMIN 4.40 05/20/2022    LABIL2 1.1 12/04/2018    AST 30 05/20/2022    ALT 30 05/20/2022         Assessment & Plan   Leukocytosis, unspecified type    Fatigue, unspecified type    Systemic lupus erythematosus, unspecified SLE type, unspecified organ involvement status (HCC)  - Ambulatory Referral to Rheumatology      · Leukocytosis and polycythemia.  Rule out myeloproliferative disease.  She has a prior history of this in the past.  Ultrasound of spleen- normal size.  Blood counts improved with WBC of 10.14, Hgb 15.8, platelets 226-improvement in counts.  JAK2 [id number] negative.  No BCR ABL arrangement.  Peripheral smear shows leukocytosis, polycythemia, no blasts.     · Possible evolving myeloproliferative neoplasm with low level MIRTA-2 positive at less than 10%. BCR-abl negative.  Low normal erythropoietin level.  Status post bone marrow aspiration and         biopsy 8/24/18.  Repeat bone marrow aspiration and biopsy 2/22/19 showed translocation [2q/11q] has been associated with myeloid neoplasm, but no morphologic evidence seen on         histopathology    · Tobacco use disorder: continues to smoke, trying to decrease.    · Fatigue: continues to have fatigue    · SLE: CHRISTOPHE positive, anti-DNA elevated at 13.  Discussed implications of SLE as autoimmune disease that can attack organs, cause joint pain, skin rashes, alterations in blood counts.  Discussed as mild at this time.  Discussed good kidney function with CR of 0.61, no rash, no recent infections.  Referred to rheumatology    Plans    · Reiterated tobacco cessation  · RTC Dr Prabhakar in 2 weeks  with CBC  · Rheumatologist referral for SLE        I spent 30 minutes in physical exam, diagnostic evaluation, review of chart, review of all labs with patient, review of ultrasound of spleen with patient, review of peripheral smear with patient, review of BCR ABL arrangement and JAK2 with patient, discussion of SLE and assessment of SLE symptoms, discussion of need to see rheumatology for SLE, ordering, and documentation      Electronically signed by FELIX Gordon, 06/28/22, 8:13 PM EDT.  Dictated using Dragon software

## 2022-07-08 ENCOUNTER — TELEPHONE (OUTPATIENT)
Dept: ONCOLOGY | Facility: CLINIC | Age: 57
End: 2022-07-08

## 2022-08-02 NOTE — PROGRESS NOTES
Hematology/Oncology Outpatient Consultation    Patient name: Rahel Ramsey  : 1965  MRN: [id number]  Primary Care Physician: Vickie Gomez APRN  Referring Physician: Vickie Gomez APRN  Reason For Consult:     Chief Complaint   Patient presents with   • Follow-up     Systemic lupus erythematosus, unspecified SLE type, unspecified organ involvement status (HCC)       History of Present Illness:    This is a 53-year-old female who has been noted to have leukocytosis   from routine blood count.  Patient was also recently diagnosed with skin cancer on the left lower   extremity.  She underwent wire excisional biopsy of the lesion.  Her course was complicated by   wound infection.  She has been on two rounds of antibiotics and currently on Keflex which will be   finished in another three to four days.  She denies any fevers or chills, rigors.  Patient had no   prior history of any hematologic problems.  Review of her CBC from 17 revealed WBC of 12.4,   hemoglobin 15.6, platelet count 271,000.  Differentials were 65% neutrophils, 28% lymphocytes.    There was no basophilia, eosinophilia or monocytosis.  Her chemistry panel had BUN 7, creatinine   0.7.  Alkaline phosphatase was 11.  The rest was unremarkable.  Patient had a CBC repeated on   17 with WBC of 12.6, hemoglobin 15.4, platelet count of 310,000.  Differentials were   unremarkable.  B12 (N) 441.  Folate (N) 13.9.  BUN 5, creatinine 0.7.  Normal thyroid at 0.9   [range 0.3-5.5].  Patient denied any repeated history of infection in the past.  She was alone   for this appointment on 17.  · 17 - Patient had labs done to further evaluate her leukocytosis.  Her WBC was 14.4,   hemoglobin 16.6, platelet count 312,000, ANC 9.3.  Differentials were 64% neutrophils, 28%   lymphocytes.  CHRISTOPHE was negative.  B12 was 337.  LDH (N) 159.  BUN 10, creatinine 0.7.  Uric acid   5.3 (N).  MIRTA-2 was not mutated.  No evidence of BCR-abl gene  rearrangement.  Sed rate 32.    Peripheral smear showed absolute neutrophilia and leukocytosis and polycythemia, reactive vs.   myeloproliferative condition.  · 5/9/17 - Erythropoietin level was low-normal at 3.92.    · 8/17/17 - Ultrasound of the abdomen showed normal spleen size at 11.3 cm.  There was no splenic   lesion identified.    · 4/2/18 - Erythropoietin level 3.79 [range 2.59-18.5].   · 8/24/18 - Bone marrow biopsy showed normocellular marrow with no morphologic evidence of   myeloproliferative neoplasm, but she was found to have low level MIRTA-2 point mutation detected at   less than 10%.  The low level of MIRTA-2 mutation in assessment of leukocytosis and erythrocytosis   is worrisome for early and evolving myeloproliferative neoplasm.  Interval repeat marrow has been   recommended.  BCR-abl RT-PCR was negative.  Flow cytometry was negative.  Cellularity was 50%.    Iron stain was present.  Cytogenetics also showed normal female karyotype.    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  MIRTA-2 analysis was not detected on the   current bone marrow.  Close follow up has been recommended.  Flowy cytometry was negative.    Histopathology had normal maturation sequence.  There were normal megakaryocytes.  Erythroid   maturation was complete.  Myeloid maturation was also complete.  Negative iron stain on the   marrow aspirate.  Recommendation is for close surveillance.  Absent iron stores on bone marrow.      Smoker 1/2 ppd, does not drink alcohol    See pulmonologist for lung nodule    Family hx of breast cancer with breast cancer age 69  Father with lung cancer      · Patient has been lost to follow-up since 2019.    · 6/28/2022: Follow-up appointment leukocytosis, thrombocytosis, polycythemia, CHRISTOPHE positive for SLE, anti-DNA elevated at 13.  US spleen- normal size.    History of present illness was  reviewed and is unchanged from the previous visit. 06/28/22      Subjective    Patient does not have any new issues today.  She continues to have significant issues with fatigue.    Past Medical History:   Diagnosis Date   • Allergic    • Arthritis    • Atrial fibrillation (HCC)    • COPD (chronic obstructive pulmonary disease) (HCC)    • Headache    • History of herniated intervertebral disc    • History of skin cancer     Left lower extremity   • Hyperlipidemia    • Hypertension    • Injury of back    • Leukocytosis    • Lung nodule    • Urinary tract infection with hematuria 1/22/2021       Past Surgical History:   Procedure Laterality Date   • BACK SURGERY     • DILATATION AND CURETTAGE     • LUMBAR DECOMPRESSION      L4-L5    • SKIN CANCER EXCISION Left     [place]   • SUBTOTAL HYSTERECTOMY           Current Outpatient Medications:   •  albuterol sulfate  (90 Base) MCG/ACT inhaler, INHALE 2 PUFFS BY MOUTH EVERY 4 HOURS AS NEEDED, Disp: 18 g, Rfl: 2  •  amitriptyline (ELAVIL) 25 MG tablet, Take 12.5 mg by mouth every night at bedtime., Disp: , Rfl: 3  •  aspirin (aspirin) 81 MG EC tablet, 81 mg., Disp: , Rfl:   •  ciprofloxacin (Cipro) 500 MG tablet, Take 1 tablet by mouth 2 (Two) Times a Day., Disp: 14 tablet, Rfl: 0  •  Cyanocobalamin (VITAMIN B 12 PO), Take  by mouth., Disp: , Rfl:   •  fenofibrate (TRICOR) 145 MG tablet, TAKE 1 TABLET BY MOUTH DAILY, Disp: 90 tablet, Rfl: 1  •  fluticasone (FLONASE) 50 MCG/ACT nasal spray, INSTILL 2 SPRAYS IN EACH NOSTRIL EVERY DAY, Disp: 48 g, Rfl: 3  •  HYDROcodone-acetaminophen (NORCO)  MG per tablet, Take 1 tablet by mouth Every 6 (Six) Hours As Needed., Disp: , Rfl: 0  •  Lyrica 200 MG capsule, Take 1 capsule by mouth 2 (Two) Times a Day., Disp: 180 capsule, Rfl: 1  •  metoprolol succinate XL (TOPROL-XL) 25 MG 24 hr tablet, Take 1 tablet by mouth Daily., Disp: 90 tablet, Rfl: 1  •  nystatin (MYCOSTATIN) 914600 UNIT/GM cream, , Disp: , Rfl:   •  phenazopyridine  (Pyridium) 100 MG tablet, Take 1 tablet by mouth 3 (Three) Times a Day As Needed for Bladder Spasms., Disp: 30 tablet, Rfl: 0  •  umeclidinium-vilanterol (ANORO ELLIPTA) 62.5-25 MCG/INH aerosol powder  inhaler, Inhale 1 puff Daily., Disp: , Rfl:   •  vitamin D (ERGOCALCIFEROL) 1.25 MG (95501 UT) capsule capsule, Take 1 capsule by mouth Every 7 (Seven) Days., Disp: 12 capsule, Rfl: 1  •  Zeasorb-AF 2 % powder, APPLY TO AFFECTED AREA DAILY, Disp: , Rfl:   •  terbinafine (lamiSIL) 250 MG tablet, Take 1 tablet by mouth Daily., Disp: 90 tablet, Rfl: 1    Allergies   Allergen Reactions   • Morphine Anaphylaxis and Nausea And Vomiting       Immunization History   Administered Date(s) Administered   • COVID-19 ([facility] 03/12/2021   • Flu Vaccine Quad PF >36MO 09/17/2018   • FluLaval/Fluarix/Fluzone >6 09/03/2020   • Influenza, Unspecified 09/27/2016, 10/12/2017   • Pneumococcal Polysaccharide (PPSV23) 09/21/2021   • Tdap 05/07/2020   • flucelvax quad pfs =>4 YRS 10/03/2019       Family History   Problem Relation Age of Onset   • Breast cancer Mother    • Lung cancer Father    • Lung cancer Other    • Colon cancer Other    • Skin cancer Other        Cancer-related family history includes Breast cancer in her mother; Colon cancer in an other family member; Lung cancer in her father and another family member; Skin cancer in an other family member.    Social History     Tobacco Use   • Smoking status: Current Every Day Smoker     Packs/day: 1.00     Years: 40.00     Pack years: 40.00   • Smokeless tobacco: Never Used   Vaping Use   • Vaping Use: Never used   Substance Use Topics   • Alcohol use: Yes     Comment: rare   • Drug use: No       ROS:    Review of Systems   Constitutional: Positive for fatigue. Negative for chills and fever.   HENT: Negative.    Eyes: Negative for visual disturbance.   Respiratory: Negative for shortness of breath.    Cardiovascular: Negative for chest pain and palpitations.   Gastrointestinal:  "Negative for abdominal pain.   Endocrine: Negative.    Genitourinary: Negative.    Skin: Negative for rash and wound.   Neurological: Positive for weakness.   Hematological: Negative for adenopathy.   Psychiatric/Behavioral: Negative for agitation and confusion.   Chronic back pain due to disc disease    Objective:    Vitals:    08/03/22 1333   BP: 123/74   Pulse: 85   Temp: 96.8 °F (36 °C)   SpO2: 98%   Weight: 90.7 kg (200 lb)   Height: 175.3 cm (69\")   PainSc: 0-No pain     Body mass index is 29.53 kg/m².    ECOG  (1) Restricted in physically strenuous activity, ambulatory and able to do work of light nature    Physical Exam:  Physical Exam  Vitals and nursing note reviewed.   Constitutional:       General: She is not in acute distress.     Appearance: She is not diaphoretic.   HENT:      Head: Normocephalic and atraumatic.   Eyes:      General: No scleral icterus.        Right eye: No discharge.         Left eye: No discharge.      Conjunctiva/sclera: Conjunctivae normal.   Neck:      Thyroid: No thyromegaly.   Cardiovascular:      Rate and Rhythm: Normal rate and regular rhythm.      Heart sounds: Normal heart sounds.     No friction rub. No gallop.   Pulmonary:      Effort: Pulmonary effort is normal. No respiratory distress.      Breath sounds: No stridor. No wheezing.   Abdominal:      General: Bowel sounds are normal.      Palpations: Abdomen is soft. There is no mass.      Tenderness: There is no abdominal tenderness. There is no guarding or rebound.   Musculoskeletal:         General: No tenderness. Normal range of motion.      Cervical back: Normal range of motion and neck supple.   Lymphadenopathy:      Cervical: No cervical adenopathy.   Skin:     General: Skin is warm.      Findings: No erythema or rash.   Neurological:      Mental Status: She is alert and oriented to person, place, and time.      Motor: No abnormal muscle tone.   Psychiatric:         Behavior: Behavior normal.     I have reexamined " the patient and the results are consistent with the previously documented exam. Cherirenee Prabhakar MD     RECENT LABS    WBC   Date Value Ref Range Status   08/03/2022 12.37 (H) 3.40 - 10.80 10*3/mm3 Final     RBC   Date Value Ref Range Status   08/03/2022 5.31 (H) 3.77 - 5.28 10*6/mm3 Final     Hemoglobin   Date Value Ref Range Status   08/03/2022 16.5 (H) 12.0 - 15.9 g/dL Final     Hematocrit   Date Value Ref Range Status   08/03/2022 49.3 (H) 34.0 - 46.6 % Final     MCV   Date Value Ref Range Status   08/03/2022 92.8 79.0 - 97.0 fL Final     MCH   Date Value Ref Range Status   08/03/2022 31.1 26.6 - 33.0 pg Final     MCHC   Date Value Ref Range Status   08/03/2022 33.5 31.5 - 35.7 g/dL Final     RDW   Date Value Ref Range Status   08/03/2022 14.5 12.3 - 15.4 % Final     RDW-SD   Date Value Ref Range Status   08/03/2022 48.0 37.0 - 54.0 fl Final     MPV   Date Value Ref Range Status   08/03/2022 9.3 6.0 - 12.0 fL Final     Platelets   Date Value Ref Range Status   08/03/2022 257 140 - 450 10*3/mm3 Final     Neutrophil %   Date Value Ref Range Status   08/03/2022 56.2 42.7 - 76.0 % Final     Lymphocyte %   Date Value Ref Range Status   08/03/2022 36.8 19.6 - 45.3 % Final     Monocyte %   Date Value Ref Range Status   08/03/2022 5.0 5.0 - 12.0 % Final     Eosinophil %   Date Value Ref Range Status   08/03/2022 1.5 0.3 - 6.2 % Final     Basophil %   Date Value Ref Range Status   08/03/2022 0.5 0.0 - 1.5 % Final     Neutrophils, Absolute   Date Value Ref Range Status   08/03/2022 6.95 1.70 - 7.00 10*3/mm3 Final     Lymphocytes, Absolute   Date Value Ref Range Status   08/03/2022 4.55 (H) 0.70 - 3.10 10*3/mm3 Final     Monocytes, Absolute   Date Value Ref Range Status   08/03/2022 0.62 0.10 - 0.90 10*3/mm3 Final     Eosinophils, Absolute   Date Value Ref Range Status   08/03/2022 0.19 0.00 - 0.40 10*3/mm3 Final     Basophils, Absolute   Date Value Ref Range Status   08/03/2022 0.06 0.00 - 0.20 10*3/mm3 Final      [facility]   Date Value Ref Range Status   01/03/2020 0.1 0.0 - 0.2 /100 WBC Final       Lab Results   Component Value Date    GLUCOSE 81 05/20/2022    BUN 11 05/20/2022    CREATININE 0.61 05/20/2022    EGFRIFNONA 99 12/14/2021    BCR 18.0 05/20/2022    K 4.3 05/20/2022    CO2 27.0 05/20/2022    CALCIUM 9.8 05/20/2022    ALBUMIN 4.40 05/20/2022    LABIL2 1.1 12/04/2018    AST 30 05/20/2022    ALT 30 05/20/2022         Assessment & Plan   Leukocytosis, unspecified type  - CBC & Differential  - Leukemia / Lymphoma Immunophenotyping Profile  - Ambulatory Referral to Rheumatology    Systemic lupus erythematosus, unspecified SLE type, unspecified organ involvement status (HCC)  - CBC & Differential  - Leukemia / Lymphoma Immunophenotyping Profile  - Ambulatory Referral to Rheumatology    Fatigue, unspecified type  - Ambulatory Referral to Rheumatology      · Leukocytosis and polycythemia.  Rule out myeloproliferative disease.  She has a prior history of this in the past.  Ultrasound of spleen- normal size.  Blood counts improved with WBC of 10.14, Hgb 15.8, platelets 226-improvement in counts.  JAK2 V617F negative.  No BCR ABL arrangement.  Peripheral smear shows leukocytosis, polycythemia, no blasts May of 2022.  We will obtain flow cytometry since she has relative lymphocytosis as well.  I have reviewed the lab results with patient.  I do not think we need to repeat a bone marrow biopsy at this time.  I have asked her to also remain on baby aspirin.  Patient does not have splenomegaly    · Possible evolving myeloproliferative neoplasm with low level MIRTA-2 positive at less than 10%. BCR-abl negative.  Low normal erythropoietin level.  Status post bone marrow aspiration and         biopsy 8/24/18.  Repeat bone marrow aspiration and biopsy 2/22/19 showed translocation [2q/11q] has been associated with myeloid neoplasm, but no morphologic evidence seen on         histopathology    · Tobacco use disorder: continues to smoke,  trying to decrease.  Currently on Chantix.  Managed by her PCP    · Fatigue: continues to have fatigue    · CHRISTOPHE positive, anti-DNA elevated at 13 .Referred to rheumatology,  rheumatology for further evaluation          Plans    · Reiterated tobacco cessation today with patient  · Referred to  rheumatology  · Peripheral blood flow cytometry  · Continue baby aspirin 81 mg p.o. daily  · Follow-up in 4 months.      I spent 30 total minutes, face-to-face, caring for Rahel today.  90% of this time involved counseling and/or coordination of care as documented within this note.

## 2022-08-03 ENCOUNTER — LAB (OUTPATIENT)
Dept: LAB | Facility: HOSPITAL | Age: 57
End: 2022-08-03

## 2022-08-03 ENCOUNTER — OFFICE VISIT (OUTPATIENT)
Dept: ONCOLOGY | Facility: CLINIC | Age: 57
End: 2022-08-03

## 2022-08-03 VITALS
DIASTOLIC BLOOD PRESSURE: 74 MMHG | HEART RATE: 85 BPM | HEIGHT: 69 IN | OXYGEN SATURATION: 98 % | WEIGHT: 200 LBS | SYSTOLIC BLOOD PRESSURE: 123 MMHG | TEMPERATURE: 96.8 F | BODY MASS INDEX: 29.62 KG/M2

## 2022-08-03 DIAGNOSIS — M32.9 SYSTEMIC LUPUS ERYTHEMATOSUS, UNSPECIFIED SLE TYPE, UNSPECIFIED ORGAN INVOLVEMENT STATUS: ICD-10-CM

## 2022-08-03 DIAGNOSIS — D72.829 LEUKOCYTOSIS, UNSPECIFIED TYPE: Primary | ICD-10-CM

## 2022-08-03 DIAGNOSIS — R53.83 FATIGUE, UNSPECIFIED TYPE: ICD-10-CM

## 2022-08-03 LAB
BASOPHILS # BLD AUTO: 0.06 10*3/MM3 (ref 0–0.2)
BASOPHILS NFR BLD AUTO: 0.5 % (ref 0–1.5)
DEPRECATED RDW RBC AUTO: 48 FL (ref 37–54)
EOSINOPHIL # BLD AUTO: 0.19 10*3/MM3 (ref 0–0.4)
EOSINOPHIL NFR BLD AUTO: 1.5 % (ref 0.3–6.2)
ERYTHROCYTE [DISTWIDTH] IN BLOOD BY AUTOMATED COUNT: 14.5 % (ref 12.3–15.4)
HCT VFR BLD AUTO: 49.3 % (ref 34–46.6)
HGB BLD-MCNC: 16.5 G/DL (ref 12–15.9)
HOLD SPECIMEN: NORMAL
HOLD SPECIMEN: NORMAL
LYMPHOCYTES # BLD AUTO: 4.55 10*3/MM3 (ref 0.7–3.1)
LYMPHOCYTES NFR BLD AUTO: 36.8 % (ref 19.6–45.3)
MCH RBC QN AUTO: 31.1 PG (ref 26.6–33)
MCHC RBC AUTO-ENTMCNC: 33.5 G/DL (ref 31.5–35.7)
MCV RBC AUTO: 92.8 FL (ref 79–97)
MONOCYTES # BLD AUTO: 0.62 10*3/MM3 (ref 0.1–0.9)
MONOCYTES NFR BLD AUTO: 5 % (ref 5–12)
NEUTROPHILS NFR BLD AUTO: 56.2 % (ref 42.7–76)
NEUTROPHILS NFR BLD AUTO: 6.95 10*3/MM3 (ref 1.7–7)
PLATELET # BLD AUTO: 257 10*3/MM3 (ref 140–450)
PMV BLD AUTO: 9.3 FL (ref 6–12)
RBC # BLD AUTO: 5.31 10*6/MM3 (ref 3.77–5.28)
WBC NRBC COR # BLD: 12.37 10*3/MM3 (ref 3.4–10.8)

## 2022-08-03 PROCEDURE — 36415 COLL VENOUS BLD VENIPUNCTURE: CPT

## 2022-08-03 PROCEDURE — 99214 OFFICE O/P EST MOD 30 MIN: CPT | Performed by: INTERNAL MEDICINE

## 2022-08-04 ENCOUNTER — TELEPHONE (OUTPATIENT)
Dept: ONCOLOGY | Facility: CLINIC | Age: 57
End: 2022-08-04

## 2022-08-04 NOTE — TELEPHONE ENCOUNTER
FAXED TO [facility].  INFORMED PATIENT THAT SHE WILL NEED TO CONTACT THEM TO SCHEDULE THE APPT.  THEY WILL NOT CONTACT HER.

## 2022-08-09 ENCOUNTER — TELEPHONE (OUTPATIENT)
Dept: FAMILY MEDICINE CLINIC | Facility: CLINIC | Age: 57
End: 2022-08-09

## 2022-08-09 LAB
ANNOTATION COMMENT IMP: NORMAL
ASSESSMENT OF LEUKOCYTES: NORMAL
CLINICAL INFO: NORMAL
GATING STRATEGY: NORMAL
IMMUNOPHENOTYPING STUDY: NORMAL
LABORATORY COMMENT REPORT: NORMAL
PATH INTERP SPEC-IMP: NORMAL
PATHOLOGIST NAME: NORMAL
SPECIMEN SOURCE: NORMAL
VIABLE CELLS NFR SPEC: NORMAL %

## 2022-08-09 NOTE — TELEPHONE ENCOUNTER
Caller: DEANDRE, NURSE  WITH [facility] AND [facility]    Relationship to patient: OTHER     Best call back number: [phone]    Patient is needing: DEANDRE WOULD LIKE TO NOTIFY LAUREN MACEDO THAT THE PATIENT'S HEALTH ASSESSMENT AND CARE PLAN HAVE BEEN COMPLETED AND ARE IN THE PROVIDER PORTAL IF SHE WOULD LIKE TO REVIEW IT.  SHE IS EXTENDING AN INVITATION TO THE [facility] -[phone] OPTION #3, AND EMAIL:  [email or web address]

## 2022-08-16 ENCOUNTER — OFFICE VISIT (OUTPATIENT)
Dept: CARDIOLOGY | Facility: CLINIC | Age: 57
End: 2022-08-16

## 2022-08-16 VITALS
HEIGHT: 69 IN | HEART RATE: 85 BPM | WEIGHT: 201 LBS | DIASTOLIC BLOOD PRESSURE: 74 MMHG | SYSTOLIC BLOOD PRESSURE: 116 MMHG | BODY MASS INDEX: 29.77 KG/M2 | OXYGEN SATURATION: 98 %

## 2022-08-16 DIAGNOSIS — I48.0 PAROXYSMAL ATRIAL FIBRILLATION: ICD-10-CM

## 2022-08-16 DIAGNOSIS — I65.23 CAROTID STENOSIS, BILATERAL: Primary | ICD-10-CM

## 2022-08-16 DIAGNOSIS — I10 ESSENTIAL HYPERTENSION: ICD-10-CM

## 2022-08-16 PROCEDURE — 93000 ELECTROCARDIOGRAM COMPLETE: CPT | Performed by: INTERNAL MEDICINE

## 2022-08-16 PROCEDURE — 99214 OFFICE O/P EST MOD 30 MIN: CPT | Performed by: INTERNAL MEDICINE

## 2022-08-18 ENCOUNTER — TELEPHONE (OUTPATIENT)
Dept: ONCOLOGY | Facility: CLINIC | Age: 57
End: 2022-08-18

## 2022-08-18 NOTE — TELEPHONE ENCOUNTER
Hub is instructed to read the documentation below to patient.    ATTEMPTED TO CONTACT PATIENT TO SEE IF SHE HAD SCHEDULED AN APPT WITH [facility] YET.  LMV ASKING HER TO CALL BACK.

## 2022-08-25 ENCOUNTER — HOSPITAL ENCOUNTER (OUTPATIENT)
Dept: CARDIOLOGY | Facility: HOSPITAL | Age: 57
Discharge: HOME | End: 2022-08-25
Admitting: INTERNAL MEDICINE

## 2022-08-25 ENCOUNTER — TELEPHONE (OUTPATIENT)
Dept: CARDIOLOGY | Facility: CLINIC | Age: 57
End: 2022-08-25

## 2022-08-25 DIAGNOSIS — I65.23 CAROTID STENOSIS, BILATERAL: ICD-10-CM

## 2022-08-25 DIAGNOSIS — I48.0 PAROXYSMAL ATRIAL FIBRILLATION: ICD-10-CM

## 2022-08-25 DIAGNOSIS — I10 ESSENTIAL HYPERTENSION: ICD-10-CM

## 2022-08-25 LAB
BH CV XLRA MEAS LEFT DIST CCA EDV: -25.2 CM/SEC
BH CV XLRA MEAS LEFT DIST CCA PSV: -88.5 CM/SEC
BH CV XLRA MEAS LEFT DIST ICA EDV: -37.8 CM/SEC
BH CV XLRA MEAS LEFT DIST ICA PSV: -76.6 CM/SEC
BH CV XLRA MEAS LEFT ICA/CCA RATIO: 1.3
BH CV XLRA MEAS LEFT PROX CCA EDV: 28.7 CM/SEC
BH CV XLRA MEAS LEFT PROX CCA PSV: 105 CM/SEC
BH CV XLRA MEAS LEFT PROX ECA PSV: -97.9 CM/SEC
BH CV XLRA MEAS LEFT PROX ICA EDV: -49.7 CM/SEC
BH CV XLRA MEAS LEFT PROX ICA PSV: -118 CM/SEC
BH CV XLRA MEAS LEFT PROX SCLA PSV: 151 CM/SEC
BH CV XLRA MEAS LEFT VERTEBRAL A EDV: -23.2 CM/SEC
BH CV XLRA MEAS LEFT VERTEBRAL A PSV: -61.3 CM/SEC
BH CV XLRA MEAS RIGHT DIST CCA EDV: -36 CM/SEC
BH CV XLRA MEAS RIGHT DIST CCA PSV: -95.7 CM/SEC
BH CV XLRA MEAS RIGHT DIST ICA EDV: -42.5 CM/SEC
BH CV XLRA MEAS RIGHT DIST ICA PSV: -118 CM/SEC
BH CV XLRA MEAS RIGHT ICA/CCA RATIO: 1.3
BH CV XLRA MEAS RIGHT PROX CCA EDV: 18.6 CM/SEC
BH CV XLRA MEAS RIGHT PROX CCA PSV: 67.1 CM/SEC
BH CV XLRA MEAS RIGHT PROX ECA EDV: -21.5 CM/SEC
BH CV XLRA MEAS RIGHT PROX ECA PSV: -105 CM/SEC
BH CV XLRA MEAS RIGHT PROX ICA EDV: -32.6 CM/SEC
BH CV XLRA MEAS RIGHT PROX ICA PSV: 124 CM/SEC
BH CV XLRA MEAS RIGHT PROX SCLA PSV: 132 CM/SEC
BH CV XLRA MEAS RIGHT VERTEBRAL A EDV: 19.1 CM/SEC
BH CV XLRA MEAS RIGHT VERTEBRAL A PSV: 53 CM/SEC
LEFT ARM BP: NORMAL MMHG
MAXIMAL PREDICTED HEART RATE: 163 BPM
RIGHT ARM BP: NORMAL MMHG
STRESS TARGET HR: 139 BPM

## 2022-08-25 PROCEDURE — 93880 EXTRACRANIAL BILAT STUDY: CPT

## 2022-08-25 NOTE — TELEPHONE ENCOUNTER
Patient needs a telephone visit to discuss her duplex carotid    You can put her on Wednesday for a telephone visit       Hub can release this information

## 2022-08-26 ENCOUNTER — TELEPHONE (OUTPATIENT)
Dept: CARDIOLOGY | Facility: CLINIC | Age: 57
End: 2022-08-26

## 2022-08-26 NOTE — TELEPHONE ENCOUNTER
Caller: Rahel Ramsey    Relationship: Self    Best call back number: [phone]    What is the best time to reach you: ANYTIME     Who are you requesting to speak with (clinical staff, provider,  specific staff member): ANYONE     What was the call regarding: PATIENT RETURNED PHONE CALL FROM [phone]. LET HER KNOW SHE NEEDS A TELEPHONE VISIT FOR THIS COMING Wednesday TO DISCUSS HER DUPLEX CAROTID. NO AVAILABILITY ON MY SIDE OF THE SCHEDULE TILL 9.30.22.     Do you require a callback: YES

## 2022-08-31 ENCOUNTER — OFFICE VISIT (OUTPATIENT)
Dept: CARDIOLOGY | Facility: CLINIC | Age: 57
End: 2022-08-31

## 2022-08-31 VITALS — WEIGHT: 199 LBS | BODY MASS INDEX: 29.47 KG/M2 | HEIGHT: 69 IN

## 2022-08-31 DIAGNOSIS — I48.0 PAROXYSMAL ATRIAL FIBRILLATION: Primary | ICD-10-CM

## 2022-08-31 DIAGNOSIS — I65.21 STENOSIS OF RIGHT CAROTID ARTERY: ICD-10-CM

## 2022-08-31 DIAGNOSIS — I65.23 CAROTID STENOSIS, BILATERAL: ICD-10-CM

## 2022-08-31 DIAGNOSIS — E78.2 MIXED HYPERLIPIDEMIA: ICD-10-CM

## 2022-08-31 PROCEDURE — 99214 OFFICE O/P EST MOD 30 MIN: CPT | Performed by: INTERNAL MEDICINE

## 2022-08-31 NOTE — PROGRESS NOTES
"Cardiology Clinic Note  Alexandro Farah MD, PhD    Subjective:     Encounter Date:08/16/2022      Patient ID: Rahel Ramsey is a 56 y.o. female.    Chief Complaint:  Chief Complaint   Patient presents with   • Atrial Fibrillation   • Hypertension   • Hyperlipidemia   • Consult       HPI:  I the pleasure to see this very pleasant 56-year-old female in clinic.  She has diabetes hypertension hyperlipidemia paroxysmal defibrillation.  No history of coronary disease heart failure stroke unexplained syncope or palpitations.  She is on metoprolol only combination with aspirin and fenofibrate for risk factor reduction.  She is not on systemic anticoagulation secondary to multiple epidurals and pain management historically and she did not want systemic anticoagulation.  She continues with some tobacco abuse 1 pack/day was counseled to quit ultimately.  She has no issues at this time.  She is pending surveillance of some right-sided carotid nonobstructive disease historically.    Carotid Dopplers ordered    Review of systems otherwise negative x14 point review of systems except as mentioned above      Historical data copied forward from previous encounters in EMR including the history, exam, and assessment/plan has been reviewed and is unchanged unless noted otherwise.    Cardiac medicines reviewed with risk, benefits, and necessity of each discussed.    Risk and benefit of cardiac testing reviewed including death heart attack stroke pain bleeding infection need for vascular /cardiovascular surgery were discussed and the patient     Objective:         /74 (BP Location: Left arm, Patient Position: Sitting, Cuff Size: Adult)   Pulse 85   Ht 175.3 cm (69.02\")   Wt 91.2 kg (201 lb)   SpO2 98%   BMI 29.67 kg/m²     Physical Exam  Regular rate and rhythm no rubs gallops heave or lift  No clubbing cyanosis or edema  Normal cap refill  Normal pulses  Normocephalic/atraumatic  No JVD  Faint bruit  Assessment:     "     Diagnoses and all orders for this visit:    1. Carotid stenosis, bilateral (Primary)  -     Duplex Carotid Ultrasound CAR; Future    2. Paroxysmal atrial fibrillation (HCC)  -     ECG 12 Lead  -     Duplex Carotid Ultrasound CAR; Future    3. Essential hypertension  -     Duplex Carotid Ultrasound CAR; Future            The pleasure to be involved in this patient's cardiovascular care.  Please call with any questions or concerns  Alexandro Farah MD, PhD    Most recent EKG as reviewed and interpreted by me:    ECG 12 Lead    Date/Time: 8/16/2022 4:02 PM  Performed by: Alexandro Farah MD  Authorized by: Alexandro Farah MD   Comparison: not compared with previous ECG   Rhythm: sinus rhythm  Rate: normal  Conduction: conduction normal  QRS axis: normal    Clinical impression: normal ECG             Most recent echo as reviewed and interpreted by me:      Most recent stress test as reviewed and interpreted by me:      Most recent cardiac catheterization as reviewed interpreted by me:  No results found for this or any previous visit.    The following portions of the patient's history were reviewed and updated as appropriate: allergies, current medications, past family history, past medical history, past social history, past surgical history and problem list.      ROS:  14 point review of systems negative except as mentioned above    Current Outpatient Medications:   •  albuterol sulfate  (90 Base) MCG/ACT inhaler, INHALE 2 PUFFS BY MOUTH EVERY 4 HOURS AS NEEDED, Disp: 18 g, Rfl: 2  •  amitriptyline (ELAVIL) 25 MG tablet, Take 12.5 mg by mouth every night at bedtime., Disp: , Rfl: 3  •  aspirin (aspirin) 81 MG EC tablet, 81 mg., Disp: , Rfl:   •  fenofibrate (TRICOR) 145 MG tablet, TAKE 1 TABLET BY MOUTH DAILY, Disp: 90 tablet, Rfl: 1  •  fluticasone (FLONASE) 50 MCG/ACT nasal spray, INSTILL 2 SPRAYS IN EACH NOSTRIL EVERY DAY, Disp: 48 g, Rfl: 3  •  HYDROcodone-acetaminophen (NORCO)  MG  per tablet, Take 1 tablet by mouth Every 6 (Six) Hours As Needed., Disp: , Rfl: 0  •  Lyrica 200 MG capsule, Take 1 capsule by mouth 2 (Two) Times a Day., Disp: 180 capsule, Rfl: 1  •  metoprolol succinate XL (TOPROL-XL) 25 MG 24 hr tablet, Take 1 tablet by mouth Daily., Disp: 90 tablet, Rfl: 1  •  nystatin (MYCOSTATIN) 480578 UNIT/GM cream, Apply 1 application topically to the appropriate area as directed As Needed., Disp: , Rfl:   •  umeclidinium-vilanterol (ANORO ELLIPTA) 62.5-25 MCG/INH aerosol powder  inhaler, Inhale 1 puff Daily., Disp: , Rfl:   •  vitamin D (ERGOCALCIFEROL) 1.25 MG (11029 UT) capsule capsule, Take 1 capsule by mouth Every 7 (Seven) Days., Disp: 12 capsule, Rfl: 1    Problem List:  Patient Active Problem List   Diagnosis   • Allergic rhinitis   • Degeneration of lumbar or lumbosacral intervertebral disc   • Degenerative joint disease of right acromioclavicular joint   • Fatigue   • History of skin cancer   • Hyperglycemia   • Essential hypertension   • Mixed hyperlipidemia   • Hypertriglyceridemia   • Leukocytosis   • Lumbar disc disease with radiculopathy   • Lumbar herniated disc   • Lumbar radiculopathy   • Other cervical disc degeneration, unspecified cervical region   • Paroxysmal atrial fibrillation (HCC)   • Stenosis of right carotid artery   • Tobacco use   • Vitamin D deficiency   • Right upper lobe pulmonary nodule   • Hilar adenopathy   • Urinary tract infection with hematuria   • UTI symptoms     Past Medical History:  Past Medical History:   Diagnosis Date   • Allergic    • Arthritis    • Atrial fibrillation (HCC)    • COPD (chronic obstructive pulmonary disease) (HCC)    • Headache    • History of herniated intervertebral disc    • History of skin cancer     Left lower extremity   • Hyperlipidemia    • Hypertension    • Injury of back    • Leukocytosis    • Lung nodule    • Urinary tract infection with hematuria 1/22/2021     Past Surgical History:  Past Surgical History:    Procedure Laterality Date   • BACK SURGERY     • CARDIAC CATHETERIZATION     • DILATATION AND CURETTAGE     • LUMBAR DECOMPRESSION      L4-L5    • SKIN CANCER EXCISION Left     [place]ao   • SUBTOTAL HYSTERECTOMY       Social History:  Social History     Socioeconomic History   • Marital status:    Tobacco Use   • Smoking status: Current Every Day Smoker     Packs/day: 1.00     Years: 40.00     Pack years: 40.00   • Smokeless tobacco: Never Used   Vaping Use   • Vaping Use: Never used   Substance and Sexual Activity   • Alcohol use: Yes     Comment: rare   • Drug use: No   • Sexual activity: Defer     Allergies:  Allergies   Allergen Reactions   • Morphine Anaphylaxis and Nausea And Vomiting     Immunizations:  Immunization History   Administered Date(s) Administered   • COVID-19 (AMELIA) 03/12/2021   • Flu Vaccine Quad PF >36MO 09/17/2018   • FluLaval/Fluzone >6mos 09/03/2020   • Influenza, Unspecified 09/27/2016, 10/12/2017   • Pneumococcal Polysaccharide (PPSV23) 09/21/2021   • Tdap 05/07/2020   • flucelvax quad pfs =>4 YRS 10/03/2019            In-Office Procedure(s):  ECG 12 Lead    (Results Pending)        ASCVD RIsk Score::  The 10-year ASCVD risk score (Anthony YESENIA Jr., et al., 2013) is: 14.4%    Values used to calculate the score:      Age: 56 years      Sex: Female      Is Non- : No      Diabetic: Yes      Tobacco smoker: Yes      Systolic Blood Pressure: 116 mmHg      Is BP treated: Yes      HDL Cholesterol: 40 mg/dL      Total Cholesterol: 194 mg/dL    Imaging:         Results for orders placed during the hospital encounter of 06/01/22    US Abdomen Limited    Narrative  Examination: US ABDOMEN LIMITED-    Date of Exam: 6/1/2022 7:24 AM    Indication: Splenomegaly; D72.829-Elevated white blood cell count,  unspecified.    Comparison: PET/CT 02/07/2020    Technique: Grayscale and color Doppler ultrasound evaluation of the left  upper quadrant of the abdomen was  performed.    Findings:  Spleen measures 11.3 x 8.1 x 6.5 cm. There is no splenic lesion  identified. No perisplenic fluid collection or subcapsular hematoma. The  left kidney measures 4.3 x 4.9 x 11.6 cm. There is no left-sided  hydronephrosis. No free fluid in the left upper quadrant.    Impression  Spleen within normal limits of size.    Electronically Signed By-Luis Alfredo Jane MD On:6/1/2022 7:41 AM  This report was finalized on [id number] by  Luis Alfredo Jane MD.      Results for orders placed in visit on 03/12/20    CT Chest With Contrast      Lab Review:   Lab on 08/03/2022   Component Date Value   • WBC 08/03/2022 12.37 (A)   • RBC 08/03/2022 5.31 (A)   • Hemoglobin 08/03/2022 16.5 (A)   • Hematocrit 08/03/2022 49.3 (A)   • MCV 08/03/2022 92.8    • MCH 08/03/2022 31.1    • MCHC 08/03/2022 33.5    • RDW 08/03/2022 14.5    • RDW-SD 08/03/2022 48.0    • MPV 08/03/2022 9.3    • Platelets 08/03/2022 257    • Neutrophil % 08/03/2022 56.2    • Lymphocyte % 08/03/2022 36.8    • Monocyte % 08/03/2022 5.0    • Eosinophil % 08/03/2022 1.5    • Basophil % 08/03/2022 0.5    • Neutrophils, Absolute 08/03/2022 6.95    • Lymphocytes, Absolute 08/03/2022 4.55 (A)   • Monocytes, Absolute 08/03/2022 0.62    • Eosinophils, Absolute 08/03/2022 0.19    • Basophils, Absolute 08/03/2022 0.06    • Extra Tube 08/03/2022 Hold for add-ons.    • Extra Tube 08/03/2022 Hold for add-ons.    • Flow Interpretation 08/03/2022 Comment    • Flow Interpretation Comm* 08/03/2022 Comment    • Clinical Information 08/03/2022 Comment    • Specimen Type 08/03/2022 Comment    • Assessment of Leukocytes 08/03/2022 Comment    • Viability 08/03/2022 Comment    • Gating Strategy 08/03/2022 Comment    • Phenotype Chart 08/03/2022 Comment    • Resulting Path Name 08/03/2022 Comment    • Comment 08/03/2022 Comment    Lab on 06/28/2022   Component Date Value   • WBC 06/28/2022 10.14    • RBC 06/28/2022 5.05    • Hemoglobin 06/28/2022 15.8    • Hematocrit  06/28/2022 47.9 (A)   • MCV 06/28/2022 94.9    • MCH 06/28/2022 31.3    • MCHC 06/28/2022 33.0    • RDW 06/28/2022 14.5    • RDW-SD 06/28/2022 49.1    • MPV 06/28/2022 9.3    • Platelets 06/28/2022 226    • Neutrophil % 06/28/2022 58.3    • Lymphocyte % 06/28/2022 34.7    • Monocyte % 06/28/2022 4.9 (A)   • Eosinophil % 06/28/2022 1.6    • Basophil % 06/28/2022 0.5    • Neutrophils, Absolute 06/28/2022 5.91    • Lymphocytes, Absolute 06/28/2022 3.52 (A)   • Monocytes, Absolute 06/28/2022 0.50    • Eosinophils, Absolute 06/28/2022 0.16    • Basophils, Absolute 06/28/2022 0.05    • Extra Tube 06/28/2022 hold for add-on    • Extra Tube 06/28/2022 hold for add-on    • Extra Tube 06/28/2022 Hold for add-ons.    • Extra Tube 06/28/2022 Hold for add-ons.    Office Visit on 06/03/2022   Component Date Value   • Urine Culture 06/03/2022 >100,000 CFU/mL Escherichia coli (A)   • Color 06/03/2022 Yellow    • Clarity, UA 06/03/2022 Clear    • Glucose, UA 06/03/2022 Negative    • Bilirubin 06/03/2022 Negative    • Ketones, UA 06/03/2022 Negative    • Specific Gravity  06/03/2022 1.010    • Blood, UA 06/03/2022 Large (A)   • pH, Urine 06/03/2022 7.5    • Protein, POC 06/03/2022 Negative    • Urobilinogen, UA 06/03/2022 Normal    • Leukocytes 06/03/2022 Trace (A)   • Nitrite, UA 06/03/2022 Negative    Consult on 05/20/2022   Component Date Value   • Erythropoietin 05/20/2022 5.6    • JAK2 [id number] Mutation 05/20/2022 Comment    • Background: 05/20/2022 Comment    • Director Review 05/20/2022 Comment    • Uric Acid 05/20/2022 5.5    • LDH 05/20/2022 185    • Specimen Type 05/20/2022 BLOOD    • Cells Counted 05/20/2022 200    • Cells Analyzed 05/20/2022 200    • FISH Result 05/20/2022 Comment:    • Interpretation 05/20/2022 Comment:    • Director Review 05/20/2022 Comment:    • Vitamin B-12 05/20/2022 621    • Sed Rate 05/20/2022 28    • Glucose 05/20/2022 81    • BUN 05/20/2022 11    • Creatinine 05/20/2022 0.61    • Sodium 05/20/2022  141    • Potassium 05/20/2022 4.3    • Chloride 05/20/2022 101    • CO2 05/20/2022 27.0    • Calcium 05/20/2022 9.8    • Total Protein 05/20/2022 7.8    • Albumin 05/20/2022 4.40    • ALT (SGPT) 05/20/2022 30    • AST (SGOT) 05/20/2022 30    • Alkaline Phosphatase 05/20/2022 101    • Total Bilirubin 05/20/2022 0.4    • Globulin 05/20/2022 3.4    • A/G Ratio 05/20/2022 1.3    • BUN/Creatinine Ratio 05/20/2022 18.0    • Anion Gap 05/20/2022 13.0    • eGFR 05/20/2022 104.4    • CHRISTOPHE Direct 05/20/2022 Positive (A)   • Pathology Review 05/20/2022 Yes    • Final Diagnosis 05/20/2022                      Value:This result contains rich text formatting which cannot be displayed here.   • Case Report 05/20/2022                      Value:Surgical Pathology Report                         Case: [id number]                                  Authorizing Provider:  Cheri Prabhakar MD Collected:           05/20/2022 11:41 AM          Ordering Location:     [facility]     Received:            05/23/2022 08:03 AM                                 GROUP HEMATOLOGY &                                                                                  ONCOLOGY                                                                     Pathologist:           South Reyna MD                                                             Specimen:    Blood, Venous Line                                                                        • Anti-DNA (DS) Ab Qn 05/20/2022 13 (A)   • See below: 05/20/2022 Comment    Lab on 05/20/2022   Component Date Value   • WBC 05/20/2022 14.86 (A)   • RBC 05/20/2022 5.73 (A)   • Hemoglobin 05/20/2022 17.9 (A)   • Hematocrit 05/20/2022 53.2 (A)   • MCV 05/20/2022 92.8    • MCH 05/20/2022 31.2    • MCHC 05/20/2022 33.6    • RDW 05/20/2022 14.7    • RDW-SD 05/20/2022 48.9    • MPV 05/20/2022 10.3    • Platelets 05/20/2022 222    • Neutrophil % 05/20/2022 62.7    • Lymphocyte % 05/20/2022 30.5    •  Monocyte % 05/20/2022 4.8 (A)   • Eosinophil % 05/20/2022 1.5    • Basophil % 05/20/2022 0.5    • Neutrophils, Absolute 05/20/2022 9.32 (A)   • Lymphocytes, Absolute 05/20/2022 4.53 (A)   • Monocytes, Absolute 05/20/2022 0.71    • Eosinophils, Absolute 05/20/2022 0.23    • Basophils, Absolute 05/20/2022 0.07    Lab on 04/29/2022   Component Date Value   • Glucose 04/29/2022 95    • BUN 04/29/2022 8    • Creatinine 04/29/2022 0.71    • Sodium 04/29/2022 140    • Potassium 04/29/2022 4.3    • Chloride 04/29/2022 104    • CO2 04/29/2022 24.0    • Calcium 04/29/2022 9.1    • Total Protein 04/29/2022 6.6    • Albumin 04/29/2022 4.30    • ALT (SGPT) 04/29/2022 27    • AST (SGOT) 04/29/2022 23    • Alkaline Phosphatase 04/29/2022 86    • Total Bilirubin 04/29/2022 0.3    • Globulin 04/29/2022 2.3    • A/G Ratio 04/29/2022 1.9    • BUN/Creatinine Ratio 04/29/2022 11.3    • Anion Gap 04/29/2022 12.0    • eGFR 04/29/2022 99.3    Appointment on 03/17/2022   Component Date Value   • WBC 03/17/2022 14.37 (A)   • RBC 03/17/2022 5.49 (A)   • Hemoglobin 03/17/2022 16.8 (A)   • Hematocrit 03/17/2022 50.2 (A)   • MCV 03/17/2022 91.4    • MCH 03/17/2022 30.6    • MCHC 03/17/2022 33.5    • RDW 03/17/2022 13.6    • RDW-SD 03/17/2022 46.3    • MPV 03/17/2022 10.9    • Platelets 03/17/2022 279    • Glucose 03/17/2022 88    • BUN 03/17/2022 11    • Creatinine 03/17/2022 0.79    • Sodium 03/17/2022 139    • Potassium 03/17/2022 4.3    • Chloride 03/17/2022 102    • CO2 03/17/2022 28.0    • Calcium 03/17/2022 9.6    • Total Protein 03/17/2022 7.6    • Albumin 03/17/2022 4.70    • ALT (SGPT) 03/17/2022 38 (A)   • AST (SGOT) 03/17/2022 27    • Alkaline Phosphatase 03/17/2022 87    • Total Bilirubin 03/17/2022 0.6    • Globulin 03/17/2022 2.9    • A/G Ratio 03/17/2022 1.6    • BUN/Creatinine Ratio 03/17/2022 13.9    • Anion Gap 03/17/2022 9.0    • eGFR 03/17/2022 87.4    • Total Cholesterol 03/17/2022 194    • Triglycerides 03/17/2022 221 (A)    • HDL Cholesterol 03/17/2022 40    • LDL Cholesterol  03/17/2022 115 (A)   • VLDL Cholesterol 03/17/2022 39    • LDL/HDL Ratio 03/17/2022 2.75    • Hemoglobin A1C 03/17/2022 6.4 (A)     Recent labs reviewed and interpreted for clinical significance and application            Level of Care:           Alexandro Farah MD  08/31/22  .

## 2022-08-31 NOTE — PROGRESS NOTES
"Cardiology Clinic Note  Alexandro Farah MD, PhD    Subjective:     Encounter Date:08/31/2022      Patient ID: Rahel Ramsey is a 56 y.o. female.    Chief Complaint:  Chief Complaint   Patient presents with   • Follow-up       HPI:    I the pleasure to have a telehealth visit with this 56-year-old patient previously seen in follow-up with diabetes hypertension hyperlipidemia and paroxysmal atrial fibrillation historically.  She is a pack per day smoker counseled to quit.  No alcohol.  EKG reviewed and interpreted by me demonstrated sinus rhythm with normal axis and ST segments at that time.  At that time we ordered bilateral carotid Dopplers which demonstrated mild disease 15 to 45% which is nonobstructive and needs really primary and secondary prevention.  She is on fenofibrate only but no statin therapy.  She is on metoprolol XL daily with underlying sinus rhythm but no systemic anticoagulation and has had no recurrence of her A. fib in quite some time.  Her [id number] score is historically 3 but she did not want anticoagulation has been deferred since prior to my establishment of care.  She requires epidural injections for chronic back pain.    Prior EKG normal normal normal sinus rhythm    Review of systems otherwise negative x14 point review of systems except as mentioned above    Historical data copied forward from previous encounters in EMR including the history, exam, and assessment/plan has been reviewed and is unchanged unless noted otherwise.    Cardiac medicines reviewed with risk, benefits, and necessity of each discussed.    Risk and benefit of cardiac testing reviewed including death heart attack stroke pain bleeding infection need for vascular /cardiovascular surgery were discussed and the patient     Objective:         Ht 175.3 cm (69\")   Wt 90.3 kg (199 lb)   BMI 29.39 kg/m²     Physical Exam  No exam today  Assessment:         Paroxysmal atrial fibrillation  Sinus rhythm  Continue " metoprolol  Aspirin only  No systemic anticoagulation on board    Essential hypertension well-controlled    Hyper triglyceridemia, fenofibrate    Carotid disease mild nonobstructive 15 to 45% by carotid Dopplers bilaterally  Atorvastatin 10 daily    Neuropathic pain, follow-up with primary care, on Lyrica and Elavil    See her back in 1 year, repeat carotid Dopplers at that time    Tobacco cessation discussed extensively cessation recommended no quit date established      The pleasure to be involved in this patient's cardiovascular care.  Please call with any questions or concerns  Alexandro Farah MD, PhD    Most recent EKG as reviewed and interpreted by me:  Procedures     Most recent echo as reviewed and interpreted by me:      Most recent stress test as reviewed and interpreted by me:      Most recent cardiac catheterization as reviewed interpreted by me:  No results found for this or any previous visit.    The following portions of the patient's history were reviewed and updated as appropriate: allergies, current medications, past family history, past medical history, past social history, past surgical history and problem list.      ROS:  14 point review of systems negative except as mentioned above    Current Outpatient Medications:   •  albuterol sulfate  (90 Base) MCG/ACT inhaler, INHALE 2 PUFFS BY MOUTH EVERY 4 HOURS AS NEEDED, Disp: 18 g, Rfl: 2  •  amitriptyline (ELAVIL) 25 MG tablet, Take 12.5 mg by mouth every night at bedtime., Disp: , Rfl: 3  •  aspirin (aspirin) 81 MG EC tablet, 81 mg., Disp: , Rfl:   •  fenofibrate (TRICOR) 145 MG tablet, TAKE 1 TABLET BY MOUTH DAILY, Disp: 90 tablet, Rfl: 1  •  fluticasone (FLONASE) 50 MCG/ACT nasal spray, INSTILL 2 SPRAYS IN EACH NOSTRIL EVERY DAY, Disp: 48 g, Rfl: 3  •  HYDROcodone-acetaminophen (NORCO)  MG per tablet, Take 1 tablet by mouth Every 6 (Six) Hours As Needed., Disp: , Rfl: 0  •  Lyrica 200 MG capsule, Take 1 capsule by mouth 2 (Two) Times  a Day., Disp: 180 capsule, Rfl: 1  •  metoprolol succinate XL (TOPROL-XL) 25 MG 24 hr tablet, Take 1 tablet by mouth Daily., Disp: 90 tablet, Rfl: 1  •  nystatin (MYCOSTATIN) 263775 UNIT/GM cream, Apply 1 application topically to the appropriate area as directed As Needed., Disp: , Rfl:   •  umeclidinium-vilanterol (ANORO ELLIPTA) 62.5-25 MCG/INH aerosol powder  inhaler, Inhale 1 puff Daily., Disp: , Rfl:   •  vitamin D (ERGOCALCIFEROL) 1.25 MG (59365 UT) capsule capsule, Take 1 capsule by mouth Every 7 (Seven) Days., Disp: 12 capsule, Rfl: 1    Problem List:  Patient Active Problem List   Diagnosis   • Allergic rhinitis   • Degeneration of lumbar or lumbosacral intervertebral disc   • Degenerative joint disease of right acromioclavicular joint   • Fatigue   • History of skin cancer   • Hyperglycemia   • Essential hypertension   • Mixed hyperlipidemia   • Hypertriglyceridemia   • Leukocytosis   • Lumbar disc disease with radiculopathy   • Lumbar herniated disc   • Lumbar radiculopathy   • Other cervical disc degeneration, unspecified cervical region   • Paroxysmal atrial fibrillation (HCC)   • Stenosis of right carotid artery   • Tobacco use   • Vitamin D deficiency   • Right upper lobe pulmonary nodule   • Hilar adenopathy   • Urinary tract infection with hematuria   • UTI symptoms     Past Medical History:  Past Medical History:   Diagnosis Date   • Allergic    • Arthritis    • Atrial fibrillation (HCC)    • COPD (chronic obstructive pulmonary disease) (HCC)    • Headache    • History of herniated intervertebral disc    • History of skin cancer     Left lower extremity   • Hyperlipidemia    • Hypertension    • Injury of back    • Leukocytosis    • Lung nodule    • Urinary tract infection with hematuria 1/22/2021     Past Surgical History:  Past Surgical History:   Procedure Laterality Date   • BACK SURGERY     • CARDIAC CATHETERIZATION     • DILATATION AND CURETTAGE     • LUMBAR DECOMPRESSION      L4-L5    • SKIN  CANCER EXCISION Left     Cao   • SUBTOTAL HYSTERECTOMY       Social History:  Social History     Socioeconomic History   • Marital status:    Tobacco Use   • Smoking status: Current Every Day Smoker     Packs/day: 1.00     Years: 40.00     Pack years: 40.00   • Smokeless tobacco: Never Used   Vaping Use   • Vaping Use: Never used   Substance and Sexual Activity   • Alcohol use: Yes     Comment: rare   • Drug use: No   • Sexual activity: Defer     Allergies:  Allergies   Allergen Reactions   • Morphine Anaphylaxis and Nausea And Vomiting     Immunizations:  Immunization History   Administered Date(s) Administered   • COVID-19 (AMELIA) 03/12/2021   • Flu Vaccine Quad PF >36MO 09/17/2018   • FluLaval/Fluzone >6mos 09/03/2020   • Influenza, Unspecified 09/27/2016, 10/12/2017   • Pneumococcal Polysaccharide (PPSV23) 09/21/2021   • Tdap 05/07/2020   • flucelvax quad pfs =>4 YRS 10/03/2019            In-Office Procedure(s):  No orders to display        ASCVD RIsk Score::  The 10-year ASCVD risk score (Anthony YESENIA Jr., et al., 2013) is: 14.4%    Values used to calculate the score:      Age: 56 years      Sex: Female      Is Non- : No      Diabetic: Yes      Tobacco smoker: Yes      Systolic Blood Pressure: 116 mmHg      Is BP treated: Yes      HDL Cholesterol: 40 mg/dL      Total Cholesterol: 194 mg/dL    Imaging:         Results for orders placed during the hospital encounter of 06/01/22    US Abdomen Limited    Narrative  Examination: US ABDOMEN LIMITED-    Date of Exam: 6/1/2022 7:24 AM    Indication: Splenomegaly; D72.829-Elevated white blood cell count,  unspecified.    Comparison: PET/CT 02/07/2020    Technique: Grayscale and color Doppler ultrasound evaluation of the left  upper quadrant of the abdomen was performed.    Findings:  Spleen measures 11.3 x 8.1 x 6.5 cm. There is no splenic lesion  identified. No perisplenic fluid collection or subcapsular hematoma. The  left kidney measures  4.3 x 4.9 x 11.6 cm. There is no left-sided  hydronephrosis. No free fluid in the left upper quadrant.    Impression  Spleen within normal limits of size.    Electronically Signed By-Luis Alfredo Jane MD On:6/1/2022 7:41 AM  This report was finalized on [id number] by  Luis Alfredo Jane MD.      Results for orders placed in visit on 03/12/20    CT Chest With Contrast      Lab Review:   Hospital Outpatient Visit on 08/25/2022   Component Date Value   • Target HR (85%) 08/25/2022 139    • Max. Pred. HR (100%) 08/25/2022 163    • Prox CCA PSV 08/25/2022 67.1    • Prox CCA EDV 08/25/2022 18.6    • Dist CCA PSV 08/25/2022 -95.7    • Dist CCA EDV 08/25/2022 -36.0    • Prox ECA PSV 08/25/2022 -105.0    • Prox ECA EDV 08/25/2022 -21.5    • Prox ICA PSV 08/25/2022 124    • Prox ICA EDV 08/25/2022 -32.6    • Dist ICA PSV 08/25/2022 -118.0    • Dist ICA EDV 08/25/2022 -42.5    • Vertebral A PSV 08/25/2022 53.0    • Vertebral A EDV 08/25/2022 19.1    • Prox SCLA PSV 08/25/2022 132.0    • Prox CCA PSV 08/25/2022 105.0    • Prox CCA EDV 08/25/2022 28.7    • Dist CCA PSV 08/25/2022 -88.5    • Dist CCA EDV 08/25/2022 -25.2    • Prox ECA PSV 08/25/2022 -97.9    • Prox ICA PSV 08/25/2022 -118.0    • Prox ICA EDV 08/25/2022 -49.7    • Dist ICA PSV 08/25/2022 -76.6    • Dist ICA EDV 08/25/2022 -37.8    • Vertebral A PSV 08/25/2022 -61.3    • Vertebral A EDV 08/25/2022 -23.2    • Prox SCLA PSV 08/25/2022 151.0    • Left arm BP 08/25/2022 123/71    • Right arm BP 08/25/2022 129/72    • ICA/CCA ratio 08/25/2022 1.3    • ICA/CCA ratio 08/25/2022 1.3    Lab on 08/03/2022   Component Date Value   • WBC 08/03/2022 12.37 (A)   • RBC 08/03/2022 5.31 (A)   • Hemoglobin 08/03/2022 16.5 (A)   • Hematocrit 08/03/2022 49.3 (A)   • MCV 08/03/2022 92.8    • MCH 08/03/2022 31.1    • MCHC 08/03/2022 33.5    • RDW 08/03/2022 14.5    • RDW-SD 08/03/2022 48.0    • MPV 08/03/2022 9.3    • Platelets 08/03/2022 257    • Neutrophil % 08/03/2022 56.2    • Lymphocyte %  08/03/2022 36.8    • Monocyte % 08/03/2022 5.0    • Eosinophil % 08/03/2022 1.5    • Basophil % 08/03/2022 0.5    • Neutrophils, Absolute 08/03/2022 6.95    • Lymphocytes, Absolute 08/03/2022 4.55 (A)   • Monocytes, Absolute 08/03/2022 0.62    • Eosinophils, Absolute 08/03/2022 0.19    • Basophils, Absolute 08/03/2022 0.06    • Extra Tube 08/03/2022 Hold for add-ons.    • Extra Tube 08/03/2022 Hold for add-ons.    • Flow Interpretation 08/03/2022 Comment    • Flow Interpretation Comm* 08/03/2022 Comment    • Clinical Information 08/03/2022 Comment    • Specimen Type 08/03/2022 Comment    • Assessment of Leukocytes 08/03/2022 Comment    • Viability 08/03/2022 Comment    • Gating Strategy 08/03/2022 Comment    • Phenotype Chart 08/03/2022 Comment    • Resulting Path Name 08/03/2022 Comment    • Comment 08/03/2022 Comment    Lab on 06/28/2022   Component Date Value   • WBC 06/28/2022 10.14    • RBC 06/28/2022 5.05    • Hemoglobin 06/28/2022 15.8    • Hematocrit 06/28/2022 47.9 (A)   • MCV 06/28/2022 94.9    • MCH 06/28/2022 31.3    • MCHC 06/28/2022 33.0    • RDW 06/28/2022 14.5    • RDW-SD 06/28/2022 49.1    • MPV 06/28/2022 9.3    • Platelets 06/28/2022 226    • Neutrophil % 06/28/2022 58.3    • Lymphocyte % 06/28/2022 34.7    • Monocyte % 06/28/2022 4.9 (A)   • Eosinophil % 06/28/2022 1.6    • Basophil % 06/28/2022 0.5    • Neutrophils, Absolute 06/28/2022 5.91    • Lymphocytes, Absolute 06/28/2022 3.52 (A)   • Monocytes, Absolute 06/28/2022 0.50    • Eosinophils, Absolute 06/28/2022 0.16    • Basophils, Absolute 06/28/2022 0.05    • Extra Tube 06/28/2022 hold for add-on    • Extra Tube 06/28/2022 hold for add-on    • Extra Tube 06/28/2022 Hold for add-ons.    • Extra Tube 06/28/2022 Hold for add-ons.    Office Visit on 06/03/2022   Component Date Value   • Urine Culture 06/03/2022 >100,000 CFU/mL Escherichia coli (A)   • Color 06/03/2022 Yellow    • Clarity, UA 06/03/2022 Clear    • Glucose, UA 06/03/2022 Negative     • Bilirubin 06/03/2022 Negative    • Ketones, UA 06/03/2022 Negative    • Specific Gravity  06/03/2022 1.010    • Blood, UA 06/03/2022 Large (A)   • pH, Urine 06/03/2022 7.5    • Protein, POC 06/03/2022 Negative    • Urobilinogen, UA 06/03/2022 Normal    • Leukocytes 06/03/2022 Trace (A)   • Nitrite, UA 06/03/2022 Negative    Consult on 05/20/2022   Component Date Value   • Erythropoietin 05/20/2022 5.6    • JAK2 [id number] Mutation 05/20/2022 Comment    • Background: 05/20/2022 Comment    • Director Review 05/20/2022 Comment    • Uric Acid 05/20/2022 5.5    • LDH 05/20/2022 185    • Specimen Type 05/20/2022 BLOOD    • Cells Counted 05/20/2022 200    • Cells Analyzed 05/20/2022 200    • FISH Result 05/20/2022 Comment:    • Interpretation 05/20/2022 Comment:    • Director Review 05/20/2022 Comment:    • Vitamin B-12 05/20/2022 621    • Sed Rate 05/20/2022 28    • Glucose 05/20/2022 81    • BUN 05/20/2022 11    • Creatinine 05/20/2022 0.61    • Sodium 05/20/2022 141    • Potassium 05/20/2022 4.3    • Chloride 05/20/2022 101    • CO2 05/20/2022 27.0    • Calcium 05/20/2022 9.8    • Total Protein 05/20/2022 7.8    • Albumin 05/20/2022 4.40    • ALT (SGPT) 05/20/2022 30    • AST (SGOT) 05/20/2022 30    • Alkaline Phosphatase 05/20/2022 101    • Total Bilirubin 05/20/2022 0.4    • Globulin 05/20/2022 3.4    • A/G Ratio 05/20/2022 1.3    • BUN/Creatinine Ratio 05/20/2022 18.0    • Anion Gap 05/20/2022 13.0    • eGFR 05/20/2022 104.4    • CHRISTOPHE Direct 05/20/2022 Positive (A)   • Pathology Review 05/20/2022 Yes    • Final Diagnosis 05/20/2022                      Value:This result contains rich text formatting which cannot be displayed here.   • Case Report 05/20/2022                      Value:Surgical Pathology Report                         Case: [id number]                                  Authorizing Provider:  Cheri Prabhakar MD Collected:           05/20/2022 11:41 AM          Ordering Location:     [facility]  MEDICAL     Received:            05/23/2022 08:03 AM                                 GROUP HEMATOLOGY &                                                                                  ONCOLOGY                                                                     Pathologist:           South Reyna MD                                                             Specimen:    Blood, Venous Line                                                                        • Anti-DNA (DS) Ab Qn 05/20/2022 13 (A)   • See below: 05/20/2022 Comment    Lab on 05/20/2022   Component Date Value   • WBC 05/20/2022 14.86 (A)   • RBC 05/20/2022 5.73 (A)   • Hemoglobin 05/20/2022 17.9 (A)   • Hematocrit 05/20/2022 53.2 (A)   • MCV 05/20/2022 92.8    • MCH 05/20/2022 31.2    • MCHC 05/20/2022 33.6    • RDW 05/20/2022 14.7    • RDW-SD 05/20/2022 48.9    • MPV 05/20/2022 10.3    • Platelets 05/20/2022 222    • Neutrophil % 05/20/2022 62.7    • Lymphocyte % 05/20/2022 30.5    • Monocyte % 05/20/2022 4.8 (A)   • Eosinophil % 05/20/2022 1.5    • Basophil % 05/20/2022 0.5    • Neutrophils, Absolute 05/20/2022 9.32 (A)   • Lymphocytes, Absolute 05/20/2022 4.53 (A)   • Monocytes, Absolute 05/20/2022 0.71    • Eosinophils, Absolute 05/20/2022 0.23    • Basophils, Absolute 05/20/2022 0.07    Lab on 04/29/2022   Component Date Value   • Glucose 04/29/2022 95    • BUN 04/29/2022 8    • Creatinine 04/29/2022 0.71    • Sodium 04/29/2022 140    • Potassium 04/29/2022 4.3    • Chloride 04/29/2022 104    • CO2 04/29/2022 24.0    • Calcium 04/29/2022 9.1    • Total Protein 04/29/2022 6.6    • Albumin 04/29/2022 4.30    • ALT (SGPT) 04/29/2022 27    • AST (SGOT) 04/29/2022 23    • Alkaline Phosphatase 04/29/2022 86    • Total Bilirubin 04/29/2022 0.3    • Globulin 04/29/2022 2.3    • A/G Ratio 04/29/2022 1.9    • BUN/Creatinine Ratio 04/29/2022 11.3    • Anion Gap 04/29/2022 12.0    • eGFR 04/29/2022 99.3    Appointment on 03/17/2022   Component Date Value    • WBC 03/17/2022 14.37 (A)   • RBC 03/17/2022 5.49 (A)   • Hemoglobin 03/17/2022 16.8 (A)   • Hematocrit 03/17/2022 50.2 (A)   • MCV 03/17/2022 91.4    • MCH 03/17/2022 30.6    • MCHC 03/17/2022 33.5    • RDW 03/17/2022 13.6    • RDW-SD 03/17/2022 46.3    • MPV 03/17/2022 10.9    • Platelets 03/17/2022 279    • Glucose 03/17/2022 88    • BUN 03/17/2022 11    • Creatinine 03/17/2022 0.79    • Sodium 03/17/2022 139    • Potassium 03/17/2022 4.3    • Chloride 03/17/2022 102    • CO2 03/17/2022 28.0    • Calcium 03/17/2022 9.6    • Total Protein 03/17/2022 7.6    • Albumin 03/17/2022 4.70    • ALT (SGPT) 03/17/2022 38 (A)   • AST (SGOT) 03/17/2022 27    • Alkaline Phosphatase 03/17/2022 87    • Total Bilirubin 03/17/2022 0.6    • Globulin 03/17/2022 2.9    • A/G Ratio 03/17/2022 1.6    • BUN/Creatinine Ratio 03/17/2022 13.9    • Anion Gap 03/17/2022 9.0    • eGFR 03/17/2022 87.4    • Total Cholesterol 03/17/2022 194    • Triglycerides 03/17/2022 221 (A)   • HDL Cholesterol 03/17/2022 40    • LDL Cholesterol  03/17/2022 115 (A)   • VLDL Cholesterol 03/17/2022 39    • LDL/HDL Ratio 03/17/2022 2.75    • Hemoglobin A1C 03/17/2022 6.4 (A)     Recent labs reviewed and interpreted for clinical significance and application            Level of Care:           Alexandro Farah MD  08/31/22  .

## 2022-09-21 RX ORDER — ERGOCALCIFEROL 1.25 MG/1
CAPSULE ORAL
Qty: 12 CAPSULE | Refills: 1 | Status: SHIPPED | OUTPATIENT
Start: 2022-09-21 | End: 2023-04-04 | Stop reason: SDUPTHER

## 2022-09-22 ENCOUNTER — CLINICAL SUPPORT (OUTPATIENT)
Dept: FAMILY MEDICINE CLINIC | Facility: CLINIC | Age: 57
End: 2022-09-22

## 2022-09-22 DIAGNOSIS — I10 ESSENTIAL HYPERTENSION: Primary | ICD-10-CM

## 2022-09-22 PROCEDURE — 80053 COMPREHEN METABOLIC PANEL: CPT | Performed by: NURSE PRACTITIONER

## 2022-09-22 PROCEDURE — 36415 COLL VENOUS BLD VENIPUNCTURE: CPT | Performed by: NURSE PRACTITIONER

## 2022-09-22 NOTE — PROGRESS NOTES
Venipuncture Blood Specimen Collection  Venipuncture performed in the right arm by Gaviota Tan MA with good hemostasis. Patient tolerated the procedure well without complications.   09/22/22   Gaviota Tan MA

## 2022-09-23 LAB
ALBUMIN SERPL-MCNC: 4.3 G/DL (ref 3.5–5.2)
ALBUMIN/GLOB SERPL: 1.6 G/DL
ALP SERPL-CCNC: 90 U/L (ref 39–117)
ALT SERPL W P-5'-P-CCNC: 44 U/L (ref 1–33)
ANION GAP SERPL CALCULATED.3IONS-SCNC: 9.9 MMOL/L (ref 5–15)
AST SERPL-CCNC: 35 U/L (ref 1–32)
BILIRUB SERPL-MCNC: 0.4 MG/DL (ref 0–1.2)
BUN SERPL-MCNC: 9 MG/DL (ref 6–20)
BUN/CREAT SERPL: 12.7 (ref 7–25)
CALCIUM SPEC-SCNC: 9.4 MG/DL (ref 8.6–10.5)
CHLORIDE SERPL-SCNC: 104 MMOL/L (ref 98–107)
CO2 SERPL-SCNC: 28.1 MMOL/L (ref 22–29)
CREAT SERPL-MCNC: 0.71 MG/DL (ref 0.57–1)
EGFRCR SERPLBLD CKD-EPI 2021: 98.7 ML/MIN/1.73
GLOBULIN UR ELPH-MCNC: 2.7 GM/DL
GLUCOSE SERPL-MCNC: 109 MG/DL (ref 65–99)
POTASSIUM SERPL-SCNC: 4.3 MMOL/L (ref 3.5–5.2)
PROT SERPL-MCNC: 7 G/DL (ref 6–8.5)
SODIUM SERPL-SCNC: 142 MMOL/L (ref 136–145)

## 2022-09-29 ENCOUNTER — OFFICE VISIT (OUTPATIENT)
Dept: FAMILY MEDICINE CLINIC | Facility: CLINIC | Age: 57
End: 2022-09-29

## 2022-09-29 VITALS
HEIGHT: 69 IN | SYSTOLIC BLOOD PRESSURE: 132 MMHG | HEART RATE: 77 BPM | DIASTOLIC BLOOD PRESSURE: 78 MMHG | OXYGEN SATURATION: 98 % | WEIGHT: 205 LBS | BODY MASS INDEX: 30.36 KG/M2

## 2022-09-29 DIAGNOSIS — R39.9 UTI SYMPTOMS: ICD-10-CM

## 2022-09-29 DIAGNOSIS — M54.42 CHRONIC MIDLINE LOW BACK PAIN WITH BILATERAL SCIATICA: ICD-10-CM

## 2022-09-29 DIAGNOSIS — E78.2 MIXED HYPERLIPIDEMIA: ICD-10-CM

## 2022-09-29 DIAGNOSIS — E11.9 DIET-CONTROLLED DIABETES MELLITUS: ICD-10-CM

## 2022-09-29 DIAGNOSIS — Z12.31 BREAST CANCER SCREENING BY MAMMOGRAM: ICD-10-CM

## 2022-09-29 DIAGNOSIS — R74.8 ELEVATED LIVER ENZYMES: Primary | ICD-10-CM

## 2022-09-29 DIAGNOSIS — D72.829 LEUKOCYTOSIS, UNSPECIFIED TYPE: ICD-10-CM

## 2022-09-29 DIAGNOSIS — G89.29 CHRONIC MIDLINE LOW BACK PAIN WITH BILATERAL SCIATICA: ICD-10-CM

## 2022-09-29 DIAGNOSIS — Z00.00 PREVENTATIVE HEALTH CARE: ICD-10-CM

## 2022-09-29 DIAGNOSIS — M54.41 CHRONIC MIDLINE LOW BACK PAIN WITH BILATERAL SCIATICA: ICD-10-CM

## 2022-09-29 DIAGNOSIS — J44.9 CHRONIC OBSTRUCTIVE PULMONARY DISEASE, UNSPECIFIED COPD TYPE: ICD-10-CM

## 2022-09-29 DIAGNOSIS — R91.1 NODULE OF UPPER LOBE OF RIGHT LUNG: ICD-10-CM

## 2022-09-29 DIAGNOSIS — Z23 INFLUENZA VACCINE ADMINISTERED: ICD-10-CM

## 2022-09-29 DIAGNOSIS — I10 ESSENTIAL HYPERTENSION: ICD-10-CM

## 2022-09-29 LAB
BILIRUB BLD-MCNC: NEGATIVE MG/DL
CLARITY, POC: CLEAR
COLOR UR: YELLOW
GLUCOSE UR STRIP-MCNC: NEGATIVE MG/DL
HBA1C MFR BLD: 6.6 % (ref 3.5–5.6)
KETONES UR QL: NEGATIVE
LEUKOCYTE EST, POC: NEGATIVE
NITRITE UR-MCNC: NEGATIVE MG/ML
PH UR: 5 [PH] (ref 5–8)
PROT UR STRIP-MCNC: NEGATIVE MG/DL
RBC # UR STRIP: ABNORMAL /UL
SP GR UR: 1.03 (ref 1–1.03)
UROBILINOGEN UR QL: NORMAL

## 2022-09-29 PROCEDURE — 83036 HEMOGLOBIN GLYCOSYLATED A1C: CPT | Performed by: NURSE PRACTITIONER

## 2022-09-29 PROCEDURE — 84443 ASSAY THYROID STIM HORMONE: CPT | Performed by: NURSE PRACTITIONER

## 2022-09-29 PROCEDURE — G0008 ADMIN INFLUENZA VIRUS VAC: HCPCS | Performed by: NURSE PRACTITIONER

## 2022-09-29 PROCEDURE — 80053 COMPREHEN METABOLIC PANEL: CPT | Performed by: NURSE PRACTITIONER

## 2022-09-29 PROCEDURE — 80061 LIPID PANEL: CPT | Performed by: NURSE PRACTITIONER

## 2022-09-29 PROCEDURE — 90686 IIV4 VACC NO PRSV 0.5 ML IM: CPT | Performed by: NURSE PRACTITIONER

## 2022-09-29 PROCEDURE — 81002 URINALYSIS NONAUTO W/O SCOPE: CPT | Performed by: NURSE PRACTITIONER

## 2022-09-29 PROCEDURE — 85027 COMPLETE CBC AUTOMATED: CPT | Performed by: NURSE PRACTITIONER

## 2022-09-29 PROCEDURE — 99214 OFFICE O/P EST MOD 30 MIN: CPT | Performed by: NURSE PRACTITIONER

## 2022-09-29 RX ORDER — ALBUTEROL SULFATE 90 UG/1
2 AEROSOL, METERED RESPIRATORY (INHALATION) EVERY 4 HOURS PRN
Qty: 18 G | Refills: 5 | Status: SHIPPED | OUTPATIENT
Start: 2022-09-29 | End: 2022-12-28 | Stop reason: SDUPTHER

## 2022-09-29 RX ORDER — PREGABALIN 100 MG/1
100 CAPSULE ORAL 2 TIMES DAILY
Qty: 90 CAPSULE | Refills: 1 | Status: SHIPPED | OUTPATIENT
Start: 2022-09-29

## 2022-09-29 RX ORDER — FENOFIBRATE 145 MG/1
145 TABLET, COATED ORAL DAILY
Qty: 90 TABLET | Refills: 1 | Status: SHIPPED | OUTPATIENT
Start: 2022-09-29 | End: 2023-04-04 | Stop reason: SDUPTHER

## 2022-09-29 NOTE — PROGRESS NOTES
"Chief Complaint  Follow-up (6 month follow up HTN) and Urinary Tract Infection (Pain with urination and strong smell )    Subjective        Rahel Ramsey presents to [facility]  History of Present Illness       HTN, stable on meds and takes as directed, denies chest pain, headache, shortness of air, palpitations and swelling of extremities.      Hyperlipidemia, The patient denies muscle aches, constipation, diarrhea, GI upset, fatigue, chest pain/pressure, exercise intolerance, dyspnea, palpitations, syncope and pedal edema.       COPD: unchanged, using inhalers as directed.  She is still smoking. She is following with Dr. Easton for a right upper lobe perihilar lung nodule measuring 9 x 7 mm 11/12/2020. repeat CT planned next week. PET scan has been negative     Diet controlled Diabetes mellitus type II, not currently on medications.  Denies any signs/symptoms of hyper/hypoglycemia, blurry vision, polydipsia, polyuria, nocturia, and unintentional weight loss     Chronic low back pain, stable on Lyrica, continues to complain of occasional facial numbness and tingling, she has also discussed with cardiology and thought to not be a a cardiac symptom.  Follows with pain mgmt Dr. Dixon.       Hammertoes of right third and fourth toes, with pain, thick toenails, following with podiatry    Leukocytosis, chronic, persistent with elevated hemoglobin.    She has returned to hematology and work-up revealed a positive CHRISTOPHE, she has been referred to rheumatology and was seen on 9/6/2022 with further lab work-up pending.     Today she complains of dysuria, urine dip collected.  Had urinary tract infection in June with E. coli, treated with Cipro    Colonoscopy over due earlier this year.       Objective   Vital Signs:  /78 (BP Location: Left arm, Patient Position: Sitting, Cuff Size: Adult)   Pulse 77   Ht 175.3 cm (69\")   Wt 93 kg (205 lb)   SpO2 98%   BMI 30.27 kg/m²   Estimated body " "mass index is 30.27 kg/m² as calculated from the following:    Height as of this encounter: 175.3 cm (69\").    Weight as of this encounter: 93 kg (205 lb).    BMI is >= 30 and <35. (Class 1 Obesity). The following options were offered after discussion;: exercise counseling/recommendations and nutrition counseling/recommendations      Physical Exam  Constitutional:       General: She is not in acute distress.     Appearance: Normal appearance. She is not ill-appearing.   HENT:      Head: Normocephalic.   Cardiovascular:      Rate and Rhythm: Normal rate and regular rhythm.   Pulmonary:      Effort: Pulmonary effort is normal. No respiratory distress.      Breath sounds: Normal breath sounds. No wheezing or rhonchi.   Abdominal:      General: Abdomen is flat. Bowel sounds are normal.      Palpations: Abdomen is soft.      Tenderness: There is no right CVA tenderness or left CVA tenderness.   Musculoskeletal:         General: Tenderness (chronic pain, L-spine good rom) present. Normal range of motion.      Cervical back: Neck supple.      Right lower leg: No edema.      Left lower leg: No edema.   Skin:     General: Skin is warm and dry.   Neurological:      General: No focal deficit present.      Mental Status: She is alert and oriented to person, place, and time. Mental status is at baseline.      Sensory: Sensory deficit present.      Motor: Weakness present.      Gait: Gait is intact.   Psychiatric:         Attention and Perception: Attention normal.         Mood and Affect: Mood normal.         Speech: Speech normal.        Result Review :                Assessment and Plan   Diagnoses and all orders for this visit:    1. Elevated liver enzymes (Primary)  Comments:  Recheck today    2. Mixed hyperlipidemia  Comments:  Ordered, continue fenofibrate and healthy heart diet.    Consider statin  Orders:  -     fenofibrate (TRICOR) 145 MG tablet; Take 1 tablet by mouth Daily.  Dispense: 90 tablet; Refill: 1    3. " Chronic midline low back pain with bilateral sciatica  Comments:  Stable, keep follow-up with pain mgmt.   Decrease Lyrica to 100 mg daily to determine if cause of facial numbness.   can not rocio dulox or em d/t UTI.   Orders:  -     pregabalin (Lyrica) 100 MG capsule; Take 1 capsule by mouth 2 (Two) Times a Day.  Dispense: 90 capsule; Refill: 1    4. UTI symptoms  Comments:  Urine dip negative, increase water intake to 2 L/day.   Limit caffeine, carbonation, citrus and chocolate  Orders:  -     POCT urinalysis dipstick, manual    5. Essential hypertension  Comments:  BP stable, continue medications per cardiology  Orders:  -     Lipid Panel  -     Comprehensive Metabolic Panel  -     CBC (No Diff)  -     TSH    6. Leukocytosis, unspecified type  Comments:  Chronic, continue with hematology and newly following with rheumatology for +ANA    7. Diet-controlled diabetes mellitus (HCC)  Comments:  A1c today, continue to work on diabetic diet  Orders:  -     Hemoglobin A1c    8. Chronic obstructive pulmonary disease, unspecified COPD type (HCC)  Comments:  Keep follow-ups with pulmonology  continue inhalers  Recommend smoking cessation  Orders:  -     albuterol sulfate  (90 Base) MCG/ACT inhaler; Inhale 2 puffs Every 4 (Four) Hours As Needed for Wheezing or Shortness of Air.  Dispense: 18 g; Refill: 5    9. Nodule of upper lobe of right lung  Comments:  Repeat CT planned soon  Keep follow-ups with pulmonology    10. Influenza vaccine administered    11. Breast cancer screening by mammogram  -     Mammo Screening Digital Tomosynthesis Bilateral With CAD; Future    12. Preventative health care  Comments:  Mammogram ordered  Flu shot today  Patient to call and reschedule colonoscopy, overdue earlier this year.     Other orders  -     FluLaval/Fluarix/Fluzone >6 Months           I spent 30 minutes caring for Marana on this date of service. This time includes time spent by me in the following activities:preparing for  the visit, reviewing tests, performing a medically appropriate examination and/or evaluation , counseling and educating the patient/family/caregiver, ordering medications, tests, or procedures and documenting information in the medical record  Follow Up   Return in about 6 months (around 3/29/2023) for Recheck. DM panel and TSh prior to appt.  Patient was given instructions and counseling regarding her condition or for health maintenance advice. Please see specific information pulled into the AVS if appropriate.     EMR Dragon transcription disclaimer:  Part of this note may be an electronic transcription/translation of spoken language to printed text using the Dragon Dictation System.

## 2022-09-29 NOTE — PROGRESS NOTES
Venipuncture Blood Specimen Collection  Venipuncture performed in right arm by Gaviota Tan MA with good hemostasis. Patient tolerated the procedure well without complications.   09/29/22   Gaviota Tan MA

## 2022-09-30 LAB
ALBUMIN SERPL-MCNC: 4.3 G/DL (ref 3.5–5.2)
ALBUMIN/GLOB SERPL: 1.9 G/DL
ALP SERPL-CCNC: 81 U/L (ref 39–117)
ALT SERPL W P-5'-P-CCNC: 43 U/L (ref 1–33)
ANION GAP SERPL CALCULATED.3IONS-SCNC: 13.4 MMOL/L (ref 5–15)
AST SERPL-CCNC: 34 U/L (ref 1–32)
BILIRUB SERPL-MCNC: 0.3 MG/DL (ref 0–1.2)
BUN SERPL-MCNC: 9 MG/DL (ref 6–20)
BUN/CREAT SERPL: 14.8 (ref 7–25)
CALCIUM SPEC-SCNC: 9.4 MG/DL (ref 8.6–10.5)
CHLORIDE SERPL-SCNC: 102 MMOL/L (ref 98–107)
CHOLEST SERPL-MCNC: 178 MG/DL (ref 0–200)
CO2 SERPL-SCNC: 23.6 MMOL/L (ref 22–29)
CREAT SERPL-MCNC: 0.61 MG/DL (ref 0.57–1)
DEPRECATED RDW RBC AUTO: 43.2 FL (ref 37–54)
EGFRCR SERPLBLD CKD-EPI 2021: 103.8 ML/MIN/1.73
ERYTHROCYTE [DISTWIDTH] IN BLOOD BY AUTOMATED COUNT: 13.4 % (ref 12.3–15.4)
GLOBULIN UR ELPH-MCNC: 2.3 GM/DL
GLUCOSE SERPL-MCNC: 99 MG/DL (ref 65–99)
HCT VFR BLD AUTO: 44.4 % (ref 34–46.6)
HDLC SERPL-MCNC: 33 MG/DL (ref 40–60)
HGB BLD-MCNC: 15.8 G/DL (ref 12–15.9)
LDLC SERPL CALC-MCNC: 101 MG/DL (ref 0–100)
LDLC/HDLC SERPL: 2.82 {RATIO}
MCH RBC QN AUTO: 31.3 PG (ref 26.6–33)
MCHC RBC AUTO-ENTMCNC: 35.6 G/DL (ref 31.5–35.7)
MCV RBC AUTO: 88.1 FL (ref 79–97)
PLATELET # BLD AUTO: 216 10*3/MM3 (ref 140–450)
PMV BLD AUTO: 10.8 FL (ref 6–12)
POTASSIUM SERPL-SCNC: 4.4 MMOL/L (ref 3.5–5.2)
PROT SERPL-MCNC: 6.6 G/DL (ref 6–8.5)
RBC # BLD AUTO: 5.04 10*6/MM3 (ref 3.77–5.28)
SODIUM SERPL-SCNC: 139 MMOL/L (ref 136–145)
TRIGL SERPL-MCNC: 260 MG/DL (ref 0–150)
TSH SERPL DL<=0.05 MIU/L-ACNC: 1.43 UIU/ML (ref 0.27–4.2)
VLDLC SERPL-MCNC: 44 MG/DL (ref 5–40)
WBC NRBC COR # BLD: 10.64 10*3/MM3 (ref 3.4–10.8)

## 2022-10-17 ENCOUNTER — HOSPITAL ENCOUNTER (OUTPATIENT)
Dept: MAMMOGRAPHY | Facility: HOSPITAL | Age: 57
Discharge: HOME | End: 2022-10-17
Admitting: NURSE PRACTITIONER

## 2022-10-17 ENCOUNTER — APPOINTMENT (OUTPATIENT)
Dept: OTHER | Facility: HOSPITAL | Age: 57
End: 2022-10-17

## 2022-10-17 DIAGNOSIS — Z00.6 EXAMINATION FOR NORMAL COMPARISON OR CONTROL IN CLINICAL RESEARCH: ICD-10-CM

## 2022-10-17 DIAGNOSIS — Z12.31 BREAST CANCER SCREENING BY MAMMOGRAM: ICD-10-CM

## 2022-10-17 PROCEDURE — 77067 SCR MAMMO BI INCL CAD: CPT

## 2022-10-17 PROCEDURE — 77063 BREAST TOMOSYNTHESIS BI: CPT

## 2022-10-27 ENCOUNTER — TELEPHONE (OUTPATIENT)
Dept: ONCOLOGY | Facility: CLINIC | Age: 57
End: 2022-10-27

## 2022-10-27 NOTE — TELEPHONE ENCOUNTER
Caller: Piotr Ramsey    Relationship: Self    Best call back number: [phone]    What was the call regarding: PIOTR CALLED TO SAY THAT SHE IS HAVING A LUNG NEEDLE BIOPSY ON Wednesday. THEY WANT HER TO HOLD HER BLOOD THINNER FOR 5 DAYS PRIOR, SO SHE IS NEEDING A CALL BACK TO DISCUSS.    Do you require a callback: YES

## 2022-10-28 NOTE — TELEPHONE ENCOUNTER
Verified with Leona , that pt is ok to stop Aspirin 81 mg 5 days prior to procedure. Pt was notified, she verbalized understanding. She was instructed to restart the Aspirin based on what the IR tells her to .

## 2022-11-02 ENCOUNTER — HOSPITAL ENCOUNTER (OUTPATIENT)
Dept: GENERAL RADIOLOGY | Facility: HOSPITAL | Age: 57
Discharge: HOME | End: 2022-11-02

## 2022-11-02 ENCOUNTER — HOSPITAL ENCOUNTER (OUTPATIENT)
Dept: CT IMAGING | Facility: HOSPITAL | Age: 57
Discharge: HOME | End: 2022-11-02

## 2022-11-02 VITALS
OXYGEN SATURATION: 95 % | DIASTOLIC BLOOD PRESSURE: 59 MMHG | HEIGHT: 69 IN | WEIGHT: 205 LBS | TEMPERATURE: 98.5 F | RESPIRATION RATE: 16 BRPM | BODY MASS INDEX: 30.36 KG/M2 | SYSTOLIC BLOOD PRESSURE: 113 MMHG | HEART RATE: 80 BPM

## 2022-11-02 DIAGNOSIS — R91.8 MASS OF UPPER LOBE OF RIGHT LUNG: ICD-10-CM

## 2022-11-02 LAB
APTT PPP: 32.7 SECONDS (ref 24–31)
BASOPHILS # BLD AUTO: 0.1 10*3/MM3 (ref 0–0.2)
BASOPHILS NFR BLD AUTO: 1.1 % (ref 0–1.5)
DEPRECATED RDW RBC AUTO: 47.3 FL (ref 37–54)
EOSINOPHIL # BLD AUTO: 0.2 10*3/MM3 (ref 0–0.4)
EOSINOPHIL NFR BLD AUTO: 2.1 % (ref 0.3–6.2)
ERYTHROCYTE [DISTWIDTH] IN BLOOD BY AUTOMATED COUNT: 14.2 % (ref 12.3–15.4)
HCT VFR BLD AUTO: 47.2 % (ref 34–46.6)
HGB BLD-MCNC: 16.1 G/DL (ref 12–15.9)
INR PPP: 1.02 (ref 0.93–1.1)
LYMPHOCYTES # BLD AUTO: 3.6 10*3/MM3 (ref 0.7–3.1)
LYMPHOCYTES NFR BLD AUTO: 32.6 % (ref 19.6–45.3)
MCH RBC QN AUTO: 30.7 PG (ref 26.6–33)
MCHC RBC AUTO-ENTMCNC: 34.1 G/DL (ref 31.5–35.7)
MCV RBC AUTO: 90.1 FL (ref 79–97)
MONOCYTES # BLD AUTO: 0.6 10*3/MM3 (ref 0.1–0.9)
MONOCYTES NFR BLD AUTO: 5.1 % (ref 5–12)
NEUTROPHILS NFR BLD AUTO: 59.1 % (ref 42.7–76)
NEUTROPHILS NFR BLD AUTO: 6.5 10*3/MM3 (ref 1.7–7)
NRBC BLD AUTO-RTO: 0.1 /100 WBC (ref 0–0.2)
PLATELET # BLD AUTO: 300 10*3/MM3 (ref 140–450)
PMV BLD AUTO: 7.3 FL (ref 6–12)
PROTHROMBIN TIME: 10.5 SECONDS (ref 9.6–11.7)
RBC # BLD AUTO: 5.23 10*6/MM3 (ref 3.77–5.28)
WBC NRBC COR # BLD: 11 10*3/MM3 (ref 3.4–10.8)

## 2022-11-02 PROCEDURE — 85025 COMPLETE CBC W/AUTO DIFF WBC: CPT | Performed by: RADIOLOGY

## 2022-11-02 PROCEDURE — 25010000002 ONDANSETRON PER 1 MG: Performed by: RADIOLOGY

## 2022-11-02 PROCEDURE — 85730 THROMBOPLASTIN TIME PARTIAL: CPT | Performed by: RADIOLOGY

## 2022-11-02 PROCEDURE — 99152 MOD SED SAME PHYS/QHP 5/>YRS: CPT

## 2022-11-02 PROCEDURE — 85610 PROTHROMBIN TIME: CPT | Performed by: RADIOLOGY

## 2022-11-02 PROCEDURE — 88334 PATH CONSLTJ SURG CYTO XM EA: CPT | Performed by: INTERNAL MEDICINE

## 2022-11-02 PROCEDURE — 71045 X-RAY EXAM CHEST 1 VIEW: CPT

## 2022-11-02 PROCEDURE — 25010000002 FENTANYL CITRATE (PF) 50 MCG/ML SOLUTION: Performed by: RADIOLOGY

## 2022-11-02 PROCEDURE — 88305 TISSUE EXAM BY PATHOLOGIST: CPT | Performed by: INTERNAL MEDICINE

## 2022-11-02 PROCEDURE — 88333 PATH CONSLTJ SURG CYTO XM 1: CPT | Performed by: INTERNAL MEDICINE

## 2022-11-02 PROCEDURE — 0 LIDOCAINE 1 % SOLUTION: Performed by: RADIOLOGY

## 2022-11-02 PROCEDURE — 25010000002 MIDAZOLAM PER 1 MG: Performed by: RADIOLOGY

## 2022-11-02 RX ORDER — SODIUM CHLORIDE 0.9 % (FLUSH) 0.9 %
10 SYRINGE (ML) INJECTION AS NEEDED
Status: DISCONTINUED | OUTPATIENT
Start: 2022-11-02 | End: 2022-11-03 | Stop reason: HOSPADM

## 2022-11-02 RX ORDER — ONDANSETRON 2 MG/ML
INJECTION INTRAMUSCULAR; INTRAVENOUS AS NEEDED
Status: COMPLETED | OUTPATIENT
Start: 2022-11-02 | End: 2022-11-02

## 2022-11-02 RX ORDER — SODIUM CHLORIDE 9 MG/ML
75 INJECTION, SOLUTION INTRAVENOUS CONTINUOUS
Status: DISCONTINUED | OUTPATIENT
Start: 2022-11-02 | End: 2022-11-03 | Stop reason: HOSPADM

## 2022-11-02 RX ORDER — HYDROCODONE BITARTRATE AND ACETAMINOPHEN 5; 325 MG/1; MG/1
2 TABLET ORAL EVERY 6 HOURS PRN
Status: DISCONTINUED | OUTPATIENT
Start: 2022-11-02 | End: 2022-11-03 | Stop reason: HOSPADM

## 2022-11-02 RX ORDER — LIDOCAINE HYDROCHLORIDE 10 MG/ML
INJECTION, SOLUTION INFILTRATION; PERINEURAL AS NEEDED
Status: COMPLETED | OUTPATIENT
Start: 2022-11-02 | End: 2022-11-02

## 2022-11-02 RX ORDER — HYDROCODONE BITARTRATE AND ACETAMINOPHEN 5; 325 MG/1; MG/1
TABLET ORAL
Status: DISPENSED
Start: 2022-11-02 | End: 2022-11-02

## 2022-11-02 RX ORDER — FENTANYL CITRATE 50 UG/ML
INJECTION, SOLUTION INTRAMUSCULAR; INTRAVENOUS AS NEEDED
Status: COMPLETED | OUTPATIENT
Start: 2022-11-02 | End: 2022-11-02

## 2022-11-02 RX ORDER — ACETAMINOPHEN 325 MG/1
650 TABLET ORAL EVERY 4 HOURS PRN
Status: DISCONTINUED | OUTPATIENT
Start: 2022-11-02 | End: 2022-11-03 | Stop reason: HOSPADM

## 2022-11-02 RX ORDER — SODIUM CHLORIDE 0.9 % (FLUSH) 0.9 %
10 SYRINGE (ML) INJECTION EVERY 12 HOURS SCHEDULED
Status: DISCONTINUED | OUTPATIENT
Start: 2022-11-02 | End: 2022-11-03 | Stop reason: HOSPADM

## 2022-11-02 RX ORDER — MIDAZOLAM HYDROCHLORIDE 1 MG/ML
INJECTION INTRAMUSCULAR; INTRAVENOUS AS NEEDED
Status: COMPLETED | OUTPATIENT
Start: 2022-11-02 | End: 2022-11-02

## 2022-11-02 RX ADMIN — Medication 10 ML: at 09:08

## 2022-11-02 RX ADMIN — ONDANSETRON 4 MG: 2 INJECTION INTRAMUSCULAR; INTRAVENOUS at 08:39

## 2022-11-02 RX ADMIN — LIDOCAINE HYDROCHLORIDE 9 ML: 10 INJECTION, SOLUTION INFILTRATION; PERINEURAL at 09:00

## 2022-11-02 RX ADMIN — HYDROCODONE BITARTRATE AND ACETAMINOPHEN 2 TABLET: 5; 325 TABLET ORAL at 11:40

## 2022-11-02 RX ADMIN — FENTANYL CITRATE 100 MCG: 50 INJECTION, SOLUTION INTRAMUSCULAR; INTRAVENOUS at 08:57

## 2022-11-02 RX ADMIN — MIDAZOLAM 1 MG: 1 INJECTION INTRAMUSCULAR; INTRAVENOUS at 08:57

## 2022-11-02 NOTE — NURSING NOTE
Local dressing of betadine jelly, 2x2, and tegaderm applied to pt's right upper back. Dressing is labeled, dry and intact.

## 2022-11-02 NOTE — NURSING NOTE
Pt moved self from stretcher onto IR procedure table into right decubitis position, feet first into the scanner. Pt remains on cardiac monitoring and was placed on 2L oxygen via N/C.

## 2022-11-02 NOTE — NURSING NOTE
Dr. Lee has needle in place and is beginning to obtain core right lung nodule biopsy samples for pathologist. See MD dictation for procedural specific information.

## 2022-11-02 NOTE — NURSING NOTE
Dr. Lee marked area for biopsy on right back and then area was prepped in sterile fashion using chlorhexidine scrub.

## 2022-11-02 NOTE — NURSING NOTE
Pt transferred to post op recovery, via stretcher and on room air, in stable condition. Dr. Lee to review post procedure CXR.

## 2022-11-02 NOTE — DISCHARGE INSTRUCTIONS
A responsible adult should stay with you and you should rest quietly for the rest of the day. Do not drink alcohol, drive or cook for 24 hours following your procedure.  Progress your diet as tolerated.  Resume your usual medications including aspirin.  When you remove your dressing in 48 hours, a small amount of blood is to be expected. Do not be alarmed.  If you feel it is bleeding excessively apply pressure and proceed to the Emergency room.  Do not shower, bath, or get your dressing wet at all for 48 hours.  You may shower after the dressing is removed. No lifting more that 10 pounds for 48 hours.  If severe pain, increased shortness of air or racing heartbeat occur, seek immediate medical attention.  Follow up with Dr. Easton for results.

## 2022-11-02 NOTE — NURSING NOTE
Pt moved self back onto the stretcher laying supine, with HOB elevated approx 20 degrees. Pt reports slight pain in right back with inspiration of a deep breath. Pt will be taken to xray dept for post procedure CXR.

## 2022-11-02 NOTE — NURSING NOTE
Pt was brought to CT dept via stretcher and on room air with face mask in place. Pt is awake, slightly drowsy, and oriented x4. Pt skin is flesh, warm, and dry. Pt is relaxed and cooperative. Pt is on cardiac monitor. Pt was educated on lung biopsy, right, along with medications used for procedure and voiced understanding. Pt is waiting to speak with physician.

## 2022-11-02 NOTE — NURSING NOTE
Pt discharged home with sister.  Instructions given.  All questions answered.  Pt discharged with no new complaints.

## 2022-11-03 LAB
LAB AP CASE REPORT: NORMAL
Lab: NORMAL
PATH REPORT.FINAL DX SPEC: NORMAL
PATH REPORT.GROSS SPEC: NORMAL

## 2022-11-15 ENCOUNTER — TRANSCRIBE ORDERS (OUTPATIENT)
Dept: ADMINISTRATIVE | Facility: HOSPITAL | Age: 57
End: 2022-11-15

## 2022-11-15 DIAGNOSIS — R06.00 DYSPNEA, UNSPECIFIED TYPE: Primary | ICD-10-CM

## 2022-11-17 ENCOUNTER — TELEPHONE (OUTPATIENT)
Dept: ONCOLOGY | Facility: CLINIC | Age: 57
End: 2022-11-17

## 2022-11-17 NOTE — TELEPHONE ENCOUNTER
Caller: Rahel Ramsey    Relationship: Self    Best call back number: [phone]    What is the best time to reach you: ANYTIME    Who are you requesting to speak with (clinical staff, provider,  specific staff member): DR OR NURSE        What was the call regarding: PT CALLING HAD CT XRAYS ON 11/2 POSSIBLE LUNG CANCER HAS APPT [facility] 11-28 DO YOU WANT HER TO COME IN SOONER ?   Do you require a callback: CALL TO DISCUSS        ”

## 2022-11-18 NOTE — PROGRESS NOTES
Hematology/Oncology Outpatient Consultation    Patient name: Rahel Ramsey  : 1965  MRN: [id number]  Primary Care Physician: Vickie Gomez APRN  Referring Physician: Vickie Gomez APRN  Reason For Consult:     Chief Complaint   Patient presents with   • Follow-up     Leukocytosis, unspecified type       History of Present Illness:    This is a 53-year-old female who has been noted to have leukocytosis   from routine blood count.  Patient was also recently diagnosed with skin cancer on the left lower   extremity.  She underwent wire excisional biopsy of the lesion.  Her course was complicated by   wound infection.  She has been on two rounds of antibiotics and currently on Keflex which will be   finished in another three to four days.  She denies any fevers or chills, rigors.  Patient had no   prior history of any hematologic problems.  Review of her CBC from 17 revealed WBC of 12.4,   hemoglobin 15.6, platelet count 271,000.  Differentials were 65% neutrophils, 28% lymphocytes.    There was no basophilia, eosinophilia or monocytosis.  Her chemistry panel had BUN 7, creatinine   0.7.  Alkaline phosphatase was 11.  The rest was unremarkable.  Patient had a CBC repeated on   17 with WBC of 12.6, hemoglobin 15.4, platelet count of 310,000.  Differentials were   unremarkable.  B12 (N) 441.  Folate (N) 13.9.  BUN 5, creatinine 0.7.  Normal thyroid at 0.9   [range 0.3-5.5].  Patient denied any repeated history of infection in the past.  She was alone   for this appointment on 17.  · 17 - Patient had labs done to further evaluate her leukocytosis.  Her WBC was 14.4,   hemoglobin 16.6, platelet count 312,000, ANC 9.3.  Differentials were 64% neutrophils, 28%   lymphocytes.  CHRISTOPHE was negative.  B12 was 337.  LDH (N) 159.  BUN 10, creatinine 0.7.  Uric acid   5.3 (N).  MIRTA-2 was not mutated.  No evidence of BCR-abl gene rearrangement.  Sed rate 32.    Peripheral smear showed absolute  neutrophilia and leukocytosis and polycythemia, reactive vs.   myeloproliferative condition.  · 5/9/17 - Erythropoietin level was low-normal at 3.92.    · 8/17/17 - Ultrasound of the abdomen showed normal spleen size at 11.3 cm.  There was no splenic   lesion identified.    · 4/2/18 - Erythropoietin level 3.79 [range 2.59-18.5].   · 8/24/18 - Bone marrow biopsy showed normocellular marrow with no morphologic evidence of   myeloproliferative neoplasm, but she was found to have low level MIRTA-2 point mutation detected at   less than 10%.  The low level of MIRTA-2 mutation in assessment of leukocytosis and erythrocytosis   is worrisome for early and evolving myeloproliferative neoplasm.  Interval repeat marrow has been   recommended.  BCR-abl RT-PCR was negative.  Flow cytometry was negative.  Cellularity was 50%.    Iron stain was present.  Cytogenetics also showed normal female karyotype.    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  MIRTA-2 analysis was not detected on the   current bone marrow.  Close follow up has been recommended.  Flowy cytometry was negative.    Histopathology had normal maturation sequence.  There were normal megakaryocytes.  Erythroid   maturation was complete.  Myeloid maturation was also complete.  Negative iron stain on the   marrow aspirate.  Recommendation is for close surveillance.  Absent iron stores on bone marrow.      Smoker 1/2 ppd, does not drink alcohol    See pulmonologist for lung nodule    Family hx of breast cancer with breast cancer age 69  Father with lung cancer      · Patient has been lost to follow-up since 2019.    · 6/28/2022: Follow-up appointment leukocytosis, thrombocytosis, polycythemia, CHRISTOPHE positive for SLE, anti-DNA elevated at 13.  US spleen- normal size.  · October 2022 patient was found to have increasing right upper lobe nodule of.  She is followed up with  Dr. Nath.  She had a CT-guided biopsy of this nodule on 11/2/2022 and pathology revealed invasive well to moderately differentiated adenocarcinoma with focal lipidic features    History of present illness was reviewed and is unchanged from the previous visit.       Subjective    She is here today for her routine hematology visit and also to discuss her pathology results given this new lung cancer diagnosis    Past Medical History:   Diagnosis Date   • Allergic    • Arthritis    • Atrial fibrillation (HCC)    • COPD (chronic obstructive pulmonary disease) (HCC)    • Headache    • History of herniated intervertebral disc    • History of skin cancer     Left lower extremity   • Hyperlipidemia    • Hypertension    • Injury of back    • Leukocytosis    • Lung nodule    • Urinary tract infection with hematuria 1/22/2021       Past Surgical History:   Procedure Laterality Date   • BACK SURGERY     • CARDIAC CATHETERIZATION     • DILATATION AND CURETTAGE     • LUMBAR DECOMPRESSION      L4-L5    • SKIN CANCER EXCISION Left     [place]   • SUBTOTAL HYSTERECTOMY           Current Outpatient Medications:   •  albuterol sulfate  (90 Base) MCG/ACT inhaler, Inhale 2 puffs Every 4 (Four) Hours As Needed for Wheezing or Shortness of Air., Disp: 18 g, Rfl: 5  •  amitriptyline (ELAVIL) 10 MG tablet, Take 10 mg by mouth Daily., Disp: , Rfl:   •  amitriptyline (ELAVIL) 25 MG tablet, Take 12.5 mg by mouth every night at bedtime., Disp: , Rfl: 3  •  aspirin (aspirin) 81 MG EC tablet, 81 mg., Disp: , Rfl:   •  fenofibrate (TRICOR) 145 MG tablet, Take 1 tablet by mouth Daily., Disp: 90 tablet, Rfl: 1  •  fluticasone (FLONASE) 50 MCG/ACT nasal spray, INSTILL 2 SPRAYS IN EACH NOSTRIL EVERY DAY, Disp: 48 g, Rfl: 3  •  fluticasone (FLONASE) 50 MCG/ACT nasal spray, 1 spray into the nostril(s) as directed by provider Daily., Disp: , Rfl:   •  HYDROcodone-acetaminophen (NORCO)  MG per tablet, Take 1 tablet by mouth Every 6 (Six) Hours  As Needed., Disp: , Rfl: 0  •  metFORMIN (GLUCOPHAGE) 500 MG tablet, Take 1 tablet by mouth Daily With Breakfast., Disp: 90 tablet, Rfl: 1  •  metoprolol succinate XL (TOPROL-XL) 25 MG 24 hr tablet, Take 1 tablet by mouth Daily., Disp: 90 tablet, Rfl: 1  •  naloxone (NARCAN) 4 MG/0.1ML nasal spray, CALL 911. SPR CONTENTS OF ONE SPRAYER (0.1ML) INTO ONE NOSTRIL. REPEAT IN 2-3 MIN IF SYMPTOMS OF OPIOID EMERGENCY PERSIST, ALTERNATE NOSTRILS, Disp: , Rfl:   •  nystatin (MYCOSTATIN) 068544 UNIT/GM cream, Apply 1 application topically to the appropriate area as directed As Needed., Disp: , Rfl:   •  pregabalin (Lyrica) 100 MG capsule, Take 1 capsule by mouth 2 (Two) Times a Day., Disp: 90 capsule, Rfl: 1  •  umeclidinium-vilanterol (ANORO ELLIPTA) 62.5-25 MCG/INH aerosol powder  inhaler, Inhale 1 puff Daily., Disp: , Rfl:   •  vitamin D (ERGOCALCIFEROL) 1.25 MG (92892 UT) capsule capsule, TAKE 1 CAPSULE BY MOUTH EVERY 7 DAYS, Disp: 12 capsule, Rfl: 1    Allergies   Allergen Reactions   • Morphine Anaphylaxis and Nausea And Vomiting       Immunization History   Administered Date(s) Administered   • COVID-19 (AMELIA) 03/12/2021   • Flu Vaccine Quad PF >36MO 09/17/2018   • FluLaval/Fluzone >6mos 09/03/2020, 09/29/2022   • Influenza, Unspecified 09/27/2016, 10/12/2017   • Pneumococcal Polysaccharide (PPSV23) 09/21/2021   • Tdap 05/07/2020   • flucelvax quad pfs =>4 YRS 10/03/2019       Family History   Problem Relation Age of Onset   • Breast cancer Mother    • Lung cancer Father    • Lung cancer Other    • Colon cancer Other    • Skin cancer Other        Cancer-related family history includes Breast cancer in her mother; Colon cancer in an other family member; Lung cancer in her father and another family member; Skin cancer in an other family member.    Social History     Tobacco Use   • Smoking status: Every Day     Packs/day: 1.00     Years: 40.00     Pack years: 40.00     Types: Cigarettes   • Smokeless tobacco: Never  "  Vaping Use   • Vaping Use: Never used   Substance Use Topics   • Alcohol use: Yes     Comment: rare   • Drug use: No     I have reviewed and confirmed the accuracy of the patient's history: Chief complaint, HPI, ROS and Subjective as entered by the MA/LPN/RN. Cheri Prabhakar MD 11/21/22     ROS:    Review of Systems   Constitutional: Positive for fatigue. Negative for chills and fever.   HENT: Negative.    Eyes: Negative for visual disturbance.   Respiratory: Negative for shortness of breath.    Cardiovascular: Negative for chest pain and palpitations.   Gastrointestinal: Negative for abdominal pain.   Endocrine: Negative.    Genitourinary: Negative.    Skin: Negative for rash and wound.   Neurological: Positive for weakness.   Hematological: Negative for adenopathy.   Psychiatric/Behavioral: Negative for agitation and confusion.   Chronic back pain due to disc disease    Objective:    Vitals:    11/21/22 0903   BP: 129/82   Pulse: 80   Resp: 20   Temp: 97 °F (36.1 °C)   TempSrc: Infrared   Weight: 90.7 kg (200 lb)   Height: 175.3 cm (69\")   PainSc:   7   PainLoc: Comment: neck, leg     Body mass index is 29.53 kg/m².    ECOG  (1) Restricted in physically strenuous activity, ambulatory and able to do work of light nature    Physical Exam:  Physical Exam  Vitals and nursing note reviewed.   Constitutional:       General: She is not in acute distress.     Appearance: She is not diaphoretic.   HENT:      Head: Normocephalic and atraumatic.   Eyes:      General: No scleral icterus.        Right eye: No discharge.         Left eye: No discharge.      Conjunctiva/sclera: Conjunctivae normal.   Neck:      Thyroid: No thyromegaly.   Cardiovascular:      Rate and Rhythm: Normal rate and regular rhythm.      Heart sounds: Normal heart sounds.     No friction rub. No gallop.   Pulmonary:      Effort: Pulmonary effort is normal. No respiratory distress.      Breath sounds: No stridor. No wheezing.   Abdominal:      " General: Bowel sounds are normal.      Palpations: Abdomen is soft. There is no mass.      Tenderness: There is no abdominal tenderness. There is no guarding or rebound.   Musculoskeletal:         General: No tenderness. Normal range of motion.      Cervical back: Normal range of motion and neck supple.   Lymphadenopathy:      Cervical: No cervical adenopathy.   Skin:     General: Skin is warm.      Findings: No erythema or rash.   Neurological:      Mental Status: She is alert and oriented to person, place, and time.      Motor: No abnormal muscle tone.   Psychiatric:         Behavior: Behavior normal.     I have reexamined the patient and the results are consistent with the previously documented exam. Cheri Prabhakar MD     RECENT LABS    WBC   Date Value Ref Range Status   11/21/2022 11.02 (H) 3.40 - 10.80 10*3/mm3 Final     RBC   Date Value Ref Range Status   11/21/2022 5.16 3.77 - 5.28 10*6/mm3 Final     Hemoglobin   Date Value Ref Range Status   11/21/2022 16.0 (H) 12.0 - 15.9 g/dL Final     Hematocrit   Date Value Ref Range Status   11/21/2022 49.0 (H) 34.0 - 46.6 % Final     MCV   Date Value Ref Range Status   11/21/2022 95.0 79.0 - 97.0 fL Final     MCH   Date Value Ref Range Status   11/21/2022 31.0 26.6 - 33.0 pg Final     MCHC   Date Value Ref Range Status   11/21/2022 32.7 31.5 - 35.7 g/dL Final     RDW   Date Value Ref Range Status   11/21/2022 14.4 12.3 - 15.4 % Final     RDW-SD   Date Value Ref Range Status   11/21/2022 48.7 37.0 - 54.0 fl Final     MPV   Date Value Ref Range Status   11/21/2022 9.3 6.0 - 12.0 fL Final     Platelets   Date Value Ref Range Status   11/21/2022 273 140 - 450 10*3/mm3 Final     Neutrophil %   Date Value Ref Range Status   11/21/2022 56.7 42.7 - 76.0 % Final     Lymphocyte %   Date Value Ref Range Status   11/21/2022 35.7 19.6 - 45.3 % Final     Monocyte %   Date Value Ref Range Status   11/21/2022 5.4 5.0 - 12.0 % Final     Eosinophil %   Date Value Ref Range  Status   11/21/2022 1.8 0.3 - 6.2 % Final     Basophil %   Date Value Ref Range Status   11/21/2022 0.4 0.0 - 1.5 % Final     Neutrophils, Absolute   Date Value Ref Range Status   11/21/2022 6.26 1.70 - 7.00 10*3/mm3 Final     Lymphocytes, Absolute   Date Value Ref Range Status   11/21/2022 3.93 (H) 0.70 - 3.10 10*3/mm3 Final     Monocytes, Absolute   Date Value Ref Range Status   11/21/2022 0.59 0.10 - 0.90 10*3/mm3 Final     Eosinophils, Absolute   Date Value Ref Range Status   11/21/2022 0.20 0.00 - 0.40 10*3/mm3 Final     Basophils, Absolute   Date Value Ref Range Status   11/21/2022 0.04 0.00 - 0.20 10*3/mm3 Final     nRBC   Date Value Ref Range Status   11/02/2022 0.1 0.0 - 0.2 /100 WBC Final       Lab Results   Component Value Date    GLUCOSE 99 09/29/2022    BUN 9 09/29/2022    CREATININE 0.61 09/29/2022    EGFRIFNONA 99 12/14/2021    BCR 14.8 09/29/2022    K 4.4 09/29/2022    CO2 23.6 09/29/2022    CALCIUM 9.4 09/29/2022    ALBUMIN 4.30 09/29/2022    LABIL2 1.1 12/04/2018    AST 34 (H) 09/29/2022    ALT 43 (H) 09/29/2022         Assessment & Plan   Leukocytosis, unspecified type  - CBC & Differential    Fatigue, unspecified type  - CBC & Differential      · Adenocarcinoma of the right lung, new diagnosis.  Enlarging right upper lobe nodule.  I have requested for [facility].  I have also requested for a PET CT scan.  Patient is scheduled already to see Dr. Kearney with cardiothoracic surgery to discuss surgical resection.    · Tobacco use disorder: She is working on smoking cessation      · Leukocytosis and polycythemia.  Rule out myeloproliferative disease.  She has a prior history of this in the past.  Ultrasound of spleen- normal size.  Blood counts improved with WBC of 10.14, Hgb 15.8, platelets 226-improvement in counts.  JAK2 V617F negative.  No BCR ABL arrangement.  Peripheral smear shows leukocytosis, polycythemia, no blasts May of 2022.  We will obtain flow cytometry  since she has relative lymphocytosis as well.  I have reviewed the lab results with patient.  I do not think we need to repeat a bone marrow biopsy at this time.  I have asked her to also remain on baby aspirin.  Patient does not have splenomegaly.  Her repeat flow cytometry completed in August 2022 does not show any myeloproliferative disease.    · Possible evolving myeloproliferative neoplasm with low level MIRTA-2 positive at less than 10%. BCR-abl negative.  Low normal erythropoietin level.  Status post bone marrow aspiration and         biopsy 8/24/18.  Repeat bone marrow aspiration and biopsy 2/22/19 showed translocation [2q/11q] has been associated with myeloid neoplasm, but no morphologic evidence seen on         histopathology      · CHRISTOPHE positive, anti-DNA elevated at 13 .Referred to rheumatology,  rheumatology for further evaluation          Plans    · PET CT scan as soon as possibele  · Follow up with Dr Kearney, she has an appointment scheduled November 29, 2022  · Call [facility] for [facility] CT report  · Follow up December first.  · Reiterated tobacco cessation today with patient  · Follow-up with  rheumatology  · Peripheral blood flow cytometry was negative- 8/3/2022  · Continue baby aspirin 81 mg p.o. daily  · All questions answered        I spent 40 total minutes, face-to-face, caring for Rahel today.  90% of this time involved counseling and/or coordination of care as documented within this note.

## 2022-11-21 ENCOUNTER — OFFICE VISIT (OUTPATIENT)
Dept: ONCOLOGY | Facility: CLINIC | Age: 57
End: 2022-11-21

## 2022-11-21 ENCOUNTER — TELEPHONE (OUTPATIENT)
Dept: ONCOLOGY | Facility: CLINIC | Age: 57
End: 2022-11-21

## 2022-11-21 ENCOUNTER — LAB (OUTPATIENT)
Dept: LAB | Facility: HOSPITAL | Age: 57
End: 2022-11-21

## 2022-11-21 VITALS
TEMPERATURE: 97 F | BODY MASS INDEX: 29.62 KG/M2 | WEIGHT: 200 LBS | SYSTOLIC BLOOD PRESSURE: 129 MMHG | HEIGHT: 69 IN | RESPIRATION RATE: 20 BRPM | HEART RATE: 80 BPM | DIASTOLIC BLOOD PRESSURE: 82 MMHG

## 2022-11-21 DIAGNOSIS — R91.8 OTHER NONSPECIFIC ABNORMAL FINDING OF LUNG FIELD: ICD-10-CM

## 2022-11-21 DIAGNOSIS — R53.83 FATIGUE, UNSPECIFIED TYPE: ICD-10-CM

## 2022-11-21 DIAGNOSIS — D72.829 LEUKOCYTOSIS, UNSPECIFIED TYPE: Primary | ICD-10-CM

## 2022-11-21 DIAGNOSIS — C34.90 MALIGNANT NEOPLASM OF LUNG, UNSPECIFIED LATERALITY, UNSPECIFIED PART OF LUNG: ICD-10-CM

## 2022-11-21 LAB
BASOPHILS # BLD AUTO: 0.04 10*3/MM3 (ref 0–0.2)
BASOPHILS NFR BLD AUTO: 0.4 % (ref 0–1.5)
DEPRECATED RDW RBC AUTO: 48.7 FL (ref 37–54)
EOSINOPHIL # BLD AUTO: 0.2 10*3/MM3 (ref 0–0.4)
EOSINOPHIL NFR BLD AUTO: 1.8 % (ref 0.3–6.2)
ERYTHROCYTE [DISTWIDTH] IN BLOOD BY AUTOMATED COUNT: 14.4 % (ref 12.3–15.4)
HCT VFR BLD AUTO: 49 % (ref 34–46.6)
HGB BLD-MCNC: 16 G/DL (ref 12–15.9)
HOLD SPECIMEN: NORMAL
HOLD SPECIMEN: NORMAL
LYMPHOCYTES # BLD AUTO: 3.93 10*3/MM3 (ref 0.7–3.1)
LYMPHOCYTES NFR BLD AUTO: 35.7 % (ref 19.6–45.3)
MCH RBC QN AUTO: 31 PG (ref 26.6–33)
MCHC RBC AUTO-ENTMCNC: 32.7 G/DL (ref 31.5–35.7)
MCV RBC AUTO: 95 FL (ref 79–97)
MONOCYTES # BLD AUTO: 0.59 10*3/MM3 (ref 0.1–0.9)
MONOCYTES NFR BLD AUTO: 5.4 % (ref 5–12)
NEUTROPHILS NFR BLD AUTO: 56.7 % (ref 42.7–76)
NEUTROPHILS NFR BLD AUTO: 6.26 10*3/MM3 (ref 1.7–7)
PLATELET # BLD AUTO: 273 10*3/MM3 (ref 140–450)
PMV BLD AUTO: 9.3 FL (ref 6–12)
RBC # BLD AUTO: 5.16 10*6/MM3 (ref 3.77–5.28)
WBC NRBC COR # BLD: 11.02 10*3/MM3 (ref 3.4–10.8)

## 2022-11-21 PROCEDURE — 85025 COMPLETE CBC W/AUTO DIFF WBC: CPT

## 2022-11-21 PROCEDURE — 36415 COLL VENOUS BLD VENIPUNCTURE: CPT

## 2022-11-21 PROCEDURE — 99215 OFFICE O/P EST HI 40 MIN: CPT | Performed by: INTERNAL MEDICINE

## 2022-11-21 RX ORDER — NALOXONE HYDROCHLORIDE 4 MG/.1ML
SPRAY NASAL
COMMUNITY
Start: 2022-10-19 | End: 2023-01-29

## 2022-11-21 RX ORDER — AMITRIPTYLINE HYDROCHLORIDE 10 MG/1
10 TABLET, FILM COATED ORAL DAILY
COMMUNITY
End: 2022-12-20

## 2022-11-21 RX ORDER — FLUTICASONE PROPIONATE 50 MCG
1 SPRAY, SUSPENSION (ML) NASAL DAILY
COMMUNITY
End: 2022-12-20

## 2022-11-21 NOTE — TELEPHONE ENCOUNTER
Caller: PIOTR    Relationship to patient: SELF    Best call back number: [phone]    Patient is needing: TO KNOW IF SHE CAN HAVE INJECTION IN BACK WITH PAIN MANAGEMENT THE DAY OF HER PET SCAN ON WEDNESDAY, 11-. SHE NEEDS TO KNOW IF SHE NEEDS TO R/S.

## 2022-11-22 ENCOUNTER — TELEPHONE (OUTPATIENT)
Dept: ONCOLOGY | Facility: CLINIC | Age: 57
End: 2022-11-22

## 2022-11-22 NOTE — TELEPHONE ENCOUNTER
Hub is instructed to read the documentation below to patient  ATTEMPTED TO CONTACT PATIENT REGARDING MD F/U APPT.  APPT SCHEDULED.  LMV INFORMING PATIENT OF DATE AND TIME.  INSTRUCTED PATIENT TO CALL BACK.

## 2022-11-22 NOTE — TELEPHONE ENCOUNTER
Returned pt's call to let her know that I do not know of any reason she would not be able to get her PET scan done on the same day as her pain management injection. Pt verbalized understanding. No further questions or needs at this time.

## 2022-11-23 ENCOUNTER — HOSPITAL ENCOUNTER (OUTPATIENT)
Dept: PET IMAGING | Facility: HOSPITAL | Age: 57
Discharge: HOME | End: 2022-11-23

## 2022-11-23 DIAGNOSIS — C34.90 MALIGNANT NEOPLASM OF LUNG, UNSPECIFIED LATERALITY, UNSPECIFIED PART OF LUNG: ICD-10-CM

## 2022-11-23 DIAGNOSIS — R91.8 OTHER NONSPECIFIC ABNORMAL FINDING OF LUNG FIELD: ICD-10-CM

## 2022-11-23 LAB — GLUCOSE BLDC GLUCOMTR-MCNC: 135 MG/DL (ref 70–105)

## 2022-11-23 PROCEDURE — 0 FLUDEOXYGLUCOSE F18 SOLUTION: Performed by: INTERNAL MEDICINE

## 2022-11-23 PROCEDURE — A9552 F18 FDG: HCPCS | Performed by: INTERNAL MEDICINE

## 2022-11-23 PROCEDURE — 78815 PET IMAGE W/CT SKULL-THIGH: CPT

## 2022-11-23 PROCEDURE — 82962 GLUCOSE BLOOD TEST: CPT

## 2022-11-23 RX ADMIN — FLUDEOXYGLUCOSE F18 1 DOSE: 300 INJECTION INTRAVENOUS at 11:26

## 2022-11-28 ENCOUNTER — APPOINTMENT (OUTPATIENT)
Dept: LAB | Facility: HOSPITAL | Age: 57
End: 2022-11-28

## 2022-11-28 ENCOUNTER — TELEPHONE (OUTPATIENT)
Dept: ONCOLOGY | Facility: CLINIC | Age: 57
End: 2022-11-28

## 2022-11-28 NOTE — TELEPHONE ENCOUNTER
Caller: PIOTR    Relationship to patient: SELF    Best call back number: [phone]    Patient is needing: TO KNOW WHAT SHE NEEDS TO DO. SHE TESTED + FOR COVID-19 ON Thursday, 11-24-22. SHE HAS NOT NOTICED A FEVER. SHE IS NOT SURE WHAT SHE NEEDS TO DO ABOUT APPTS OR IF SHE CAN DO VIRTUAL OR TELEPHONE TO KEEP UP WITH APPTS.    SHE REQUEST CALL FROM RN.

## 2022-12-01 ENCOUNTER — APPOINTMENT (OUTPATIENT)
Dept: LAB | Facility: HOSPITAL | Age: 57
End: 2022-12-01
Payer: MEDICARE

## 2022-12-05 ENCOUNTER — OFFICE VISIT (OUTPATIENT)
Dept: FAMILY MEDICINE CLINIC | Facility: CLINIC | Age: 57
End: 2022-12-05

## 2022-12-05 VITALS
HEART RATE: 87 BPM | BODY MASS INDEX: 29.53 KG/M2 | DIASTOLIC BLOOD PRESSURE: 88 MMHG | SYSTOLIC BLOOD PRESSURE: 158 MMHG | OXYGEN SATURATION: 98 % | TEMPERATURE: 97.8 F | HEIGHT: 69 IN

## 2022-12-05 DIAGNOSIS — R30.0 DYSURIA: Primary | ICD-10-CM

## 2022-12-05 DIAGNOSIS — J44.1 COPD WITH EXACERBATION: ICD-10-CM

## 2022-12-05 LAB
BILIRUB BLD-MCNC: NEGATIVE MG/DL
CLARITY, POC: CLEAR
COLOR UR: ABNORMAL
GLUCOSE UR STRIP-MCNC: NEGATIVE MG/DL
KETONES UR QL: ABNORMAL
LEUKOCYTE EST, POC: NEGATIVE
NITRITE UR-MCNC: NEGATIVE MG/ML
PH UR: 5 [PH] (ref 5–8)
PROT UR STRIP-MCNC: ABNORMAL MG/DL
RBC # UR STRIP: NEGATIVE /UL
SP GR UR: 1.03 (ref 1–1.03)
UROBILINOGEN UR QL: NORMAL

## 2022-12-05 PROCEDURE — 87088 URINE BACTERIA CULTURE: CPT | Performed by: NURSE PRACTITIONER

## 2022-12-05 PROCEDURE — 99213 OFFICE O/P EST LOW 20 MIN: CPT | Performed by: NURSE PRACTITIONER

## 2022-12-05 PROCEDURE — 81001 URINALYSIS AUTO W/SCOPE: CPT | Performed by: NURSE PRACTITIONER

## 2022-12-05 PROCEDURE — 87186 SC STD MICRODIL/AGAR DIL: CPT | Performed by: NURSE PRACTITIONER

## 2022-12-05 PROCEDURE — 81002 URINALYSIS NONAUTO W/O SCOPE: CPT | Performed by: NURSE PRACTITIONER

## 2022-12-05 PROCEDURE — 87086 URINE CULTURE/COLONY COUNT: CPT | Performed by: NURSE PRACTITIONER

## 2022-12-05 RX ORDER — CEFDINIR 300 MG/1
300 CAPSULE ORAL 2 TIMES DAILY
Qty: 14 CAPSULE | Refills: 0 | Status: SHIPPED | OUTPATIENT
Start: 2022-12-05 | End: 2022-12-20

## 2022-12-05 RX ORDER — METHYLPREDNISOLONE 4 MG/1
TABLET ORAL
Qty: 21 TABLET | Refills: 0 | Status: SHIPPED | OUTPATIENT
Start: 2022-12-05 | End: 2022-12-20

## 2022-12-05 NOTE — ASSESSMENT & PLAN NOTE
1.  Continue Trelegy 1 puff daily  2.  Albuterol as needed for dyspnea or wheezing  3.  Medrol Dosepak  4.  Start cefdinir 300 mg twice daily x7 days  5.  Continue Flonase 2 sprays each nostril daily  6.  Call if symptoms persist or worsen

## 2022-12-05 NOTE — PROGRESS NOTES
"Chief Complaint  Nasal Congestion (Test positive for COVID on Nov 24 and does not feel like she is getting better at all. ), Sore Throat, Laryngitis, Fatigue, and Dysuria (Worried about UTI/burning and difficulty urinating)    Subjective        Rahel Ramsey presents to [facility]  History of Present Illness    Patient tested + for COVID-19 on 11/24. She reports ongoing nasal congestion, fatigue and sore throat. Patient reports increased mucus production. Patient is using Flonase daily. She has history of COPD and Lung CA. Patient is using Trelegy 1 puff daily and albuterol PRN.     Patient is c/o dysuria and urinary urgency over the last two weeks. She is concerned of UTI.     Objective   Vital Signs:  /88 (BP Location: Left arm, Patient Position: Sitting, Cuff Size: Adult)   Pulse 87   Temp 97.8 °F (36.6 °C) (Oral)   Ht 175.3 cm (69\")   SpO2 98%   BMI 29.53 kg/m²   Estimated body mass index is 29.53 kg/m² as calculated from the following:    Height as of this encounter: 175.3 cm (69\").    Weight as of 11/21/22: 90.7 kg (200 lb).          Physical Exam  Constitutional:       Appearance: Normal appearance.   HENT:      Head: Normocephalic.   Cardiovascular:      Rate and Rhythm: Normal rate and regular rhythm.   Pulmonary:      Effort: Pulmonary effort is normal.      Breath sounds: Examination of the right-middle field reveals decreased breath sounds. Examination of the right-lower field reveals decreased breath sounds. Decreased breath sounds present.   Abdominal:      General: Abdomen is flat. Bowel sounds are normal.      Palpations: Abdomen is soft.   Musculoskeletal:         General: Normal range of motion.      Cervical back: Neck supple.      Right lower leg: No edema.      Left lower leg: No edema.   Skin:     General: Skin is warm and dry.   Neurological:      Mental Status: She is alert and oriented to person, place, and time.      Gait: Gait is intact. "   Psychiatric:         Attention and Perception: Attention normal.         Mood and Affect: Mood normal.         Speech: Speech normal.        Result Review :                Assessment and Plan   Diagnoses and all orders for this visit:    1. Dysuria (Primary)  Assessment & Plan:  1. Urine dip is + for protein, send for culture    Orders:  -     POCT urinalysis dipstick, manual  -     Urinalysis With Culture If Indicated - Urine, Clean Catch    2. COPD with exacerbation (HCC)  Assessment & Plan:  1.  Continue Trelegy 1 puff daily  2.  Albuterol as needed for dyspnea or wheezing  3.  Medrol Dosepak  4.  Start cefdinir 300 mg twice daily x7 days  5.  Continue Flonase 2 sprays each nostril daily  6.  Call if symptoms persist or worsen          Other orders  -     methylPREDNISolone (MEDROL) 4 MG dose pack; Take as directed on package instructions.  Dispense: 21 tablet; Refill: 0  -     cefdinir (OMNICEF) 300 MG capsule; Take 1 capsule by mouth 2 (Two) Times a Day.  Dispense: 14 capsule; Refill: 0         I spent 20 minutes caring for Rahel on this date of service. This time includes time spent by me in the following activities:preparing for the visit, obtaining and/or reviewing a separately obtained history, performing a medically appropriate examination and/or evaluation , counseling and educating the patient/family/caregiver, ordering medications, tests, or procedures, documenting information in the medical record and care coordination  Follow Up   Return if symptoms worsen or fail to improve.  Patient was given instructions and counseling regarding her condition or for health maintenance advice. Please see specific information pulled into the AVS if appropriate.

## 2022-12-06 LAB
AMORPH URATE CRY URNS QL MICRO: NORMAL /HPF
BACTERIA UR QL AUTO: NORMAL /HPF
BILIRUB UR QL STRIP: NEGATIVE
CLARITY UR: ABNORMAL
COLOR UR: ABNORMAL
GLUCOSE UR STRIP-MCNC: NEGATIVE MG/DL
HGB UR QL STRIP.AUTO: NEGATIVE
HYALINE CASTS UR QL AUTO: NORMAL /LPF
KETONES UR QL STRIP: ABNORMAL
LEUKOCYTE ESTERASE UR QL STRIP.AUTO: ABNORMAL
NITRITE UR QL STRIP: POSITIVE
PH UR STRIP.AUTO: 5.5 [PH] (ref 5–8)
PROT UR QL STRIP: ABNORMAL
RBC # UR STRIP: NORMAL /HPF
REF LAB TEST METHOD: NORMAL
SP GR UR STRIP: >=1.03 (ref 1–1.03)
SQUAMOUS #/AREA URNS HPF: NORMAL /HPF
UROBILINOGEN UR QL STRIP: ABNORMAL
WBC # UR STRIP: NORMAL /HPF

## 2022-12-08 LAB — BACTERIA SPEC AEROBE CULT: ABNORMAL

## 2022-12-11 RX ORDER — FLUCONAZOLE 150 MG/1
150 TABLET ORAL ONCE
Qty: 2 TABLET | Refills: 0 | Status: SHIPPED | OUTPATIENT
Start: 2022-12-11 | End: 2022-12-11

## 2022-12-14 ENCOUNTER — HOSPITAL ENCOUNTER (OUTPATIENT)
Dept: RESPIRATORY THERAPY | Facility: HOSPITAL | Age: 57
Discharge: HOME | End: 2022-12-14
Admitting: INTERNAL MEDICINE

## 2022-12-14 VITALS — OXYGEN SATURATION: 98 % | RESPIRATION RATE: 17 BRPM | HEART RATE: 89 BPM

## 2022-12-14 DIAGNOSIS — R06.00 DYSPNEA, UNSPECIFIED TYPE: ICD-10-CM

## 2022-12-14 PROCEDURE — 94729 DIFFUSING CAPACITY: CPT

## 2022-12-14 PROCEDURE — 94060 EVALUATION OF WHEEZING: CPT

## 2022-12-14 PROCEDURE — 94726 PLETHYSMOGRAPHY LUNG VOLUMES: CPT

## 2022-12-14 RX ORDER — ALBUTEROL SULFATE 90 UG/1
2 AEROSOL, METERED RESPIRATORY (INHALATION) ONCE
Status: COMPLETED | OUTPATIENT
Start: 2022-12-14 | End: 2022-12-14

## 2022-12-14 RX ADMIN — ALBUTEROL SULFATE 2 PUFF: 108 INHALANT RESPIRATORY (INHALATION) at 08:37

## 2022-12-14 NOTE — PROGRESS NOTES
Venipuncture Blood Specimen Collection  Venipuncture performed  by Gaviota Tan MA with good hemostasis. Patient tolerated the procedure well without complications.   12/14/22   Gaviota Tan MA

## 2022-12-20 ENCOUNTER — OFFICE VISIT (OUTPATIENT)
Dept: SURGERY | Facility: CLINIC | Age: 57
End: 2022-12-20

## 2022-12-20 ENCOUNTER — PREP FOR SURGERY (OUTPATIENT)
Dept: OTHER | Facility: HOSPITAL | Age: 57
End: 2022-12-20

## 2022-12-20 ENCOUNTER — NURSE NAVIGATOR (OUTPATIENT)
Dept: ONCOLOGY | Facility: CLINIC | Age: 57
End: 2022-12-20

## 2022-12-20 VITALS
SYSTOLIC BLOOD PRESSURE: 122 MMHG | HEART RATE: 95 BPM | DIASTOLIC BLOOD PRESSURE: 77 MMHG | BODY MASS INDEX: 29.65 KG/M2 | TEMPERATURE: 97.7 F | HEIGHT: 69 IN | WEIGHT: 200.2 LBS | OXYGEN SATURATION: 96 %

## 2022-12-20 DIAGNOSIS — R91.1 RIGHT UPPER LOBE PULMONARY NODULE: Primary | ICD-10-CM

## 2022-12-20 DIAGNOSIS — R94.5 ABNORMAL RESULTS OF LIVER FUNCTION STUDIES: ICD-10-CM

## 2022-12-20 PROBLEM — C34.11 PRIMARY CANCER OF RIGHT UPPER LOBE OF LUNG: Status: ACTIVE | Noted: 2022-12-20

## 2022-12-20 PROCEDURE — 99215 OFFICE O/P EST HI 40 MIN: CPT | Performed by: SURGERY

## 2022-12-20 RX ORDER — GABAPENTIN 300 MG/1
300 CAPSULE ORAL ONCE
Status: CANCELLED | OUTPATIENT
Start: 2022-12-20 | End: 2022-12-20

## 2022-12-20 RX ORDER — CELECOXIB 200 MG/1
200 CAPSULE ORAL ONCE
Status: CANCELLED | OUTPATIENT
Start: 2022-12-20 | End: 2022-12-20

## 2022-12-20 RX ORDER — SCOLOPAMINE TRANSDERMAL SYSTEM 1 MG/1
1 PATCH, EXTENDED RELEASE TRANSDERMAL CONTINUOUS
Status: CANCELLED | OUTPATIENT
Start: 2022-12-20 | End: 2022-12-23

## 2022-12-20 RX ORDER — FLUCONAZOLE 150 MG/1
150 TABLET ORAL AS NEEDED
COMMUNITY
Start: 2022-12-12 | End: 2023-01-29

## 2022-12-20 RX ORDER — ACETAMINOPHEN 500 MG
1000 TABLET ORAL ONCE
Status: CANCELLED | OUTPATIENT
Start: 2022-12-20 | End: 2022-12-20

## 2022-12-20 NOTE — PROGRESS NOTES
I accompanied patient to Dr. Kearney's office visit.     Dr. Kearney reviewed medical history, scan images and plan of care. Dr. Kearney discussed risks and benefits of surgery. Patient scheduled for surgery 1/9/23 and CT with contrast 12/28 arrive at 1015 to [facility]. NPO 4 hours prior, medications with plain water only. Patient aware of instructions and will call with any questions or concerns. Patient is still smoking but has cut back and using patches. Patient give a flyer for the medication management clinic for smoking cessation.

## 2022-12-21 NOTE — H&P (VIEW-ONLY)
THORACIC SURGERY CLINIC CONSULT    REASON FOR CONSULT: PET avid 1.7 cm right upper lobe adenocarcinoma    Subjective   HISTORY OF PRESENTING ILLNESS:   Rahel Ramsey is a 57 y.o. female is being seen for consultation today at the request of Dr. Rustam Aldana.    Rahel Ramsey has significant medical problems as mentioned below.  She has a history of tobacco abuse, 40-pack-year history of smoking.  Her father was diagnosed with lung cancer.  She has been seen by Dr. Aldana for right-sided pulmonary nodule which recently increased in size on surveillance CT scan.    She had a CT scan of the chest on 10/6/2022 which noted increasing size of the right upper lobe nodule.  CT-guided biopsy was recommended which was performed on 11/2/2022 and revealed invasive well to moderately differentiated adenocarcinoma with focal lepidic features.  PET/CT on 11/23/2022 showed a 1.7 cm right upper lobe nodule that was not PET avid.  Also noted an 8 mm right upper lobe nodule which has been stable since 2020 and likely benign.  There was no evidence of distant metastases.  She was referred to me for surgical evaluation.    Patient denied chest pain, unintentional weight loss, cough or hemoptysis.  She did report exertional dyspnea, however her activities is limited due to lower extremity pain.  She has a good social support system and is independent in her activities of daily living.  She denied personal history of cancer.  No prior report of MI, stroke, surgery or radiation to the chest.    ECOG 0-1    Review of Systems   Constitutional: Positive for diaphoresis and fatigue.   HENT: Positive for drooling and ear pain.    Eyes: Positive for itching.   Respiratory: Positive for shortness of breath and wheezing.    Cardiovascular: Negative.    Gastrointestinal: Negative.    Endocrine: Negative.    Genitourinary: Negative.    Musculoskeletal: Positive for back pain, gait problem, neck pain and neck stiffness.   Skin: Negative.     Allergic/Immunologic: Negative.    Neurological: Positive for numbness.        Past Medical History:   Diagnosis Date   • Allergic    • Arthritis    • Atrial fibrillation (HCC)    • COPD (chronic obstructive pulmonary disease) (HCC)    • Headache    • History of herniated intervertebral disc    • History of skin cancer     Left lower extremity   • Hyperlipidemia    • Hypertension    • Injury of back    • Leukocytosis    • Lung nodule    • Urinary tract infection with hematuria 1/22/2021       Past Surgical History:   Procedure Laterality Date   • BACK SURGERY     • CARDIAC CATHETERIZATION     • DILATATION AND CURETTAGE     • LUMBAR DECOMPRESSION      L4-L5    • LUNG BIOPSY Right 11/2022   • SKIN CANCER EXCISION Left     [place]   • SUBTOTAL HYSTERECTOMY         Family History   Problem Relation Age of Onset   • Breast cancer Mother    • Lung cancer Father    • Lung cancer Other    • Colon cancer Other    • Skin cancer Other        Social History     Socioeconomic History   • Marital status:    Tobacco Use   • Smoking status: Every Day     Packs/day: 1.00     Years: 40.00     Pack years: 40.00     Types: Cigarettes   • Smokeless tobacco: Never   Vaping Use   • Vaping Use: Never used   Substance and Sexual Activity   • Alcohol use: Yes     Comment: rare   • Drug use: No   • Sexual activity: Defer         Current Outpatient Medications:   •  albuterol sulfate  (90 Base) MCG/ACT inhaler, Inhale 2 puffs Every 4 (Four) Hours As Needed for Wheezing or Shortness of Air., Disp: 18 g, Rfl: 5  •  amitriptyline (ELAVIL) 25 MG tablet, Take 12.5 mg by mouth every night at bedtime., Disp: , Rfl: 3  •  aspirin (aspirin) 81 MG EC tablet, 81 mg., Disp: , Rfl:   •  fenofibrate (TRICOR) 145 MG tablet, Take 1 tablet by mouth Daily., Disp: 90 tablet, Rfl: 1  •  fluconazole (DIFLUCAN) 150 MG tablet, TAKE 1 TABLET BY MOUTH 1 TIME FOR 1 DOSE. MAY REPEAT IN 72 HOURS AS NEEDED, Disp: , Rfl:   •  fluticasone (FLONASE) 50 MCG/ACT  nasal spray, INSTILL 2 SPRAYS IN EACH NOSTRIL EVERY DAY, Disp: 48 g, Rfl: 3  •  Fluticasone-Umeclidin-Vilant (TRELEGY) 100-62.5-25 MCG/ACT inhaler, Inhale 1 puff Daily., Disp: , Rfl:   •  HYDROcodone-acetaminophen (NORCO)  MG per tablet, Take 1 tablet by mouth Every 6 (Six) Hours As Needed., Disp: , Rfl: 0  •  metFORMIN (GLUCOPHAGE) 500 MG tablet, Take 1 tablet by mouth Daily With Breakfast., Disp: 90 tablet, Rfl: 1  •  metoprolol succinate XL (TOPROL-XL) 25 MG 24 hr tablet, Take 1 tablet by mouth Daily., Disp: 90 tablet, Rfl: 1  •  naloxone (NARCAN) 4 MG/0.1ML nasal spray, CALL 911. SPR CONTENTS OF ONE SPRAYER (0.1ML) INTO ONE NOSTRIL. REPEAT IN 2-3 MIN IF SYMPTOMS OF OPIOID EMERGENCY PERSIST, ALTERNATE NOSTRILS, Disp: , Rfl:   •  nystatin (MYCOSTATIN) 041845 UNIT/GM cream, Apply 1 application topically to the appropriate area as directed As Needed., Disp: , Rfl:   •  pregabalin (Lyrica) 100 MG capsule, Take 1 capsule by mouth 2 (Two) Times a Day., Disp: 90 capsule, Rfl: 1  •  vitamin D (ERGOCALCIFEROL) 1.25 MG (51118 UT) capsule capsule, TAKE 1 CAPSULE BY MOUTH EVERY 7 DAYS, Disp: 12 capsule, Rfl: 1     Allergies   Allergen Reactions   • Morphine Anaphylaxis and Nausea And Vomiting             Objective    OBJECTIVE:     VITAL SIGNS:  Temp:  [97.7 °F (36.5 °C)] 97.7 °F (36.5 °C)  Heart Rate:  [95] 95  BP: (122)/(77) 122/77    PHYSICAL EXAM:  Constitutional:       Appearance: Normal appearance.   HENT:      Head: Normocephalic.      Right Ear: External ear normal.      Left Ear: External ear normal.      Nose: Nose normal.      Mouth/Throat: No obvious deformity     Pharynx: Oropharynx is clear.   Eyes:      Conjunctiva/sclera: Conjunctivae normal.   Cardiovascular:      Rate and Rhythm: Normal rate.      Pulses: Normal pulses.   Pulmonary:      Effort: Pulmonary effort is normal.   Abdominal:      Palpations: Abdomen is soft.   Musculoskeletal:         General: Normal range of motion.      Cervical back:  Normal range of motion.   Skin:     General: Skin is warm.   Neurological:      General: No focal deficit present.      Mental Status: He is alert and oriented to person, place, and time.     LAB RESULTS:  I have reviewed all the available laboratory results in the chart.    RESULTS REVIEW:  I have reviewed the patient's all relevant laboratory and imaging findings.     IMAGING RESULTS:    CT chest 10/6/2022:  1.  The groundglass opacity in the posterior right upper lobe has increased in size since November 2020 and more recently December 2021.  Tissue diagnosis recommended given the continued growth.  Slow-growing neoplasm such as adenocarcinoma suspected.  2.  The other nodule in the right lung are unchanged since 2020.  Therefore benign.  Mild emphysema.    CT-guided lung biopsy on 11/2/2022:  Lung, right, CT-guided core biopsy:    Invasive well to moderately differentiated adenocarcinoma with focal lepidic features    CT chest with IV contrast:  Pending    PET/ CT 11/23/2022:  1. 1.7 cm right upper lobe ground glass nodular density, which has  undergone recent CT-guided biopsy with positive findings for  adenocarcinoma, demonstrates no appreciable FDG uptake.  2. An 8 mm right upper lobe perihilar nodule is stable since 3/10/2020,  smaller than on the 1/17/2020 examination. It demonstrates no abnormal  FDG uptake, and is in keeping with benign finding.  3. There is no convincing evidence of metastatic disease elsewhere  within the chest. No evidence of primary malignancy or metastatic  disease in the neck, abdomen, pelvis, or skeleton.    MRI brain:  Not indicated    Cardiac testing:  Not done    Carotid Doppler on 8/25/2022:  •    Right ICA Prox:  Imaging indicates 16%-49% stenosis.    •    Right Vertebral:  Antegrade flow is present.     •    Left ICA Prox:  Imaging indicates 16-49% stenosis.   •    Left Vertebral:  Antegrade flow is present.     Pulmonary Function Test 12/14/2022:  FEV1 72%  FVC 82%  DLCO  50%:        ASSESSMENT & PLAN:  Rahel Ramsey is a 57 y.o. female with significant medical conditions as mentioned above presented to my clinic on 12/20/2022.     Diagnosis: Right upper lobe adenocarcinoma  Staging: Stage I ([id number])    Ms. Ramsey is a 57-year-old female with history of tobacco abuse who had right upper lobe nodule known since 2020 that has increased in size and currently measures 1.7 cm.  Though it was not PET avid but biopsy has confirmed adenocarcinoma with lepidic features.  She is independent in her activities of daily living but she does not have the greatest functional status.  Her DLCO is 50%.  Her risk factors are not prohibitive and right upper lobectomy is feasible.  I am interested in getting CT chest with IV contrast to assess her segmental anatomy better.  If intraoperatively the anatomy is favorable, I will perform right upper lobe posterior (S2) segmentectomy to preserve her lung function.    She is scheduled for robotic assisted right upper segmentectomy possible lobectomy, mediastinal lymph node dissection on 12/9/2022.    I discussed the patients findings and my recommendations with the patient. The patient was given adequate time to ask questions and all questions were answered to patient satisfaction. Thank you for this consult and allowing us to participate in the care of your patient.      Erwin Kearney MD  Thoracic Surgeon  [facility]        Dictated utilizing Dragon dictation at 06:43 EST on 12/21/2022    I spent 60 minutes caring for Rahel on this date of service. This time includes time spent by me in the following activities:preparing for the visit, reviewing tests, obtaining and/or reviewing a separately obtained history, performing a medically appropriate examination and/or evaluation , counseling and educating the patient/family/caregiver, ordering medications, tests, or procedures, referring and communicating with other health care  professionals , documenting information in the medical record, independently interpreting results and communicating that information with the patient/family/caregiver and care coordination and more than half the time was spent in direct face to face evaluation and decision making.

## 2022-12-21 NOTE — PROGRESS NOTES
Hematology/Oncology Outpatient Consultation    Patient name: Rahel Ramsey  : 1965  MRN: [id number]  Primary Care Physician: Vickie Gomez APRN  Referring Physician: Vickie Gomez APRN  Reason For Consult:     Chief Complaint   Patient presents with   • Follow-up     Adenocarcinoma of the right lung       History of Present Illness: Patient is here today for routine follow-up and does not have any specific complaints    This is a 57-year-old female who has been noted to have leukocytosis   from routine blood count.  Patient was also recently diagnosed with skin cancer on the left lower   extremity.  She underwent wire excisional biopsy of the lesion.  Her course was complicated by   wound infection.  She has been on two rounds of antibiotics and currently on Keflex which will be   finished in another three to four days.  She denies any fevers or chills, rigors.  Patient had no   prior history of any hematologic problems.  Review of her CBC from 17 revealed WBC of 12.4,   hemoglobin 15.6, platelet count 271,000.  Differentials were 65% neutrophils, 28% lymphocytes.    There was no basophilia, eosinophilia or monocytosis.  Her chemistry panel had BUN 7, creatinine   0.7.  Alkaline phosphatase was 11.  The rest was unremarkable.  Patient had a CBC repeated on   17 with WBC of 12.6, hemoglobin 15.4, platelet count of 310,000.  Differentials were   unremarkable.  B12 (N) 441.  Folate (N) 13.9.  BUN 5, creatinine 0.7.  Normal thyroid at 0.9   [range 0.3-5.5].  Patient denied any repeated history of infection in the past.  She was alone   for this appointment on 17.  · 17 - Patient had labs done to further evaluate her leukocytosis.  Her WBC was 14.4,   hemoglobin 16.6, platelet count 312,000, ANC 9.3.  Differentials were 64% neutrophils, 28%   lymphocytes.  CHRISTOPHE was negative.  B12 was 337.  LDH (N) 159.  BUN 10, creatinine 0.7.  Uric acid   5.3 (N).  MIRTA-2 was not mutated.  No evidence  of BCR-abl gene rearrangement.  Sed rate 32.    Peripheral smear showed absolute neutrophilia and leukocytosis and polycythemia, reactive vs.   myeloproliferative condition.  · 5/9/17 - Erythropoietin level was low-normal at 3.92.    · 8/17/17 - Ultrasound of the abdomen showed normal spleen size at 11.3 cm.  There was no splenic   lesion identified.    · 4/2/18 - Erythropoietin level 3.79 [range 2.59-18.5].   · 8/24/18 - Bone marrow biopsy showed normocellular marrow with no morphologic evidence of   myeloproliferative neoplasm, but she was found to have low level MIRTA-2 point mutation detected at   less than 10%.  The low level of MIRTA-2 mutation in assessment of leukocytosis and erythrocytosis   is worrisome for early and evolving myeloproliferative neoplasm.  Interval repeat marrow has been   recommended.  BCR-abl RT-PCR was negative.  Flow cytometry was negative.  Cellularity was 50%.    Iron stain was present.  Cytogenetics also showed normal female karyotype.    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  MIRTA-2 analysis was not detected on the   current bone marrow.  Close follow up has been recommended.  Flowy cytometry was negative.    Histopathology had normal maturation sequence.  There were normal megakaryocytes.  Erythroid   maturation was complete.  Myeloid maturation was also complete.  Negative iron stain on the   marrow aspirate.  Recommendation is for close surveillance.  Absent iron stores on bone marrow.      Smoker 1/2 ppd, does not drink alcohol    See pulmonologist for lung nodule    Family hx of breast cancer with breast cancer age 69  Father with lung cancer      · Patient has been lost to follow-up since 2019.    · 6/28/2022: Follow-up appointment leukocytosis, thrombocytosis, polycythemia, CHRISTOPHE positive for SLE, anti-DNA elevated at 13.  US spleen- normal size.  · October 2022 patient  was found to have increasing right upper lobe nodule of.  She is followed up with Dr. Nath.  She had a CT-guided biopsy of this nodule on 11/2/2022 and pathology revealed invasive well to moderately differentiated adenocarcinoma with focal lipidic features  · 11/23/2022: Patient had a PET CT scan which showed a 1.7 cm right upper lobe nodule not PET avid.  There is an 8 mm right upper lobe nodule also stable since 2020 likely benign.  There was no convincing evidence of metastatic disease elsewhere.  · 12/20/2022: Patient was seen by Dr. Kearney.  She is being evaluated for right upper lobectomy in the next 2 to 3 weeks    History of present illness was reviewed and is unchanged from the previous visit.       Subjective    Patient is here today for routine follow-up and does not have any specific complaints    Past Medical History:   Diagnosis Date   • Allergic    • Arthritis    • Atrial fibrillation (HCC)    • COPD (chronic obstructive pulmonary disease) (HCC)    • Headache    • History of herniated intervertebral disc    • History of skin cancer     Left lower extremity   • Hyperlipidemia    • Hypertension    • Injury of back    • Leukocytosis    • Lung nodule    • Urinary tract infection with hematuria 1/22/2021       Past Surgical History:   Procedure Laterality Date   • BACK SURGERY     • CARDIAC CATHETERIZATION     • DILATATION AND CURETTAGE     • LUMBAR DECOMPRESSION      L4-L5    • LUNG BIOPSY Right 11/2022   • SKIN CANCER EXCISION Left     [place]   • SUBTOTAL HYSTERECTOMY           Current Outpatient Medications:   •  albuterol sulfate  (90 Base) MCG/ACT inhaler, Inhale 2 puffs Every 4 (Four) Hours As Needed for Wheezing or Shortness of Air., Disp: 18 g, Rfl: 5  •  amitriptyline (ELAVIL) 25 MG tablet, Take 12.5 mg by mouth every night at bedtime., Disp: , Rfl: 3  •  aspirin (aspirin) 81 MG EC tablet, 81 mg., Disp: , Rfl:   •  fenofibrate (TRICOR) 145 MG tablet, Take 1 tablet by mouth Daily., Disp: 90  tablet, Rfl: 1  •  fluconazole (DIFLUCAN) 150 MG tablet, TAKE 1 TABLET BY MOUTH 1 TIME FOR 1 DOSE. MAY REPEAT IN 72 HOURS AS NEEDED, Disp: , Rfl:   •  fluticasone (FLONASE) 50 MCG/ACT nasal spray, INSTILL 2 SPRAYS IN EACH NOSTRIL EVERY DAY, Disp: 48 g, Rfl: 3  •  Fluticasone-Umeclidin-Vilant (TRELEGY) 100-62.5-25 MCG/ACT inhaler, Inhale 1 puff Daily., Disp: , Rfl:   •  HYDROcodone-acetaminophen (NORCO)  MG per tablet, Take 1 tablet by mouth Every 6 (Six) Hours As Needed., Disp: , Rfl: 0  •  metFORMIN (GLUCOPHAGE) 500 MG tablet, Take 1 tablet by mouth Daily With Breakfast., Disp: 90 tablet, Rfl: 1  •  metoprolol succinate XL (TOPROL-XL) 25 MG 24 hr tablet, Take 1 tablet by mouth Daily., Disp: 90 tablet, Rfl: 1  •  naloxone (NARCAN) 4 MG/0.1ML nasal spray, CALL 911. SPR CONTENTS OF ONE SPRAYER (0.1ML) INTO ONE NOSTRIL. REPEAT IN 2-3 MIN IF SYMPTOMS OF OPIOID EMERGENCY PERSIST, ALTERNATE NOSTRILS, Disp: , Rfl:   •  nystatin (MYCOSTATIN) 641323 UNIT/GM cream, Apply 1 application topically to the appropriate area as directed As Needed., Disp: , Rfl:   •  pregabalin (Lyrica) 100 MG capsule, Take 1 capsule by mouth 2 (Two) Times a Day., Disp: 90 capsule, Rfl: 1  •  vitamin D (ERGOCALCIFEROL) 1.25 MG (54048 UT) capsule capsule, TAKE 1 CAPSULE BY MOUTH EVERY 7 DAYS, Disp: 12 capsule, Rfl: 1    Allergies   Allergen Reactions   • Morphine Anaphylaxis and Nausea And Vomiting       Immunization History   Administered Date(s) Administered   • COVID-19 (AMELIA) 03/12/2021   • Flu Vaccine Quad PF >36MO 09/17/2018   • FluLaval/Fluzone >6mos 09/03/2020, 09/29/2022   • Influenza, Unspecified 09/27/2016, 10/12/2017   • Pneumococcal Polysaccharide (PPSV23) 09/21/2021   • Tdap 05/07/2020   • flucelvax quad pfs =>4 YRS 10/03/2019       Family History   Problem Relation Age of Onset   • Breast cancer Mother    • Lung cancer Father    • Lung cancer Other    • Colon cancer Other    • Skin cancer Other        Cancer-related family  "history includes Breast cancer in her mother; Colon cancer in an other family member; Lung cancer in her father and another family member; Skin cancer in an other family member.    Social History     Tobacco Use   • Smoking status: Every Day     Packs/day: 1.00     Years: 40.00     Pack years: 40.00     Types: Cigarettes   • Smokeless tobacco: Never   Vaping Use   • Vaping Use: Never used   Substance Use Topics   • Alcohol use: Yes     Comment: rare   • Drug use: No     I have reviewed and confirmed the accuracy of the patient's history: Chief complaint, HPI, ROS and Subjective as entered by the MA/LPN/RN. Cherialessio Prabhakar MD 12/22/22     ROS:    Review of Systems   Constitutional: Positive for fatigue. Negative for chills and fever.   HENT: Negative.    Eyes: Negative for visual disturbance.   Respiratory: Negative for shortness of breath.    Cardiovascular: Negative for chest pain and palpitations.   Gastrointestinal: Negative for abdominal pain.   Endocrine: Negative.    Genitourinary: Negative.    Skin: Negative for rash and wound.   Neurological: Positive for weakness.   Hematological: Negative for adenopathy.   Psychiatric/Behavioral: Negative for agitation and confusion.   Chronic back pain due to disc disease    Objective:    Vitals:    12/22/22 1008   BP: 120/78   Pulse: 89   Resp: 18   Temp: 96.6 °F (35.9 °C)   TempSrc: Infrared   Weight: 90.7 kg (200 lb)   Height: 175.3 cm (69\")   PainSc:   7   PainLoc: Back     Body mass index is 29.53 kg/m².    ECOG  (1) Restricted in physically strenuous activity, ambulatory and able to do work of light nature    Physical Exam:  Physical Exam  Vitals and nursing note reviewed.   Constitutional:       General: She is not in acute distress.     Appearance: She is not diaphoretic.   HENT:      Head: Normocephalic and atraumatic.   Eyes:      General: No scleral icterus.        Right eye: No discharge.         Left eye: No discharge.      Conjunctiva/sclera: " Conjunctivae normal.   Neck:      Thyroid: No thyromegaly.   Cardiovascular:      Rate and Rhythm: Normal rate and regular rhythm.      Heart sounds: Normal heart sounds.     No friction rub. No gallop.   Pulmonary:      Effort: Pulmonary effort is normal. No respiratory distress.      Breath sounds: No stridor. No wheezing.   Abdominal:      General: Bowel sounds are normal.      Palpations: Abdomen is soft. There is no mass.      Tenderness: There is no abdominal tenderness. There is no guarding or rebound.   Musculoskeletal:         General: No tenderness. Normal range of motion.      Cervical back: Normal range of motion and neck supple.   Lymphadenopathy:      Cervical: No cervical adenopathy.   Skin:     General: Skin is warm.      Findings: No erythema or rash.   Neurological:      Mental Status: She is alert and oriented to person, place, and time.      Motor: No abnormal muscle tone.   Psychiatric:         Behavior: Behavior normal.       I have reexamined the patient and the results are consistent with the previously documented exam. Cheri Prabhakar MD     RECENT LABS    WBC   Date Value Ref Range Status   12/22/2022 10.60 3.40 - 10.80 10*3/mm3 Final     RBC   Date Value Ref Range Status   12/22/2022 5.21 3.77 - 5.28 10*6/mm3 Final     Hemoglobin   Date Value Ref Range Status   12/22/2022 16.4 (H) 12.0 - 15.9 g/dL Final     Hematocrit   Date Value Ref Range Status   12/22/2022 48.8 (H) 34.0 - 46.6 % Final     MCV   Date Value Ref Range Status   12/22/2022 93.7 79.0 - 97.0 fL Final     MCH   Date Value Ref Range Status   12/22/2022 31.5 26.6 - 33.0 pg Final     MCHC   Date Value Ref Range Status   12/22/2022 33.6 31.5 - 35.7 g/dL Final     RDW   Date Value Ref Range Status   12/22/2022 14.2 12.3 - 15.4 % Final     RDW-SD   Date Value Ref Range Status   12/22/2022 48.1 37.0 - 54.0 fl Final     MPV   Date Value Ref Range Status   12/22/2022 9.1 6.0 - 12.0 fL Final     Platelets   Date Value Ref Range  Status   12/22/2022 244 140 - 450 10*3/mm3 Final     Neutrophil %   Date Value Ref Range Status   12/22/2022 57.5 42.7 - 76.0 % Final     Lymphocyte %   Date Value Ref Range Status   12/22/2022 35.1 19.6 - 45.3 % Final     Monocyte %   Date Value Ref Range Status   12/22/2022 4.9 (L) 5.0 - 12.0 % Final     Eosinophil %   Date Value Ref Range Status   12/22/2022 2.0 0.3 - 6.2 % Final     Basophil %   Date Value Ref Range Status   12/22/2022 0.5 0.0 - 1.5 % Final     Neutrophils, Absolute   Date Value Ref Range Status   12/22/2022 6.10 1.70 - 7.00 10*3/mm3 Final     Lymphocytes, Absolute   Date Value Ref Range Status   12/22/2022 3.72 (H) 0.70 - 3.10 10*3/mm3 Final     Monocytes, Absolute   Date Value Ref Range Status   12/22/2022 0.52 0.10 - 0.90 10*3/mm3 Final     Eosinophils, Absolute   Date Value Ref Range Status   12/22/2022 0.21 0.00 - 0.40 10*3/mm3 Final     Basophils, Absolute   Date Value Ref Range Status   12/22/2022 0.05 0.00 - 0.20 10*3/mm3 Final     nRBC   Date Value Ref Range Status   11/02/2022 0.1 0.0 - 0.2 /100 WBC Final       Lab Results   Component Value Date    GLUCOSE 99 09/29/2022    BUN 9 09/29/2022    CREATININE 0.61 09/29/2022    EGFRIFNONA 99 12/14/2021    BCR 14.8 09/29/2022    K 4.4 09/29/2022    CO2 23.6 09/29/2022    CALCIUM 9.4 09/29/2022    ALBUMIN 4.30 09/29/2022    LABIL2 1.1 12/04/2018    AST 34 (H) 09/29/2022    ALT 43 (H) 09/29/2022         Assessment & Plan   Leukocytosis, unspecified type  - CBC & Differential    Malignant neoplasm of lung, unspecified laterality, unspecified part of lung (HCC)  - CBC & Differential      · Adenocarcinoma of the right lung, new diagnosis.  Enlarging right upper lobe nodule.  Clinical T1b N0 M0.  Stage I A2.  New diagnosis.  Patient is scheduled for right upper lobectomy on January 9, 2023 by Dr. Kearney    · Tobacco use disorder: She is working on smoking cessation.  She has been encouraged      · Leukocytosis and polycythemia.  Rule out  myeloproliferative disease.  She has a prior history of this in the past.  Ultrasound of spleen- normal size.  Blood counts improved with WBC of 10.14, Hgb 15.8, platelets 226-improvement in counts.  JAK2 V617F negative.  No BCR ABL arrangement.  Peripheral smear shows leukocytosis, polycythemia, no blasts May of 2022.  We will obtain flow cytometry since she has relative lymphocytosis as well.  I have reviewed the lab results with patient.  I do not think we need to repeat a bone marrow biopsy at this time.  I have asked her to also remain on baby aspirin.  Patient does not have splenomegaly.  Her repeat flow cytometry completed in August 2022 does not show any myeloproliferative disease.    · Possible evolving myeloproliferative neoplasm with low level MIRTA-2 positive at less than 10%. BCR-abl negative.  Low normal erythropoietin level.  Status post bone marrow aspiration and         biopsy 8/24/18.  Repeat bone marrow aspiration and biopsy 2/22/19 showed translocation [2q/11q] has been associated with myeloid neoplasm, but no morphologic evidence seen on         histopathology      · CHRISTOPHE positive, anti-DNA elevated at 13 .Referred to rheumatology,  rheumatology for further evaluation          Plans    · Reviewed her PET CT scan results with patient  · She is being scheduled for right upper lobectomy in the near future January 9, 2023  · Follow-up postoperatively third week in January  · Pulmonary functions by Dr. Kearney.  Hold 5 days  · Reiterated tobacco cessation today with patient  · Follow-up with  rheumatology  · Peripheral blood flow cytometry was negative- 8/3/2022  · Continue baby aspirin 81 mg p.o. daily for procedure  · All questions answered                      I spent 30 total minutes, face-to-face, caring for Rahel today.  90% of this time involved counseling and/or coordination of care as documented within this note.

## 2022-12-21 NOTE — PROGRESS NOTES
THORACIC SURGERY CLINIC CONSULT    REASON FOR CONSULT: PET avid 1.7 cm right upper lobe adenocarcinoma    Subjective   HISTORY OF PRESENTING ILLNESS:   Rahel Ramsey is a 57 y.o. female is being seen for consultation today at the request of Dr. Rustam Aldana.    Rahel Ramsey has significant medical problems as mentioned below.  She has a history of tobacco abuse, 40-pack-year history of smoking.  Her father was diagnosed with lung cancer.  She has been seen by Dr. Aldana for right-sided pulmonary nodule which recently increased in size on surveillance CT scan.    She had a CT scan of the chest on 10/6/2022 which noted increasing size of the right upper lobe nodule.  CT-guided biopsy was recommended which was performed on 11/2/2022 and revealed invasive well to moderately differentiated adenocarcinoma with focal lepidic features.  PET/CT on 11/23/2022 showed a 1.7 cm right upper lobe nodule that was not PET avid.  Also noted an 8 mm right upper lobe nodule which has been stable since 2020 and likely benign.  There was no evidence of distant metastases.  She was referred to me for surgical evaluation.    Patient denied chest pain, unintentional weight loss, cough or hemoptysis.  She did report exertional dyspnea, however her activities is limited due to lower extremity pain.  She has a good social support system and is independent in her activities of daily living.  She denied personal history of cancer.  No prior report of MI, stroke, surgery or radiation to the chest.    ECOG 0-1    Review of Systems   Constitutional: Positive for diaphoresis and fatigue.   HENT: Positive for drooling and ear pain.    Eyes: Positive for itching.   Respiratory: Positive for shortness of breath and wheezing.    Cardiovascular: Negative.    Gastrointestinal: Negative.    Endocrine: Negative.    Genitourinary: Negative.    Musculoskeletal: Positive for back pain, gait problem, neck pain and neck stiffness.   Skin: Negative.     Allergic/Immunologic: Negative.    Neurological: Positive for numbness.        Past Medical History:   Diagnosis Date   • Allergic    • Arthritis    • Atrial fibrillation (HCC)    • COPD (chronic obstructive pulmonary disease) (HCC)    • Headache    • History of herniated intervertebral disc    • History of skin cancer     Left lower extremity   • Hyperlipidemia    • Hypertension    • Injury of back    • Leukocytosis    • Lung nodule    • Urinary tract infection with hematuria 1/22/2021       Past Surgical History:   Procedure Laterality Date   • BACK SURGERY     • CARDIAC CATHETERIZATION     • DILATATION AND CURETTAGE     • LUMBAR DECOMPRESSION      L4-L5    • LUNG BIOPSY Right 11/2022   • SKIN CANCER EXCISION Left     [place]   • SUBTOTAL HYSTERECTOMY         Family History   Problem Relation Age of Onset   • Breast cancer Mother    • Lung cancer Father    • Lung cancer Other    • Colon cancer Other    • Skin cancer Other        Social History     Socioeconomic History   • Marital status:    Tobacco Use   • Smoking status: Every Day     Packs/day: 1.00     Years: 40.00     Pack years: 40.00     Types: Cigarettes   • Smokeless tobacco: Never   Vaping Use   • Vaping Use: Never used   Substance and Sexual Activity   • Alcohol use: Yes     Comment: rare   • Drug use: No   • Sexual activity: Defer         Current Outpatient Medications:   •  albuterol sulfate  (90 Base) MCG/ACT inhaler, Inhale 2 puffs Every 4 (Four) Hours As Needed for Wheezing or Shortness of Air., Disp: 18 g, Rfl: 5  •  amitriptyline (ELAVIL) 25 MG tablet, Take 12.5 mg by mouth every night at bedtime., Disp: , Rfl: 3  •  aspirin (aspirin) 81 MG EC tablet, 81 mg., Disp: , Rfl:   •  fenofibrate (TRICOR) 145 MG tablet, Take 1 tablet by mouth Daily., Disp: 90 tablet, Rfl: 1  •  fluconazole (DIFLUCAN) 150 MG tablet, TAKE 1 TABLET BY MOUTH 1 TIME FOR 1 DOSE. MAY REPEAT IN 72 HOURS AS NEEDED, Disp: , Rfl:   •  fluticasone (FLONASE) 50 MCG/ACT  nasal spray, INSTILL 2 SPRAYS IN EACH NOSTRIL EVERY DAY, Disp: 48 g, Rfl: 3  •  Fluticasone-Umeclidin-Vilant (TRELEGY) 100-62.5-25 MCG/ACT inhaler, Inhale 1 puff Daily., Disp: , Rfl:   •  HYDROcodone-acetaminophen (NORCO)  MG per tablet, Take 1 tablet by mouth Every 6 (Six) Hours As Needed., Disp: , Rfl: 0  •  metFORMIN (GLUCOPHAGE) 500 MG tablet, Take 1 tablet by mouth Daily With Breakfast., Disp: 90 tablet, Rfl: 1  •  metoprolol succinate XL (TOPROL-XL) 25 MG 24 hr tablet, Take 1 tablet by mouth Daily., Disp: 90 tablet, Rfl: 1  •  naloxone (NARCAN) 4 MG/0.1ML nasal spray, CALL 911. SPR CONTENTS OF ONE SPRAYER (0.1ML) INTO ONE NOSTRIL. REPEAT IN 2-3 MIN IF SYMPTOMS OF OPIOID EMERGENCY PERSIST, ALTERNATE NOSTRILS, Disp: , Rfl:   •  nystatin (MYCOSTATIN) 412832 UNIT/GM cream, Apply 1 application topically to the appropriate area as directed As Needed., Disp: , Rfl:   •  pregabalin (Lyrica) 100 MG capsule, Take 1 capsule by mouth 2 (Two) Times a Day., Disp: 90 capsule, Rfl: 1  •  vitamin D (ERGOCALCIFEROL) 1.25 MG (30112 UT) capsule capsule, TAKE 1 CAPSULE BY MOUTH EVERY 7 DAYS, Disp: 12 capsule, Rfl: 1     Allergies   Allergen Reactions   • Morphine Anaphylaxis and Nausea And Vomiting             Objective    OBJECTIVE:     VITAL SIGNS:  Temp:  [97.7 °F (36.5 °C)] 97.7 °F (36.5 °C)  Heart Rate:  [95] 95  BP: (122)/(77) 122/77    PHYSICAL EXAM:  Constitutional:       Appearance: Normal appearance.   HENT:      Head: Normocephalic.      Right Ear: External ear normal.      Left Ear: External ear normal.      Nose: Nose normal.      Mouth/Throat: No obvious deformity     Pharynx: Oropharynx is clear.   Eyes:      Conjunctiva/sclera: Conjunctivae normal.   Cardiovascular:      Rate and Rhythm: Normal rate.      Pulses: Normal pulses.   Pulmonary:      Effort: Pulmonary effort is normal.   Abdominal:      Palpations: Abdomen is soft.   Musculoskeletal:         General: Normal range of motion.      Cervical back:  Normal range of motion.   Skin:     General: Skin is warm.   Neurological:      General: No focal deficit present.      Mental Status: He is alert and oriented to person, place, and time.     LAB RESULTS:  I have reviewed all the available laboratory results in the chart.    RESULTS REVIEW:  I have reviewed the patient's all relevant laboratory and imaging findings.     IMAGING RESULTS:    CT chest 10/6/2022:  1.  The groundglass opacity in the posterior right upper lobe has increased in size since November 2020 and more recently December 2021.  Tissue diagnosis recommended given the continued growth.  Slow-growing neoplasm such as adenocarcinoma suspected.  2.  The other nodule in the right lung are unchanged since 2020.  Therefore benign.  Mild emphysema.    CT-guided lung biopsy on 11/2/2022:  Lung, right, CT-guided core biopsy:    Invasive well to moderately differentiated adenocarcinoma with focal lepidic features    CT chest with IV contrast:  Pending    PET/ CT 11/23/2022:  1. 1.7 cm right upper lobe ground glass nodular density, which has  undergone recent CT-guided biopsy with positive findings for  adenocarcinoma, demonstrates no appreciable FDG uptake.  2. An 8 mm right upper lobe perihilar nodule is stable since 3/10/2020,  smaller than on the 1/17/2020 examination. It demonstrates no abnormal  FDG uptake, and is in keeping with benign finding.  3. There is no convincing evidence of metastatic disease elsewhere  within the chest. No evidence of primary malignancy or metastatic  disease in the neck, abdomen, pelvis, or skeleton.    MRI brain:  Not indicated    Cardiac testing:  Not done    Carotid Doppler on 8/25/2022:  •    Right ICA Prox:  Imaging indicates 16%-49% stenosis.    •    Right Vertebral:  Antegrade flow is present.     •    Left ICA Prox:  Imaging indicates 16-49% stenosis.   •    Left Vertebral:  Antegrade flow is present.     Pulmonary Function Test 12/14/2022:  FEV1 72%  FVC 82%  DLCO  50%:        ASSESSMENT & PLAN:  Rahel Ramsey is a 57 y.o. female with significant medical conditions as mentioned above presented to my clinic on 12/20/2022.     Diagnosis: Right upper lobe adenocarcinoma  Staging: Stage I ([id number])    Ms. Ramsey is a 57-year-old female with history of tobacco abuse who had right upper lobe nodule known since 2020 that has increased in size and currently measures 1.7 cm.  Though it was not PET avid but biopsy has confirmed adenocarcinoma with lepidic features.  She is independent in her activities of daily living but she does not have the greatest functional status.  Her DLCO is 50%.  Her risk factors are not prohibitive and right upper lobectomy is feasible.  I am interested in getting CT chest with IV contrast to assess her segmental anatomy better.  If intraoperatively the anatomy is favorable, I will perform right upper lobe posterior (S2) segmentectomy to preserve her lung function.    She is scheduled for robotic assisted right upper segmentectomy possible lobectomy, mediastinal lymph node dissection on 12/9/2022.    I discussed the patients findings and my recommendations with the patient. The patient was given adequate time to ask questions and all questions were answered to patient satisfaction. Thank you for this consult and allowing us to participate in the care of your patient.      Erwin Kearney MD  Thoracic Surgeon  [facility]        Dictated utilizing Dragon dictation at 06:43 EST on 12/21/2022    I spent 60 minutes caring for Rahel on this date of service. This time includes time spent by me in the following activities:preparing for the visit, reviewing tests, obtaining and/or reviewing a separately obtained history, performing a medically appropriate examination and/or evaluation , counseling and educating the patient/family/caregiver, ordering medications, tests, or procedures, referring and communicating with other health care  professionals , documenting information in the medical record, independently interpreting results and communicating that information with the patient/family/caregiver and care coordination and more than half the time was spent in direct face to face evaluation and decision making.

## 2022-12-22 ENCOUNTER — OFFICE VISIT (OUTPATIENT)
Dept: ONCOLOGY | Facility: CLINIC | Age: 57
End: 2022-12-22

## 2022-12-22 ENCOUNTER — LAB (OUTPATIENT)
Dept: LAB | Facility: HOSPITAL | Age: 57
End: 2022-12-22
Payer: MEDICARE

## 2022-12-22 VITALS
HEART RATE: 89 BPM | HEIGHT: 69 IN | RESPIRATION RATE: 18 BRPM | WEIGHT: 200 LBS | SYSTOLIC BLOOD PRESSURE: 120 MMHG | DIASTOLIC BLOOD PRESSURE: 78 MMHG | BODY MASS INDEX: 29.62 KG/M2 | TEMPERATURE: 96.6 F

## 2022-12-22 DIAGNOSIS — C34.90 MALIGNANT NEOPLASM OF LUNG, UNSPECIFIED LATERALITY, UNSPECIFIED PART OF LUNG: ICD-10-CM

## 2022-12-22 DIAGNOSIS — D72.829 LEUKOCYTOSIS, UNSPECIFIED TYPE: Primary | ICD-10-CM

## 2022-12-22 LAB
BASOPHILS # BLD AUTO: 0.05 10*3/MM3 (ref 0–0.2)
BASOPHILS NFR BLD AUTO: 0.5 % (ref 0–1.5)
DEPRECATED RDW RBC AUTO: 48.1 FL (ref 37–54)
EOSINOPHIL # BLD AUTO: 0.21 10*3/MM3 (ref 0–0.4)
EOSINOPHIL NFR BLD AUTO: 2 % (ref 0.3–6.2)
ERYTHROCYTE [DISTWIDTH] IN BLOOD BY AUTOMATED COUNT: 14.2 % (ref 12.3–15.4)
HCT VFR BLD AUTO: 48.8 % (ref 34–46.6)
HGB BLD-MCNC: 16.4 G/DL (ref 12–15.9)
HOLD SPECIMEN: NORMAL
HOLD SPECIMEN: NORMAL
LYMPHOCYTES # BLD AUTO: 3.72 10*3/MM3 (ref 0.7–3.1)
LYMPHOCYTES NFR BLD AUTO: 35.1 % (ref 19.6–45.3)
MCH RBC QN AUTO: 31.5 PG (ref 26.6–33)
MCHC RBC AUTO-ENTMCNC: 33.6 G/DL (ref 31.5–35.7)
MCV RBC AUTO: 93.7 FL (ref 79–97)
MONOCYTES # BLD AUTO: 0.52 10*3/MM3 (ref 0.1–0.9)
MONOCYTES NFR BLD AUTO: 4.9 % (ref 5–12)
NEUTROPHILS NFR BLD AUTO: 57.5 % (ref 42.7–76)
NEUTROPHILS NFR BLD AUTO: 6.1 10*3/MM3 (ref 1.7–7)
PLATELET # BLD AUTO: 244 10*3/MM3 (ref 140–450)
PMV BLD AUTO: 9.1 FL (ref 6–12)
RBC # BLD AUTO: 5.21 10*6/MM3 (ref 3.77–5.28)
WBC NRBC COR # BLD: 10.6 10*3/MM3 (ref 3.4–10.8)
WHOLE BLOOD HOLD COAG: NORMAL

## 2022-12-22 PROCEDURE — 36415 COLL VENOUS BLD VENIPUNCTURE: CPT

## 2022-12-22 PROCEDURE — 85025 COMPLETE CBC W/AUTO DIFF WBC: CPT

## 2022-12-22 PROCEDURE — 99214 OFFICE O/P EST MOD 30 MIN: CPT | Performed by: INTERNAL MEDICINE

## 2022-12-28 ENCOUNTER — APPOINTMENT (OUTPATIENT)
Dept: PET IMAGING | Facility: HOSPITAL | Age: 57
End: 2022-12-28

## 2022-12-28 ENCOUNTER — HOSPITAL ENCOUNTER (OUTPATIENT)
Dept: CT IMAGING | Facility: HOSPITAL | Age: 57
Discharge: HOME | End: 2022-12-28
Admitting: SURGERY

## 2022-12-28 DIAGNOSIS — R91.1 RIGHT UPPER LOBE PULMONARY NODULE: ICD-10-CM

## 2022-12-28 DIAGNOSIS — J44.9 CHRONIC OBSTRUCTIVE PULMONARY DISEASE, UNSPECIFIED COPD TYPE: ICD-10-CM

## 2022-12-28 LAB
CREAT BLDA-MCNC: 0.7 MG/DL (ref 0.6–1.3)
EGFRCR SERPLBLD CKD-EPI 2021: 100.4 ML/MIN/1.73

## 2022-12-28 PROCEDURE — 82565 ASSAY OF CREATININE: CPT

## 2022-12-28 PROCEDURE — 71260 CT THORAX DX C+: CPT

## 2022-12-28 PROCEDURE — 0 IOPAMIDOL PER 1 ML: Performed by: SURGERY

## 2022-12-28 RX ORDER — ALBUTEROL SULFATE 90 UG/1
2 AEROSOL, METERED RESPIRATORY (INHALATION) EVERY 4 HOURS PRN
Qty: 18 G | Refills: 5 | Status: SHIPPED | OUTPATIENT
Start: 2022-12-28

## 2022-12-28 RX ADMIN — IOPAMIDOL 100 ML: 755 INJECTION, SOLUTION INTRAVENOUS at 10:29

## 2023-01-03 ENCOUNTER — HOSPITAL ENCOUNTER (OUTPATIENT)
Dept: GENERAL RADIOLOGY | Facility: HOSPITAL | Age: 58
Discharge: HOME | End: 2023-01-03
Payer: MEDICARE

## 2023-01-03 ENCOUNTER — HOSPITAL ENCOUNTER (OUTPATIENT)
Dept: CARDIOLOGY | Facility: HOSPITAL | Age: 58
Discharge: HOME | End: 2023-01-03
Payer: MEDICARE

## 2023-01-03 ENCOUNTER — LAB (OUTPATIENT)
Dept: LAB | Facility: HOSPITAL | Age: 58
End: 2023-01-03
Payer: MEDICARE

## 2023-01-03 DIAGNOSIS — R91.1 RIGHT UPPER LOBE PULMONARY NODULE: ICD-10-CM

## 2023-01-03 LAB
ABO GROUP BLD: NORMAL
ALBUMIN SERPL-MCNC: 4.4 G/DL (ref 3.5–5.2)
ALBUMIN/GLOB SERPL: 1.6 G/DL
ALP SERPL-CCNC: 99 U/L (ref 39–117)
ALT SERPL W P-5'-P-CCNC: 49 U/L (ref 1–33)
ANION GAP SERPL CALCULATED.3IONS-SCNC: 9.1 MMOL/L (ref 5–15)
AST SERPL-CCNC: 35 U/L (ref 1–32)
BASOPHILS # BLD AUTO: 0.07 10*3/MM3 (ref 0–0.2)
BASOPHILS NFR BLD AUTO: 0.8 % (ref 0–1.5)
BILIRUB SERPL-MCNC: 0.4 MG/DL (ref 0–1.2)
BLD GP AB SCN SERPL QL: NEGATIVE
BUN SERPL-MCNC: 10 MG/DL (ref 6–20)
BUN/CREAT SERPL: 15.2 (ref 7–25)
CALCIUM SPEC-SCNC: 9.5 MG/DL (ref 8.6–10.5)
CHLORIDE SERPL-SCNC: 101 MMOL/L (ref 98–107)
CO2 SERPL-SCNC: 28.9 MMOL/L (ref 22–29)
CREAT SERPL-MCNC: 0.66 MG/DL (ref 0.57–1)
DEPRECATED RDW RBC AUTO: 42.4 FL (ref 37–54)
EGFRCR SERPLBLD CKD-EPI 2021: 101.8 ML/MIN/1.73
EOSINOPHIL # BLD AUTO: 0.17 10*3/MM3 (ref 0–0.4)
EOSINOPHIL NFR BLD AUTO: 1.8 % (ref 0.3–6.2)
ERYTHROCYTE [DISTWIDTH] IN BLOOD BY AUTOMATED COUNT: 13 % (ref 12.3–15.4)
GLOBULIN UR ELPH-MCNC: 2.7 GM/DL
GLUCOSE SERPL-MCNC: 153 MG/DL (ref 65–99)
HCT VFR BLD AUTO: 45.3 % (ref 34–46.6)
HGB BLD-MCNC: 15.8 G/DL (ref 12–15.9)
IMM GRANULOCYTES # BLD AUTO: 0.04 10*3/MM3 (ref 0–0.05)
IMM GRANULOCYTES NFR BLD AUTO: 0.4 % (ref 0–0.5)
INR PPP: 1 (ref 0.93–1.1)
LYMPHOCYTES # BLD AUTO: 3.27 10*3/MM3 (ref 0.7–3.1)
LYMPHOCYTES NFR BLD AUTO: 35.2 % (ref 19.6–45.3)
MCH RBC QN AUTO: 30.7 PG (ref 26.6–33)
MCHC RBC AUTO-ENTMCNC: 34.9 G/DL (ref 31.5–35.7)
MCV RBC AUTO: 88.1 FL (ref 79–97)
MONOCYTES # BLD AUTO: 0.43 10*3/MM3 (ref 0.1–0.9)
MONOCYTES NFR BLD AUTO: 4.6 % (ref 5–12)
NEUTROPHILS NFR BLD AUTO: 5.32 10*3/MM3 (ref 1.7–7)
NEUTROPHILS NFR BLD AUTO: 57.2 % (ref 42.7–76)
NRBC BLD AUTO-RTO: 0.1 /100 WBC (ref 0–0.2)
PLATELET # BLD AUTO: 243 10*3/MM3 (ref 140–450)
PMV BLD AUTO: 10.6 FL (ref 6–12)
POTASSIUM SERPL-SCNC: 4.3 MMOL/L (ref 3.5–5.2)
PROT SERPL-MCNC: 7.1 G/DL (ref 6–8.5)
PROTHROMBIN TIME: 10.3 SECONDS (ref 9.6–11.7)
QT INTERVAL: 368 MS
RBC # BLD AUTO: 5.14 10*6/MM3 (ref 3.77–5.28)
RH BLD: NEGATIVE
SODIUM SERPL-SCNC: 139 MMOL/L (ref 136–145)
T&S EXPIRATION DATE: NORMAL
WBC NRBC COR # BLD: 9.3 10*3/MM3 (ref 3.4–10.8)

## 2023-01-03 PROCEDURE — 93005 ELECTROCARDIOGRAM TRACING: CPT | Performed by: SURGERY

## 2023-01-03 PROCEDURE — 86901 BLOOD TYPING SEROLOGIC RH(D): CPT

## 2023-01-03 PROCEDURE — 86900 BLOOD TYPING SEROLOGIC ABO: CPT

## 2023-01-03 PROCEDURE — 85610 PROTHROMBIN TIME: CPT

## 2023-01-03 PROCEDURE — 36415 COLL VENOUS BLD VENIPUNCTURE: CPT

## 2023-01-03 PROCEDURE — 71046 X-RAY EXAM CHEST 2 VIEWS: CPT

## 2023-01-03 PROCEDURE — 85025 COMPLETE CBC W/AUTO DIFF WBC: CPT

## 2023-01-03 PROCEDURE — 80053 COMPREHEN METABOLIC PANEL: CPT

## 2023-01-03 PROCEDURE — 86850 RBC ANTIBODY SCREEN: CPT

## 2023-01-03 PROCEDURE — 93010 ELECTROCARDIOGRAM REPORT: CPT | Performed by: INTERNAL MEDICINE

## 2023-01-09 ENCOUNTER — HOSPITAL ENCOUNTER (INPATIENT)
Facility: HOSPITAL | Age: 58
LOS: 3 days | Discharge: HOME | End: 2023-01-12
Attending: SURGERY | Admitting: SURGERY
Payer: MEDICARE

## 2023-01-09 ENCOUNTER — ANESTHESIA EVENT (OUTPATIENT)
Dept: PERIOP | Facility: HOSPITAL | Age: 58
End: 2023-01-09
Payer: MEDICARE

## 2023-01-09 ENCOUNTER — ANESTHESIA (OUTPATIENT)
Dept: PERIOP | Facility: HOSPITAL | Age: 58
End: 2023-01-09
Payer: MEDICARE

## 2023-01-09 ENCOUNTER — APPOINTMENT (OUTPATIENT)
Dept: GENERAL RADIOLOGY | Facility: HOSPITAL | Age: 58
End: 2023-01-09
Payer: MEDICARE

## 2023-01-09 DIAGNOSIS — R91.1 RIGHT UPPER LOBE PULMONARY NODULE: ICD-10-CM

## 2023-01-09 LAB
ANION GAP SERPL CALCULATED.3IONS-SCNC: 12 MMOL/L (ref 5–15)
ANION GAP SERPL CALCULATED.3IONS-SCNC: 13 MMOL/L (ref 5–15)
BASOPHILS # BLD AUTO: 0.1 10*3/MM3 (ref 0–0.2)
BASOPHILS NFR BLD AUTO: 0.3 % (ref 0–1.5)
BUN SERPL-MCNC: 10 MG/DL (ref 6–20)
BUN SERPL-MCNC: 10 MG/DL (ref 6–20)
BUN/CREAT SERPL: 12.3 (ref 7–25)
BUN/CREAT SERPL: 14.9 (ref 7–25)
CALCIUM SPEC-SCNC: 8.6 MG/DL (ref 8.6–10.5)
CALCIUM SPEC-SCNC: 8.9 MG/DL (ref 8.6–10.5)
CHLORIDE SERPL-SCNC: 101 MMOL/L (ref 98–107)
CHLORIDE SERPL-SCNC: 102 MMOL/L (ref 98–107)
CO2 SERPL-SCNC: 22 MMOL/L (ref 22–29)
CO2 SERPL-SCNC: 24 MMOL/L (ref 22–29)
CREAT SERPL-MCNC: 0.67 MG/DL (ref 0.57–1)
CREAT SERPL-MCNC: 0.81 MG/DL (ref 0.57–1)
DEPRECATED RDW RBC AUTO: 45.5 FL (ref 37–54)
DEPRECATED RDW RBC AUTO: 48.1 FL (ref 37–54)
EGFRCR SERPLBLD CKD-EPI 2021: 101.5 ML/MIN/1.73
EGFRCR SERPLBLD CKD-EPI 2021: 84.3 ML/MIN/1.73
EOSINOPHIL # BLD AUTO: 0 10*3/MM3 (ref 0–0.4)
EOSINOPHIL NFR BLD AUTO: 0 % (ref 0.3–6.2)
ERYTHROCYTE [DISTWIDTH] IN BLOOD BY AUTOMATED COUNT: 14.1 % (ref 12.3–15.4)
ERYTHROCYTE [DISTWIDTH] IN BLOOD BY AUTOMATED COUNT: 14.2 % (ref 12.3–15.4)
GLUCOSE BLDC GLUCOMTR-MCNC: 139 MG/DL (ref 70–105)
GLUCOSE BLDC GLUCOMTR-MCNC: 184 MG/DL (ref 70–105)
GLUCOSE SERPL-MCNC: 146 MG/DL (ref 65–99)
GLUCOSE SERPL-MCNC: 175 MG/DL (ref 65–99)
HCT VFR BLD AUTO: 45.8 % (ref 34–46.6)
HCT VFR BLD AUTO: 45.9 % (ref 34–46.6)
HGB BLD-MCNC: 14.8 G/DL (ref 12–15.9)
HGB BLD-MCNC: 15.7 G/DL (ref 12–15.9)
LYMPHOCYTES # BLD AUTO: 1.6 10*3/MM3 (ref 0.7–3.1)
LYMPHOCYTES NFR BLD AUTO: 8.2 % (ref 19.6–45.3)
MAGNESIUM SERPL-MCNC: 1.7 MG/DL (ref 1.6–2.6)
MCH RBC QN AUTO: 29.8 PG (ref 26.6–33)
MCH RBC QN AUTO: 31.3 PG (ref 26.6–33)
MCHC RBC AUTO-ENTMCNC: 32.4 G/DL (ref 31.5–35.7)
MCHC RBC AUTO-ENTMCNC: 34.2 G/DL (ref 31.5–35.7)
MCV RBC AUTO: 91.6 FL (ref 79–97)
MCV RBC AUTO: 91.9 FL (ref 79–97)
MONOCYTES # BLD AUTO: 0.9 10*3/MM3 (ref 0.1–0.9)
MONOCYTES NFR BLD AUTO: 4.7 % (ref 5–12)
NEUTROPHILS NFR BLD AUTO: 16.6 10*3/MM3 (ref 1.7–7)
NEUTROPHILS NFR BLD AUTO: 86.8 % (ref 42.7–76)
NRBC BLD AUTO-RTO: 0 /100 WBC (ref 0–0.2)
PLATELET # BLD AUTO: 290 10*3/MM3 (ref 140–450)
PLATELET # BLD AUTO: 336 10*3/MM3 (ref 140–450)
PMV BLD AUTO: 7.6 FL (ref 6–12)
PMV BLD AUTO: 7.7 FL (ref 6–12)
POTASSIUM SERPL-SCNC: 4.1 MMOL/L (ref 3.5–5.2)
POTASSIUM SERPL-SCNC: 4.3 MMOL/L (ref 3.5–5.2)
RBC # BLD AUTO: 4.99 10*6/MM3 (ref 3.77–5.28)
RBC # BLD AUTO: 5.01 10*6/MM3 (ref 3.77–5.28)
SODIUM SERPL-SCNC: 137 MMOL/L (ref 136–145)
SODIUM SERPL-SCNC: 137 MMOL/L (ref 136–145)
WBC NRBC COR # BLD: 19.1 10*3/MM3 (ref 3.4–10.8)
WBC NRBC COR # BLD: 20.2 10*3/MM3 (ref 3.4–10.8)

## 2023-01-09 PROCEDURE — 85025 COMPLETE CBC W/AUTO DIFF WBC: CPT | Performed by: SURGERY

## 2023-01-09 PROCEDURE — 32651 THORACOSCOPY REMOVE CORTEX: CPT | Performed by: REGISTERED NURSE

## 2023-01-09 PROCEDURE — 25010000002 PHENYLEPHRINE 10 MG/ML SOLUTION: Performed by: NURSE ANESTHETIST, CERTIFIED REGISTERED

## 2023-01-09 PROCEDURE — 0 BUPIVACAINE LIPOSOME 1.3 % SUSPENSION 10 ML VIAL: Performed by: SURGERY

## 2023-01-09 PROCEDURE — 07B74ZX EXCISION OF THORAX LYMPHATIC, PERCUTANEOUS ENDOSCOPIC APPROACH, DIAGNOSTIC: ICD-10-PCS | Performed by: SURGERY

## 2023-01-09 PROCEDURE — 32674 THORACOSCOPY LYMPH NODE EXC: CPT | Performed by: REGISTERED NURSE

## 2023-01-09 PROCEDURE — 0BNC4ZZ RELEASE RIGHT UPPER LUNG LOBE, PERCUTANEOUS ENDOSCOPIC APPROACH: ICD-10-PCS | Performed by: SURGERY

## 2023-01-09 PROCEDURE — 25010000002 CEFOXITIN PER 1 G: Performed by: SURGERY

## 2023-01-09 PROCEDURE — 32663 THORACOSCOPY W/LOBECTOMY: CPT | Performed by: REGISTERED NURSE

## 2023-01-09 PROCEDURE — 25010000002 FENTANYL CITRATE (PF) 100 MCG/2ML SOLUTION: Performed by: NURSE ANESTHETIST, CERTIFIED REGISTERED

## 2023-01-09 PROCEDURE — 32674 THORACOSCOPY LYMPH NODE EXC: CPT | Performed by: SURGERY

## 2023-01-09 PROCEDURE — 25010000002 HEPARIN (PORCINE) PER 1000 UNITS: Performed by: SURGERY

## 2023-01-09 PROCEDURE — C9290 INJ, BUPIVACAINE LIPOSOME: HCPCS | Performed by: SURGERY

## 2023-01-09 PROCEDURE — 0BJ08ZZ INSPECTION OF TRACHEOBRONCHIAL TREE, VIA NATURAL OR ARTIFICIAL OPENING ENDOSCOPIC: ICD-10-PCS | Performed by: SURGERY

## 2023-01-09 PROCEDURE — 25010000002 KETOROLAC TROMETHAMINE PER 15 MG: Performed by: SURGERY

## 2023-01-09 PROCEDURE — 88331 PATH CONSLTJ SURG 1 BLK 1SPC: CPT | Performed by: SURGERY

## 2023-01-09 PROCEDURE — 88307 TISSUE EXAM BY PATHOLOGIST: CPT | Performed by: SURGERY

## 2023-01-09 PROCEDURE — 25010000002 PROPOFOL 200 MG/20ML EMULSION: Performed by: NURSE ANESTHETIST, CERTIFIED REGISTERED

## 2023-01-09 PROCEDURE — 80048 BASIC METABOLIC PNL TOTAL CA: CPT | Performed by: SURGERY

## 2023-01-09 PROCEDURE — 25010000002 DEXAMETHASONE PER 1 MG: Performed by: NURSE ANESTHETIST, CERTIFIED REGISTERED

## 2023-01-09 PROCEDURE — 25010000002 ONDANSETRON PER 1 MG: Performed by: NURSE ANESTHETIST, CERTIFIED REGISTERED

## 2023-01-09 PROCEDURE — 25010000002 ENOXAPARIN PER 10 MG: Performed by: SURGERY

## 2023-01-09 PROCEDURE — 94640 AIRWAY INHALATION TREATMENT: CPT

## 2023-01-09 PROCEDURE — 25010000002 FENTANYL CITRATE (PF) 50 MCG/ML SOLUTION: Performed by: NURSE ANESTHETIST, CERTIFIED REGISTERED

## 2023-01-09 PROCEDURE — 8E0W4CZ ROBOTIC ASSISTED PROCEDURE OF TRUNK REGION, PERCUTANEOUS ENDOSCOPIC APPROACH: ICD-10-PCS | Performed by: SURGERY

## 2023-01-09 PROCEDURE — 25010000002 HYDROMORPHONE 1 MG/ML SOLUTION: Performed by: NURSE ANESTHETIST, CERTIFIED REGISTERED

## 2023-01-09 PROCEDURE — 3E0T3BZ INTRODUCTION OF ANESTHETIC AGENT INTO PERIPHERAL NERVES AND PLEXI, PERCUTANEOUS APPROACH: ICD-10-PCS | Performed by: SURGERY

## 2023-01-09 PROCEDURE — 82962 GLUCOSE BLOOD TEST: CPT

## 2023-01-09 PROCEDURE — 88305 TISSUE EXAM BY PATHOLOGIST: CPT | Performed by: SURGERY

## 2023-01-09 PROCEDURE — 83735 ASSAY OF MAGNESIUM: CPT | Performed by: SURGERY

## 2023-01-09 PROCEDURE — 25010000002 HYDROMORPHONE 1 MG/ML SOLUTION: Performed by: SURGERY

## 2023-01-09 PROCEDURE — 71045 X-RAY EXAM CHEST 1 VIEW: CPT

## 2023-01-09 PROCEDURE — 85027 COMPLETE CBC AUTOMATED: CPT | Performed by: SURGERY

## 2023-01-09 PROCEDURE — 94761 N-INVAS EAR/PLS OXIMETRY MLT: CPT

## 2023-01-09 PROCEDURE — 94799 UNLISTED PULMONARY SVC/PX: CPT

## 2023-01-09 PROCEDURE — 0BTC4ZZ RESECTION OF RIGHT UPPER LUNG LOBE, PERCUTANEOUS ENDOSCOPIC APPROACH: ICD-10-PCS | Performed by: SURGERY

## 2023-01-09 PROCEDURE — C1729 CATH, DRAINAGE: HCPCS | Performed by: SURGERY

## 2023-01-09 PROCEDURE — 32663 THORACOSCOPY W/LOBECTOMY: CPT | Performed by: SURGERY

## 2023-01-09 PROCEDURE — 25010000002 PROPOFOL 10 MG/ML EMULSION: Performed by: NURSE ANESTHETIST, CERTIFIED REGISTERED

## 2023-01-09 PROCEDURE — 25010000002 DROPERIDOL PER 5 MG: Performed by: NURSE ANESTHETIST, CERTIFIED REGISTERED

## 2023-01-09 PROCEDURE — 32651 THORACOSCOPY REMOVE CORTEX: CPT | Performed by: SURGERY

## 2023-01-09 PROCEDURE — 88312 SPECIAL STAINS GROUP 1: CPT | Performed by: SURGERY

## 2023-01-09 DEVICE — SUREFORM 45 RELOAD BLUE
Type: IMPLANTABLE DEVICE | Site: CHEST | Status: FUNCTIONAL
Brand: SUREFORM

## 2023-01-09 DEVICE — CLIP LIG HEMOLOK PA 6CT MD/LG GRN: Type: IMPLANTABLE DEVICE | Site: CHEST | Status: FUNCTIONAL

## 2023-01-09 DEVICE — ABSORBABLE HEMOSTAT (OXIDIZED REGENERATED CELLULOSE, U.S.P.)
Type: IMPLANTABLE DEVICE | Site: CHEST | Status: FUNCTIONAL
Brand: SURGICEL

## 2023-01-09 DEVICE — SUREFORM 45 RELOAD GREEN
Type: IMPLANTABLE DEVICE | Site: CHEST | Status: FUNCTIONAL
Brand: SUREFORM

## 2023-01-09 DEVICE — STAPLER 30 RELOAD WHITE
Type: IMPLANTABLE DEVICE | Site: CHEST | Status: FUNCTIONAL
Brand: ENDOWRIST

## 2023-01-09 RX ORDER — ONDANSETRON 2 MG/ML
INJECTION INTRAMUSCULAR; INTRAVENOUS AS NEEDED
Status: DISCONTINUED | OUTPATIENT
Start: 2023-01-09 | End: 2023-01-09 | Stop reason: SURG

## 2023-01-09 RX ORDER — PROMETHAZINE HYDROCHLORIDE 25 MG/1
25 SUPPOSITORY RECTAL ONCE AS NEEDED
Status: DISCONTINUED | OUTPATIENT
Start: 2023-01-09 | End: 2023-01-09 | Stop reason: HOSPADM

## 2023-01-09 RX ORDER — SODIUM CHLORIDE, SODIUM LACTATE, POTASSIUM CHLORIDE, CALCIUM CHLORIDE 600; 310; 30; 20 MG/100ML; MG/100ML; MG/100ML; MG/100ML
INJECTION, SOLUTION INTRAVENOUS CONTINUOUS PRN
Status: DISCONTINUED | OUTPATIENT
Start: 2023-01-09 | End: 2023-01-09 | Stop reason: SURG

## 2023-01-09 RX ORDER — ACETAMINOPHEN 325 MG/1
650 TABLET ORAL 3 TIMES DAILY
Status: DISCONTINUED | OUTPATIENT
Start: 2023-01-09 | End: 2023-01-12 | Stop reason: HOSPADM

## 2023-01-09 RX ORDER — SCOLOPAMINE TRANSDERMAL SYSTEM 1 MG/1
1 PATCH, EXTENDED RELEASE TRANSDERMAL CONTINUOUS
Status: DISCONTINUED | OUTPATIENT
Start: 2023-01-09 | End: 2023-01-09

## 2023-01-09 RX ORDER — IBUPROFEN 400 MG/1
600 TABLET ORAL ONCE AS NEEDED
Status: DISCONTINUED | OUTPATIENT
Start: 2023-01-09 | End: 2023-01-09 | Stop reason: HOSPADM

## 2023-01-09 RX ORDER — SODIUM CHLORIDE 0.9 % (FLUSH) 0.9 %
10 SYRINGE (ML) INJECTION AS NEEDED
Status: DISCONTINUED | OUTPATIENT
Start: 2023-01-09 | End: 2023-01-09 | Stop reason: HOSPADM

## 2023-01-09 RX ORDER — ACETYLCYSTEINE 200 MG/ML
3 SOLUTION ORAL; RESPIRATORY (INHALATION)
Status: DISCONTINUED | OUTPATIENT
Start: 2023-01-09 | End: 2023-01-09

## 2023-01-09 RX ORDER — SODIUM CHLORIDE, SODIUM LACTATE, POTASSIUM CHLORIDE, CALCIUM CHLORIDE 600; 310; 30; 20 MG/100ML; MG/100ML; MG/100ML; MG/100ML
40 INJECTION, SOLUTION INTRAVENOUS CONTINUOUS
Status: DISCONTINUED | OUTPATIENT
Start: 2023-01-09 | End: 2023-01-10

## 2023-01-09 RX ORDER — ACETYLCYSTEINE 200 MG/ML
3 SOLUTION ORAL; RESPIRATORY (INHALATION)
Status: DISCONTINUED | OUTPATIENT
Start: 2023-01-10 | End: 2023-01-12 | Stop reason: HOSPADM

## 2023-01-09 RX ORDER — ENOXAPARIN SODIUM 100 MG/ML
40 INJECTION SUBCUTANEOUS DAILY
Status: DISCONTINUED | OUTPATIENT
Start: 2023-01-09 | End: 2023-01-12 | Stop reason: HOSPADM

## 2023-01-09 RX ORDER — HYDROCODONE BITARTRATE AND ACETAMINOPHEN 10; 325 MG/1; MG/1
1 TABLET ORAL EVERY 6 HOURS PRN
Status: DISCONTINUED | OUTPATIENT
Start: 2023-01-09 | End: 2023-01-10

## 2023-01-09 RX ORDER — FENTANYL CITRATE 50 UG/ML
50 INJECTION, SOLUTION INTRAMUSCULAR; INTRAVENOUS
Status: DISCONTINUED | OUTPATIENT
Start: 2023-01-09 | End: 2023-01-09 | Stop reason: HOSPADM

## 2023-01-09 RX ORDER — HEPARIN SODIUM 5000 [USP'U]/ML
5000 INJECTION, SOLUTION INTRAVENOUS; SUBCUTANEOUS ONCE
Status: COMPLETED | OUTPATIENT
Start: 2023-01-09 | End: 2023-01-09

## 2023-01-09 RX ORDER — ALBUTEROL SULFATE 2.5 MG/3ML
2.5 SOLUTION RESPIRATORY (INHALATION)
Status: DISCONTINUED | OUTPATIENT
Start: 2023-01-10 | End: 2023-01-12 | Stop reason: HOSPADM

## 2023-01-09 RX ORDER — DIPHENHYDRAMINE HYDROCHLORIDE 50 MG/ML
12.5 INJECTION INTRAMUSCULAR; INTRAVENOUS ONCE AS NEEDED
Status: DISCONTINUED | OUTPATIENT
Start: 2023-01-09 | End: 2023-01-09 | Stop reason: HOSPADM

## 2023-01-09 RX ORDER — SODIUM CHLORIDE 9 MG/ML
INJECTION, SOLUTION INTRAVENOUS AS NEEDED
Status: DISCONTINUED | OUTPATIENT
Start: 2023-01-09 | End: 2023-01-09 | Stop reason: HOSPADM

## 2023-01-09 RX ORDER — ALBUTEROL SULFATE 90 UG/1
2 AEROSOL, METERED RESPIRATORY (INHALATION) EVERY 4 HOURS PRN
Status: DISCONTINUED | OUTPATIENT
Start: 2023-01-09 | End: 2023-01-12 | Stop reason: HOSPADM

## 2023-01-09 RX ORDER — DROPERIDOL 2.5 MG/ML
0.62 INJECTION, SOLUTION INTRAMUSCULAR; INTRAVENOUS ONCE AS NEEDED
Status: COMPLETED | OUTPATIENT
Start: 2023-01-09 | End: 2023-01-09

## 2023-01-09 RX ORDER — OXYCODONE HYDROCHLORIDE 5 MG/1
5 TABLET ORAL ONCE AS NEEDED
Status: DISCONTINUED | OUTPATIENT
Start: 2023-01-09 | End: 2023-01-09 | Stop reason: HOSPADM

## 2023-01-09 RX ORDER — PREGABALIN 100 MG/1
100 CAPSULE ORAL 2 TIMES DAILY
Status: DISCONTINUED | OUTPATIENT
Start: 2023-01-09 | End: 2023-01-12 | Stop reason: HOSPADM

## 2023-01-09 RX ORDER — PHENYLEPHRINE HYDROCHLORIDE 10 MG/ML
INJECTION INTRAVENOUS AS NEEDED
Status: DISCONTINUED | OUTPATIENT
Start: 2023-01-09 | End: 2023-01-09 | Stop reason: SURG

## 2023-01-09 RX ORDER — LIDOCAINE HYDROCHLORIDE 10 MG/ML
INJECTION, SOLUTION EPIDURAL; INFILTRATION; INTRACAUDAL; PERINEURAL AS NEEDED
Status: DISCONTINUED | OUTPATIENT
Start: 2023-01-09 | End: 2023-01-09 | Stop reason: SURG

## 2023-01-09 RX ORDER — FENTANYL CITRATE 50 UG/ML
INJECTION, SOLUTION INTRAMUSCULAR; INTRAVENOUS AS NEEDED
Status: DISCONTINUED | OUTPATIENT
Start: 2023-01-09 | End: 2023-01-09 | Stop reason: SURG

## 2023-01-09 RX ORDER — ONDANSETRON 2 MG/ML
4 INJECTION INTRAMUSCULAR; INTRAVENOUS EVERY 6 HOURS PRN
Status: DISCONTINUED | OUTPATIENT
Start: 2023-01-09 | End: 2023-01-12 | Stop reason: HOSPADM

## 2023-01-09 RX ORDER — POLYETHYLENE GLYCOL 3350 17 G/17G
17 POWDER, FOR SOLUTION ORAL DAILY
Status: DISCONTINUED | OUTPATIENT
Start: 2023-01-10 | End: 2023-01-12 | Stop reason: HOSPADM

## 2023-01-09 RX ORDER — SODIUM CHLORIDE, SODIUM LACTATE, POTASSIUM CHLORIDE, CALCIUM CHLORIDE 600; 310; 30; 20 MG/100ML; MG/100ML; MG/100ML; MG/100ML
1000 INJECTION, SOLUTION INTRAVENOUS CONTINUOUS
Status: DISCONTINUED | OUTPATIENT
Start: 2023-01-09 | End: 2023-01-09

## 2023-01-09 RX ORDER — LIDOCAINE HYDROCHLORIDE 10 MG/ML
0.5 INJECTION, SOLUTION INFILTRATION; PERINEURAL ONCE AS NEEDED
Status: DISCONTINUED | OUTPATIENT
Start: 2023-01-09 | End: 2023-01-09 | Stop reason: HOSPADM

## 2023-01-09 RX ORDER — CELECOXIB 200 MG/1
200 CAPSULE ORAL ONCE
Status: COMPLETED | OUTPATIENT
Start: 2023-01-09 | End: 2023-01-09

## 2023-01-09 RX ORDER — PROMETHAZINE HYDROCHLORIDE 25 MG/1
25 TABLET ORAL ONCE AS NEEDED
Status: DISCONTINUED | OUTPATIENT
Start: 2023-01-09 | End: 2023-01-09 | Stop reason: HOSPADM

## 2023-01-09 RX ORDER — GABAPENTIN 300 MG/1
300 CAPSULE ORAL ONCE
Status: COMPLETED | OUTPATIENT
Start: 2023-01-09 | End: 2023-01-09

## 2023-01-09 RX ORDER — ACETAMINOPHEN 500 MG
1000 TABLET ORAL ONCE
Status: COMPLETED | OUTPATIENT
Start: 2023-01-09 | End: 2023-01-09

## 2023-01-09 RX ORDER — ONDANSETRON 4 MG/1
4 TABLET, FILM COATED ORAL EVERY 6 HOURS PRN
Status: DISCONTINUED | OUTPATIENT
Start: 2023-01-09 | End: 2023-01-12 | Stop reason: HOSPADM

## 2023-01-09 RX ORDER — BUPIVACAINE HYDROCHLORIDE 2.5 MG/ML
INJECTION, SOLUTION EPIDURAL; INFILTRATION; INTRACAUDAL AS NEEDED
Status: DISCONTINUED | OUTPATIENT
Start: 2023-01-09 | End: 2023-01-09 | Stop reason: HOSPADM

## 2023-01-09 RX ORDER — AMITRIPTYLINE HYDROCHLORIDE 25 MG/1
25 TABLET, FILM COATED ORAL NIGHTLY
Status: DISCONTINUED | OUTPATIENT
Start: 2023-01-10 | End: 2023-01-12 | Stop reason: HOSPADM

## 2023-01-09 RX ORDER — LABETALOL HYDROCHLORIDE 5 MG/ML
5 INJECTION, SOLUTION INTRAVENOUS
Status: DISCONTINUED | OUTPATIENT
Start: 2023-01-09 | End: 2023-01-09 | Stop reason: HOSPADM

## 2023-01-09 RX ORDER — METOPROLOL TARTRATE 5 MG/5ML
INJECTION INTRAVENOUS AS NEEDED
Status: DISCONTINUED | OUTPATIENT
Start: 2023-01-09 | End: 2023-01-09 | Stop reason: SURG

## 2023-01-09 RX ORDER — ROCURONIUM BROMIDE 10 MG/ML
INJECTION, SOLUTION INTRAVENOUS AS NEEDED
Status: DISCONTINUED | OUTPATIENT
Start: 2023-01-09 | End: 2023-01-09 | Stop reason: SURG

## 2023-01-09 RX ORDER — PROPOFOL 10 MG/ML
INJECTION, EMULSION INTRAVENOUS AS NEEDED
Status: DISCONTINUED | OUTPATIENT
Start: 2023-01-09 | End: 2023-01-09 | Stop reason: SURG

## 2023-01-09 RX ORDER — KETOROLAC TROMETHAMINE 15 MG/ML
15 INJECTION, SOLUTION INTRAMUSCULAR; INTRAVENOUS EVERY 8 HOURS
Status: DISCONTINUED | OUTPATIENT
Start: 2023-01-09 | End: 2023-01-09 | Stop reason: HOSPADM

## 2023-01-09 RX ORDER — DEXAMETHASONE SODIUM PHOSPHATE 4 MG/ML
INJECTION, SOLUTION INTRA-ARTICULAR; INTRALESIONAL; INTRAMUSCULAR; INTRAVENOUS; SOFT TISSUE AS NEEDED
Status: DISCONTINUED | OUTPATIENT
Start: 2023-01-09 | End: 2023-01-09 | Stop reason: SURG

## 2023-01-09 RX ORDER — DOCUSATE SODIUM 100 MG/1
100 CAPSULE, LIQUID FILLED ORAL 2 TIMES DAILY
Status: DISCONTINUED | OUTPATIENT
Start: 2023-01-09 | End: 2023-01-12 | Stop reason: HOSPADM

## 2023-01-09 RX ORDER — ALBUTEROL SULFATE 2.5 MG/3ML
2.5 SOLUTION RESPIRATORY (INHALATION)
Status: DISCONTINUED | OUTPATIENT
Start: 2023-01-09 | End: 2023-01-09

## 2023-01-09 RX ORDER — HYDRALAZINE HYDROCHLORIDE 20 MG/ML
5 INJECTION INTRAMUSCULAR; INTRAVENOUS
Status: DISCONTINUED | OUTPATIENT
Start: 2023-01-09 | End: 2023-01-09 | Stop reason: HOSPADM

## 2023-01-09 RX ORDER — ESMOLOL HYDROCHLORIDE 10 MG/ML
INJECTION INTRAVENOUS AS NEEDED
Status: DISCONTINUED | OUTPATIENT
Start: 2023-01-09 | End: 2023-01-09 | Stop reason: SURG

## 2023-01-09 RX ADMIN — METOPROLOL TARTRATE 2 MG: 1 INJECTION, SOLUTION INTRAVENOUS at 12:53

## 2023-01-09 RX ADMIN — PHENYLEPHRINE HYDROCHLORIDE 50 MCG: 10 INJECTION INTRAVENOUS at 11:48

## 2023-01-09 RX ADMIN — HEPARIN SODIUM 5000 UNITS: 5000 INJECTION INTRAVENOUS; SUBCUTANEOUS at 06:59

## 2023-01-09 RX ADMIN — CEFOXITIN 2 G: 2 INJECTION, POWDER, FOR SOLUTION INTRAVENOUS at 09:55

## 2023-01-09 RX ADMIN — HYDROMORPHONE HYDROCHLORIDE 0.5 MG: 1 INJECTION, SOLUTION INTRAMUSCULAR; INTRAVENOUS; SUBCUTANEOUS at 21:51

## 2023-01-09 RX ADMIN — ROCURONIUM BROMIDE 20 MG: 10 INJECTION, SOLUTION INTRAVENOUS at 09:48

## 2023-01-09 RX ADMIN — HYDROMORPHONE HYDROCHLORIDE 0.5 MG: 1 INJECTION, SOLUTION INTRAMUSCULAR; INTRAVENOUS; SUBCUTANEOUS at 18:10

## 2023-01-09 RX ADMIN — METOPROLOL TARTRATE 3 MG: 1 INJECTION, SOLUTION INTRAVENOUS at 11:45

## 2023-01-09 RX ADMIN — ROCURONIUM BROMIDE 20 MG: 10 INJECTION, SOLUTION INTRAVENOUS at 11:45

## 2023-01-09 RX ADMIN — SUGAMMADEX 200 MG: 100 INJECTION, SOLUTION INTRAVENOUS at 13:10

## 2023-01-09 RX ADMIN — Medication 12.5 MG: at 21:50

## 2023-01-09 RX ADMIN — FENTANYL CITRATE 100 MCG: 50 INJECTION, SOLUTION INTRAMUSCULAR; INTRAVENOUS at 07:45

## 2023-01-09 RX ADMIN — HYDROMORPHONE HYDROCHLORIDE 0.5 MG: 1 INJECTION, SOLUTION INTRAMUSCULAR; INTRAVENOUS; SUBCUTANEOUS at 11:22

## 2023-01-09 RX ADMIN — ROCURONIUM BROMIDE 20 MG: 10 INJECTION, SOLUTION INTRAVENOUS at 08:19

## 2023-01-09 RX ADMIN — ROCURONIUM BROMIDE 20 MG: 10 INJECTION, SOLUTION INTRAVENOUS at 08:45

## 2023-01-09 RX ADMIN — ACETAMINOPHEN 650 MG: 325 TABLET, FILM COATED ORAL at 21:50

## 2023-01-09 RX ADMIN — LABETALOL 20 MG/4 ML (5 MG/ML) INTRAVENOUS SYRINGE 5 MG: at 13:16

## 2023-01-09 RX ADMIN — HYDROMORPHONE HYDROCHLORIDE 0.5 MG: 1 INJECTION, SOLUTION INTRAMUSCULAR; INTRAVENOUS; SUBCUTANEOUS at 08:27

## 2023-01-09 RX ADMIN — ALBUTEROL SULFATE 2.5 MG: 2.5 SOLUTION RESPIRATORY (INHALATION) at 22:47

## 2023-01-09 RX ADMIN — DEXAMETHASONE SODIUM PHOSPHATE 4 MG: 4 INJECTION, SOLUTION INTRAMUSCULAR; INTRAVENOUS at 10:34

## 2023-01-09 RX ADMIN — HYDROMORPHONE HYDROCHLORIDE 0.5 MG: 1 INJECTION, SOLUTION INTRAMUSCULAR; INTRAVENOUS; SUBCUTANEOUS at 14:52

## 2023-01-09 RX ADMIN — ACETAMINOPHEN 1000 MG: 500 TABLET, FILM COATED ORAL at 06:58

## 2023-01-09 RX ADMIN — ESMOLOL HYDROCHLORIDE 10 MG: 100 INJECTION, SOLUTION INTRAVENOUS at 11:27

## 2023-01-09 RX ADMIN — CEFOXITIN 2 G: 2 INJECTION, POWDER, FOR SOLUTION INTRAVENOUS at 07:55

## 2023-01-09 RX ADMIN — LABETALOL 20 MG/4 ML (5 MG/ML) INTRAVENOUS SYRINGE 7.5 MG: at 13:24

## 2023-01-09 RX ADMIN — HYDROMORPHONE HYDROCHLORIDE 0.5 MG: 1 INJECTION, SOLUTION INTRAMUSCULAR; INTRAVENOUS; SUBCUTANEOUS at 13:57

## 2023-01-09 RX ADMIN — ROCURONIUM BROMIDE 10 MG: 10 INJECTION, SOLUTION INTRAVENOUS at 12:14

## 2023-01-09 RX ADMIN — DROPERIDOL 0.62 MG: 2.5 INJECTION, SOLUTION INTRAMUSCULAR; INTRAVENOUS at 19:39

## 2023-01-09 RX ADMIN — SODIUM CHLORIDE, POTASSIUM CHLORIDE, SODIUM LACTATE AND CALCIUM CHLORIDE 1000 ML: 600; 310; 30; 20 INJECTION, SOLUTION INTRAVENOUS at 06:59

## 2023-01-09 RX ADMIN — PROPOFOL INJECTABLE EMULSION 120 MCG/KG/MIN: 10 INJECTION, EMULSION INTRAVENOUS at 07:45

## 2023-01-09 RX ADMIN — PROPOFOL 200 MG: 10 INJECTION, EMULSION INTRAVENOUS at 07:45

## 2023-01-09 RX ADMIN — ACETYLCYSTEINE 3 ML: 200 INHALANT RESPIRATORY (INHALATION) at 22:47

## 2023-01-09 RX ADMIN — SODIUM CHLORIDE, SODIUM LACTATE, POTASSIUM CHLORIDE, AND CALCIUM CHLORIDE: .6; .31; .03; .02 INJECTION, SOLUTION INTRAVENOUS at 07:45

## 2023-01-09 RX ADMIN — PHENYLEPHRINE HYDROCHLORIDE 100 MCG: 10 INJECTION INTRAVENOUS at 11:32

## 2023-01-09 RX ADMIN — CEFOXITIN 2 G: 2 INJECTION, POWDER, FOR SOLUTION INTRAVENOUS at 11:53

## 2023-01-09 RX ADMIN — HYDROMORPHONE HYDROCHLORIDE 0.5 MG: 1 INJECTION, SOLUTION INTRAMUSCULAR; INTRAVENOUS; SUBCUTANEOUS at 12:48

## 2023-01-09 RX ADMIN — ROCURONIUM BROMIDE 20 MG: 10 INJECTION, SOLUTION INTRAVENOUS at 11:15

## 2023-01-09 RX ADMIN — ONDANSETRON 4 MG: 2 INJECTION INTRAMUSCULAR; INTRAVENOUS at 12:47

## 2023-01-09 RX ADMIN — LIDOCAINE HYDROCHLORIDE 100 MG: 10 INJECTION, SOLUTION EPIDURAL; INFILTRATION; INTRACAUDAL; PERINEURAL at 07:45

## 2023-01-09 RX ADMIN — PHENYLEPHRINE HYDROCHLORIDE 200 MCG: 10 INJECTION INTRAVENOUS at 09:26

## 2023-01-09 RX ADMIN — ROCURONIUM BROMIDE 20 MG: 10 INJECTION, SOLUTION INTRAVENOUS at 10:24

## 2023-01-09 RX ADMIN — SODIUM CHLORIDE, SODIUM LACTATE, POTASSIUM CHLORIDE, AND CALCIUM CHLORIDE: .6; .31; .03; .02 INJECTION, SOLUTION INTRAVENOUS at 13:27

## 2023-01-09 RX ADMIN — SODIUM CHLORIDE, SODIUM LACTATE, POTASSIUM CHLORIDE, AND CALCIUM CHLORIDE: .6; .31; .03; .02 INJECTION, SOLUTION INTRAVENOUS at 09:13

## 2023-01-09 RX ADMIN — PHENYLEPHRINE HYDROCHLORIDE 200 MCG: 10 INJECTION INTRAVENOUS at 09:17

## 2023-01-09 RX ADMIN — KETOROLAC TROMETHAMINE 15 MG: 15 INJECTION, SOLUTION INTRAMUSCULAR; INTRAVENOUS at 14:31

## 2023-01-09 RX ADMIN — ENOXAPARIN SODIUM 40 MG: 100 INJECTION SUBCUTANEOUS at 21:50

## 2023-01-09 RX ADMIN — PREGABALIN 100 MG: 100 CAPSULE ORAL at 21:50

## 2023-01-09 RX ADMIN — DOCUSATE SODIUM 100 MG: 100 CAPSULE, LIQUID FILLED ORAL at 21:50

## 2023-01-09 RX ADMIN — ROCURONIUM BROMIDE 10 MG: 10 INJECTION, SOLUTION INTRAVENOUS at 10:51

## 2023-01-09 RX ADMIN — SCOPALAMINE 1 PATCH: 1 PATCH, EXTENDED RELEASE TRANSDERMAL at 06:59

## 2023-01-09 RX ADMIN — GABAPENTIN 300 MG: 300 CAPSULE ORAL at 06:59

## 2023-01-09 RX ADMIN — ROCURONIUM BROMIDE 10 MG: 10 INJECTION, SOLUTION INTRAVENOUS at 09:12

## 2023-01-09 RX ADMIN — FENTANYL CITRATE 50 MCG: 50 INJECTION, SOLUTION INTRAMUSCULAR; INTRAVENOUS at 14:35

## 2023-01-09 RX ADMIN — ROCURONIUM BROMIDE 50 MG: 10 INJECTION, SOLUTION INTRAVENOUS at 07:47

## 2023-01-09 RX ADMIN — ROCURONIUM BROMIDE 10 MG: 10 INJECTION, SOLUTION INTRAVENOUS at 12:33

## 2023-01-09 RX ADMIN — LABETALOL 20 MG/4 ML (5 MG/ML) INTRAVENOUS SYRINGE 5 MG: at 13:21

## 2023-01-09 RX ADMIN — CELECOXIB 200 MG: 200 CAPSULE ORAL at 06:58

## 2023-01-09 RX ADMIN — FENTANYL CITRATE 50 MCG: 50 INJECTION, SOLUTION INTRAMUSCULAR; INTRAVENOUS at 19:39

## 2023-01-09 RX ADMIN — HYDROMORPHONE HYDROCHLORIDE 0.5 MG: 1 INJECTION, SOLUTION INTRAMUSCULAR; INTRAVENOUS; SUBCUTANEOUS at 08:21

## 2023-01-09 NOTE — ANESTHESIA PROCEDURE NOTES
Airway  Urgency: elective    Date/Time: 1/9/2023 7:53 AM  Airway not difficult    General Information and Staff    Patient location during procedure: OR  CRNA/CAA: Millie Sheldon CRNA    Indications and Patient Condition  Indications for airway management: airway protection    Preoxygenated: yes  MILS maintained throughout  Mask difficulty assessment: 2 - vent by mask + OA or adjuvant +/- NMBA    Final Airway Details  Final airway type: endotracheal airway      Successful airway: EBT - double lumen left  Cuffed: yes   Successful intubation technique: video laryngoscopy  Facilitating devices/methods: intubating stylet  Endotracheal tube insertion site: oral  Blade: Glidescope  Blade size: 3  EBT DL size (fr): 37  Cormack-Lehane Classification: grade I - full view of glottis  Placement verified by: bronchoscopy, capnometry and single lung ventilation   Measured from: lips  ETT/EBT  to lips (cm): 28  Number of attempts at approach: 1  Assessment: lips, teeth, and gum same as pre-op and atraumatic intubation    Additional Comments  Double lumen tube placed by ANA CRISTINA Galeana. Placement confirmed via bronchoscopy by Dr. Hood. Placement reconfirmed post postioning via bronchoscopy.

## 2023-01-09 NOTE — SIGNIFICANT NOTE
01/09/23 1438   OTHER   Discipline occupational therapist   Rehab Time/Intention   Session Not Performed patient unavailable for evaluation  (robotic assisted right upper segmentectomy possible lobectomy, mediastinal lymph node dissection 1/9/23)   Therapy Assessment/Plan (PT)   Criteria for Skilled Interventions Met (PT)  --    Recommendation   PT - Next Appointment 01/10/23   Recommendation   OT - Next Appointment 01/10/23

## 2023-01-09 NOTE — ANESTHESIA POSTPROCEDURE EVALUATION
Patient: Rahel Ramsey    Procedure Summary     Date: 01/09/23 Room / Location: [facility] OR 08 / [facility] MAIN OR    Anesthesia Start: 0736 Anesthesia Stop: 1344    Procedure: THORASCOPIC RIGHT UPPER LOBECTOMY WITH DAVINCI ROBOT, MEDIASTINAL LYMPH NODE DISSECTION (Right: Chest) Diagnosis:       Right upper lobe pulmonary nodule      (Right upper lobe pulmonary nodule [R91.1])    Surgeons: Erwin Kearney MD Provider: Joe Culver MD    Anesthesia Type: general with block, ERAS ProtocolLety ASA Status: 3          Anesthesia Type: general with block, ERAS Protocol, Lety    Vitals  Vitals Value Taken Time   /60 01/09/23 1645   Temp 98.6 °F (37 °C) 01/09/23 1341   Pulse 97 01/09/23 1703   Resp 15 01/09/23 1600   SpO2 93 % 01/09/23 1703   Vitals shown include unvalidated device data.        Post Anesthesia Care and Evaluation    Patient location during evaluation: PACU  Patient participation: complete - patient participated  Level of consciousness: awake  Pain scale: See nurse's notes for pain score.  Pain management: adequate    Airway patency: patent  Anesthetic complications: No anesthetic complications  PONV Status: none  Cardiovascular status: acceptable  Respiratory status: acceptable  Hydration status: acceptable    Comments: Patient seen and examined postoperatively; vital signs stable; SpO2 greater than or equal to 90%; cardiopulmonary status stable; nausea/vomiting adequately controlled; pain adequately controlled; no apparent anesthesia complications; patient discharged from anesthesia care when discharge criteria were met

## 2023-01-09 NOTE — OP NOTE
OPERATIVE NOTE     Date of procedure: 1/9/2023     Patient name: Rahel Ramsey  MRN: [id number]    Pre OP diagnosis:  1. Right upper lobe adenocarcinoma.  2. Chronic obstructive pulmonary disease.  3. Tobacco abuse, active smoker.  4. Essential hypertension  5. Paroxysmal atrial fibrillation.  6. Hyperlipidemia.  7. Chronic back pain.  8. Herniated lumbar disc.  9. Cervical disc degeneration.  10. Hyperglycemia.  11. Vitamin D deficiency.  12. History of urinary tract infection.  13. Allergic rhinitis.    Post OP diagnosis:  1. Right upper lobe adenocarcinoma s/p Right Upper lobectomy..  2. Chronic obstructive pulmonary disease.  3. Tobacco abuse, active smoker.  4. Essential hypertension  5. Paroxysmal atrial fibrillation.  6. Hyperlipidemia.  7. Chronic back pain.  8. Herniated lumbar disc.  9. Cervical disc degeneration.  10. Hyperglycemia.  11. Vitamin D deficiency.  12. History of urinary tract infection.  13. Allergic rhinitis.    Procedure performed:   1. Flexible Bronchoscopy.   2. Robot assisted Thoracoscopic Right Upper Lobectomy.   3. Systematic Mediastinal Lymph node dissection.   4. Partial decortication.   5. Intercostal Nerve Block.     Synoptic portion:  1. Intent: curative  2. Surgical procedure performed:  Resection of at least one lobe or bilobectomy - Lobectomy WITH mediastinal lymph node dissection  3. Mediastinal lymph node stations resected:  4R Lower Paratracheal (right), 7 Subcarinal, 8 Paraesophageal (below caroline) and 9 Pulmonary ligament  4. Hilar lymph node stations resected:  10 Hilar, 11 Interlobar and 12 Lobar    Indications:   Rahel Ramsey  is a 57-year-old female who has a history of tobacco abuse, 40-pack-year history of smoking, active smoker and chronic obstructive pulm disease.  She has been getting lung cancer surveillance CT scan and on CT scan of the chest on 10/6/2022 she was found to have increasing size of the right upper lobe nodule.  CT-guided biopsy of the  right upper lobe nodule on 11/2/2022 revealed invasive well to moderately differentiated adenocarcinoma with focal lepidic features.  PET/CT on 11/23/2022 showed a 1.7 cm right upper lobe nodule that was not PET avid.  Also noted an 8 mm right upper lobe nodule which has been stable since 2020 and likely benign.  There was no evidence of distant metastases.  Based on the above scenario, she has clinical stage I ([id number]) adenocarcinoma.  She has good performance status, her functional status is limited due to chronic back pain.  She is an active smoker and her DLCO was 50%.      Based on the results of CALGB [id number] and JCOG 0801 randomized controlled trial, sublobar resection of a peripherally located less than 2 cm non-small cell lung cancer is feasible. I informed the patient if intraoperatively the anatomy is favorable, I will perform right upper lobe posterior (S2) segmentectomy or wedge resection to preserve her lung function.  Otherwise she can tolerate right upper lobectomy if needed. After discussing the risks and benefits of the procedure, the patient provided informed consent for a Flexible Bronchoscopy, Robotic assisted right upper segmentectomy possible lobectomy and mediastinal lymph node dissection.    Findings:  1. Right upper lobe adherent to the chest wall.  2. No evidence of pleural implants or effusion.  3. Large calcified and necrotic 11 R lymph node between the right upper lobe takeoff and bronchus intermedius noted.  Specimen sent for frozen section and came back as necrotizing granulomatous inflammation.  4. Partial complete fissures.  5. Right middle lobe inflated under direct vision without evidence of torsion.  6. Small airleak at the end of the procedure.    Surgeon: Erwin Kearney MD     Assistants: EARL Pierre was responsible for performing the following activities: Retraction, Suction, Irrigation, Suturing, Closing, Placing Dressing and Held/Positioned Camera and their skilled  assistance was necessary for the success of this case.    Anesthesia: General endotracheal - Double lumen tube administered by No anesthesia staff entered.    ASA Class: 3    Procedure Details   On 1/9/2023, the patient was brought to the operating room and placed supine on the operating table.  Following an uneventful induction of general anesthesia, she was intubated with a double-lumen endotracheal tube without incident.  Appropriate placement was confirmed with the pediatric bronchoscope.  A radial arterial line and additional peripheral IVs were placed by the Anesthesia team. Thacker catheter was inserted.  Pneumatic compression devices placed to the lower extremities.  Patient was repositioned in the left lateral decubitus position.  The table was jackknifed. All bony prominences were well padded.  The patient received Ancef for intravenous antibiotic prophylaxis and 5000 units subcutaneous heparin for DVT prophylaxis.  The right chest was prepped and draped in usual sterile fashion.  Prior to beginning the operation, a time-out was conducted with all members of the surgical team present.      Following right lung isolation, I began by making an 8 mm transverse incision in the seventh intercostal space in the right mid chest.  The pleural space was entered without incident using blunt tonsil dissection.  A trocar was inserted. After confirming safe pleural entry, carbon dioxide insufflation to 8 mm Hg was utilized.  Next, I placed my additional robotic ports.  After infiltrating the pleura and skin with local anesthetic, I placed an 8 mm port 4 cm anterior to the spinous process, approximately in the sixth and seventh intercostal space.  A 12 mm (Arm 3) port was placed midway between the arm 2 and arm 4 ports.  The 12 mm (Arm 1) port was placed anteriorly in the confluence of the diaphragm at the sixth intercostal space.  A 12 mm assistant AirSeal® trocar was placed at the confluence of the diaphragm  triangulated between the arm 1 and arm 2 ports.  The robot was docked in a standard fashion.  All instruments were inserted under direct vision. The chest was examined.  There was no pleural effusion or obvious pleural disease.      I began by taking down the adhesions between the right upper lobe and the chest wall using bipolar energy device.  A partial decortication was performed.  Then I retracted the lung superiorly and started working on the inferior pulmonary ligament. Level 9R lymph node was identified and sent for histopathology. I retracted the lung anteriorly to expose the posterior hilum.  I encountered level 8R lymph node which was sent for histopathology. I continued the pleural dissection posteriorly up to the azygous vein.  I encountered 10 R lymph node which I sent for histopathology.  The level 7 lymph nodes were removed and sent for histopathology.  11 R superior sump lymph node was removed and sent for histopathology.  Between the right upper lobe takeoff and bronchus intermedius there was necrotic calcified 11 R lymph node noted which was dissected and sent for frozen section.  The pathology confirmed granulomatous necrotizing inflammation. I retract the lung inferiorly and continued the pleural dissection superiorly.  An 11 R lymph node between the right upper lobe bronchus and pulmonary artery branches to the upper lobe was removed.  I then retracted lung posteriorly and continued the anterior hilar dissection by taking down the pleura.  I identified the superior pulmonary vein with branches to right upper lobe and middle lobe.  The right upper lobe branches of superior pulmonary vein was dissected circumferentially and encircled with a vessel loop. I identified the truncus arteriosus and dissected circumferentially.  There was an 11 R lymph node between the upper lobe pulmonary vein and truncus arteriosus which I sent for histopathology as well.  Then I turned my attention to expose the  pulmonary artery between the fissure.  The major fissure was partially complete and minor fissure was incomplete.  I found a safe window over the pulmonary artery and divided the major fissure using blue load stapler.  The minor fissure was completed using blue load stapler as well.  This exposed the pulmonary artery proximally and distally.  I transected the upper lobe vein branches using white load stapler followed by truncus arteriosus using white load stapler as well.  I exposed the posterior ascending branch of the right upper lobe and transected it using white load stapler.  At this time the right upper lobe was completely free except the bronchial attachment.  I performed a clamp test of the right upper lobe takeoff and inflated the lung.  I confirmed that the right middle lobe and lower lobe were not obstructed.  I transected the right upper lobe using green load stapler.      Intercostal nerve block from 4th-10th ribs was performed using 0.25% Exparel.  Using an Endo Catch bag that was introduced through the assistant port, I retrieved the specimen.  The specimen was inspected the back table.  On gross inspection, the area of concern was well away from parenchymal and bronchial margin. The robot was undocked and I irrigated the chest cavity with 0.9% normal saline and suction till it was clear. Hemostasis was achieved. A 24 Fr chest tube was introduced through the assistant port and directed posterior to the hilum and towards the apex of the pleural space. The remaining lung was inflated under direct visualization. The middle lobe inflated without torsion and no plication was indicated.      The chest drain was secured to the skin using # 2 Ethibond suture. The port sites were closed in layers.  The fascia of the 12 mm port sites was closed using 0-Vicryl suture.  The subcutaneous tissues were closed using 2-0 Vicryl suture.  Skin incisions were closed using 4-0 Monocryl subcuticular sutures and skin glue  were applied.      The sponge, needle, and instrument counts were correct at the end of the operation.  The patient was awakened from anesthesia, was extubated without incident, and was transported to the Post Anesthesia Care Unit in stable condition.  There was a small air leak at the end the case.    Estimated Blood Loss:  25 cc           Drains: 24 Fr chest tube was introduced through the assistant port and directed posterior to the hilum and towards the apex of the pleural space.                 Specimens:     ID Type Source Tests Collected by Time   A : LEVEL 9R LN Tissue Lymph Node TISSUE PATHOLOGY EXAM Erwin Kearney MD 1/9/2023 0856   B : LEVEL 8R LN Tissue Lymph Node TISSUE PATHOLOGY EXAM Erwin Kearney MD 1/9/2023 0858   C : LEVEL 10R LN Tissue Lymph Node TISSUE PATHOLOGY EXAM Erwin Kearney MD 1/9/2023 0920   D : LEVEL 7 LN Tissue Lymph Node TISSUE PATHOLOGY EXAM Erwin Kearney MD 1/9/2023 0922   E : LEVEL 11R SUPERIOR LN Tissue Lymph Node TISSUE PATHOLOGY EXAM Erwin Kearney MD 1/9/2023 0939   F : LEVEL 12R LN Tissue Lymph Node TISSUE PATHOLOGY EXAM Erwin Kearney MD 1/9/2023 1024   G : 11R #1 LN Tissue Lymph Node TISSUE PATHOLOGY EXAM Erwin Kearney MD 1/9/2023 1058   H : 11R #2 LN Tissue Lymph Node TISSUE PATHOLOGY EXAM Erwin Kearney MD 1/9/2023 1059   I : 11R #3 LN Tissue Lymph Node TISSUE PATHOLOGY EXAM Erwin Kearney MD 1/9/2023 1111   J : LEVEL 10R #2 LN Tissue Lymph Node TISSUE PATHOLOGY EXAM Erwin Kearney MD 1/9/2023 1122   K : LEVEL 4R LN'S  Tissue Lymph Node TISSUE PATHOLOGY EXAM Erwin Kearney MD 1/9/2023 1132   L : 11R #4 LN Tissue Lymph Node TISSUE PATHOLOGY EXAM Erwin Kearney MD 1/9/2023 1200   M : RIGHT UPPER LOBECTOMY Tissue Lung, Right Upper Lobe TISSUE PATHOLOGY EXAM Erwin Kearney MD 1/9/2023 1241              Implants: None           Complications: None           Disposition: PACU - hemodynamically stable.           Condition: Stable     Erwin Kearney MD   Thoracic Surgeon  [facility] [employer]

## 2023-01-09 NOTE — ANESTHESIA PREPROCEDURE EVALUATION
Anesthesia Evaluation     Patient summary reviewed and Nursing notes reviewed   no history of anesthetic complications:  NPO Solid Status: > 8 hours  NPO Liquid Status: > 8 hours           Airway   Mallampati: II  TM distance: >3 FB  Neck ROM: full  No difficulty expected  Dental      Pulmonary - normal exam   (+) a smoker Current Abstained day of surgery, lung cancer, COPD,   Cardiovascular - normal exam    (+) hypertension, dysrhythmias Atrial Fib, hyperlipidemia,  carotid artery disease right carotid      Neuro/Psych  (+) headaches, numbness,    GI/Hepatic/Renal/Endo    (+)   diabetes mellitus,     Musculoskeletal     (+) radiculopathy  Abdominal  - normal exam   Substance History - negative use     OB/GYN negative ob/gyn ROS         Other   arthritis,    history of cancer                    Anesthesia Plan    ASA 3     general with block, ERAS Protocol and [place]     intravenous induction     Anesthetic plan, risks, benefits, and alternatives have been provided, discussed and informed consent has been obtained with: patient.    Plan discussed with CRNA.        CODE STATUS:

## 2023-01-10 ENCOUNTER — APPOINTMENT (OUTPATIENT)
Dept: GENERAL RADIOLOGY | Facility: HOSPITAL | Age: 58
End: 2023-01-10
Payer: MEDICARE

## 2023-01-10 LAB
GLUCOSE BLDC GLUCOMTR-MCNC: 136 MG/DL (ref 70–105)
GLUCOSE BLDC GLUCOMTR-MCNC: 138 MG/DL (ref 70–105)
GLUCOSE BLDC GLUCOMTR-MCNC: 148 MG/DL (ref 70–105)
GLUCOSE BLDC GLUCOMTR-MCNC: 159 MG/DL (ref 70–105)

## 2023-01-10 PROCEDURE — 94799 UNLISTED PULMONARY SVC/PX: CPT

## 2023-01-10 PROCEDURE — 71045 X-RAY EXAM CHEST 1 VIEW: CPT

## 2023-01-10 PROCEDURE — 25010000002 HYDROMORPHONE 1 MG/ML SOLUTION: Performed by: SURGERY

## 2023-01-10 PROCEDURE — 97162 PT EVAL MOD COMPLEX 30 MIN: CPT

## 2023-01-10 PROCEDURE — 94664 DEMO&/EVAL PT USE INHALER: CPT

## 2023-01-10 PROCEDURE — 97166 OT EVAL MOD COMPLEX 45 MIN: CPT

## 2023-01-10 PROCEDURE — 82962 GLUCOSE BLOOD TEST: CPT

## 2023-01-10 PROCEDURE — 25010000002 ENOXAPARIN PER 10 MG: Performed by: SURGERY

## 2023-01-10 RX ORDER — METHOCARBAMOL 500 MG/1
500 TABLET, FILM COATED ORAL EVERY 8 HOURS SCHEDULED
Status: DISCONTINUED | OUTPATIENT
Start: 2023-01-10 | End: 2023-01-12 | Stop reason: HOSPADM

## 2023-01-10 RX ORDER — OLANZAPINE 10 MG/2ML
1 INJECTION, POWDER, LYOPHILIZED, FOR SOLUTION INTRAMUSCULAR
Status: DISCONTINUED | OUTPATIENT
Start: 2023-01-10 | End: 2023-01-12 | Stop reason: HOSPADM

## 2023-01-10 RX ORDER — TRAMADOL HYDROCHLORIDE 50 MG/1
25 TABLET ORAL EVERY 8 HOURS
Status: DISCONTINUED | OUTPATIENT
Start: 2023-01-10 | End: 2023-01-10

## 2023-01-10 RX ORDER — DEXTROSE MONOHYDRATE 25 G/50ML
25 INJECTION, SOLUTION INTRAVENOUS
Status: DISCONTINUED | OUTPATIENT
Start: 2023-01-10 | End: 2023-01-12 | Stop reason: HOSPADM

## 2023-01-10 RX ORDER — HYDROCODONE BITARTRATE AND ACETAMINOPHEN 10; 325 MG/1; MG/1
1 TABLET ORAL EVERY 6 HOURS PRN
Status: DISCONTINUED | OUTPATIENT
Start: 2023-01-10 | End: 2023-01-12 | Stop reason: HOSPADM

## 2023-01-10 RX ORDER — INSULIN LISPRO 100 [IU]/ML
2-9 INJECTION, SOLUTION INTRAVENOUS; SUBCUTANEOUS
Status: DISCONTINUED | OUTPATIENT
Start: 2023-01-10 | End: 2023-01-12 | Stop reason: HOSPADM

## 2023-01-10 RX ORDER — NICOTINE POLACRILEX 4 MG
15 LOZENGE BUCCAL
Status: DISCONTINUED | OUTPATIENT
Start: 2023-01-10 | End: 2023-01-12 | Stop reason: HOSPADM

## 2023-01-10 RX ADMIN — HYDROMORPHONE HYDROCHLORIDE 0.5 MG: 1 INJECTION, SOLUTION INTRAMUSCULAR; INTRAVENOUS; SUBCUTANEOUS at 02:59

## 2023-01-10 RX ADMIN — HYDROCODONE BITARTRATE AND ACETAMINOPHEN 1 TABLET: 10; 325 TABLET ORAL at 07:49

## 2023-01-10 RX ADMIN — ACETYLCYSTEINE 3 ML: 200 SOLUTION ORAL; RESPIRATORY (INHALATION) at 07:14

## 2023-01-10 RX ADMIN — POLYETHYLENE GLYCOL 3350 17 G: 17 POWDER, FOR SOLUTION ORAL at 09:02

## 2023-01-10 RX ADMIN — ACETAMINOPHEN 650 MG: 325 TABLET, FILM COATED ORAL at 20:05

## 2023-01-10 RX ADMIN — HYDROCODONE BITARTRATE AND ACETAMINOPHEN 1 TABLET: 10; 325 TABLET ORAL at 14:06

## 2023-01-10 RX ADMIN — PREGABALIN 100 MG: 100 CAPSULE ORAL at 20:05

## 2023-01-10 RX ADMIN — DOCUSATE SODIUM 100 MG: 100 CAPSULE, LIQUID FILLED ORAL at 20:05

## 2023-01-10 RX ADMIN — Medication 12.5 MG: at 20:07

## 2023-01-10 RX ADMIN — ENOXAPARIN SODIUM 40 MG: 100 INJECTION SUBCUTANEOUS at 16:37

## 2023-01-10 RX ADMIN — ALBUTEROL SULFATE 2.5 MG: 2.5 SOLUTION RESPIRATORY (INHALATION) at 07:14

## 2023-01-10 RX ADMIN — ACETAMINOPHEN 650 MG: 325 TABLET, FILM COATED ORAL at 09:02

## 2023-01-10 RX ADMIN — Medication 12.5 MG: at 09:01

## 2023-01-10 RX ADMIN — AMITRIPTYLINE HYDROCHLORIDE 25 MG: 25 TABLET, FILM COATED ORAL at 20:05

## 2023-01-10 RX ADMIN — METHOCARBAMOL TABLETS 500 MG: 500 TABLET, COATED ORAL at 07:47

## 2023-01-10 RX ADMIN — HYDROMORPHONE HYDROCHLORIDE 0.5 MG: 1 INJECTION, SOLUTION INTRAMUSCULAR; INTRAVENOUS; SUBCUTANEOUS at 00:06

## 2023-01-10 RX ADMIN — ACETAMINOPHEN 650 MG: 325 TABLET, FILM COATED ORAL at 16:38

## 2023-01-10 RX ADMIN — METHOCARBAMOL TABLETS 500 MG: 500 TABLET, COATED ORAL at 22:10

## 2023-01-10 RX ADMIN — HYDROCODONE BITARTRATE AND ACETAMINOPHEN 1 TABLET: 10; 325 TABLET ORAL at 20:08

## 2023-01-10 RX ADMIN — DOCUSATE SODIUM 100 MG: 100 CAPSULE, LIQUID FILLED ORAL at 09:02

## 2023-01-10 RX ADMIN — METHOCARBAMOL TABLETS 500 MG: 500 TABLET, COATED ORAL at 14:06

## 2023-01-10 RX ADMIN — PREGABALIN 100 MG: 100 CAPSULE ORAL at 09:15

## 2023-01-10 NOTE — PLAN OF CARE
Goal Outcome Evaluation:              Outcome Evaluation: Pt abed at present, c/o abd pain, prn norco given. Pt strongly encouraged to get up out of bed. No s/sx of distress observed. Pt R chest tube remains in place to water seal, air leak noted MD aware. VSS call light in reach, plan of care on going.

## 2023-01-10 NOTE — CASE MANAGEMENT/SOCIAL WORK
Discharge Planning Assessment   J[place]     Patient Name: Rahel Ramsey  MRN: [id number]  Today's Date: 1/10/2023    Admit Date: 1/9/2023    Plan: Home. [facility] for home o2 needs and Possible HH  for nsg if need home o2. No needs for PT per PT eval today   Discharge Needs Assessment     Row Name 01/10/23 1523       Living Environment    People in Home significant other    Current Living Arrangements home    Primary Care Provided by self    Provides Primary Care For no one    Family Caregiver if Needed significant other    Quality of Family Relationships helpful    Able to Return to Prior Arrangements yes       Resource/Environmental Concerns    Resource/Environmental Concerns none    Transportation Concerns none       Transition Planning    Patient/Family Anticipates Transition to home with family    Patient/Family Anticipated Services at Transition none    Transportation Anticipated family or friend will provide       Discharge Needs Assessment    Readmission Within the Last 30 Days no previous admission in last 30 days    Equipment Currently Used at Home cane, quad    Concerns to be Addressed discharge planning    Anticipated Changes Related to Illness none    Equipment Needed After Discharge none    Provided Post Acute Provider List? N/A    N/A Provider List Comment PT/OT recommends home with family    Provided Post Acute Provider Quality & Resource List? N/A               Discharge Plan     Row Name 01/10/23 1525       Plan    Plan Home. [facility] for home o2 needs and Possible HH  for nsg if need home o2. No needs for PT per PT eval today    Plan Comments Barriers to dc: chest tube. PCP and pharmacy confirmed              Continued Care and Services - Admitted Since 1/9/2023    Coordination has not been started for this encounter.       Expected Discharge Date and Time     Expected Discharge Date Expected Discharge Time    Jan 11, 2023          Demographic Summary     Row Name 01/10/23 1522       General  Information    Referral Source admission list    Reason for Consult discharge planning    Preferred Language English    Row Name 01/10/23 1521       General Information    Admission Type inpatient    Arrived From PACU/recovery room    Required Notices Provided Important Message from Medicare               Functional Status     Row Name 01/10/23 1523       Functional Status    Usual Activity Tolerance moderate    Current Activity Tolerance moderate       Functional Status, IADL    Medications independent    Meal Preparation independent    Housekeeping independent    Laundry independent    Shopping independent       Mental Status    General Appearance WDL WDL       Mental Status Summary    Recent Changes in Mental Status/Cognitive Functioning no changes                         Ana Madrid, RN

## 2023-01-10 NOTE — PROGRESS NOTES
"    Chief Complaint: Post op pain  S/P: RATS Right upper lobectomy, MLND  POD # 1    No acute issues overnight.     Subjective:  Symptoms:  She reports cough and chest pain.    Diet:  Poor intake.    Activity level: Impaired due to weakness.    Pain:  She complains of pain that is moderate.  She reports pain is unchanged.  Pain is partially controlled.      Vital Signs:  Temp:  [97.2 °F (36.2 °C)-99.1 °F (37.3 °C)] 99.1 °F (37.3 °C)  Heart Rate:  [] 95  Resp:  [10-30] 19  BP: (104-170)/(55-92) 120/69  Arterial Line BP: (166-184)/() 184/102    Intake & Output (last day)       01/09 0701  01/10 0700 01/10 0701  01/11 0700    I.V. (mL/kg) 2448.9 (25.5)     IV Piggyback 300     Total Intake(mL/kg) 2748.9 (28.6)     Urine (mL/kg/hr) 1375 (0.6)     Blood 10     Chest Tube 550     Total Output 1935     Net +813.9                 Objective:  General Appearance:  In no acute distress and in pain.    Vital signs: (most recent): Blood pressure 120/69, pulse 95, temperature 99.1 °F (37.3 °C), temperature source Oral, resp. rate 19, height 175.3 cm (69\"), weight 96.1 kg (211 lb 13.8 oz), SpO2 95 %.  Vital signs are normal.    Output: Producing urine.    Lungs:  Normal effort.    Heart: Normal rate.    Chest: Chest wall tenderness present.    Abdomen: Abdomen is soft.    Extremities: Normal range of motion.    Neurological: Patient is alert and oriented to person, place and time.            Chest tube:   Site: Right  Suction: -20 cm  Air Leak: negative  Level: 550  24 Hour Total: 550    Results Review:     I reviewed the patient's new clinical results.  I reviewed the patient's new imaging results and agree with the interpretation.    Imaging Results (Last 24 Hours)     Procedure Component Value Units Date/Time    XR Chest 1 View [[id number]] Resulted: 01/10/23 0549     Updated: 01/10/23 0552    XR Chest 1 View [[id number]] Collected: 01/09/23 1434     Updated: 01/09/23 1439    Narrative:      XR CHEST 1 VW    Date of " Exam: 1/9/2023 2:30 PM EST    Indication: Post Op Lung Surgery.    Comparison: 1/3/2023    Findings:  Heart size is at the upper limits of normal. Pulmonary vessels are normal. There is a right thoracostomy tube place the tip projecting over the right lung apex. There is large amounts of subcutaneous air are seen of the right lateral chest wall and base   of the right neck. No definite pneumothorax identified. There is patchy airspace disease in the right perihilar region right lung base. Left lung is clear. No definite pleural effusion. Bony structures are unremarkable.         Impression:      Impression:    1. Right-sided thoracostomy tube in place. No evidence pneumothorax. There is a large amount of subcutaneous air over the right lateral chest wall base of the right neck.  2. Right perihilar and right basilar airspace disease favored to be due to atelectasis.    Electronically Signed: Josh Gurwinder    1/9/2023 2:37 PM EST    Workstation ID: [id number]          Lab Results:     Lab Results (last 24 hours)     Procedure Component Value Units Date/Time    Tissue Pathology Exam [[id number]] Collected: 01/09/23 0856    Specimen: Tissue from Lymph Node; Tissue from Lymph Node; Tissue from Lymph Node; Tissue from Lymph Node; Tissue from Lymph Node; Tissue from Lymph Node; Tissue from Lymph Node; Tissue from Lymph Node; Tissue from Lymph Node; Tissue from Lymph Node; Tissue from Lymph Node; Tissue from Lymph Node; Tissue from Lung, Right Upper Lobe Updated: 01/10/23 0652     Case Report --     Surgical Pathology Report                         Case: [id number]                                  Authorizing Provider:  Erwin Kearney MD            Collected:           01/09/2023 10:58 AM          Ordering Location:     [facility] MAIN  Received:            01/09/2023 11:07 AM                                 OR                                                                           Pathologist:           Axel  MD South                                                             Intraop:               [place] Reyna MD                                                             Specimen:    Lymph Node, 11R #1 LN                                                                       Intraoperative Consultation --     1FA. Lymph Node, 11R #1, excision:    DX: Necrotizing granulomatous inflammation, no evidence of malignancy          Basic Metabolic Panel [[id number]]  (Abnormal) Collected: 01/09/23 2245    Specimen: Blood Updated: 01/09/23 2331     Glucose 146 mg/dL      BUN 10 mg/dL      Creatinine 0.67 mg/dL      Sodium 137 mmol/L      Potassium 4.1 mmol/L      Chloride 102 mmol/L      CO2 22.0 mmol/L      Calcium 8.6 mg/dL      BUN/Creatinine Ratio 14.9     Anion Gap 13.0 mmol/L      eGFR 101.5 mL/min/1.73      Comment: National Kidney Foundation and American Society of Nephrology (ASN) Task Force recommended calculation based on the Chronic Kidney Disease Epidemiology Collaboration (CKD-EPI) equation refit without adjustment for race.       Narrative:      GFR Normal >60  Chronic Kidney Disease <60  Kidney Failure <15      CBC & Differential [[id number]]  (Abnormal) Collected: 01/09/23 2245    Specimen: Blood Updated: 01/09/23 2312    Narrative:      The following orders were created for panel order CBC & Differential.  Procedure                               Abnormality         Status                     ---------                               -----------         ------                     CBC Auto Differential[[id number]]        Abnormal            Final result                 Please view results for these tests on the individual orders.    CBC Auto Differential [[id number]]  (Abnormal) Collected: 01/09/23 2245    Specimen: Blood Updated: 01/09/23 2312     WBC 19.10 10*3/mm3      RBC 4.99 10*6/mm3      Hemoglobin 14.8 g/dL      Hematocrit 45.8 %      MCV 91.9 fL      MCH 29.8 pg      MCHC 32.4 g/dL      RDW 14.2 %       RDW-SD 45.5 fl      MPV 7.7 fL      Platelets 290 10*3/mm3      Neutrophil % 86.8 %      Lymphocyte % 8.2 %      Monocyte % 4.7 %      Eosinophil % 0.0 %      Basophil % 0.3 %      Neutrophils, Absolute 16.60 10*3/mm3      Lymphocytes, Absolute 1.60 10*3/mm3      Monocytes, Absolute 0.90 10*3/mm3      Eosinophils, Absolute 0.00 10*3/mm3      Basophils, Absolute 0.10 10*3/mm3      nRBC 0.0 /100 WBC     Basic Metabolic Panel [[id number]]  (Abnormal) Collected: 01/09/23 1635    Specimen: Blood Updated: 01/09/23 1703     Glucose 175 mg/dL      BUN 10 mg/dL      Creatinine 0.81 mg/dL      Sodium 137 mmol/L      Potassium 4.3 mmol/L      Comment: Slight hemolysis detected by analyzer. Results may be affected.        Chloride 101 mmol/L      CO2 24.0 mmol/L      Calcium 8.9 mg/dL      BUN/Creatinine Ratio 12.3     Anion Gap 12.0 mmol/L      eGFR 84.3 mL/min/1.73      Comment: National Kidney Foundation and American Society of Nephrology (ASN) Task Force recommended calculation based on the Chronic Kidney Disease Epidemiology Collaboration (CKD-EPI) equation refit without adjustment for race.       Narrative:      GFR Normal >60  Chronic Kidney Disease <60  Kidney Failure <15      Magnesium [[id number]]  (Normal) Collected: 01/09/23 1355    Specimen: Blood Updated: 01/09/23 1421     Magnesium 1.7 mg/dL     CBC (No Diff) [[id number]]  (Abnormal) Collected: 01/09/23 1355    Specimen: Blood Updated: 01/09/23 1410     WBC 20.20 10*3/mm3      RBC 5.01 10*6/mm3      Hemoglobin 15.7 g/dL      Hematocrit 45.9 %      MCV 91.6 fL      MCH 31.3 pg      MCHC 34.2 g/dL      RDW 14.1 %      RDW-SD 48.1 fl      MPV 7.6 fL      Platelets 336 10*3/mm3     POC Glucose Once [[id number]]  (Abnormal) Collected: 01/09/23 1346    Specimen: Blood Updated: 01/09/23 1348     Glucose 184 mg/dL      Comment: Serial Number: [id number]:  [id number]              Assessment & Plan       Right upper lobe pulmonary nodule    Primary cancer of right upper  lobe of lung (HCC)      Assessment & Plan    Recovering as expected.   Pain sub-optimal control. Patient has history of chronic pain.   No air leak noted.     Multimodal pain management.   Wean supplemental O2.   Encourage IS, ambulation.   Chest tube to waterseal.   Bronchodilators and Mucomyst.   DC IV fluids.   Remove matthew catheter.   Continue home medications.   Chemical DVT PPx.   PT/ OT.     Erwin Kearney MD  [facility]  01/10/23  07:07 EST

## 2023-01-10 NOTE — PLAN OF CARE
Goal Outcome Evaluation:  Plan of Care Reviewed With: patient             56 yo female with lung mass s/p robotic assisted R upper lobectomy on 1/9. Normally, patient is independent with ADLs and IADLs. She lives with her  in a SSH with 3 SCAR. Pt oriented x4 today and with pain reported at 7/10. She is Max A for donning socks while in supine, with difficulty bending down to reach her feet d/t incision and chest tube. She then comes to EOB with Mod I, and stands and ambulates to chair with CGA. Patient remains on 2 L O2, normally on RA. Desatted from 94 to 90% with activity. Anticipate steady progress and improved activity tolerance. She should be safe to d/c home when medically stable.

## 2023-01-10 NOTE — PLAN OF CARE
Goal Outcome Evaluation:  Plan of Care Reviewed With: patient        Progress: no change  Outcome Evaluation: Patient with complaints of pain, treated per MAR.  Chest tube in place to right side.  Air leak noted.  Dressing intact.  Monitoring chest tube output q4h x24h, noted at 300 currently. 2L O2 via NC. Thacker cath in place, patent to BSD. Vital signs stable. Call light within reach. Care plan on going.

## 2023-01-10 NOTE — PLAN OF CARE
Goal Outcome Evaluation:  Plan of Care Reviewed With: patient           Outcome Evaluation: 56 yo female with lung mass s/p robotic assisted R upper lobectomy on 1/9. Pt is from home with her significant other and is normally independent.  Pt with significant amt of post op pain but is able to transfer OOB to chair with CGAx1.  Pt is on 2L O2, sats 90-94%.  Will follow pt 5x/week with daily progression of mobility. Anticipate steady progress with goal for home at d/c.

## 2023-01-10 NOTE — THERAPY EVALUATION
Patient Name: Rahel Ramsey  : 1965    MRN: [id number]                              Today's Date: 1/10/2023       Admit Date: 2023    Visit Dx:     ICD-10-CM ICD-9-CM   1. Right upper lobe pulmonary nodule  R91.1 793.11     Patient Active Problem List   Diagnosis   • Allergic rhinitis   • Degeneration of lumbar or lumbosacral intervertebral disc   • Degenerative joint disease of right acromioclavicular joint   • Fatigue   • History of skin cancer   • Hyperglycemia   • Essential hypertension   • Mixed hyperlipidemia   • Hypertriglyceridemia   • Leukocytosis   • Lumbar disc disease with radiculopathy   • Lumbar herniated disc   • Lumbar radiculopathy   • Other cervical disc degeneration, unspecified cervical region   • Paroxysmal atrial fibrillation (HCC)   • Stenosis of right carotid artery   • Tobacco use   • Vitamin D deficiency   • Right upper lobe pulmonary nodule   • Hilar adenopathy   • Urinary tract infection with hematuria   • UTI symptoms   • Dysuria   • COPD with exacerbation (HCC)   • Primary cancer of right upper lobe of lung (HCC)     Past Medical History:   Diagnosis Date   • Allergic    • Arthritis    • Atrial fibrillation (HCC)    • COPD (chronic obstructive pulmonary disease) (HCC)    • Diabetes mellitus (HCC)    • Headache    • History of herniated intervertebral disc    • History of skin cancer     Left lower extremity   • Hyperlipidemia    • Hypertension    • Injury of back    • Leukocytosis    • Lung nodule    • Urinary tract infection with hematuria 2021     Past Surgical History:   Procedure Laterality Date   • BACK SURGERY     • CARDIAC CATHETERIZATION     • DILATATION AND CURETTAGE     • LUMBAR DECOMPRESSION      L4-L5    • LUNG BIOPSY Right 2022   • SKIN CANCER EXCISION Left     [place]   • SUBTOTAL HYSTERECTOMY        General Information     Row Name 01/10/23 1119          Physical Therapy Time and Intention    Document Type evaluation  -     Mode of Treatment  physical therapy  -     Row Name 01/10/23 1119          General Information    Patient Profile Reviewed yes  -     Prior Level of Function independent:;all household mobility;community mobility;driving;ADL's  -     Existing Precautions/Restrictions no known precautions/restrictions  -     Barriers to Rehab none identified  -     Row Name 01/10/23 1119          Living Environment    People in Home significant other  -     Row Name 01/10/23 1119          Home Main Entrance    Number of Stairs, Main Entrance three  -     Row Name 01/10/23 1119          Stairs Within Home, Primary    Stairs, Within Home, Primary optional upstairs, plans to stay on main level  -     Row Name 01/10/23 1119          Cognition    Orientation Status (Cognition) oriented x 4  -     Row Name 01/10/23 1119          Safety Issues, Functional Mobility    Impairments Affecting Function (Mobility) pain  -           User Key  (r) = Recorded By, (t) = Taken By, (c) = Cosigned By    Initials Name Provider Type     Inna Morrison PT Physical Therapist               Mobility     Row Name 01/10/23 1121          Bed Mobility    Bed Mobility bed mobility (all) activities  -     All Activities, [place] (Bed Mobility) modified independence  -     Assistive Device (Bed Mobility) head of bed elevated;bed rails  -     Row Name 01/10/23 1121          Bed-Chair Transfer    Bed-Chair [place] (Transfers) contact guard  -     Row Name 01/10/23 1121          Sit-Stand Transfer    Sit-Stand [place] (Transfers) contact guard  -     Row Name 01/10/23 1121          Gait/Stairs (Locomotion)    [place] Level (Gait) unable to assess  -     Comment, (Gait/Stairs) pt reports increased pain and unable to attempt walking  -           User Key  (r) = Recorded By, (t) = Taken By, (c) = Cosigned By    Initials Name Provider Type    Inna Biswas PT Physical Therapist               Obj/Interventions     Row Name 01/10/23  1121          Range of Motion Comprehensive    General Range of Motion bilateral lower extremity ROM WFL  -     Comment, General Range of Motion guarded RUE due to pain at CT site  -     Row Name 01/10/23 1121          Strength Comprehensive (MMT)    Comment, General Manual Muscle Testing (MMT) Assessment gross strength BLE 4/5  -     Row Name 01/10/23 1121          Sensory Assessment (Somatosensory)    Sensory Assessment (Somatosensory) sensation intact  -           User Key  (r) = Recorded By, (t) = Taken By, (c) = Cosigned By    Initials Name Provider Type     Inna Morrison, PT Physical Therapist               Goals/Plan     Row Name 01/10/23 1128          Bed Mobility Goal 1 (PT)    Activity/Assistive Device (Bed Mobility Goal 1, PT) bed mobility activities, all  -     [place] Level/Cues Needed (Bed Mobility Goal 1, PT) independent  -     Time Frame (Bed Mobility Goal 1, PT) 1 week  -[facility] Name 01/10/23 1128          Transfer Goal 1 (PT)    Activity/Assistive Device (Transfer Goal 1, PT) transfers, all  -     [place] Level/Cues Needed (Transfer Goal 1, PT) independent  -     Time Frame (Transfer Goal 1, PT) 1 week  -[facility] Name 01/10/23 1128          Gait Training Goal 1 (PT)    Activity/Assistive Device (Gait Training Goal 1, PT) gait (walking locomotion)  -     [place] Level (Gait Training Goal 1, PT) modified independence  pt has quad cane in the room or RW as needed  -     Distance (Gait Training Goal 1, PT) 200'  -     Time Frame (Gait Training Goal 1, PT) 2 weeks  -[facility] Name 01/10/23 1128          Stairs Goal 1 (PT)    Activity/Assistive Device (Stairs Goal 1, PT) stairs, all skills  -     [place] Level/Cues Needed (Stairs Goal 1, PT) independent  -     Number of Stairs (Stairs Goal 1, PT) 3  -     Time Frame (Stairs Goal 1, PT) 2 weeks  -[facility] Name 01/10/23 1128          Therapy Assessment/Plan (PT)    Planned Therapy Interventions (PT)  bed mobility training;gait training;transfer training;strengthening;ROM (range of motion);stair training;patient/family education  [facility]           User Key  (r) = Recorded By, (t) = Taken By, (c) = Cosigned By    Initials Name Provider Type     Inna Morrison, PT Physical Therapist               Clinical Impression     Row Name 01/10/23 1122          Pain    Additional Documentation Pain Scale: FACES Pre/Post-Treatment (Group)  -     Row Name 01/10/23 1122          Pain Scale: FACES Pre/Post-Treatment    Pain: FACES Scale, Pretreatment 6-->hurts even more  -     Posttreatment Pain Rating 8-->hurts whole lot  -     Pain Location - Side/Orientation Right  -     Pain Location - abdomen;flank  -     Row Name 01/10/23 1122          Plan of Care Review    Plan of Care Reviewed With patient  -     Outcome Evaluation 58 yo female with lung mass s/p robotic assisted R upper lobectomy on 1/9. Pt is from home with her significant other and is normally independent.  Pt with significant amt of post op pain but is able to transfer OOB to chair with CGAx1.  Pt is on 2L O2, sats 90-94%.  Will follow pt 5x/week with daily progression of mobility. Anticipate steady progress with goal for home at d/c.  -     Row Name 01/10/23 1122          Therapy Assessment/Plan (PT)    Rehab Potential (PT) good, to achieve stated therapy goals  -     Criteria for Skilled Interventions Met (PT) yes;skilled treatment is necessary  -     Therapy Frequency (PT) 5 times/wk  -     Row Name 01/10/23 1122          Vital Signs    Pre SpO2 (%) 94  -     O2 Delivery Pre Treatment supplemental O2  -     Intra SpO2 (%) 90  -     O2 Delivery Intra Treatment room air  -     Post SpO2 (%) 93  -JH     O2 Delivery Post Treatment supplemental O2  -     Row Name 01/10/23 1122          Positioning and Restraints    Pre-Treatment Position in bed  -     Post Treatment Position chair  -     In Chair notified nsg;reclined;call light within  reach;encouraged to call for assist;exit alarm on  -           User Key  (r) = Recorded By, (t) = Taken By, (c) = Cosigned By    Initials Name Provider Type     Inna Morrison, KISHAN Physical Therapist               Outcome Measures     Row Name 01/10/23 1129 01/10/23 0830       How much help from another person do you currently need...    Turning from your back to your side while in flat bed without using bedrails? 3  - 3  -AK    Moving from lying on back to sitting on the side of a flat bed without bedrails? 3  - 3  -AK    Moving to and from a bed to a chair (including a wheelchair)? 3  - 3  -AK    Standing up from a chair using your arms (e.g., wheelchair, bedside chair)? 3  - 3  -AK    Climbing 3-5 steps with a railing? 3  - 3  -AK    To walk in hospital room? 3  - 3  -AK    AM-PAC 6 Clicks Score (PT) 18  - 18  -AK    Highest level of mobility 6 --> Walked 10 steps or more  - 6 --> Walked 10 steps or more  -AK          User Key  (r) = Recorded By, (t) = Taken By, (c) = Cosigned By    Initials Name Provider Type     Inna Morrison, KISHAN Physical Therapist    Leighann Perez LPN Licensed Nurse                             Physical Therapy Education     Title: PT OT SLP Therapies (Done)     Topic: Physical Therapy (Done)     Point: Mobility training (Done)     Learning Progress Summary           Patient Acceptance, E,TB, VU by  at 1/10/2023 1140                               User Key     Initials Effective Dates Name Provider Type [facility] 06/16/21 -  Inna Morrison, PT Physical Therapist PT              PT Recommendation and Plan  Planned Therapy Interventions (PT): bed mobility training, gait training, transfer training, strengthening, ROM (range of motion), stair training, patient/family education  Plan of Care Reviewed With: patient  Outcome Evaluation: 56 yo female with lung mass s/p robotic assisted R upper lobectomy on 1/9. Pt is from home with her significant other and is normally  independent.  Pt with significant amt of post op pain but is able to transfer OOB to chair with CGAx1.  Pt is on 2L O2, sats 90-94%.  Will follow pt 5x/week with daily progression of mobility. Anticipate steady progress with goal for home at d/c.     Time Calculation:    PT Charges     Row Name 01/10/23 1141             Time Calculation    Start Time 0818  -      Stop Time 0830  -      Time Calculation (min) 12 min  -      PT Received On 01/10/23  -      PT - Next Appointment 01/11/23  -      PT Goal Re-Cert Due Date 01/24/23  -         Time Calculation- PT    Total Timed Code Minutes- PT 0 minute(s)  -            User Key  (r) = Recorded By, (t) = Taken By, (c) = Cosigned By    Initials Name Provider Type    Inna Biswas, KISHAN Physical Therapist              Therapy Charges for Today     Code Description Service Date Service Provider Modifiers Qty    34499563237 HC PT EVAL MOD COMPLEXITY 3 1/10/2023 Inna Morrison, PT GP 1          PT G-Codes  AM-PAC 6 Clicks Score (PT): 18  PT Discharge Summary  Anticipated Discharge Disposition (PT): home with assist    Inna Morrison PT  1/10/2023

## 2023-01-10 NOTE — THERAPY EVALUATION
Patient Name: Rahel Ramsey  : 1965    MRN: [id number]                              Today's Date: 1/10/2023       Admit Date: 2023    Visit Dx:     ICD-10-CM ICD-9-CM   1. Right upper lobe pulmonary nodule  R91.1 793.11     Patient Active Problem List   Diagnosis   • Allergic rhinitis   • Degeneration of lumbar or lumbosacral intervertebral disc   • Degenerative joint disease of right acromioclavicular joint   • Fatigue   • History of skin cancer   • Hyperglycemia   • Essential hypertension   • Mixed hyperlipidemia   • Hypertriglyceridemia   • Leukocytosis   • Lumbar disc disease with radiculopathy   • Lumbar herniated disc   • Lumbar radiculopathy   • Other cervical disc degeneration, unspecified cervical region   • Paroxysmal atrial fibrillation (HCC)   • Stenosis of right carotid artery   • Tobacco use   • Vitamin D deficiency   • Right upper lobe pulmonary nodule   • Hilar adenopathy   • Urinary tract infection with hematuria   • UTI symptoms   • Dysuria   • COPD with exacerbation (HCC)   • Primary cancer of right upper lobe of lung (HCC)     Past Medical History:   Diagnosis Date   • Allergic    • Arthritis    • Atrial fibrillation (HCC)    • COPD (chronic obstructive pulmonary disease) (HCC)    • Diabetes mellitus (HCC)    • Headache    • History of herniated intervertebral disc    • History of skin cancer     Left lower extremity   • Hyperlipidemia    • Hypertension    • Injury of back    • Leukocytosis    • Lung nodule    • Urinary tract infection with hematuria 2021     Past Surgical History:   Procedure Laterality Date   • BACK SURGERY     • CARDIAC CATHETERIZATION     • DILATATION AND CURETTAGE     • LUMBAR DECOMPRESSION      L4-L5    • LUNG BIOPSY Right 2022   • SKIN CANCER EXCISION Left     [place]   • SUBTOTAL HYSTERECTOMY        General Information     Row Name 01/10/23 1358          OT Time and Intention    Document Type evaluation  -LS     Mode of Treatment occupational therapy   -     Row Name 01/10/23 1358          General Information    Patient Profile Reviewed yes  -LS     Prior Level of Function independent:;ADL's;driving;all household mobility;community mobility  -     Barriers to Rehab none identified  -LS     Row Name 01/10/23 1358          Living Environment    People in Home significant other  -LS     Row Name 01/10/23 1358          Home Main Entrance    Number of Stairs, Main Entrance three  -LS     Row Name 01/10/23 1358          Cognition    Orientation Status (Cognition) oriented x 4  -LS     Row Name 01/10/23 1358          Safety Issues, Functional Mobility    Impairments Affecting Function (Mobility) pain  -LS           User Key  (r) = Recorded By, (t) = Taken By, (c) = Cosigned By    Initials Name Provider Type    LS Sonny Allred OT Occupational Therapist                 Mobility/ADL's     Row Name 01/10/23 1400          Bed Mobility    Bed Mobility bed mobility (all) activities  -     All Activities, [place] (Bed Mobility) modified independence  -     Assistive Device (Bed Mobility) head of bed elevated;bed rails  -     Comment, (Bed Mobility) excess time and encouragement  -     Row Name 01/10/23 1400          Transfers    Transfers sit-stand transfer;bed-chair transfer  -LS     Row Name 01/10/23 1400          Bed-Chair Transfer    Bed-Chair [place] (Transfers) contact guard  -LS     Row Name 01/10/23 1400          Sit-Stand Transfer    Sit-Stand [place] (Transfers) contact guard  -LS     Row Name 01/10/23 1400          Activities of Daily Living    BADL Assessment/Intervention lower body dressing  -     Row Name 01/10/23 1400          Lower Body Dressing Assessment/Training    [place] Level (Lower Body Dressing) don;socks;maximum assist (25% patient effort)  -LS     Position (Lower Body Dressing) supine  -LS           User Key  (r) = Recorded By, (t) = Taken By, (c) = Cosigned By    Initials Name Provider Type    LS Sonny Allred OT  Occupational Therapist               Obj/Interventions     Row Name 01/10/23 1401          Sensory Assessment (Somatosensory)    Sensory Assessment (Somatosensory) sensation intact  -     Row Name 01/10/23 1401          Range of Motion Comprehensive    General Range of Motion bilateral upper extremity ROM WFL  -     Row Name 01/10/23 1401          Strength Comprehensive (MMT)    Comment, General Manual Muscle Testing (MMT) Assessment Gross BUEs 4/5  -           User Key  (r) = Recorded By, (t) = Taken By, (c) = Cosigned By    Initials Name Provider Type    Sonny Raygoza OT Occupational Therapist               Goals/Plan     Row Name 01/10/23 1410          Bathing Goal 1 (OT)    Activity/Device (Bathing Goal 1, OT) bathing skills, all  -LS     [place] Level/Cues Needed (Bathing Goal 1, OT) independent  -LS     Time Frame (Bathing Goal 1, OT) long term goal (LTG);2 weeks  -     Row Name 01/10/23 1410          Dressing Goal 1 (OT)    Activity/Device (Dressing Goal 1, OT) dressing skills, all  -LS     [place]/Cues Needed (Dressing Goal 1, OT) independent  -LS     Time Frame (Dressing Goal 1, OT) long term goal (LTG);2 weeks  -     Row Name 01/10/23 1410          Toileting Goal 1 (OT)    Activity/Device (Toileting Goal 1, OT) toileting skills, all  -LS     [place] Level/Cues Needed (Toileting Goal 1, OT) independent  -LS     Time Frame (Toileting Goal 1, OT) long term goal (LTG);2 weeks  -     Row Name 01/10/23 1410          Therapy Assessment/Plan (OT)    Planned Therapy Interventions (OT) activity tolerance training;BADL retraining;functional balance retraining;IADL retraining;occupation/activity based interventions;ROM/therapeutic exercise;strengthening exercise;transfer/mobility retraining  -           User Key  (r) = Recorded By, (t) = Taken By, (c) = Cosigned By    Initials Name Provider Type    Sonny Raygoza OT Occupational Therapist               Clinical Impression     [facility]  Name 01/10/23 1405          Pain Assessment    Pretreatment Pain Rating 7/10  -LS     Posttreatment Pain Rating 7/10  -LS     Pain Location - abdomen;flank  -LS     Row Name 01/10/23 1405          Plan of Care Review    Plan of Care Reviewed With patient  -LS     Outcome Evaluation 56 yo female with lung mass s/p robotic assisted R upper lobectomy on 1/9. Normally, patient is independent with ADLs and IADLs. She lives with her  in a H with 3 SCAR. Pt oriented x4 today and with pain reported at 7/10. She is Max A for donning socks while in supine, with difficulty bending down to reach her feet d/t incision and chest tube. She then comes to EOB with Mod I, and stands and ambulates to chair with CGA. Patient remains on 2 L O2, normally on RA. Desatted from 94 to 90% with activity. Anticipate steady progress and improved activity tolerance. She should be safe to d/c home when medically stable.  -     Row Name 01/10/23 1404          Therapy Assessment/Plan (OT)    Rehab Potential (OT) good, to achieve stated therapy goals  -     Criteria for Skilled Therapeutic Interventions Met (OT) yes;skilled treatment is necessary  -     Therapy Frequency (OT) 3 times/wk  -     Predicted Duration of Therapy Intervention (OT) until dc  -LS     Row Name 01/10/23 1405          Therapy Plan Review/Discharge Plan (OT)    Anticipated Discharge Disposition (OT) home  -     Row Name 01/10/23 1409          Vital Signs    Pre SpO2 (%) 94  2L  -LS     O2 Delivery Pre Treatment supplemental O2  -LS     Intra SpO2 (%) 90  -LS     O2 Delivery Intra Treatment room air  -LS     Post SpO2 (%) 94  -LS     O2 Delivery Post Treatment supplemental O2  -LS     Pre Patient Position Supine  -LS     Intra Patient Position Standing  -LS     Post Patient Position Sitting  -LS     Row Name 01/10/23 1405          Positioning and Restraints    Pre-Treatment Position in bed  -LS     Post Treatment Position chair  -LS     In Chair notified  nsg;reclined;call light within reach;encouraged to call for assist  -LS           User Key  (r) = Recorded By, (t) = Taken By, (c) = Cosigned By    Initials Name Provider Type    Sonny Raygoza OT Occupational Therapist               Outcome Measures     Row Name 01/10/23 1411          How much help from another is currently needed...    Putting on and taking off regular lower body clothing? 2  -LS     Bathing (including washing, rinsing, and drying) 2  -LS     Toileting (which includes using toilet bed pan or urinal) 3  -LS     Putting on and taking off regular upper body clothing 3  -LS     Taking care of personal grooming (such as brushing teeth) 4  -LS     Eating meals 4  -LS     AM-PAC 6 Clicks Score (OT) 18  -LS     Row Name 01/10/23 1129 01/10/23 0830       How much help from another person do you currently need...    Turning from your back to your side while in flat bed without using bedrails? 3  -JH 3  -AK    Moving from lying on back to sitting on the side of a flat bed without bedrails? 3  - 3  -AK    Moving to and from a bed to a chair (including a wheelchair)? 3  -JH 3  -AK    Standing up from a chair using your arms (e.g., wheelchair, bedside chair)? 3  -JH 3  -AK    Climbing 3-5 steps with a railing? 3  -JH 3  -AK    To walk in hospital room? 3  -JH 3  -AK    AM-PAC 6 Clicks Score (PT) 18  -JH 18  -AK    Highest level of mobility 6 --> Walked 10 steps or more  - 6 --> Walked 10 steps or more  -AK    Row Name 01/10/23 1411          Functional Assessment    Outcome Measure Options AM-PAC 6 Clicks Daily Activity (OT)  -LS           User Key  (r) = Recorded By, (t) = Taken By, (c) = Cosigned By    Initials Name Provider Type    Inna Biswas, KISHAN Physical Therapist    Leighann Perez LPN Licensed Nurse    Sonny Raygoza OT Occupational Therapist                Occupational Therapy Education     Title: PT OT SLP Therapies (Done)     Topic: Occupational Therapy (Done)     Point: ADL training  (Done)     Description:   Instruct learner(s) on proper safety adaptation and remediation techniques during self care or transfers.   Instruct in proper use of assistive devices.              Learning Progress Summary           Patient Acceptance, E,TB, VU by  at 1/10/2023 1412                               User Key     Initials Effective Dates Name Provider Type Discipline     09/22/22 -  Sonny Allred OT Occupational Therapist OT              OT Recommendation and Plan  Planned Therapy Interventions (OT): activity tolerance training, BADL retraining, functional balance retraining, IADL retraining, occupation/activity based interventions, ROM/therapeutic exercise, strengthening exercise, transfer/mobility retraining  Therapy Frequency (OT): 3 times/wk  Plan of Care Review  Plan of Care Reviewed With: patient  Outcome Evaluation: 58 yo female with lung mass s/p robotic assisted R upper lobectomy on 1/9. Normally, patient is independent with ADLs and IADLs. She lives with her  in a H with 3 SCAR. Pt oriented x4 today and with pain reported at 7/10. She is Max A for donning socks while in supine, with difficulty bending down to reach her feet d/t incision and chest tube. She then comes to EOB with Mod I, and stands and ambulates to chair with CGA. Patient remains on 2 L O2, normally on RA. Desatted from 94 to 90% with activity. Anticipate steady progress and improved activity tolerance. She should be safe to d/c home when medically stable.     Time Calculation:    Time Calculation- OT     Row Name 01/10/23 1412             Time Calculation- OT    OT Start Time 0815  -      OT Stop Time 0827  -      OT Time Calculation (min) 12 min  -      OT Received On 01/10/23  -      OT - Next Appointment 01/12/23  -      OT Goal Re-Cert Due Date 01/24/23  -         Untimed Charges    OT Eval/Re-eval Minutes 12  -         Total Minutes    Untimed Charges Total Minutes 12  -       Total Minutes 12  -LS             User Key  (r) = Recorded By, (t) = Taken By, (c) = Cosigned By    Initials Name Provider Type     Sonny Allred OT Occupational Therapist              Therapy Charges for Today     Code Description Service Date Service Provider Modifiers Qty    [id number]  OT EVAL MOD COMPLEXITY 3 1/10/2023 Sonny Allred OT GO 1               Sonny Allred OT  1/10/2023

## 2023-01-11 ENCOUNTER — APPOINTMENT (OUTPATIENT)
Dept: GENERAL RADIOLOGY | Facility: HOSPITAL | Age: 58
End: 2023-01-11
Payer: MEDICARE

## 2023-01-11 LAB
GLUCOSE BLDC GLUCOMTR-MCNC: 145 MG/DL (ref 70–105)
GLUCOSE BLDC GLUCOMTR-MCNC: 146 MG/DL (ref 70–105)
GLUCOSE BLDC GLUCOMTR-MCNC: 149 MG/DL (ref 70–105)
GLUCOSE BLDC GLUCOMTR-MCNC: 175 MG/DL (ref 70–105)
LAB AP CASE REPORT: NORMAL
LAB AP DIAGNOSIS COMMENT: NORMAL
LAB AP SYNOPTIC CHECKLIST: NORMAL
Lab: NORMAL
PATH REPORT.FINAL DX SPEC: NORMAL
PATH REPORT.GROSS SPEC: NORMAL

## 2023-01-11 PROCEDURE — 25010000002 ENOXAPARIN PER 10 MG: Performed by: SURGERY

## 2023-01-11 PROCEDURE — 71045 X-RAY EXAM CHEST 1 VIEW: CPT

## 2023-01-11 PROCEDURE — 82962 GLUCOSE BLOOD TEST: CPT

## 2023-01-11 PROCEDURE — 99024 POSTOP FOLLOW-UP VISIT: CPT | Performed by: NURSE PRACTITIONER

## 2023-01-11 PROCEDURE — 94761 N-INVAS EAR/PLS OXIMETRY MLT: CPT

## 2023-01-11 PROCEDURE — 94664 DEMO&/EVAL PT USE INHALER: CPT

## 2023-01-11 PROCEDURE — 63710000001 INSULIN LISPRO (HUMAN) PER 5 UNITS: Performed by: SURGERY

## 2023-01-11 PROCEDURE — 94799 UNLISTED PULMONARY SVC/PX: CPT

## 2023-01-11 PROCEDURE — 97530 THERAPEUTIC ACTIVITIES: CPT

## 2023-01-11 RX ADMIN — ACETAMINOPHEN 650 MG: 325 TABLET, FILM COATED ORAL at 08:53

## 2023-01-11 RX ADMIN — AMITRIPTYLINE HYDROCHLORIDE 25 MG: 25 TABLET, FILM COATED ORAL at 22:47

## 2023-01-11 RX ADMIN — ACETYLCYSTEINE 3 ML: 200 SOLUTION ORAL; RESPIRATORY (INHALATION) at 19:04

## 2023-01-11 RX ADMIN — DOCUSATE SODIUM 100 MG: 100 CAPSULE, LIQUID FILLED ORAL at 08:53

## 2023-01-11 RX ADMIN — ACETAMINOPHEN 650 MG: 325 TABLET, FILM COATED ORAL at 16:07

## 2023-01-11 RX ADMIN — METHOCARBAMOL TABLETS 500 MG: 500 TABLET, COATED ORAL at 06:12

## 2023-01-11 RX ADMIN — Medication 12.5 MG: at 22:47

## 2023-01-11 RX ADMIN — HYDROCODONE BITARTRATE AND ACETAMINOPHEN 1 TABLET: 10; 325 TABLET ORAL at 09:29

## 2023-01-11 RX ADMIN — PREGABALIN 100 MG: 100 CAPSULE ORAL at 08:53

## 2023-01-11 RX ADMIN — INSULIN LISPRO 2 UNITS: 100 INJECTION, SOLUTION INTRAVENOUS; SUBCUTANEOUS at 16:45

## 2023-01-11 RX ADMIN — ENOXAPARIN SODIUM 40 MG: 100 INJECTION SUBCUTANEOUS at 16:07

## 2023-01-11 RX ADMIN — Medication 12.5 MG: at 08:54

## 2023-01-11 RX ADMIN — HYDROCODONE BITARTRATE AND ACETAMINOPHEN 1 TABLET: 10; 325 TABLET ORAL at 16:07

## 2023-01-11 RX ADMIN — ACETYLCYSTEINE 3 ML: 200 SOLUTION ORAL; RESPIRATORY (INHALATION) at 07:26

## 2023-01-11 RX ADMIN — ALBUTEROL SULFATE 2.5 MG: 2.5 SOLUTION RESPIRATORY (INHALATION) at 19:00

## 2023-01-11 RX ADMIN — HYDROCODONE BITARTRATE AND ACETAMINOPHEN 1 TABLET: 10; 325 TABLET ORAL at 03:29

## 2023-01-11 RX ADMIN — METHOCARBAMOL TABLETS 500 MG: 500 TABLET, COATED ORAL at 22:47

## 2023-01-11 RX ADMIN — ACETAMINOPHEN 650 MG: 325 TABLET, FILM COATED ORAL at 22:47

## 2023-01-11 RX ADMIN — PREGABALIN 100 MG: 100 CAPSULE ORAL at 22:47

## 2023-01-11 RX ADMIN — ALBUTEROL SULFATE 2.5 MG: 2.5 SOLUTION RESPIRATORY (INHALATION) at 07:26

## 2023-01-11 RX ADMIN — DOCUSATE SODIUM 100 MG: 100 CAPSULE, LIQUID FILLED ORAL at 22:47

## 2023-01-11 RX ADMIN — METHOCARBAMOL TABLETS 500 MG: 500 TABLET, COATED ORAL at 14:04

## 2023-01-11 NOTE — PROGRESS NOTES
Venipuncture Blood Specimen Collection  Venipuncture performed by Gaviota Tan MA with good hemostasis. Patient tolerated the procedure well without complications.   01/11/23   Gaviota Tan MA  
present

## 2023-01-11 NOTE — PLAN OF CARE
Goal Outcome Evaluation:  Plan of Care Reviewed With: patient        Progress: no change  Outcome Evaluation: Patient with complaints of pain, treated per MAR.  Verbal motivation and education encourage and provided for ambulation and IS usage. Requires assist x1-2 with walker/gait belt for ambulation/transfers. Right sided chest tube noted to water seal with air leak noted, patent to chamber. Output noted/marked on chamber.  Remains on 2L O2 via NC. Family at bedside. Vital signs stable. Call light within reach. Care plan on going.

## 2023-01-11 NOTE — THERAPY TREATMENT NOTE
"Subjective: Pt agreeable to therapeutic plan of care.    Objective:     Bed mobility - CG/min A, supine to sit with HOB elevated and use of railings   Transfers - CGA, stand from EOB, transfer to chair  Ambulation - 5 feet CGA and with rolling walker, deferred further gait and pt HR to 130s with standing activity and pt with increased pain.     Vitals: Tachycardic    Pain: 6 VAS   Location: CT site/abdomen  Intervention for pain: Repositioned and Therapeutic Presence    Education: Provided education on the importance of mobility in the acute care setting    Assessment: Rahel Ramsey presents with functional mobility impairments which indicate the need for skilled intervention. Pts mobility is limited by pain, tachycardic with standing activity this date.  Pt c/o most pain from CT site.  Will follow to progress mobility as tolerated. Tolerating session today without incident. Will continue to follow and progress as tolerated.     Plan/Recommendations:   Low Intensity Therapy recommended post-acute care - This is recommended as therapy feels this patient would require 2-3 visits per week. OP or HH would be the best option depending on patient's home bound status. Consider, if the patient has other  \"skilled\" needs such as wounds, IV antibiotics, etc. Combined with \"low intensity\" could also equate to a SNF. If patient is medically complex, consider LTAC.. Pt requires no DME at discharge.     Pt desires Home with family assist at discharge. Pt cooperative; agreeable to therapeutic recommendations and plan of care.         Basic Mobility 6-click:  Rollin = Total, A lot = 2, A little = 3; 4 = None  Supine>Sit:   1 = Total, A lot = 2, A little = 3; 4 = None   Sit>Stand with arms:  1 = Total, A lot = 2, A little = 3; 4 = None  Bed>Chair:   1 = Total, A lot = 2, A little = 3; 4 = None  Ambulate in room:  1 = Total, A lot = 2, A little = 3; 4 = None  3-5 Steps with railin = Total, A lot = 2, A little = " 3; 4 = None  Score: 17    Modified [place]: N/A = No pre-op stroke/TIA    Post-Tx Position: Up in Chair, Alarms activated and Call light and personal items within reach  PPE: gloves and surgical mask

## 2023-01-11 NOTE — PROGRESS NOTES
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  She reports shortness of breath and chest pain.    Diet:  No nausea or vomiting.    Activity level: Impaired due to pain.    Pain:  Pain is partially controlled.        Objective:  General Appearance:  Uncomfortable and in no acute distress.    Vital signs: (most recent): Blood pressure 133/70, pulse 111, temperature 98.3 °F (36.8 °C), temperature source Oral, resp. rate 25, height 175.3 cm (69.02\"), weight 96.1 kg (211 lb 13.8 oz), SpO2 95 %.  Vital signs are normal.  No fever.    Output: Producing urine.    Lungs:  Normal effort and normal respiratory rate.  There are decreased breath sounds.  No rales, wheezes or rhonchi.    Heart: Normal rate.  Regular rhythm.    Chest: Chest wall tenderness present.    Extremities: There is no dependent edema.    Neurological: Patient is alert and oriented to person, place and time.    Skin:  Warm and dry.              Chest tube:   Site: Right, Clean, Dry, Intact and Securement device intact  Suction: waterseal  Air Leak: negative  24 Hour Total: 220cc    Results Review:     I reviewed the patient's new clinical results.  I reviewed the patient's new imaging results and agree with the interpretation.  I reviewed the patient's other test results and agree with the interpretation  Discussed with Patient, RN, Dr. Kearney    Assessment & Plan     This morning's chest x-ray was independently reviewed which demonstrates similar appearance with stable postoperative changes.  No pneumothorax.  Chest tube output has dropped off.  Chest tube removed at the bedside without difficulty.  DuoDERM placed over site which she will remain in place for 48 hours.  She is still having some " postoperative discomfort.  Removal of chest tube should help with this.  Patient does have chronic pain and she is on her home pain regimen with as needed medication for breakthrough pain.  Patient is requiring 2 L supplemental oxygen on my assessment.  Wean oxygen as tolerated.  I will go ahead and get an overnight oximetry as well as walking oximetry tomorrow if she is still requiring supplemental oxygen so that we can make arrangements for her discharge.  Encourage good pulmonary hygiene with incentive spirometry 10 times per hour and at least 3 walks around the nurses station today.  Plan for discharge home tomorrow.    FELIX Villatoro  [facility]  01/11/23  09:32 EST    Patient was seen and assessed while wearing personal protective equipment including facemask, protective eyewear and gloves.  Hand hygiene performed prior to entering the room and upon exiting with doffing of gloves.

## 2023-01-11 NOTE — PLAN OF CARE
Goal Outcome Evaluation:  Plan of Care Reviewed With: patient      Assessment: Rahel Ramsey presents with functional mobility impairments which indicate the need for skilled intervention. Pts mobility is limited by pain, tachycardic with standing activity this date.  Pt c/o most pain from CT site.  Will follow to progress mobility as tolerated. Tolerating session today without incident. Will continue to follow and progress as tolerated.

## 2023-01-11 NOTE — PLAN OF CARE
Goal Outcome Evaluation:               Patient's chest tube removed today, patient tolerated procedure well, reports decreased pain at an 8 instead of usual 10 after removal. Pt weaning from O2, has been 91 to 92 percent on room air. Pt walked to bathroom this shift and to the chair.

## 2023-01-12 ENCOUNTER — READMISSION MANAGEMENT (OUTPATIENT)
Dept: CALL CENTER | Facility: HOSPITAL | Age: 58
End: 2023-01-12
Payer: MEDICARE

## 2023-01-12 ENCOUNTER — APPOINTMENT (OUTPATIENT)
Dept: GENERAL RADIOLOGY | Facility: HOSPITAL | Age: 58
End: 2023-01-12
Payer: MEDICARE

## 2023-01-12 VITALS
DIASTOLIC BLOOD PRESSURE: 66 MMHG | RESPIRATION RATE: 17 BRPM | HEART RATE: 92 BPM | TEMPERATURE: 98.3 F | WEIGHT: 211.86 LBS | SYSTOLIC BLOOD PRESSURE: 121 MMHG | OXYGEN SATURATION: 95 % | BODY MASS INDEX: 31.38 KG/M2 | HEIGHT: 69 IN

## 2023-01-12 LAB
ANION GAP SERPL CALCULATED.3IONS-SCNC: 14 MMOL/L (ref 5–15)
BUN SERPL-MCNC: 11 MG/DL (ref 6–20)
BUN/CREAT SERPL: 25 (ref 7–25)
CALCIUM SPEC-SCNC: 8.4 MG/DL (ref 8.6–10.5)
CHLORIDE SERPL-SCNC: 95 MMOL/L (ref 98–107)
CO2 SERPL-SCNC: 24 MMOL/L (ref 22–29)
CREAT SERPL-MCNC: 0.44 MG/DL (ref 0.57–1)
EGFRCR SERPLBLD CKD-EPI 2021: 112.3 ML/MIN/1.73
GLUCOSE BLDC GLUCOMTR-MCNC: 140 MG/DL (ref 70–105)
GLUCOSE BLDC GLUCOMTR-MCNC: 167 MG/DL (ref 70–105)
GLUCOSE SERPL-MCNC: 137 MG/DL (ref 65–99)
MAGNESIUM SERPL-MCNC: 1.9 MG/DL (ref 1.6–2.6)
POTASSIUM SERPL-SCNC: 3.5 MMOL/L (ref 3.5–5.2)
SODIUM SERPL-SCNC: 133 MMOL/L (ref 136–145)

## 2023-01-12 PROCEDURE — 97116 GAIT TRAINING THERAPY: CPT

## 2023-01-12 PROCEDURE — 80048 BASIC METABOLIC PNL TOTAL CA: CPT | Performed by: SURGERY

## 2023-01-12 PROCEDURE — 97530 THERAPEUTIC ACTIVITIES: CPT

## 2023-01-12 PROCEDURE — 63710000001 INSULIN LISPRO (HUMAN) PER 5 UNITS: Performed by: SURGERY

## 2023-01-12 PROCEDURE — 83735 ASSAY OF MAGNESIUM: CPT | Performed by: SURGERY

## 2023-01-12 PROCEDURE — 63710000001 ONDANSETRON PER 8 MG: Performed by: SURGERY

## 2023-01-12 PROCEDURE — 82962 GLUCOSE BLOOD TEST: CPT

## 2023-01-12 PROCEDURE — 94618 PULMONARY STRESS TESTING: CPT

## 2023-01-12 PROCEDURE — 71045 X-RAY EXAM CHEST 1 VIEW: CPT

## 2023-01-12 PROCEDURE — 99024 POSTOP FOLLOW-UP VISIT: CPT | Performed by: NURSE PRACTITIONER

## 2023-01-12 PROCEDURE — 97535 SELF CARE MNGMENT TRAINING: CPT

## 2023-01-12 RX ORDER — ACETAMINOPHEN 325 MG/1
650 TABLET ORAL 3 TIMES DAILY
Qty: 84 TABLET | Refills: 0 | Status: SHIPPED | OUTPATIENT
Start: 2023-01-12 | End: 2023-01-26

## 2023-01-12 RX ORDER — CALCIUM CARBONATE 200(500)MG
1 TABLET,CHEWABLE ORAL EVERY 4 HOURS PRN
Status: DISCONTINUED | OUTPATIENT
Start: 2023-01-12 | End: 2023-01-12 | Stop reason: HOSPADM

## 2023-01-12 RX ORDER — METHOCARBAMOL 500 MG/1
500 TABLET, FILM COATED ORAL EVERY 8 HOURS SCHEDULED
Qty: 90 TABLET | Refills: 0 | Status: SHIPPED | OUTPATIENT
Start: 2023-01-12 | End: 2023-03-13

## 2023-01-12 RX ORDER — ONDANSETRON 4 MG/1
4 TABLET, FILM COATED ORAL EVERY 6 HOURS PRN
Qty: 20 TABLET | Refills: 0 | Status: SHIPPED | OUTPATIENT
Start: 2023-01-12 | End: 2023-01-29

## 2023-01-12 RX ADMIN — METHOCARBAMOL TABLETS 500 MG: 500 TABLET, COATED ORAL at 14:35

## 2023-01-12 RX ADMIN — INSULIN LISPRO 2 UNITS: 100 INJECTION, SOLUTION INTRAVENOUS; SUBCUTANEOUS at 11:57

## 2023-01-12 RX ADMIN — METOPROLOL TARTRATE 25 MG: 25 TABLET, FILM COATED ORAL at 08:32

## 2023-01-12 RX ADMIN — HYDROCODONE BITARTRATE AND ACETAMINOPHEN 1 TABLET: 10; 325 TABLET ORAL at 08:31

## 2023-01-12 RX ADMIN — POLYETHYLENE GLYCOL 3350 17 G: 17 POWDER, FOR SOLUTION ORAL at 08:31

## 2023-01-12 RX ADMIN — METHOCARBAMOL TABLETS 500 MG: 500 TABLET, COATED ORAL at 05:18

## 2023-01-12 RX ADMIN — ACETAMINOPHEN 650 MG: 325 TABLET, FILM COATED ORAL at 08:32

## 2023-01-12 RX ADMIN — PREGABALIN 100 MG: 100 CAPSULE ORAL at 08:31

## 2023-01-12 RX ADMIN — ONDANSETRON HYDROCHLORIDE 4 MG: 4 TABLET, FILM COATED ORAL at 01:13

## 2023-01-12 RX ADMIN — HYDROCODONE BITARTRATE AND ACETAMINOPHEN 1 TABLET: 10; 325 TABLET ORAL at 02:44

## 2023-01-12 RX ADMIN — DOCUSATE SODIUM 100 MG: 100 CAPSULE, LIQUID FILLED ORAL at 08:32

## 2023-01-12 RX ADMIN — CALCIUM CARBONATE (ANTACID) CHEW TAB 500 MG 1 TABLET: 500 CHEW TAB at 07:31

## 2023-01-12 RX ADMIN — HYDROCODONE BITARTRATE AND ACETAMINOPHEN 1 TABLET: 10; 325 TABLET ORAL at 14:35

## 2023-01-12 RX ADMIN — CALCIUM CARBONATE (ANTACID) CHEW TAB 500 MG 1 TABLET: 500 CHEW TAB at 01:04

## 2023-01-12 NOTE — PROGRESS NOTES
Exercise Oximetry    Patient Name:Rahel Ramsey   MRN: [id number]   Date: 01/12/23             ROOM AIR BASELINE   SpO2% 90   Heart Rate 100   Blood Pressure      EXERCISE ON ROOM AIR SpO2% EXERCISE ON O2 @  LPM SpO2%   1 MINUTE 91 1 MINUTE    2 MINUTES 92 2 MINUTES    3 MINUTES 92 3 MINUTES    4 MINUTES 93 4 MINUTES    5 MINUTES 92 5 MINUTES    6 MINUTES 92 6 MINUTES               Distance Walked  Distance Walked   Dyspnea (Shane Scale)   Dyspnea (Shane Scale)   Fatigue ([place] Scale)   Fatigue ([place] Scale)   SpO2% Post Exercise   SpO2% Post Exercise   HR Post Exercise   HR Post Exercise   Time to Recovery   Time to Recovery     Comments: Pt sat did not drop below 92% while walking. Pt stated that she was not short of breath while walking. At this time, pt does not require O2 while ambulating.     Kandice Mckinney, CRT 10:58 EST  01/12/23

## 2023-01-12 NOTE — DISCHARGE SUMMARY
Patient Care Team:  Vickie Gomez APRN as PCP - General (Nurse Practitioner)  Cheri Prabhakar MD as Consulting Physician (Hematology and Oncology)  Veronique Brothers, RN as Nurse Navigator    Date of Admission: 1/9/2023   Date of Discharge:  1/12/2023    Discharge Diagnosis: Right upper lobe lung nodule    Presenting Problem  Right upper lobe pulmonary nodule [R91.1]  Primary cancer of right upper lobe of lung (HCC) [C34.11]     History of Present Illness  Rahel Ramsey is a 57 y.o. female who presented To Dr. Kearney with an enlarging right upper lobe pulmonary mass that was hypermetabolic on PET scan.  Pathology from needle biopsy demonstrated moderately differentiated adenocarcinoma.  PFTs were adequate for lobectomy and lung resection was recommended.  The patient agreed to proceed.    Hospital Course  Patient was admitted postoperatively status post robot-assisted right upper lobectomy.  For further details, please refer to the operative note.  She has had a satisfactory postoperative course.  Her chest tube was removed on POD 2 and follow-up imaging is stable.  A 6-minute walk was completed earlier today which demonstrated oxygen saturations of 92% and above.  Her pain is appropriately controlled she is hemodynamically stable on room air.  Postoperative care instructions have been discussed with her and medications have been sent to the pharmacy.  She will follow-up in our clinic in 2 weeks with a hospital performed chest x-ray.    Procedures Performed  Procedure(s):  THORASCOPIC RIGHT UPPER LOBECTOMY WITH DAVINCI ROBOT, MEDIASTINAL LYMPH NODE DISSECTION       Consults:   Consults     No orders found from 12/11/2022 to 1/10/2023.          Pertinent Test Results:     Imaging Results (Last 24 Hours)     Procedure Component Value Units Date/Time    XR Chest 1 View [[id number]] Collected: 01/12/23 0815     Updated: 01/12/23 0820    Narrative:      XR CHEST 1 VW    Date of Exam: 1/12/2023 5:37 AM  EST    Indication: Post Op Lung Surgery.    Comparison: AP portable chest 1/11/2023., CT chest 12/28/2022    Findings:  Right chest wall subcutaneous air is stable. The right chest tube has been removed. Only a tiny residual apical pneumothorax measuring about 2 mm thickness is seen. Probable trace right basilar pleural fluid is present. Previously described right upper   lobe focal opacity has diminished, with mild residual groundglass alveolar disease remaining in    Mediastinal right upper lobe. There is stable asymmetric prominence of left hilum. Mild left basilar atelectasis is present, partially obscuring the diaphragmatic margin. No acute osseous abnormalities are identified.      Impression:      Impression:    1. Right chest tube has been removed. Only a tiny right apical pneumothorax remains.  2. Stable right chest wall subcutaneous air.  3. Mild groundglass alveolar disease in the paramediastinal right upper lobe, improved since the prior examination, may represent improving postoperative contusion, atelectasis or pneumonia.  4. Stable minimal left basilar atelectasis.    Electronically Signed: Alisha Santos    1/12/2023 8:18 AM EST    Workstation ID: [id number]          Lab Results (last 24 hours)     Procedure Component Value Units Date/Time    POC Glucose Once [[id number]]  (Abnormal) Collected: 01/12/23 1151    Specimen: Blood Updated: 01/12/23 1153     Glucose 167 mg/dL      Comment: Serial Number: [id number]:  [id number]       Basic Metabolic Panel [[id number]]  (Abnormal) Collected: 01/12/23 0909    Specimen: Blood Updated: 01/12/23 1002     Glucose 137 mg/dL      BUN 11 mg/dL      Creatinine 0.44 mg/dL      Sodium 133 mmol/L      Potassium 3.5 mmol/L      Comment: Slight hemolysis detected by analyzer. Results may be affected.        Chloride 95 mmol/L      CO2 24.0 mmol/L      Calcium 8.4 mg/dL      BUN/Creatinine Ratio 25.0     Anion Gap 14.0 mmol/L      eGFR 112.3 mL/min/1.73      Comment:  National Kidney Foundation and American Society of Nephrology (ASN) Task Force recommended calculation based on the Chronic Kidney Disease Epidemiology Collaboration (CKD-EPI) equation refit without adjustment for race.       Narrative:      GFR Normal >60  Chronic Kidney Disease <60  Kidney Failure <15      Magnesium [[id number]]  (Normal) Collected: 01/12/23 0909    Specimen: Blood Updated: 01/12/23 1002     Magnesium 1.9 mg/dL     POC Glucose Once [[id number]]  (Abnormal) Collected: 01/12/23 0735    Specimen: Blood Updated: 01/12/23 0736     Glucose 140 mg/dL      Comment: Serial Number: [id number]:  [id number]       POC Glucose Once [[id number]]  (Abnormal) Collected: 01/11/23 2019    Specimen: Blood Updated: 01/11/23 2020     Glucose 146 mg/dL      Comment: Serial Number: [id number]:  [id number]       POC Glucose Once [[id number]]  (Abnormal) Collected: 01/11/23 1639    Specimen: Blood Updated: 01/11/23 1644     Glucose 175 mg/dL      Comment: Serial Number: [id number]:  [id number]               Condition on Discharge:  stable    Vital Signs  Temp:  [98.2 °F (36.8 °C)-99.3 °F (37.4 °C)] 98.3 °F (36.8 °C)  Heart Rate:  [] 92  Resp:  [16-25] 17  BP: (121-140)/(66-83) 121/66    Physical Exam:    General Appearance:    Alert, cooperative, in no acute distress   Head:    Normocephalic, without obvious abnormality, atraumatic   Eyes:            Lids and lashes normal, conjunctivae and sclerae normal, no   icterus, no pallor, corneas clear, PERRLA   Ears:    Ears appear intact with no abnormalities noted   Throat:   No oral lesions, no thrush, oral mucosa moist   Neck:   No adenopathy, supple, trachea midline, no thyromegaly, no     carotid bruit, no JVD   Back:     No kyphosis present, no scoliosis present, no skin lesions,       erythema or scars, no tenderness to percussion or                   palpation,   range of motion normal   Lungs:     Clear to auscultation,respirations regular, even and                    unlabored    Heart:    Regular rhythm and normal rate, normal S1 and S2, no            murmur, no gallop, no rub, no click   Breast Exam:    Deferred   Abdomen:     Normal bowel sounds, no masses, no organomegaly, soft        non-tender, non-distended, no guarding, no rebound                 tenderness   Genitalia:    Deferred   Extremities:   Moves all extremities well, no edema, no cyanosis, no              redness   Pulses:   Pulses palpable and equal bilaterally   Skin:   No bleeding, bruising or rash.  Postoperative incisions are well approximated with Dermabond intact.  DuoDERM applied to former chest tube site.   Lymph nodes:   No palpable adenopathy   Neurologic:   Cranial nerves 2 - 12 grossly intact, sensation intact, DTR        present and equal bilaterally       Discharge Disposition  Home today    Discharge Medications     Discharge Medications      New Medications      Instructions Start Date   acetaminophen 325 MG tablet  Commonly known as: TYLENOL   650 mg, Oral, 3 Times Daily      methocarbamol 500 MG tablet  Commonly known as: ROBAXIN   500 mg, Oral, Every 8 Hours Scheduled      ondansetron 4 MG tablet  Commonly known as: ZOFRAN   4 mg, Oral, Every 6 Hours PRN         Continue These Medications      Instructions Start Date   albuterol sulfate  (90 Base) MCG/ACT inhaler  Commonly known as: PROVENTIL HFA;VENTOLIN HFA;PROAIR HFA   2 puffs, Inhalation, Every 4 Hours PRN      amitriptyline 25 MG tablet  Commonly known as: ELAVIL   25 mg, Oral, Every Night at Bedtime      aspirin 81 MG EC tablet   81 mg, Oral, Daily      fenofibrate 145 MG tablet  Commonly known as: TRICOR   145 mg, Oral, Daily      fluconazole 150 MG tablet  Commonly known as: DIFLUCAN   150 mg, Oral, As Needed      fluticasone 50 MCG/ACT nasal spray  Commonly known as: FLONASE   INSTILL 2 SPRAYS IN EACH NOSTRIL EVERY DAY      Fluticasone-Umeclidin-Vilant 100-62.5-25 MCG/ACT inhaler  Commonly known as: TRELEGY    1 puff, Inhalation, Daily - RT      HYDROcodone-acetaminophen  MG per tablet  Commonly known as: NORCO   1 tablet, Oral, Every 6 Hours PRN      metFORMIN 500 MG tablet  Commonly known as: GLUCOPHAGE   500 mg, Oral, Daily With Breakfast      metoprolol succinate XL 25 MG 24 hr tablet  Commonly known as: TOPROL-XL   25 mg, Oral, Daily      naloxone 4 MG/0.1ML nasal spray  Commonly known as: NARCAN   CALL 911. SPR CONTENTS OF ONE SPRAYER (0.1ML) INTO ONE NOSTRIL. REPEAT IN 2-3 MIN IF SYMPTOMS OF OPIOID EMERGENCY PERSIST, ALTERNATE NOSTRILS      nystatin 359627 UNIT/GM cream  Commonly known as: MYCOSTATIN   1 application, Topical, As Needed      pregabalin 100 MG capsule  Commonly known as: Lyrica   100 mg, Oral, 2 Times Daily      vitamin D 1.25 MG (37475 UT) capsule capsule  Commonly known as: ERGOCALCIFEROL   TAKE 1 CAPSULE BY MOUTH EVERY 7 DAYS             Discharge Instructions:  · No heavy lifting, pushing, pulling greater than 10 pounds.  · No driving up until 2 weeks after surgery and no longer taking narcotics.  · Resume home diet as tolerated.  · Continue incentive spirometer at least 4 times per day.  · Remove dressing from post chest tube site after 48 hours, may shower and clean surgical sites with antibacterial soap or hydrogen peroxide, and apply gauze dressing or band-aid as needed for any drainage.  No dressing needed once no longer draining.          Follow-up Appointments  Future Appointments   Date Time Provider Department Center   1/31/2023 11:15 AM Cheri Prabhakar MD [facility]   1/31/2023 11:15 AM LAB MD  [facility]   3/28/2023  8:15 AM NURSE/MA PC [facility]   4/4/2023  8:15 AM Vickie Gomez APRN [facility]   8/15/2023 10:45 AM Alexandro Farah MD [facility] NA [facility]     Additional Instructions for the Follow-ups that You Need to Schedule     XR Chest 2 View    Jan 24, 2023 (Approximate)      Exam reason: post-op                Test Results Pending at Discharge      For any questions regarding patient's stay, please refer to patient's chart.    Jamila Monroe DNP, APRN  [facility]  01/12/23  15:05 EST      Greater than 30 minutes spent on the unit discharging this patient, with more than 50% of time spent assessing the patient, counseling the patient on postoperative care and discharge planning.

## 2023-01-12 NOTE — PLAN OF CARE
Goal Outcome Evaluation:     Bed mobility - SBA supine to sit  Transfers - SBA and with rolling walker sit to stand off edge of the bed and 2 times off toilet. Pt was able to clean herself off, don depends and change in her pants without assist.   Ambulation - 40 feet x's 2 SBA, CGA and with rolling walker    Low Intensity Therapy recommended post-acute care - This is recommended as therapy feels this patient would require 2-3 visits per week.  HHPT would be recommended.  Pt requires rolling walker ?? at discharge.     Pt desires Home with family assist and and Home Health at discharge. Pt cooperative; agreeable to therapeutic recommendations and plan of care.

## 2023-01-12 NOTE — PLAN OF CARE
Problem: Adult Inpatient Plan of Care  Goal: Plan of Care Review  Outcome: Ongoing, Progressing  Flowsheets (Taken 1/11/2023 2035)  Progress: no change  Plan of Care Reviewed With: patient  Goal: Patient-Specific Goal (Individualized)  Outcome: Ongoing, Progressing  Goal: Absence of Hospital-Acquired Illness or Injury  Outcome: Ongoing, Progressing  Intervention: Identify and Manage Fall Risk  Description: Perform standard risk assessment on admission using a validated tool or comprehensive approach appropriate to the patient; reassess fall risk frequently, with change in status or transfer to another level of care.  Communicate fall injury risk to interprofessional healthcare team.  Determine need for increased observation, equipment and environmental modification, such as low bed, signage and supportive, nonskid footwear.  Adjust safety measures to individual developmental age, stage and identified risk factors.  Reinforce the importance of safety and physical activity with patient and family.  Perform regular intentional rounding to assess need for position change, pain assessment and personal needs, including assistance with toileting.  Recent Flowsheet Documentation  Taken 1/11/2023 2000 by Cristopher Forte, RN  Safety Promotion/Fall Prevention: safety round/check completed  Intervention: Prevent and Manage VTE (Venous Thromboembolism) Risk  Description: Assess for VTE (venous thromboembolism) risk.  Encourage and assist with early ambulation.  Initiate and maintain compression or other therapy, as indicated, based on identified risk in accordance with organizational protocol and provider order.  Encourage both active and passive leg exercises while in bed, if unable to ambulate.  Recent Flowsheet Documentation  Taken 1/11/2023 2000 by Cristopher Forte, RN  VTE Prevention/Management:   sequential compression devices off   patient refused intervention  Goal: Optimal Comfort and Wellbeing  Outcome: Ongoing,  Progressing  Intervention: Provide Person-Centered Care  Description: Use a family-focused approach to care.  Develop trust and rapport by proactively providing information, encouraging questions, addressing concerns and offering reassurance.  Acknowledge emotional response to hospitalization.  Recognize and utilize personal coping strategies.  Honor spiritual and cultural preferences.  Recent Flowsheet Documentation  Taken 1/11/2023 2000 by Cristopher Forte, RN  Trust Relationship/Rapport: care explained  Goal: Readiness for Transition of Care  Outcome: Ongoing, Progressing     Problem: Pain Chronic (Persistent) (Comorbidity Management)  Goal: Acceptable Pain Control and Functional Ability  Outcome: Ongoing, Progressing     Problem: Skin Injury Risk Increased  Goal: Skin Health and Integrity  Outcome: Ongoing, Progressing     Problem: Fall Injury Risk  Goal: Absence of Fall and Fall-Related Injury  Outcome: Ongoing, Progressing  Intervention: Promote Injury-Free Environment  Description: Provide a safe, barrier-free environment that encourages independent activity.  Keep care area uncluttered and well-lighted.  Determine need for increased observation or monitoring.  Avoid use of devices that minimize mobility, such as restraints or indwelling urinary catheter.  Recent Flowsheet Documentation  Taken 1/11/2023 2000 by Cristopher Forte, RN  Safety Promotion/Fall Prevention: safety round/check completed   Goal Outcome Evaluation:  Plan of Care Reviewed With: patient        Progress: no change

## 2023-01-12 NOTE — THERAPY TREATMENT NOTE
"Subjective: Pt agreeable to therapeutic plan of care.  Cognition: oriented to Person, Place, Time and Situation    Objective:     Bed Mobility: SBA   Functional Transfers: SBA with RW  Functional Ambulation: CGA for 40' x2    Toileting: SBA  ADL Position: unsupported sitting  ADL Comments: Patient sat on standard commode to void urine this date, performing all aspects of task with SBA including jeremy-hygiene.    Grooming: SBA  ADL Position: supported standing and at sink  ADL Comments: Patient performed oral care regimen and hair care while standing at the sink, SBA provided. Patient stood within the RW for duration of task.    Vitals: WNL    Pain: 10 VAS  Location: Incision site / flank  Interventions for pain: Repositioned and RN provided medication  Education: Provided education on the importance of mobility in the acute care setting, Verbal/Tactile Cues, ADL training and Transfer Training    Assessment: Rahel Ramsey presents with ADL impairments below baseline abilities which indicate the need for continued skilled intervention while inpatient. Patient's biggest limiting factor and barrier to independence remains her pain levels. Despite pain at 10/10, patient SBA for all of today's treatment. At baseline she did not use RW, however has been using RW for support with functional mobility.  Tolerating session today without incident. Will continue to follow and progress as tolerated.     Plan/Recommendations:   Low Intensity Therapy recommended post-acute care - This is recommended as therapy feels this patient would require 2-3 visits per week. OP or HH would be the best option depending on patient's home bound status. Consider, if the patient has other  \"skilled\" needs such as wounds, IV antibiotics, etc. Combined with \"low intensity\" could also equate to a SNF. If patient is medically complex, consider LTAC.. Pt requires rolling walker at discharge.     Pt desires Home with family assist at discharge. Pt " cooperative; agreeable to therapeutic recommendations and plan of care.     Modified [place]: N/A = No pre-op stroke/TIA    Post-Tx Position: Supine with HOB Elevated, Alarms activated and Call light and personal items within reach  PPE: gloves and surgical mask

## 2023-01-12 NOTE — THERAPY TREATMENT NOTE
Subjective: Pt agreeable to therapeutic plan of care.  Nursing reported pt had pain meds about 45 minutes before PT tx session    Objective:     Bed mobility - SBA supine to sit  Transfers - SBA and with rolling walker sit to stand off edge of the bed and 2 times off toilet. Pt was able to clean herself off, don depends and change in her pants without assist.   Ambulation - 40 feet x's 2 SBA, CGA and with rolling walker    Vitals: WNL    Pain: 5 VAS   Location: right flank from surgery.   Intervention for pain: Repositioned, RN provided medication and Increased Activity    Education: Provided education on the importance of mobility in the acute care setting, Verbal/Tactile Cues, Transfer Training and Gait Training    Assessment: Rahel Ramsey presents with functional mobility impairments which indicate the need for skilled intervention. Tolerating session today without incident.  Pt still in a lot of pain from her surgery but transfers and ambulates well. Pt should be safe to return home with family at d/c.   Will continue to follow and progress as tolerated.     Plan/Recommendations:   Low Intensity Therapy recommended post-acute care - This is recommended as therapy feels this patient would require 2-3 visits per week.  HHPT would be recommended.  Pt requires rolling walker ?? at discharge.     Pt desires Home with family assist and and Home Health at discharge. Pt cooperative; agreeable to therapeutic recommendations and plan of care.     Basic Mobility 6-click:  Rollin = Total, A lot = 2, A little = 3; 4 = None  Supine>Sit:   1 = Total, A lot = 2, A little = 3; 4 = None   Sit>Stand with arms:  1 = Total, A lot = 2, A little = 3; 4 = None  Bed>Chair:   1 = Total, A lot = 2, A little = 3; 4 = None  Ambulate in room:  1 = Total, A lot = 2, A little = 3; 4 = None  3-5 Steps with railin = Total, A lot = 2, A little = 3; 4 = None  Score: 22    Post-Tx Position: Up in Chair, Alarms activated and  Call light and personal items within reach  PPE: gloves and surgical mask

## 2023-01-12 NOTE — PLAN OF CARE
"Assessment: Rahel Ramsey presents with ADL impairments below baseline abilities which indicate the need for continued skilled intervention while inpatient. Patient's biggest limiting factor and barrier to independence remains her pain levels. Despite pain at 10/10, patient SBA for all of today's treatment. At baseline she did not use RW, however has been using RW for support with functional mobility.  Tolerating session today without incident. Will continue to follow and progress as tolerated.      Plan/Recommendations:   Low Intensity Therapy recommended post-acute care - This is recommended as therapy feels this patient would require 2-3 visits per week. OP or HH would be the best option depending on patient's home bound status. Consider, if the patient has other  \"skilled\" needs such as wounds, IV antibiotics, etc. Combined with \"low intensity\" could also equate to a SNF. If patient is medically complex, consider LTAC.. Pt requires rolling walker at discharge.   "

## 2023-01-13 ENCOUNTER — TRANSITIONAL CARE MANAGEMENT TELEPHONE ENCOUNTER (OUTPATIENT)
Dept: CALL CENTER | Facility: HOSPITAL | Age: 58
End: 2023-01-13
Payer: MEDICARE

## 2023-01-13 ENCOUNTER — TELEPHONE (OUTPATIENT)
Dept: SURGERY | Facility: CLINIC | Age: 58
End: 2023-01-13
Payer: MEDICARE

## 2023-01-13 NOTE — PROGRESS NOTES
Reached out to pt to schedule hospital follow up, pt declined and said she does not feel like scheduling appt right now but said she will call back when she is ready to schedule

## 2023-01-13 NOTE — CASE MANAGEMENT/SOCIAL WORK
Continued Stay Note  [facility]     Patient Name: Rahel Ramsey  MRN: [id number]  Today's Date: 1/13/2023    Admit Date: 1/9/2023    Plan: Home. Watch for home o2 needs and Possible HH  for nsg if need home o2. No needs for PT per PT eval today   Discharge Plan     Row Name 01/13/23 0816       Plan    Final Discharge Disposition Code 01 - home or self-care    Final Note home                Expected Discharge Date and Time     Expected Discharge Date Expected Discharge Time    Jan 12, 2023             Zo Candelaria RN

## 2023-01-13 NOTE — TELEPHONE ENCOUNTER
Call placed to patient to check on after surgery on 1/9/2023 by Dr. Kearney; reports doing well. We discussed instructions for Chest Xray and Post op follow up apt. Patient encouraged to call with any questions; expressed understanding of all discussed.

## 2023-01-13 NOTE — OUTREACH NOTE
Call Center TCM Note    Flowsheet Row Responses   [facility] patient discharged from? [facility]   Does the patient have one of the following disease processes/diagnoses(primary or secondary)? Cardiothoracic surgery   TCM attempt successful? Yes  [No verbal release on file]   Call start time 1012   Call end time 1018   Discharge diagnosis Lobectomy this visit   Meds reviewed with patient/caregiver? Yes   Is the patient having any side effects they believe may be caused by any medication additions or changes? No   Does the patient have all medications related to this admission filled (includes all antibiotics, pain medications, cardiac medications, etc.) Yes   Is the patient taking all medications as directed (includes completed medication regime)? Yes   Does the patient have an appointment with their PCP within 7 days of discharge? No   Nursing Interventions Routed TCM call to PCP office, Patient declined scheduling/rescheduling appointment at this time   Has home health visited the patient within 72 hours of discharge? N/A   Psychosocial issues? No   Did the patient receive a copy of their discharge instructions? Yes   Nursing interventions Reviewed instructions with patient   What is the patient's perception of their health status since discharge? Improving   Nursing interventions Nurse provided patient education   Is the patient/caregiver able to teach back normal signs of recovery? Nausea and lack of appetite, Constipation, Pain or discomfort at incisional site, Depression or irritability   Nursing interventions Reassured on normal signs of recovery   Is the patient /caregiver able to teach back basic post-op care? Continue use of incentive spirometry at least 1 week post discharge, Practice cough and deep breath every 4 hours while awake, No tub bath, swimming, or hot tub until instructed by MD, Drive as instructed by MD in discharge instructions, Use a clean wash cloth and antibacterial bar or liquid soap to  clean incisions   Is the patient/caregiver able to teach back signs and symptoms of incisional infection? Increased redness, swelling or pain at the incisonal site, Increased drainage or bleeding, Incisional warmth, Pus or odor from incision, Fever   Is the patient/caregiver able to teach back steps to recovery at home? Set small, achievable goals for return to baseline health, Rest and rebuild strength, gradually increase activity, Eat a well-balance diet   If the patient is a current smoker, are they able to teach back resources for cessation? Not a smoker  [Patient has not smoked since d/c]   Is the patient/caregiver able to teach back the hierarchy of who to call/visit for symptoms/problems? PCP, Specialist, Home health nurse, Urgent Care, ED, 911 Yes   TCM call completed? Yes   Call end time 1018   Would this patient benefit from a Referral to Amb Social Work? No   Is the patient interested in additional calls from an ambulatory ?  NOTE:  applies to high risk patients requiring additional follow-up. No          Adelaide Calzada RN    1/13/2023, 10:19 EST

## 2023-01-13 NOTE — OUTREACH NOTE
Prep Survey    Flowsheet Row Responses   [facility] patient discharged from? [facility]   Is LACE score < 7 ? No   Eligibility [facility]   Hospital [facility]   Date of Admission 01/09/23   Date of Discharge 01/12/23   Discharge Disposition Home or Self Care   Discharge diagnosis Lobectomy this visit   Does the patient have one of the following disease processes/diagnoses(primary or secondary)? Cardiothoracic surgery   Does the patient have Home health ordered? No   Is there a DME ordered? No   Prep survey completed? Yes          NAZIA MOROCHO - Registered Nurse

## 2023-01-18 ENCOUNTER — NURSE TRIAGE (OUTPATIENT)
Dept: CALL CENTER | Facility: HOSPITAL | Age: 58
End: 2023-01-18
Payer: MEDICARE

## 2023-01-18 DIAGNOSIS — R91.1 RIGHT UPPER LOBE PULMONARY NODULE: Primary | ICD-10-CM

## 2023-01-18 RX ORDER — HYDROCODONE BITARTRATE AND ACETAMINOPHEN 10; 325 MG/1; MG/1
1 TABLET ORAL EVERY 8 HOURS PRN
Qty: 21 TABLET | Refills: 0 | Status: SHIPPED | OUTPATIENT
Start: 2023-01-18 | End: 2023-01-28

## 2023-01-18 NOTE — TELEPHONE ENCOUNTER
"Caller requesting refill of [place]. Cardiothoracic surgery last week, does not feel well enough to go to pain management for refill. States due to pain and weakness. Call transferred to Dr. Rai's office/ [place] office nurse to discuss whether Dr. Rai can prescribe.    Reason for Disposition  • Prescription refill request for NON-ESSENTIAL medicine (i.e., no harm to patient if med not taken) and triager unable to refill per department policy    Additional Information  • Negative: New-onset or worsening symptoms, see that protocol (e.g., diarrhea, runny nose, sore throat)  • Negative: Medicine question not related to refill or renewal  • Negative: Caller (e.g., patient or pharmacist) requesting information about a new medicine  • Negative: Caller requesting information unrelated to medicine  • Negative: Prescription refill request for ESSENTIAL medicine (i.e., likelihood of harm to patient if not taken) and triager unable to refill per department policy  • Negative: Prescription not at pharmacy and was prescribed by PCP recently  (Exception: triager has access to EMR and prescription is recorded there. Go to Home Care and confirm for pharmacy.)  • Negative: Pharmacy calling with prescription questions and triager unable to answer question  • Negative: Caller requesting a CONTROLLED substance prescription refill (e.g., narcotics, ADHD medicines)  • Negative: Caller has NON-URGENT medicine question about med that PCP prescribed and triager unable to answer question  • Negative: Prescription request for new medicine (not a refill)  • Negative: Prescription prescribed recently is not at pharmacy and triager has access to patient's EMR and prescription is recorded in the EMR    Answer Assessment - Initial Assessment Questions  1. DRUG NAME: \"What medicine do you need to have refilled?\"      Norco  2. REFILLS REMAINING: \"How many refills are remaining?\" (Note: The label on the medicine or pill bottle will show how many " "refills are remaining. If there are no refills remaining, then a renewal may be needed.)      None  3. EXPIRATION DATE: \"What is the expiration date?\" (Note: The label states when the prescription will , and thus can no longer be refilled.)        4. PRESCRIBING HCP: \"Who prescribed it?\" Reason: If prescribed by specialist, call should be referred to that group.      Dr. Rai/Pain management  5. SYMPTOMS: \"Do you have any symptoms?\"     Postop pain  6. PREGNANCY: \"Is there any chance that you are pregnant?\" \"When was your last menstrual period?\"      n/a    Protocols used: MEDICATION REFILL AND RENEWAL CALL-ADULT-[place]      "

## 2023-01-20 ENCOUNTER — READMISSION MANAGEMENT (OUTPATIENT)
Dept: CALL CENTER | Facility: HOSPITAL | Age: 58
End: 2023-01-20
Payer: MEDICARE

## 2023-01-20 NOTE — OUTREACH NOTE
CT Surgery Week 2 Survey    Flowsheet Row Responses   [facility] patient discharged from? [facility]   Does the patient have one of the following disease processes/diagnoses(primary or secondary)? Cardiothoracic surgery   Week 2 attempt successful? Yes   Call start time 0846   Call end time 0848   Discharge diagnosis Lobectomy this visit   Meds reviewed with patient/caregiver? Yes   Is the patient having any side effects they believe may be caused by any medication additions or changes? No   Does the patient have all medications related to this admission filled (includes all antibiotics, pain medications, cardiac medications, etc.) Yes   Is the patient taking all medications as directed (includes completed medication regime)? Yes   Does the patient have a primary care provider?  Yes   Does the patient have an appointment scheduled with their C/T surgeon? Yes   Comments regarding PCP PATIENT DECLINED SCHEDULING PCP APPOINTMENT DURING THIS CALL    Has the patient kept scheduled appointments due by today? N/A   Has home health visited the patient within 72 hours of discharge? N/A   Psychosocial issues? No   Did the patient receive a copy of their discharge instructions? Yes   Nursing interventions Reviewed instructions with patient   What is the patient's perception of their health status since discharge? Improving   Nursing interventions Nurse provided patient education   Is the patient/caregiver able to teach back normal signs of recovery? Pain or discomfort at incisional site, Constipation   Nursing interventions Reassured on normal signs of recovery   Is the patient /caregiver able to teach back basic post-op care? Hold pillow to support chest when coughing, Drive as instructed by MD in discharge instructions, No tub bath, swimming, or hot tub until instructed by MD, Lifting as instructed by MD in discharge instructions   Is the patient/caregiver able to teach back signs and symptoms of incisional infection?  Increased redness, swelling or pain at the incisonal site, Increased drainage or bleeding, Incisional warmth, Pus or odor from incision, Fever   Is the patient/caregiver able to teach back steps to recovery at home? Set small, achievable goals for return to baseline health, Rest and rebuild strength, gradually increase activity, Make a list of questions for surgeon's appointment   Is the patient/caregiver able to teach back the hierarchy of who to call/visit for symptoms/problems? PCP, Specialist, Home health nurse, Urgent Care, ED, 911 Yes   Week 2 call completed? Yes          CONY NULL - Licensed Nurse

## 2023-01-23 ENCOUNTER — TELEPHONE (OUTPATIENT)
Dept: SURGERY | Facility: CLINIC | Age: 58
End: 2023-01-23
Payer: MEDICARE

## 2023-01-23 NOTE — PROGRESS NOTES
THORACIC SURGERY CLINIC FOLLOW UP    REASON FOR VISIT: Stage I Right upper lobe adenocarcinoma s/p Robotic assisted Right Upper lobectomy    Subjective   HISTORY OF PRESENTING ILLNESS:   Rahel Ramsey is a 57 y.o. female was initially seen for consultation at the request of Dr. Rustam Aldana.    Ms. Ramsey  has a history of tobacco abuse, 40-pack-year history of smoking, she is active smoker and has chronic obstructive pulmonary disease.  She has been getting lung cancer surveillance CT scan. CT scan of the chest on 10/6/2022 found increasing size of the right upper lobe nodule.  CT-guided biopsy of the right upper lobe nodule on 11/2/2022 revealed invasive well to moderately differentiated adenocarcinoma with focal lepidic features.  PET/CT on 11/23/2022 showed a 1.7 cm right upper lobe nodule that was not PET avid.  Also noted an 8 mm right upper lobe nodule which has been stable since 2020 and likely benign.  There was no evidence of distant metastases. She had good performance status, her functional status was limited due to chronic back pain.  She is an active smoker and her DLCO was 50%.       On 1/9/2023, she underwent robotic assisted right upper lobectomy, partial decortication and systemic mediastinal lymph node dissection.     Intraoperative findings:  1. Right upper lobe adherent to the chest wall.  2. No evidence of pleural implants or effusion.  3. Large calcified and necrotic 11 R lymph node between the right upper lobe takeoff and bronchus intermedius noted.  Specimen sent for frozen section and came back as necrotizing granulomatous inflammation.  4. Partial complete fissures.  5. Right middle lobe inflated under direct vision without evidence of torsion.  6. Small airleak at the end of the procedure    She tolerated the procedure well.  There were no intraoperative or immediate postoperative complication.  The pathology confirmed 1.3 cm invasive lepidi well differentiated c adenocarcinoma  with 0.8 cm invasive component.  All margins were negative for carcinoma invasion.  Total of 12 lymph nodes were examined and all were negative.  The final pathological stage was stage I (pT1bN0).    1/24/2023: She came to clinic for postop visit.  She is doing well and as expected after surgery.  She still reports pain around the incision site.  She required refill of narcotic in the last 2 weeks.  She has history of chronic pain and will be following with her pain specialist for further management.  She denies fever, chills, rigors, drainage from the incision.  She still reports shortness of breath with mild to moderate exertion.    Past Medical History:   Diagnosis Date   • Allergic    • Arthritis    • Atrial fibrillation (HCC)    • COPD (chronic obstructive pulmonary disease) (HCC)    • Diabetes mellitus (HCC)    • Headache    • History of herniated intervertebral disc    • History of skin cancer     Left lower extremity   • Hyperlipidemia    • Hypertension    • Injury of back    • Leukocytosis    • Lung nodule    • Urinary tract infection with hematuria 01/22/2021       Past Surgical History:   Procedure Laterality Date   • BACK SURGERY     • CARDIAC CATHETERIZATION     • DILATATION AND CURETTAGE     • LOBECTOMY Right 1/9/2023    Procedure: THORASCOPIC RIGHT UPPER LOBECTOMY WITH DAVINCI ROBOT, MEDIASTINAL LYMPH NODE DISSECTION;  Surgeon: Erwin Kearney MD;  Location: [facility];  Service: Robotics - [employer];  Laterality: Right;   • LUMBAR DECOMPRESSION      L4-L5    • LUNG BIOPSY Right 11/2022   • SKIN CANCER EXCISION Left     [place]   • SUBTOTAL HYSTERECTOMY         Family History   Problem Relation Age of Onset   • Breast cancer Mother    • Lung cancer Father    • Lung cancer Other    • Colon cancer Other    • Skin cancer Other        Social History     Socioeconomic History   • Marital status:    Tobacco Use   • Smoking status: Every Day     Packs/day: 1.00     Years: 40.00     Pack years: 40.00      Types: Cigarettes   • Smokeless tobacco: Never   Vaping Use   • Vaping Use: Never used   Substance and Sexual Activity   • Alcohol use: Yes     Comment: rare   • Drug use: No   • Sexual activity: Defer         Current Outpatient Medications:   •  acetaminophen (TYLENOL) 325 MG tablet, Take 2 tablets by mouth 3 (Three) Times a Day for 14 days., Disp: 84 tablet, Rfl: 0  •  albuterol sulfate  (90 Base) MCG/ACT inhaler, Inhale 2 puffs Every 4 (Four) Hours As Needed for Wheezing or Shortness of Air., Disp: 18 g, Rfl: 5  •  amitriptyline (ELAVIL) 25 MG tablet, Take 25 mg by mouth every night at bedtime., Disp: , Rfl: 3  •  aspirin (aspirin) 81 MG EC tablet, Take 81 mg by mouth Daily., Disp: , Rfl:   •  fenofibrate (TRICOR) 145 MG tablet, Take 1 tablet by mouth Daily., Disp: 90 tablet, Rfl: 1  •  fluconazole (DIFLUCAN) 150 MG tablet, Take 150 mg by mouth As Needed (yeast inf)., Disp: , Rfl:   •  fluticasone (FLONASE) 50 MCG/ACT nasal spray, INSTILL 2 SPRAYS IN EACH NOSTRIL EVERY DAY, Disp: 48 g, Rfl: 3  •  Fluticasone-Umeclidin-Vilant (TRELEGY) 100-62.5-25 MCG/ACT inhaler, Inhale 1 puff Daily., Disp: , Rfl:   •  HYDROcodone-acetaminophen (NORCO)  MG per tablet, Take 1 tablet by mouth Every 8 (Eight) Hours As Needed for Severe Pain for up to 10 days., Disp: 21 tablet, Rfl: 0  •  metFORMIN (GLUCOPHAGE) 500 MG tablet, Take 1 tablet by mouth Daily With Breakfast., Disp: 90 tablet, Rfl: 1  •  methocarbamol (ROBAXIN) 500 MG tablet, Take 1 tablet by mouth Every 8 (Eight) Hours for 30 days., Disp: 90 tablet, Rfl: 0  •  metoprolol succinate XL (TOPROL-XL) 25 MG 24 hr tablet, Take 1 tablet by mouth Daily., Disp: 90 tablet, Rfl: 1  •  naloxone (NARCAN) 4 MG/0.1ML nasal spray, CALL 911. SPR CONTENTS OF ONE SPRAYER (0.1ML) INTO ONE NOSTRIL. REPEAT IN 2-3 MIN IF SYMPTOMS OF OPIOID EMERGENCY PERSIST, ALTERNATE NOSTRILS, Disp: , Rfl:   •  nystatin (MYCOSTATIN) 408468 UNIT/GM cream, Apply 1 application topically to the  "appropriate area as directed As Needed., Disp: , Rfl:   •  ondansetron (ZOFRAN) 4 MG tablet, Take 1 tablet by mouth Every 6 (Six) Hours As Needed for Nausea or Vomiting., Disp: 20 tablet, Rfl: 0  •  pregabalin (Lyrica) 100 MG capsule, Take 1 capsule by mouth 2 (Two) Times a Day., Disp: 90 capsule, Rfl: 1  •  vitamin D (ERGOCALCIFEROL) 1.25 MG (55769 UT) capsule capsule, TAKE 1 CAPSULE BY MOUTH EVERY 7 DAYS, Disp: 12 capsule, Rfl: 1     Allergies   Allergen Reactions   • Morphine Anaphylaxis and Nausea And Vomiting             Objective    OBJECTIVE:     VITAL SIGNS:  /82 (BP Location: Right arm, Patient Position: Sitting, Cuff Size: Adult)   Pulse 77   Temp 97.1 °F (36.2 °C) (Infrared)   Ht 175.3 cm (69\")   Wt 86.2 kg (190 lb)   SpO2 98%   BMI 28.06 kg/m²     PHYSICAL EXAM:  Constitutional:       Appearance: Normal appearance.   HENT:      Head: Normocephalic.      Right Ear: External ear normal.      Left Ear: External ear normal.      Nose: Nose normal.      Mouth/Throat: No obvious deformity     Pharynx: Oropharynx is clear.   Eyes:      Conjunctiva/sclera: Conjunctivae normal.   Cardiovascular:      Rate and Rhythm: Normal rate.      Pulses: Normal pulses.   Pulmonary:      Effort: Pulmonary effort is normal.  Incision clean, dry, intact.  Abdominal:      Palpations: Abdomen is soft.   Musculoskeletal:         General: Normal range of motion.      Cervical back: Normal range of motion.   Skin:     General: Skin is warm.   Neurological:      General: No focal deficit present.      Mental Status: She is alert and oriented to person, place, and time.     LAB RESULTS:  I have reviewed all the available laboratory results in the chart.    RESULTS REVIEW:  I have reviewed the patient's all relevant laboratory and imaging findings.     IMAGING RESULTS:    CT chest 10/6/2022:  1.  The groundglass opacity in the posterior right upper lobe has increased in size since November 2020 and more recently December " 2021.  Tissue diagnosis recommended given the continued growth.  Slow-growing neoplasm such as adenocarcinoma suspected.  2.  The other nodule in the right lung are unchanged since 2020.  Therefore benign.  Mild emphysema.    CT-guided lung biopsy on 11/2/2022:  Lung, right, CT-guided core biopsy:    Invasive well to moderately differentiated adenocarcinoma with focal lepidic features    CT chest with IV contrast:  Pending    PET/ CT 11/23/2022:  1. 1.7 cm right upper lobe ground glass nodular density, which has  undergone recent CT-guided biopsy with positive findings for  adenocarcinoma, demonstrates no appreciable FDG uptake.  2. An 8 mm right upper lobe perihilar nodule is stable since 3/10/2020,  smaller than on the 1/17/2020 examination. It demonstrates no abnormal  FDG uptake, and is in keeping with benign finding.  3. There is no convincing evidence of metastatic disease elsewhere  within the chest. No evidence of primary malignancy or metastatic  disease in the neck, abdomen, pelvis, or skeleton.    MRI brain:  Not indicated    Cardiac testing:  Not done    Carotid Doppler on 8/25/2022:  •    Right ICA Prox:  Imaging indicates 16%-49% stenosis.    •    Right Vertebral:  Antegrade flow is present.     •    Left ICA Prox:  Imaging indicates 16-49% stenosis.   •    Left Vertebral:  Antegrade flow is present.     Pulmonary Function Test 12/14/2022:  FEV1 72%  FVC 82%  DLCO 50%:    Robotic Assisted Right Upper Lobectomy, Lymph node dissection on 1/9/2023:  LUNG: RESECTION - 13  SPECIMEN   Procedure  Lobectomy    Specimen Laterality  Right    TUMOR   Tumor Focality  Single focus    Tumor Site  Upper lobe of lung    Tumor Size     Total Tumor Size (size of entire tumor)  Greatest Dimension (Centimeters): 1.3 cm   Size of Invasive Component  Greatest Dimension (Centimeters): 0.8 cm   Percentage of Total Tumor Size (above)  50% %   Histologic Type  Invasive lepidic adenocarcinoma    Histologic Patterns Present   Acinar: 30      Lepidic: 70    Histologic Grade  G1, well differentiated    Visceral Pleura Invasion  Not identified    Direct Invasion of Adjacent Structures  Not applicable (no adjacent structures present)    Treatment Effect  No known presurgical therapy    Lymphovascular Invasion  Not identified    MARGINS   Margin Status for Invasive Carcinoma  All margins negative for invasive carcinoma    Closest Margin(s) to Invasive Carcinoma  Bronchial    Distance from Invasive Carcinoma to Closest Margin  4 cm   Margin Status for Non-Invasive Tumor  All margins negative for non-invasive tumor    REGIONAL LYMPH NODES   Lymph Node(s) from Prior Procedures  No known prior lymph node sampling performed    Regional Lymph Node Status  All regional lymph nodes negative for tumor    Number of Lymph Nodes Examined  12    Tai Site(s) Examined  4R: Lower paratracheal      8R: Para-esophageal (below caroline)      9R: Pulmonary ligament      10R: Hilar      11R: Interlobar      12R: Lobar      7: Subcarinal    PATHOLOGIC STAGE CLASSIFICATION (pTNM, AJCC 8th Edition)   Reporting of pT, pN, and (when applicable) pM categories is based on information available to the pathologist at the time the report is issued. As per the AJCC (Chapter 1, 8th Ed.) it is the managing physician’s responsibility to establish the final pathologic stage based upon all pertinent information, including but potentially not limited to this pathology report.   pT Category  pT1b    pN Category  pN0    ADDITIONAL FINDINGS   Additional Findings  Inflammation: Necrotizing granulomatous inflammation    .     I agree with the pathology report.           ASSESSMENT & PLAN:  Rahel Ramsey is a 57 y.o. female with significant medical conditions as mentioned above presented to clinic.    Diagnosis: Right upper lobe well-differentiated lepidic invasive adenocarcinoma  Staging: Stage I ([id number])    She is doing very well after surgery and recovering as expected.   Pain control was challenging but she is over the acute pain.  She will follow-up with her pain specialist for chronic pain issues.    The final pathology confirmed stage I well-differentiated lipidic invasive adenocarcinoma.  All the resection margins were negative for cancer involvement.  She will not require additional treatment.    I recommended following up in 6-month with repeat CT chest for cancer surveillance.    I discussed the patients findings and my recommendations with the patient. The patient was given adequate time to ask questions and all questions were answered to patient satisfaction. Thank you for this consult and allowing us to participate in the care of your patient.      Erwin Kearney MD  Thoracic Surgeon  [facility]        Dictated utilizing Dragon dictation at 16:17 EST on 1/23/2023    I spent 40 minutes caring for Rahel on this date of service. This time includes time spent by me in the following activities:preparing for the visit, reviewing tests, obtaining and/or reviewing a separately obtained history, performing a medically appropriate examination and/or evaluation , counseling and educating the patient/family/caregiver, ordering medications, tests, or procedures, referring and communicating with other health care professionals , documenting information in the medical record, independently interpreting results and communicating that information with the patient/family/caregiver and care coordination and more than half the time was spent in direct face to face evaluation and decision making.

## 2023-01-23 NOTE — TELEPHONE ENCOUNTER
Left message to remind patient to have CXR before appt tomorrow with dr. Kearney at [facility]

## 2023-01-24 ENCOUNTER — HOSPITAL ENCOUNTER (OUTPATIENT)
Dept: GENERAL RADIOLOGY | Facility: HOSPITAL | Age: 58
Discharge: HOME | End: 2023-01-24
Admitting: NURSE PRACTITIONER
Payer: MEDICARE

## 2023-01-24 ENCOUNTER — OFFICE VISIT (OUTPATIENT)
Dept: SURGERY | Facility: CLINIC | Age: 58
End: 2023-01-24
Payer: MEDICARE

## 2023-01-24 VITALS
WEIGHT: 190 LBS | BODY MASS INDEX: 28.14 KG/M2 | DIASTOLIC BLOOD PRESSURE: 82 MMHG | HEART RATE: 77 BPM | HEIGHT: 69 IN | SYSTOLIC BLOOD PRESSURE: 145 MMHG | OXYGEN SATURATION: 98 % | TEMPERATURE: 97.1 F

## 2023-01-24 DIAGNOSIS — C34.11 PRIMARY NON-SMALL CELL CARCINOMA OF UPPER LOBE OF RIGHT LUNG: ICD-10-CM

## 2023-01-24 DIAGNOSIS — R91.1 RIGHT UPPER LOBE PULMONARY NODULE: Primary | ICD-10-CM

## 2023-01-24 DIAGNOSIS — R91.1 RIGHT UPPER LOBE PULMONARY NODULE: ICD-10-CM

## 2023-01-24 PROCEDURE — 71046 X-RAY EXAM CHEST 2 VIEWS: CPT

## 2023-01-24 PROCEDURE — 99024 POSTOP FOLLOW-UP VISIT: CPT | Performed by: SURGERY

## 2023-01-27 RX ORDER — FLUTICASONE PROPIONATE 50 MCG
SPRAY, SUSPENSION (ML) NASAL
Qty: 48 G | Refills: 3 | Status: SHIPPED | OUTPATIENT
Start: 2023-01-27

## 2023-01-31 ENCOUNTER — INPATIENT HOSPITAL (AMBULATORY)
Dept: URBAN - METROPOLITAN AREA HOSPITAL 84 | Facility: HOSPITAL | Age: 58
End: 2023-01-31

## 2023-01-31 ENCOUNTER — TELEPHONE (OUTPATIENT)
Dept: ONCOLOGY | Facility: CLINIC | Age: 58
End: 2023-01-31

## 2023-01-31 ENCOUNTER — APPOINTMENT (OUTPATIENT)
Dept: CT IMAGING | Facility: HOSPITAL | Age: 58
DRG: 439 | End: 2023-01-31
Payer: MEDICARE

## 2023-01-31 ENCOUNTER — READMISSION MANAGEMENT (OUTPATIENT)
Dept: CALL CENTER | Facility: HOSPITAL | Age: 58
End: 2023-01-31
Payer: MEDICARE

## 2023-01-31 ENCOUNTER — HOSPITAL ENCOUNTER (INPATIENT)
Facility: HOSPITAL | Age: 58
LOS: 5 days | Discharge: HOME | DRG: 439 | End: 2023-02-05
Attending: EMERGENCY MEDICINE | Admitting: INTERNAL MEDICINE
Payer: MEDICARE

## 2023-01-31 ENCOUNTER — PATIENT ROUNDING (BHMG ONLY) (OUTPATIENT)
Dept: SURGERY | Facility: CLINIC | Age: 58
End: 2023-01-31
Payer: MEDICARE

## 2023-01-31 ENCOUNTER — APPOINTMENT (OUTPATIENT)
Dept: MRI IMAGING | Facility: HOSPITAL | Age: 58
DRG: 439 | End: 2023-01-31
Payer: MEDICARE

## 2023-01-31 DIAGNOSIS — R10.9 LEFT SIDED ABDOMINAL PAIN: ICD-10-CM

## 2023-01-31 DIAGNOSIS — R19.7 DIARRHEA, UNSPECIFIED: ICD-10-CM

## 2023-01-31 DIAGNOSIS — Z85.118 PERSONAL HISTORY OF OTHER MALIGNANT NEOPLASM OF BRONCHUS AND: ICD-10-CM

## 2023-01-31 DIAGNOSIS — R19.7 DIARRHEA, UNSPECIFIED TYPE: ICD-10-CM

## 2023-01-31 DIAGNOSIS — K85.92 ACUTE PANCREATITIS WITH INFECTED NECROSIS, UNSPECIFIED PANCREATITIS TYPE: Primary | ICD-10-CM

## 2023-01-31 DIAGNOSIS — K85.90 ACUTE PANCREATITIS WITHOUT NECROSIS OR INFECTION, UNSPECIFIE: ICD-10-CM

## 2023-01-31 DIAGNOSIS — Z85.118 HISTORY OF LUNG CANCER: ICD-10-CM

## 2023-01-31 DIAGNOSIS — R74.01 ELEVATION OF LEVELS OF LIVER TRANSAMINASE LEVELS: ICD-10-CM

## 2023-01-31 DIAGNOSIS — D72.829 ELEVATED WHITE BLOOD CELL COUNT, UNSPECIFIED: ICD-10-CM

## 2023-01-31 DIAGNOSIS — K80.80 OTHER CHOLELITHIASIS WITHOUT OBSTRUCTION: ICD-10-CM

## 2023-01-31 DIAGNOSIS — R11.2 NAUSEA WITH VOMITING, UNSPECIFIED: ICD-10-CM

## 2023-01-31 DIAGNOSIS — K80.50 CHOLEDOCHOLITHIASIS: ICD-10-CM

## 2023-01-31 PROBLEM — R10.10 UPPER ABDOMINAL PAIN: Status: ACTIVE | Noted: 2023-01-31

## 2023-01-31 LAB
ALBUMIN SERPL-MCNC: 4.3 G/DL (ref 3.5–5.2)
ALBUMIN/GLOB SERPL: 1.1 G/DL
ALP SERPL-CCNC: 120 U/L (ref 39–117)
ALT SERPL W P-5'-P-CCNC: 17 U/L (ref 1–33)
ANION GAP SERPL CALCULATED.3IONS-SCNC: 13 MMOL/L (ref 5–15)
AST SERPL-CCNC: 18 U/L (ref 1–32)
BACTERIA UR QL AUTO: ABNORMAL /HPF
BASOPHILS # BLD AUTO: 0.1 10*3/MM3 (ref 0–0.2)
BASOPHILS NFR BLD AUTO: 0.9 % (ref 0–1.5)
BILIRUB SERPL-MCNC: 0.7 MG/DL (ref 0–1.2)
BILIRUB UR QL STRIP: NEGATIVE
BUN SERPL-MCNC: 14 MG/DL (ref 6–20)
BUN/CREAT SERPL: 19.7 (ref 7–25)
CALCIUM SPEC-SCNC: 10 MG/DL (ref 8.6–10.5)
CHLORIDE SERPL-SCNC: 95 MMOL/L (ref 98–107)
CHOLEST SERPL-MCNC: 154 MG/DL (ref 0–200)
CLARITY UR: ABNORMAL
CO2 SERPL-SCNC: 28 MMOL/L (ref 22–29)
COLOR UR: YELLOW
CREAT SERPL-MCNC: 0.71 MG/DL (ref 0.57–1)
CRP SERPL-MCNC: 5.04 MG/DL (ref 0–0.5)
DEPRECATED RDW RBC AUTO: 46.4 FL (ref 37–54)
EGFRCR SERPLBLD CKD-EPI 2021: 98.7 ML/MIN/1.73
EOSINOPHIL # BLD AUTO: 0.4 10*3/MM3 (ref 0–0.4)
EOSINOPHIL NFR BLD AUTO: 2.8 % (ref 0.3–6.2)
ERYTHROCYTE [DISTWIDTH] IN BLOOD BY AUTOMATED COUNT: 14.3 % (ref 12.3–15.4)
FLUAV SUBTYP SPEC NAA+PROBE: NOT DETECTED
FLUBV RNA ISLT QL NAA+PROBE: NOT DETECTED
GLOBULIN UR ELPH-MCNC: 3.8 GM/DL
GLUCOSE SERPL-MCNC: 163 MG/DL (ref 65–99)
GLUCOSE UR STRIP-MCNC: NEGATIVE MG/DL
HCT VFR BLD AUTO: 49 % (ref 34–46.6)
HDLC SERPL-MCNC: 36 MG/DL (ref 40–60)
HGB BLD-MCNC: 16.7 G/DL (ref 12–15.9)
HGB UR QL STRIP.AUTO: NEGATIVE
HYALINE CASTS UR QL AUTO: ABNORMAL /LPF
KETONES UR QL STRIP: NEGATIVE
LDLC SERPL CALC-MCNC: 78 MG/DL (ref 0–100)
LDLC/HDLC SERPL: 1.94 {RATIO}
LEUKOCYTE ESTERASE UR QL STRIP.AUTO: ABNORMAL
LIPASE SERPL-CCNC: 493 U/L (ref 13–60)
LYMPHOCYTES # BLD AUTO: 3 10*3/MM3 (ref 0.7–3.1)
LYMPHOCYTES NFR BLD AUTO: 20.8 % (ref 19.6–45.3)
MCH RBC QN AUTO: 30 PG (ref 26.6–33)
MCHC RBC AUTO-ENTMCNC: 34.1 G/DL (ref 31.5–35.7)
MCV RBC AUTO: 87.9 FL (ref 79–97)
MONOCYTES # BLD AUTO: 0.6 10*3/MM3 (ref 0.1–0.9)
MONOCYTES NFR BLD AUTO: 4.3 % (ref 5–12)
NEUTROPHILS NFR BLD AUTO: 10.5 10*3/MM3 (ref 1.7–7)
NEUTROPHILS NFR BLD AUTO: 71.2 % (ref 42.7–76)
NITRITE UR QL STRIP: NEGATIVE
NRBC BLD AUTO-RTO: 0.1 /100 WBC (ref 0–0.2)
PH UR STRIP.AUTO: 7.5 [PH] (ref 5–8)
PLATELET # BLD AUTO: 575 10*3/MM3 (ref 140–450)
PMV BLD AUTO: 7 FL (ref 6–12)
POTASSIUM SERPL-SCNC: 4 MMOL/L (ref 3.5–5.2)
PROT SERPL-MCNC: 8.1 G/DL (ref 6–8.5)
PROT UR QL STRIP: ABNORMAL
RBC # BLD AUTO: 5.58 10*6/MM3 (ref 3.77–5.28)
RBC # UR STRIP: ABNORMAL /HPF
REF LAB TEST METHOD: ABNORMAL
SARS-COV-2 RNA PNL SPEC NAA+PROBE: NOT DETECTED
SODIUM SERPL-SCNC: 136 MMOL/L (ref 136–145)
SP GR UR STRIP: 1.06 (ref 1–1.03)
SQUAMOUS #/AREA URNS HPF: ABNORMAL /HPF
TRIGL SERPL-MCNC: 241 MG/DL (ref 0–150)
UROBILINOGEN UR QL STRIP: ABNORMAL
VLDLC SERPL-MCNC: 40 MG/DL (ref 5–40)
WBC # UR STRIP: ABNORMAL /HPF
WBC NRBC COR # BLD: 14.7 10*3/MM3 (ref 3.4–10.8)

## 2023-01-31 PROCEDURE — 74181 MRI ABDOMEN W/O CONTRAST: CPT

## 2023-01-31 PROCEDURE — 99222 1ST HOSP IP/OBS MODERATE 55: CPT | Performed by: NURSE PRACTITIONER

## 2023-01-31 PROCEDURE — 25010000002 HYDROMORPHONE 1 MG/ML SOLUTION: Performed by: EMERGENCY MEDICINE

## 2023-01-31 PROCEDURE — 25010000002 ONDANSETRON PER 1 MG: Performed by: NURSE PRACTITIONER

## 2023-01-31 PROCEDURE — 87636 SARSCOV2 & INF A&B AMP PRB: CPT | Performed by: EMERGENCY MEDICINE

## 2023-01-31 PROCEDURE — 82787 IGG 1 2 3 OR 4 EACH: CPT | Performed by: NURSE PRACTITIONER

## 2023-01-31 PROCEDURE — 74177 CT ABD & PELVIS W/CONTRAST: CPT

## 2023-01-31 PROCEDURE — 82784 ASSAY IGA/IGD/IGG/IGM EACH: CPT | Performed by: NURSE PRACTITIONER

## 2023-01-31 PROCEDURE — 25010000002 ONDANSETRON PER 1 MG: Performed by: EMERGENCY MEDICINE

## 2023-01-31 PROCEDURE — 83690 ASSAY OF LIPASE: CPT | Performed by: EMERGENCY MEDICINE

## 2023-01-31 PROCEDURE — 99285 EMERGENCY DEPT VISIT HI MDM: CPT

## 2023-01-31 PROCEDURE — 86038 ANTINUCLEAR ANTIBODIES: CPT | Performed by: NURSE PRACTITIONER

## 2023-01-31 PROCEDURE — 81001 URINALYSIS AUTO W/SCOPE: CPT | Performed by: INTERNAL MEDICINE

## 2023-01-31 PROCEDURE — 36415 COLL VENOUS BLD VENIPUNCTURE: CPT | Performed by: NURSE PRACTITIONER

## 2023-01-31 PROCEDURE — 25010000002 HYDROMORPHONE 1 MG/ML SOLUTION: Performed by: NURSE PRACTITIONER

## 2023-01-31 PROCEDURE — 80053 COMPREHEN METABOLIC PANEL: CPT | Performed by: EMERGENCY MEDICINE

## 2023-01-31 PROCEDURE — 0 IOPAMIDOL PER 1 ML: Performed by: EMERGENCY MEDICINE

## 2023-01-31 PROCEDURE — 85025 COMPLETE CBC W/AUTO DIFF WBC: CPT | Performed by: EMERGENCY MEDICINE

## 2023-01-31 PROCEDURE — 86140 C-REACTIVE PROTEIN: CPT | Performed by: NURSE PRACTITIONER

## 2023-01-31 PROCEDURE — 80061 LIPID PANEL: CPT | Performed by: NURSE PRACTITIONER

## 2023-01-31 RX ORDER — ONDANSETRON 2 MG/ML
4 INJECTION INTRAMUSCULAR; INTRAVENOUS ONCE
Status: COMPLETED | OUTPATIENT
Start: 2023-01-31 | End: 2023-01-31

## 2023-01-31 RX ORDER — METRONIDAZOLE 500 MG/100ML
500 INJECTION, SOLUTION INTRAVENOUS EVERY 8 HOURS
Status: DISCONTINUED | OUTPATIENT
Start: 2023-01-31 | End: 2023-02-01

## 2023-01-31 RX ORDER — SODIUM CHLORIDE 9 MG/ML
100 INJECTION, SOLUTION INTRAVENOUS CONTINUOUS
Status: DISCONTINUED | OUTPATIENT
Start: 2023-01-31 | End: 2023-01-31

## 2023-01-31 RX ORDER — ONDANSETRON 2 MG/ML
4 INJECTION INTRAMUSCULAR; INTRAVENOUS EVERY 4 HOURS PRN
Status: DISCONTINUED | OUTPATIENT
Start: 2023-01-31 | End: 2023-02-05 | Stop reason: HOSPADM

## 2023-01-31 RX ORDER — ACETAMINOPHEN 325 MG/1
650 TABLET ORAL EVERY 4 HOURS PRN
Status: DISCONTINUED | OUTPATIENT
Start: 2023-01-31 | End: 2023-02-05 | Stop reason: HOSPADM

## 2023-01-31 RX ORDER — SODIUM CHLORIDE 9 MG/ML
40 INJECTION, SOLUTION INTRAVENOUS AS NEEDED
Status: DISCONTINUED | OUTPATIENT
Start: 2023-01-31 | End: 2023-02-05 | Stop reason: HOSPADM

## 2023-01-31 RX ORDER — CHOLECALCIFEROL (VITAMIN D3) 125 MCG
5 CAPSULE ORAL NIGHTLY PRN
Status: DISCONTINUED | OUTPATIENT
Start: 2023-01-31 | End: 2023-02-05 | Stop reason: HOSPADM

## 2023-01-31 RX ORDER — SODIUM CHLORIDE 0.9 % (FLUSH) 0.9 %
10 SYRINGE (ML) INJECTION AS NEEDED
Status: DISCONTINUED | OUTPATIENT
Start: 2023-01-31 | End: 2023-02-05 | Stop reason: HOSPADM

## 2023-01-31 RX ORDER — PANTOPRAZOLE SODIUM 40 MG/10ML
40 INJECTION, POWDER, LYOPHILIZED, FOR SOLUTION INTRAVENOUS ONCE
Status: COMPLETED | OUTPATIENT
Start: 2023-01-31 | End: 2023-01-31

## 2023-01-31 RX ORDER — SODIUM CHLORIDE, SODIUM LACTATE, POTASSIUM CHLORIDE, CALCIUM CHLORIDE 600; 310; 30; 20 MG/100ML; MG/100ML; MG/100ML; MG/100ML
150 INJECTION, SOLUTION INTRAVENOUS CONTINUOUS
Status: DISCONTINUED | OUTPATIENT
Start: 2023-01-31 | End: 2023-02-05 | Stop reason: HOSPADM

## 2023-01-31 RX ORDER — HYDROCODONE BITARTRATE AND ACETAMINOPHEN 10; 325 MG/1; MG/1
1 TABLET ORAL EVERY 6 HOURS PRN
COMMUNITY

## 2023-01-31 RX ORDER — ACETAMINOPHEN 160 MG/5ML
650 SOLUTION ORAL EVERY 4 HOURS PRN
Status: DISCONTINUED | OUTPATIENT
Start: 2023-01-31 | End: 2023-02-05 | Stop reason: HOSPADM

## 2023-01-31 RX ORDER — PANTOPRAZOLE SODIUM 40 MG/10ML
40 INJECTION, POWDER, LYOPHILIZED, FOR SOLUTION INTRAVENOUS
Status: DISCONTINUED | OUTPATIENT
Start: 2023-02-01 | End: 2023-02-05 | Stop reason: HOSPADM

## 2023-01-31 RX ORDER — ACETAMINOPHEN 650 MG/1
650 SUPPOSITORY RECTAL EVERY 4 HOURS PRN
Status: DISCONTINUED | OUTPATIENT
Start: 2023-01-31 | End: 2023-02-05 | Stop reason: HOSPADM

## 2023-01-31 RX ORDER — ALUMINA, MAGNESIA, AND SIMETHICONE 2400; 2400; 240 MG/30ML; MG/30ML; MG/30ML
15 SUSPENSION ORAL EVERY 6 HOURS PRN
Status: DISCONTINUED | OUTPATIENT
Start: 2023-01-31 | End: 2023-02-05 | Stop reason: HOSPADM

## 2023-01-31 RX ORDER — SODIUM CHLORIDE 0.9 % (FLUSH) 0.9 %
10 SYRINGE (ML) INJECTION EVERY 12 HOURS SCHEDULED
Status: DISCONTINUED | OUTPATIENT
Start: 2023-01-31 | End: 2023-02-05 | Stop reason: HOSPADM

## 2023-01-31 RX ADMIN — Medication 10 ML: at 20:51

## 2023-01-31 RX ADMIN — PANTOPRAZOLE SODIUM 40 MG: 40 INJECTION, POWDER, FOR SOLUTION INTRAVENOUS at 06:16

## 2023-01-31 RX ADMIN — HYDROMORPHONE HYDROCHLORIDE 0.5 MG: 1 INJECTION, SOLUTION INTRAMUSCULAR; INTRAVENOUS; SUBCUTANEOUS at 20:51

## 2023-01-31 RX ADMIN — ONDANSETRON 4 MG: 2 INJECTION INTRAMUSCULAR; INTRAVENOUS at 20:50

## 2023-01-31 RX ADMIN — SODIUM CHLORIDE, POTASSIUM CHLORIDE, SODIUM LACTATE AND CALCIUM CHLORIDE 150 ML/HR: 600; 310; 30; 20 INJECTION, SOLUTION INTRAVENOUS at 15:34

## 2023-01-31 RX ADMIN — HYDROMORPHONE HYDROCHLORIDE 0.5 MG: 1 INJECTION, SOLUTION INTRAMUSCULAR; INTRAVENOUS; SUBCUTANEOUS at 11:27

## 2023-01-31 RX ADMIN — SODIUM CHLORIDE, POTASSIUM CHLORIDE, SODIUM LACTATE AND CALCIUM CHLORIDE 500 ML: 600; 310; 30; 20 INJECTION, SOLUTION INTRAVENOUS at 08:00

## 2023-01-31 RX ADMIN — ONDANSETRON 4 MG: 2 INJECTION INTRAMUSCULAR; INTRAVENOUS at 06:16

## 2023-01-31 RX ADMIN — SODIUM CHLORIDE 100 ML/HR: 9 INJECTION, SOLUTION INTRAVENOUS at 11:26

## 2023-01-31 RX ADMIN — SODIUM CHLORIDE, POTASSIUM CHLORIDE, SODIUM LACTATE AND CALCIUM CHLORIDE 1000 ML: 600; 310; 30; 20 INJECTION, SOLUTION INTRAVENOUS at 06:14

## 2023-01-31 RX ADMIN — HYDROMORPHONE HYDROCHLORIDE 0.5 MG: 1 INJECTION, SOLUTION INTRAMUSCULAR; INTRAVENOUS; SUBCUTANEOUS at 06:16

## 2023-01-31 RX ADMIN — HYDROMORPHONE HYDROCHLORIDE 0.5 MG: 1 INJECTION, SOLUTION INTRAMUSCULAR; INTRAVENOUS; SUBCUTANEOUS at 15:40

## 2023-01-31 RX ADMIN — METRONIDAZOLE 500 MG: 500 INJECTION, SOLUTION INTRAVENOUS at 15:35

## 2023-01-31 RX ADMIN — IOPAMIDOL 100 ML: 755 INJECTION, SOLUTION INTRAVENOUS at 06:59

## 2023-01-31 RX ADMIN — METRONIDAZOLE 500 MG: 500 INJECTION, SOLUTION INTRAVENOUS at 07:58

## 2023-01-31 RX ADMIN — ONDANSETRON 4 MG: 2 INJECTION INTRAMUSCULAR; INTRAVENOUS at 11:21

## 2023-01-31 NOTE — OUTREACH NOTE
CT Surgery Week 3 Survey    Flowsheet Row Responses   [facility] [place] facility patient discharged from? [facility]   Does the patient have one of the following disease processes/diagnoses(primary or secondary)? Cardiothoracic surgery   Week 3 attempt successful? No   Unsuccessful attempts Attempt 1   Revoke Readmitted          JOAO NULL - Registered Nurse

## 2023-02-01 ENCOUNTER — TELEPHONE (OUTPATIENT)
Dept: FAMILY MEDICINE CLINIC | Facility: CLINIC | Age: 58
End: 2023-02-01
Payer: MEDICARE

## 2023-02-01 ENCOUNTER — INPATIENT HOSPITAL (AMBULATORY)
Dept: URBAN - METROPOLITAN AREA HOSPITAL 84 | Facility: HOSPITAL | Age: 58
End: 2023-02-01

## 2023-02-01 ENCOUNTER — ANESTHESIA (OUTPATIENT)
Dept: GASTROENTEROLOGY | Facility: HOSPITAL | Age: 58
DRG: 439 | End: 2023-02-01
Payer: MEDICARE

## 2023-02-01 ENCOUNTER — APPOINTMENT (OUTPATIENT)
Dept: GENERAL RADIOLOGY | Facility: HOSPITAL | Age: 58
DRG: 439 | End: 2023-02-01
Payer: MEDICARE

## 2023-02-01 ENCOUNTER — ANESTHESIA EVENT (OUTPATIENT)
Dept: GASTROENTEROLOGY | Facility: HOSPITAL | Age: 58
DRG: 439 | End: 2023-02-01
Payer: MEDICARE

## 2023-02-01 DIAGNOSIS — R74.01 ELEVATION OF LEVELS OF LIVER TRANSAMINASE LEVELS: ICD-10-CM

## 2023-02-01 DIAGNOSIS — K85.10 BILIARY ACUTE PANCREATITIS WITHOUT NECROSIS OR INFECTION: ICD-10-CM

## 2023-02-01 DIAGNOSIS — K86.89 OTHER SPECIFIED DISEASES OF PANCREAS: ICD-10-CM

## 2023-02-01 DIAGNOSIS — R93.2 ABNORMAL FINDINGS ON DIAGNOSTIC IMAGING OF LIVER AND BILIARY: ICD-10-CM

## 2023-02-01 DIAGNOSIS — K80.50 CALCULUS OF BILE DUCT WITHOUT CHOLANGITIS OR CHOLECYSTITIS W: ICD-10-CM

## 2023-02-01 LAB
ALBUMIN SERPL-MCNC: 3.4 G/DL (ref 3.5–5.2)
ALBUMIN/GLOB SERPL: 1.3 G/DL
ALP SERPL-CCNC: 98 U/L (ref 39–117)
ALT SERPL W P-5'-P-CCNC: 12 U/L (ref 1–33)
AMYLASE SERPL-CCNC: 253 U/L (ref 28–100)
ANA SER QL: NEGATIVE
ANION GAP SERPL CALCULATED.3IONS-SCNC: 10 MMOL/L (ref 5–15)
AST SERPL-CCNC: 17 U/L (ref 1–32)
BASOPHILS # BLD AUTO: 0.1 10*3/MM3 (ref 0–0.2)
BASOPHILS NFR BLD AUTO: 0.8 % (ref 0–1.5)
BILIRUB SERPL-MCNC: 0.7 MG/DL (ref 0–1.2)
BUN SERPL-MCNC: 9 MG/DL (ref 6–20)
BUN/CREAT SERPL: 13.2 (ref 7–25)
CALCIUM SPEC-SCNC: 9.1 MG/DL (ref 8.6–10.5)
CHLORIDE SERPL-SCNC: 99 MMOL/L (ref 98–107)
CO2 SERPL-SCNC: 28 MMOL/L (ref 22–29)
CREAT SERPL-MCNC: 0.68 MG/DL (ref 0.57–1)
CRP SERPL-MCNC: 11.4 MG/DL (ref 0–0.5)
DEPRECATED RDW RBC AUTO: 46.8 FL (ref 37–54)
EGFRCR SERPLBLD CKD-EPI 2021: 101.1 ML/MIN/1.73
EOSINOPHIL # BLD AUTO: 0.5 10*3/MM3 (ref 0–0.4)
EOSINOPHIL NFR BLD AUTO: 4.9 % (ref 0.3–6.2)
ERYTHROCYTE [DISTWIDTH] IN BLOOD BY AUTOMATED COUNT: 14.4 % (ref 12.3–15.4)
ERYTHROCYTE [SEDIMENTATION RATE] IN BLOOD: 32 MM/HR (ref 0–30)
GLOBULIN UR ELPH-MCNC: 2.7 GM/DL
GLUCOSE BLDC GLUCOMTR-MCNC: 121 MG/DL (ref 70–105)
GLUCOSE SERPL-MCNC: 127 MG/DL (ref 65–99)
HCT VFR BLD AUTO: 41.4 % (ref 34–46.6)
HGB BLD-MCNC: 14.1 G/DL (ref 12–15.9)
IGG SERPL-MCNC: 751 MG/DL (ref 586–1602)
IGG1 SER-MCNC: 435 MG/DL (ref 248–810)
IGG2 SER-MCNC: 209 MG/DL (ref 130–555)
IGG3 SER-MCNC: 45 MG/DL (ref 15–102)
IGG4 SER-MCNC: 18 MG/DL (ref 2–96)
LIPASE SERPL-CCNC: 257 U/L (ref 13–60)
LYMPHOCYTES # BLD AUTO: 2.6 10*3/MM3 (ref 0.7–3.1)
LYMPHOCYTES NFR BLD AUTO: 25.4 % (ref 19.6–45.3)
MAGNESIUM SERPL-MCNC: 1.8 MG/DL (ref 1.6–2.6)
MCH RBC QN AUTO: 30.3 PG (ref 26.6–33)
MCHC RBC AUTO-ENTMCNC: 34.2 G/DL (ref 31.5–35.7)
MCV RBC AUTO: 88.8 FL (ref 79–97)
MONOCYTES # BLD AUTO: 0.7 10*3/MM3 (ref 0.1–0.9)
MONOCYTES NFR BLD AUTO: 6.4 % (ref 5–12)
NEUTROPHILS NFR BLD AUTO: 6.5 10*3/MM3 (ref 1.7–7)
NEUTROPHILS NFR BLD AUTO: 62.5 % (ref 42.7–76)
NRBC BLD AUTO-RTO: 0.1 /100 WBC (ref 0–0.2)
PHOSPHATE SERPL-MCNC: 3.9 MG/DL (ref 2.5–4.5)
PLATELET # BLD AUTO: 403 10*3/MM3 (ref 140–450)
PMV BLD AUTO: 7.4 FL (ref 6–12)
POTASSIUM SERPL-SCNC: 3.7 MMOL/L (ref 3.5–5.2)
PROCALCITONIN SERPL-MCNC: 0.17 NG/ML (ref 0–0.25)
PROT SERPL-MCNC: 6.1 G/DL (ref 6–8.5)
RBC # BLD AUTO: 4.67 10*6/MM3 (ref 3.77–5.28)
SODIUM SERPL-SCNC: 137 MMOL/L (ref 136–145)
WBC NRBC COR # BLD: 10.3 10*3/MM3 (ref 3.4–10.8)

## 2023-02-01 PROCEDURE — 74328 X-RAY BILE DUCT ENDOSCOPY: CPT

## 2023-02-01 PROCEDURE — 0F798DZ DILATION OF COMMON BILE DUCT WITH INTRALUMINAL DEVICE, VIA NATURAL OR ARTIFICIAL OPENING ENDOSCOPIC: ICD-10-PCS | Performed by: INTERNAL MEDICINE

## 2023-02-01 PROCEDURE — 43274 ERCP DUCT STENT PLACEMENT: CPT | Performed by: INTERNAL MEDICINE

## 2023-02-01 PROCEDURE — 83735 ASSAY OF MAGNESIUM: CPT | Performed by: INTERNAL MEDICINE

## 2023-02-01 PROCEDURE — 83690 ASSAY OF LIPASE: CPT | Performed by: NURSE PRACTITIONER

## 2023-02-01 PROCEDURE — BF101ZZ FLUOROSCOPY OF BILE DUCTS USING LOW OSMOLAR CONTRAST: ICD-10-PCS | Performed by: INTERNAL MEDICINE

## 2023-02-01 PROCEDURE — 25010000002 FENTANYL CITRATE (PF) 100 MCG/2ML SOLUTION: Performed by: NURSE ANESTHETIST, CERTIFIED REGISTERED

## 2023-02-01 PROCEDURE — 36415 COLL VENOUS BLD VENIPUNCTURE: CPT | Performed by: NURSE PRACTITIONER

## 2023-02-01 PROCEDURE — 25010000002 ONDANSETRON PER 1 MG: Performed by: NURSE ANESTHETIST, CERTIFIED REGISTERED

## 2023-02-01 PROCEDURE — 25010000002 HYDROMORPHONE 1 MG/ML SOLUTION: Performed by: NURSE PRACTITIONER

## 2023-02-01 PROCEDURE — 80053 COMPREHEN METABOLIC PANEL: CPT | Performed by: NURSE PRACTITIONER

## 2023-02-01 PROCEDURE — C1769 GUIDE WIRE: HCPCS | Performed by: INTERNAL MEDICINE

## 2023-02-01 PROCEDURE — 82150 ASSAY OF AMYLASE: CPT | Performed by: NURSE PRACTITIONER

## 2023-02-01 PROCEDURE — 25010000002 ONDANSETRON PER 1 MG: Performed by: NURSE PRACTITIONER

## 2023-02-01 PROCEDURE — 25010000002 PIPERACILLIN SOD-TAZOBACTAM PER 1 G: Performed by: NURSE PRACTITIONER

## 2023-02-01 PROCEDURE — 82962 GLUCOSE BLOOD TEST: CPT

## 2023-02-01 PROCEDURE — 86140 C-REACTIVE PROTEIN: CPT | Performed by: INTERNAL MEDICINE

## 2023-02-01 PROCEDURE — 85025 COMPLETE CBC W/AUTO DIFF WBC: CPT | Performed by: NURSE PRACTITIONER

## 2023-02-01 PROCEDURE — 84100 ASSAY OF PHOSPHORUS: CPT | Performed by: INTERNAL MEDICINE

## 2023-02-01 PROCEDURE — 25010000002 IOPAMIDOL 61 % SOLUTION 30 ML VIAL: Performed by: INTERNAL MEDICINE

## 2023-02-01 PROCEDURE — 25010000002 PROPOFOL 10 MG/ML EMULSION: Performed by: NURSE ANESTHETIST, CERTIFIED REGISTERED

## 2023-02-01 PROCEDURE — 85652 RBC SED RATE AUTOMATED: CPT | Performed by: INTERNAL MEDICINE

## 2023-02-01 PROCEDURE — 84145 PROCALCITONIN (PCT): CPT | Performed by: INTERNAL MEDICINE

## 2023-02-01 PROCEDURE — 25010000002 HYDROMORPHONE 1 MG/ML SOLUTION: Performed by: INTERNAL MEDICINE

## 2023-02-01 PROCEDURE — 25010000002 SUCCINYLCHOLINE PER 20 MG: Performed by: NURSE ANESTHETIST, CERTIFIED REGISTERED

## 2023-02-01 PROCEDURE — C2625 STENT, NON-COR, TEM W/DEL SY: HCPCS | Performed by: INTERNAL MEDICINE

## 2023-02-01 DEVICE — BILIARY STENT WITH NAVIFLEXTM RX DELIVERY SYSTEM
Type: IMPLANTABLE DEVICE | Site: BILE DUCT | Status: NON-FUNCTIONAL
Brand: ADVANIX™ BILIARY
Removed: 2023-03-30

## 2023-02-01 RX ORDER — SODIUM CHLORIDE 9 MG/ML
INJECTION, SOLUTION INTRAVENOUS CONTINUOUS PRN
Status: DISCONTINUED | OUTPATIENT
Start: 2023-02-01 | End: 2023-02-01 | Stop reason: SURG

## 2023-02-01 RX ORDER — DROPERIDOL 2.5 MG/ML
1.25 INJECTION, SOLUTION INTRAMUSCULAR; INTRAVENOUS ONCE AS NEEDED
Status: DISCONTINUED | OUTPATIENT
Start: 2023-02-01 | End: 2023-02-01

## 2023-02-01 RX ORDER — LABETALOL HYDROCHLORIDE 5 MG/ML
5 INJECTION, SOLUTION INTRAVENOUS
Status: DISCONTINUED | OUTPATIENT
Start: 2023-02-01 | End: 2023-02-01

## 2023-02-01 RX ORDER — LIDOCAINE HYDROCHLORIDE 20 MG/ML
INJECTION, SOLUTION INFILTRATION; PERINEURAL AS NEEDED
Status: DISCONTINUED | OUTPATIENT
Start: 2023-02-01 | End: 2023-02-01 | Stop reason: SURG

## 2023-02-01 RX ORDER — ACETAMINOPHEN 650 MG/1
650 SUPPOSITORY RECTAL ONCE AS NEEDED
Status: DISCONTINUED | OUTPATIENT
Start: 2023-02-01 | End: 2023-02-01

## 2023-02-01 RX ORDER — INDOMETHACIN 100 MG
SUPPOSITORY, RECTAL RECTAL
Status: DISPENSED
Start: 2023-02-01 | End: 2023-02-02

## 2023-02-01 RX ORDER — DIPHENHYDRAMINE HYDROCHLORIDE 50 MG/ML
12.5 INJECTION INTRAMUSCULAR; INTRAVENOUS
Status: DISCONTINUED | OUTPATIENT
Start: 2023-02-01 | End: 2023-02-01

## 2023-02-01 RX ORDER — FENTANYL CITRATE 50 UG/ML
50 INJECTION, SOLUTION INTRAMUSCULAR; INTRAVENOUS
Status: DISCONTINUED | OUTPATIENT
Start: 2023-02-01 | End: 2023-02-01

## 2023-02-01 RX ORDER — FENTANYL CITRATE 50 UG/ML
25 INJECTION, SOLUTION INTRAMUSCULAR; INTRAVENOUS
Status: DISCONTINUED | OUTPATIENT
Start: 2023-02-01 | End: 2023-02-01

## 2023-02-01 RX ORDER — NALOXONE HCL 0.4 MG/ML
0.4 VIAL (ML) INJECTION AS NEEDED
Status: DISCONTINUED | OUTPATIENT
Start: 2023-02-01 | End: 2023-02-01

## 2023-02-01 RX ORDER — IPRATROPIUM BROMIDE AND ALBUTEROL SULFATE 2.5; .5 MG/3ML; MG/3ML
3 SOLUTION RESPIRATORY (INHALATION) ONCE AS NEEDED
Status: DISCONTINUED | OUTPATIENT
Start: 2023-02-01 | End: 2023-02-01

## 2023-02-01 RX ORDER — ONDANSETRON 2 MG/ML
INJECTION INTRAMUSCULAR; INTRAVENOUS AS NEEDED
Status: DISCONTINUED | OUTPATIENT
Start: 2023-02-01 | End: 2023-02-01 | Stop reason: SURG

## 2023-02-01 RX ORDER — SODIUM CHLORIDE 9 MG/ML
INJECTION, SOLUTION INTRAVENOUS
Status: COMPLETED
Start: 2023-02-01 | End: 2023-02-01

## 2023-02-01 RX ORDER — FENTANYL CITRATE 50 UG/ML
INJECTION, SOLUTION INTRAMUSCULAR; INTRAVENOUS AS NEEDED
Status: DISCONTINUED | OUTPATIENT
Start: 2023-02-01 | End: 2023-02-01 | Stop reason: SURG

## 2023-02-01 RX ORDER — SODIUM CHLORIDE 9 MG/ML
100 INJECTION, SOLUTION INTRAVENOUS CONTINUOUS
Status: DISCONTINUED | OUTPATIENT
Start: 2023-02-01 | End: 2023-02-04

## 2023-02-01 RX ORDER — ROCURONIUM BROMIDE 10 MG/ML
INJECTION, SOLUTION INTRAVENOUS AS NEEDED
Status: DISCONTINUED | OUTPATIENT
Start: 2023-02-01 | End: 2023-02-01 | Stop reason: SURG

## 2023-02-01 RX ORDER — ACETAMINOPHEN 325 MG/1
650 TABLET ORAL ONCE AS NEEDED
Status: DISCONTINUED | OUTPATIENT
Start: 2023-02-01 | End: 2023-02-01

## 2023-02-01 RX ORDER — EPHEDRINE SULFATE 5 MG/ML
5 INJECTION INTRAVENOUS ONCE AS NEEDED
Status: DISCONTINUED | OUTPATIENT
Start: 2023-02-01 | End: 2023-02-01

## 2023-02-01 RX ORDER — HYDRALAZINE HYDROCHLORIDE 20 MG/ML
5 INJECTION INTRAMUSCULAR; INTRAVENOUS
Status: DISCONTINUED | OUTPATIENT
Start: 2023-02-01 | End: 2023-02-01

## 2023-02-01 RX ORDER — PROPOFOL 10 MG/ML
VIAL (ML) INTRAVENOUS AS NEEDED
Status: DISCONTINUED | OUTPATIENT
Start: 2023-02-01 | End: 2023-02-01 | Stop reason: SURG

## 2023-02-01 RX ORDER — ONDANSETRON 2 MG/ML
4 INJECTION INTRAMUSCULAR; INTRAVENOUS ONCE AS NEEDED
Status: DISCONTINUED | OUTPATIENT
Start: 2023-02-01 | End: 2023-02-01

## 2023-02-01 RX ORDER — NALOXONE HCL 0.4 MG/ML
0.4 VIAL (ML) INJECTION
Status: DISCONTINUED | OUTPATIENT
Start: 2023-02-01 | End: 2023-02-05 | Stop reason: HOSPADM

## 2023-02-01 RX ORDER — SUCCINYLCHOLINE CHLORIDE 20 MG/ML
INJECTION INTRAMUSCULAR; INTRAVENOUS AS NEEDED
Status: DISCONTINUED | OUTPATIENT
Start: 2023-02-01 | End: 2023-02-01 | Stop reason: SURG

## 2023-02-01 RX ADMIN — HYDROMORPHONE HYDROCHLORIDE 0.5 MG: 1 INJECTION, SOLUTION INTRAMUSCULAR; INTRAVENOUS; SUBCUTANEOUS at 01:22

## 2023-02-01 RX ADMIN — HYDROMORPHONE HYDROCHLORIDE 0.5 MG: 1 INJECTION, SOLUTION INTRAMUSCULAR; INTRAVENOUS; SUBCUTANEOUS at 05:32

## 2023-02-01 RX ADMIN — FENTANYL CITRATE 50 MCG: 50 INJECTION, SOLUTION INTRAMUSCULAR; INTRAVENOUS at 15:11

## 2023-02-01 RX ADMIN — HYDROMORPHONE HYDROCHLORIDE 1 MG: 1 INJECTION, SOLUTION INTRAMUSCULAR; INTRAVENOUS; SUBCUTANEOUS at 21:34

## 2023-02-01 RX ADMIN — FENTANYL CITRATE 50 MCG: 50 INJECTION, SOLUTION INTRAMUSCULAR; INTRAVENOUS at 15:21

## 2023-02-01 RX ADMIN — Medication 10 ML: at 21:34

## 2023-02-01 RX ADMIN — HYDROMORPHONE HYDROCHLORIDE 0.5 MG: 1 INJECTION, SOLUTION INTRAMUSCULAR; INTRAVENOUS; SUBCUTANEOUS at 09:39

## 2023-02-01 RX ADMIN — ONDANSETRON 4 MG: 2 INJECTION INTRAMUSCULAR; INTRAVENOUS at 15:47

## 2023-02-01 RX ADMIN — ONDANSETRON 4 MG: 2 INJECTION INTRAMUSCULAR; INTRAVENOUS at 05:32

## 2023-02-01 RX ADMIN — HYDROMORPHONE HYDROCHLORIDE 1 MG: 1 INJECTION, SOLUTION INTRAMUSCULAR; INTRAVENOUS; SUBCUTANEOUS at 17:24

## 2023-02-01 RX ADMIN — PIPERACILLIN AND TAZOBACTAM 3.38 G: 3; .375 INJECTION, POWDER, LYOPHILIZED, FOR SOLUTION INTRAVENOUS at 17:21

## 2023-02-01 RX ADMIN — PIPERACILLIN AND TAZOBACTAM 3.38 G: 3; .375 INJECTION, POWDER, LYOPHILIZED, FOR SOLUTION INTRAVENOUS at 09:39

## 2023-02-01 RX ADMIN — ONDANSETRON 4 MG: 2 INJECTION INTRAMUSCULAR; INTRAVENOUS at 01:22

## 2023-02-01 RX ADMIN — Medication 10 ML: at 07:39

## 2023-02-01 RX ADMIN — SUCCINYLCHOLINE CHLORIDE 160 MG: 20 INJECTION, SOLUTION INTRAMUSCULAR; INTRAVENOUS at 15:17

## 2023-02-01 RX ADMIN — SODIUM CHLORIDE, POTASSIUM CHLORIDE, SODIUM LACTATE AND CALCIUM CHLORIDE 150 ML/HR: 600; 310; 30; 20 INJECTION, SOLUTION INTRAVENOUS at 04:21

## 2023-02-01 RX ADMIN — PROPOFOL 150 MG: 10 INJECTION, EMULSION INTRAVENOUS at 15:17

## 2023-02-01 RX ADMIN — PANTOPRAZOLE SODIUM 40 MG: 40 INJECTION, POWDER, LYOPHILIZED, FOR SOLUTION INTRAVENOUS at 05:32

## 2023-02-01 RX ADMIN — ONDANSETRON 4 MG: 2 INJECTION INTRAMUSCULAR; INTRAVENOUS at 09:39

## 2023-02-01 RX ADMIN — LIDOCAINE HYDROCHLORIDE 100 MG: 20 INJECTION, SOLUTION INFILTRATION; PERINEURAL at 15:17

## 2023-02-01 RX ADMIN — SODIUM CHLORIDE: 0.9 INJECTION, SOLUTION INTRAVENOUS at 15:11

## 2023-02-01 RX ADMIN — SODIUM CHLORIDE 5 ML/HR: 9 INJECTION, SOLUTION INTRAVENOUS at 15:11

## 2023-02-01 RX ADMIN — ROCURONIUM BROMIDE 50 MG: 10 INJECTION, SOLUTION INTRAVENOUS at 15:26

## 2023-02-01 RX ADMIN — SODIUM CHLORIDE 100 ML/HR: 9 INJECTION, SOLUTION INTRAVENOUS at 17:21

## 2023-02-01 RX ADMIN — ONDANSETRON 4 MG: 2 INJECTION INTRAMUSCULAR; INTRAVENOUS at 21:34

## 2023-02-01 RX ADMIN — METRONIDAZOLE 500 MG: 500 INJECTION, SOLUTION INTRAVENOUS at 01:22

## 2023-02-01 RX ADMIN — ONDANSETRON 4 MG: 2 INJECTION INTRAMUSCULAR; INTRAVENOUS at 17:24

## 2023-02-01 RX ADMIN — METRONIDAZOLE 500 MG: 500 INJECTION, SOLUTION INTRAVENOUS at 07:38

## 2023-02-01 NOTE — TELEPHONE ENCOUNTER
Pt due/overdue for MWV. Letter being sent to patient in r/t, since she is currently hospitalized.

## 2023-02-01 NOTE — PLAN OF CARE
Goal Outcome Evaluation:  Plan of Care Reviewed With: patient     Problem: Adult Inpatient Plan of Care  Goal: Plan of Care Review  Outcome: Ongoing, Progressing  Flowsheets (Taken 2/1/2023 [id number])  Progress: no change  Plan of Care Reviewed With: patient        Progress: no change

## 2023-02-01 NOTE — PLAN OF CARE
Problem: Adult Inpatient Plan of Care  Goal: Absence of Hospital-Acquired Illness or Injury  Intervention: Identify and Manage Fall Risk  Recent Flowsheet Documentation  Taken 2/1/2023 1829 by Viktoria Peter RN  Safety Promotion/Fall Prevention:   assistive device/personal items within reach   clutter free environment maintained   fall prevention program maintained   lighting adjusted   nonskid shoes/slippers when out of bed   room organization consistent   safety round/check completed  Taken 2/1/2023 1724 by Viktoria Peter RN  Safety Promotion/Fall Prevention:   assistive device/personal items within reach   clutter free environment maintained   fall prevention program maintained   lighting adjusted   nonskid shoes/slippers when out of bed   room organization consistent   safety round/check completed  Taken 2/1/2023 1620 by Viktoria Peter RN  Safety Promotion/Fall Prevention: patient off unit  Taken 2/1/2023 1433 by Viktoria Peter RN  Safety Promotion/Fall Prevention: patient off unit  Taken 2/1/2023 1221 by Viktoria Peter RN  Safety Promotion/Fall Prevention:   assistive device/personal items within reach   clutter free environment maintained   fall prevention program maintained   lighting adjusted   nonskid shoes/slippers when out of bed   room organization consistent   safety round/check completed  Taken 2/1/2023 1039 by Viktoria Peter RN  Safety Promotion/Fall Prevention:   assistive device/personal items within reach   clutter free environment maintained   fall prevention program maintained   lighting adjusted   nonskid shoes/slippers when out of bed   room organization consistent   safety round/check completed  Taken 2/1/2023 0740 by Viktoria Peter RN  Safety Promotion/Fall Prevention:   assistive device/personal items within reach   clutter free environment maintained   fall prevention program maintained   lighting adjusted   nonskid shoes/slippers when out of bed   room organization consistent   safety  round/check completed  Intervention: Prevent Skin Injury  Recent Flowsheet Documentation  Taken 2/1/2023 1829 by Viktoria Peter RN  Body Position: position changed independently  Taken 2/1/2023 1724 by Viktoria Peter RN  Body Position: position changed independently  Taken 2/1/2023 1221 by Viktoria Peter RN  Body Position: position changed independently  Taken 2/1/2023 1039 by Viktoria Peter RN  Body Position: position changed independently  Taken 2/1/2023 0740 by Viktoria Peter RN  Body Position: position changed independently  Intervention: Prevent and Manage VTE (Venous Thromboembolism) Risk  Recent Flowsheet Documentation  Taken 2/1/2023 1829 by Viktoria Peter RN  Activity Management:   activity adjusted per tolerance   activity encouraged  Taken 2/1/2023 1724 by Viktoria Peter RN  Activity Management:   activity adjusted per tolerance   activity encouraged  Taken 2/1/2023 1715 by Viktoria Peter RN  VTE Prevention/Management: sequential compression devices on  Taken 2/1/2023 1221 by Viktoria Peter RN  Activity Management:   activity adjusted per tolerance   activity encouraged  Taken 2/1/2023 1039 by Viktoria Peter RN  Activity Management:   activity adjusted per tolerance   activity encouraged  Taken 2/1/2023 0740 by Viktoria Peter RN  Activity Management:   activity adjusted per tolerance   activity encouraged  Range of Motion: active ROM (range of motion) encouraged  Intervention: Prevent Infection  Recent Flowsheet Documentation  Taken 2/1/2023 1829 by Viktoria Peter RN  Infection Prevention:   hand hygiene promoted   personal protective equipment utilized  Taken 2/1/2023 1724 by Viktoria Peter RN  Infection Prevention:   hand hygiene promoted   personal protective equipment utilized  Taken 2/1/2023 1221 by Viktoria Peter RN  Infection Prevention:   hand hygiene promoted   personal protective equipment utilized  Taken 2/1/2023 1039 by Viktoria Peter RN  Infection Prevention:   hand hygiene promoted   personal protective  equipment utilized  Taken 2/1/2023 0740 by Viktoria Peter RN  Infection Prevention:   hand hygiene promoted   personal protective equipment utilized  Goal: Optimal Comfort and Wellbeing  Intervention: Provide Person-Centered Care  Recent Flowsheet Documentation  Taken 2/1/2023 0740 by Viktoria Peter RN  Trust Relationship/Rapport: care explained     Problem: Skin Injury Risk Increased  Goal: Skin Health and Integrity  Intervention: Optimize Skin Protection  Recent Flowsheet Documentation  Taken 2/1/2023 1829 by Viktoria Peter RN  Head of Bed (HOB) Positioning: HOB elevated  Taken 2/1/2023 1724 by Viktoria Peter RN  Head of Bed (HOB) Positioning: HOB elevated  Taken 2/1/2023 1221 by Viktoria Peter RN  Head of Bed (HOB) Positioning: HOB elevated  Taken 2/1/2023 1039 by Viktoria Peter RN  Head of Bed (HOB) Positioning: HOB elevated  Taken 2/1/2023 0740 by Viktoria Peter RN  Pressure Reduction Techniques: frequent weight shift encouraged  Head of Bed (HOB) Positioning: HOB elevated  Pressure Reduction Devices: pressure-redistributing mattress utilized     Problem: COPD (Chronic Obstructive Pulmonary Disease) Comorbidity  Goal: Maintenance of COPD Symptom Control  Intervention: Maintain COPD-Symptom Control  Recent Flowsheet Documentation  Taken 2/1/2023 1829 by Viktoria Peter RN  Medication Review/Management: medications reviewed  Taken 2/1/2023 1724 by Viktoria Peter RN  Medication Review/Management: medications reviewed  Taken 2/1/2023 1221 by Viktoria Peter RN  Medication Review/Management: medications reviewed  Taken 2/1/2023 1039 by Viktoria Peter RN  Medication Review/Management: medications reviewed  Taken 2/1/2023 0740 by Viktoria Peter RN  Supportive Measures: active listening utilized  Medication Review/Management: medications reviewed     Problem: Pain Chronic (Persistent) (Comorbidity Management)  Goal: Acceptable Pain Control and Functional Ability  Intervention: Manage Persistent Pain  Recent Flowsheet  Documentation  Taken 2/1/2023 1829 by Viktoria Peter RN  Medication Review/Management: medications reviewed  Taken 2/1/2023 1724 by Viktoria Peter RN  Medication Review/Management: medications reviewed  Taken 2/1/2023 1221 by Viktoria Peter RN  Medication Review/Management: medications reviewed  Taken 2/1/2023 1039 by Viktoria Peter RN  Medication Review/Management: medications reviewed  Taken 2/1/2023 0740 by Viktoria Peter RN  Medication Review/Management: medications reviewed  Intervention: Optimize Psychosocial Wellbeing  Recent Flowsheet Documentation  Taken 2/1/2023 1724 by Viktoria Peter RN  Diversional Activities: television  Family/Support System Care:   support provided   self-care encouraged  Taken 2/1/2023 0740 by Viktoria Peter RN  Supportive Measures: active listening utilized  Diversional Activities:   television   LiveData  Family/Support System Care:   support provided   self-care encouraged     Problem: Fall Injury Risk  Goal: Absence of Fall and Fall-Related Injury  Intervention: Identify and Manage Contributors  Recent Flowsheet Documentation  Taken 2/1/2023 1829 by Viktoria Peter RN  Medication Review/Management: medications reviewed  Taken 2/1/2023 1724 by Viktoria Peter RN  Medication Review/Management: medications reviewed  Taken 2/1/2023 1221 by Viktoria Peter RN  Medication Review/Management: medications reviewed  Taken 2/1/2023 1039 by Viktoria Peter RN  Medication Review/Management: medications reviewed  Taken 2/1/2023 0740 by Viktoria Peter RN  Medication Review/Management: medications reviewed  Intervention: Promote Injury-Free Environment  Recent Flowsheet Documentation  Taken 2/1/2023 1829 by Viktoria Peter RN  Safety Promotion/Fall Prevention:   assistive device/personal items within reach   clutter free environment maintained   fall prevention program maintained   lighting adjusted   nonskid shoes/slippers when out of bed   room organization consistent   safety round/check completed  Taken  2/1/2023 1724 by Viktoria Peter RN  Safety Promotion/Fall Prevention:   assistive device/personal items within reach   clutter free environment maintained   fall prevention program maintained   lighting adjusted   nonskid shoes/slippers when out of bed   room organization consistent   safety round/check completed  Taken 2/1/2023 1620 by Viktoria Peter RN  Safety Promotion/Fall Prevention: patient off unit  Taken 2/1/2023 1433 by Viktoria Peter RN  Safety Promotion/Fall Prevention: patient off unit  Taken 2/1/2023 1221 by Viktoria Peter RN  Safety Promotion/Fall Prevention:   assistive device/personal items within reach   clutter free environment maintained   fall prevention program maintained   lighting adjusted   nonskid shoes/slippers when out of bed   room organization consistent   safety round/check completed  Taken 2/1/2023 1039 by Viktoria Peter RN  Safety Promotion/Fall Prevention:   assistive device/personal items within reach   clutter free environment maintained   fall prevention program maintained   lighting adjusted   nonskid shoes/slippers when out of bed   room organization consistent   safety round/check completed  Taken 2/1/2023 0740 by Viktoria Peter RN  Safety Promotion/Fall Prevention:   assistive device/personal items within reach   clutter free environment maintained   fall prevention program maintained   lighting adjusted   nonskid shoes/slippers when out of bed   room organization consistent   safety round/check completed   Goal Outcome Evaluation:            Pt back from ERCP. Pain meds given per MAR. Pt resting at this time. Family at bedside.

## 2023-02-01 NOTE — ANESTHESIA PREPROCEDURE EVALUATION
Anesthesia Evaluation     Patient summary reviewed and Nursing notes reviewed   NPO Solid Status: > 8 hours  NPO Liquid Status: > 2 hours           Airway   Mallampati: III  TM distance: >3 FB  Neck ROM: full  No difficulty expected  Dental    (+) edentulous    Pulmonary    (+) a smoker Current, lung cancer (s/p RU Lobectomy), COPD mild, home oxygen, rhonchi, decreased breath sounds,   Cardiovascular - normal exam  Exercise tolerance: poor (<4 METS)    ECG reviewed    (+) hypertension, dysrhythmias Atrial Fib, hyperlipidemia,  carotid artery disease right carotid      Neuro/Psych  (+) headaches, numbness,    GI/Hepatic/Renal/Endo    (+)   diabetes mellitus type 2 well controlled,     Musculoskeletal     (+) arthralgias, back pain, chronic pain,   Abdominal  - normal exam   Substance History      OB/GYN          Other   arthritis,    history of cancer    ROS/Med Hx Other: choledocholithiasis                   Anesthesia Plan    ASA 3     MAC       Anesthetic plan, risks, benefits, and alternatives have been provided, discussed and informed consent has been obtained with: patient.    Use of blood products discussed with patient .   Plan discussed with CRNA.        CODE STATUS:    Level Of Support Discussed With: Patient  Code Status (Patient has no pulse and is not breathing): CPR (Attempt to Resuscitate)  Medical Interventions (Patient has pulse or is breathing): Full Support

## 2023-02-01 NOTE — PROGRESS NOTES
[facility] Medicine Services Daily Progress Note    Patient Name: Rahel Ramsey  : 1965  MRN: [id number]  Primary Care Physician:  Vickie Gomez APRN  Date of admission: 2023      Subjective      Chief Complaint: Abdominal pain nausea vomiting      Patient Reports still having a lot of abdominal pain and nausea and vomiting.  Received Zofran.  Abnormal MRCP pending ERCP      ROS negative except as above      Objective      Vitals:   Temp:  [97.8 °F (36.6 °C)-98 °F (36.7 °C)] 98 °F (36.7 °C)  Heart Rate:  [] 89  Resp:  [14-23] 14  BP: (125-138)/(67-78) 138/74    Physical Exam  Vitals reviewed.   Constitutional:       Comments: Nauseous and vomiting during my evaluation   HENT:      Head: Normocephalic.      Nose: Nose normal.      Mouth/Throat:      Mouth: Mucous membranes are moist.   Cardiovascular:      Rate and Rhythm: Normal rate and regular rhythm.      Pulses: Normal pulses.      Heart sounds: Normal heart sounds.   Pulmonary:      Effort: Pulmonary effort is normal.      Breath sounds: Normal breath sounds.   Abdominal:      General: Abdomen is flat.      Palpations: Abdomen is soft.      Tenderness: There is abdominal tenderness.   Musculoskeletal:         General: Normal range of motion.      Cervical back: Normal range of motion.   Skin:     General: Skin is warm.   Neurological:      General: No focal deficit present.      Mental Status: She is alert and oriented to person, place, and time.   Psychiatric:         Mood and Affect: Mood normal.         Behavior: Behavior normal.             Result Review    Result Review:  I have personally reviewed the results from the time of this admission to 2023 13:44 EST and agree with these findings:  [x]  Laboratory  []  Microbiology  []  Radiology  []  EKG/Telemetry   []  Cardiology/Vascular   []  Pathology  []  Old records  []  Other:  Most notable findings include: ESR 32         Assessment & Plan           Active Hospital Problems:       Active Hospital Problems     Diagnosis     • **Acute pancreatitis with infected necrosis, unspecified pancreatitis type     • Upper abdominal pain     • Diarrhea     • History of lung cancer     • Primary cancer of right upper lobe of lung (HCC)     • Essential hypertension     • Mixed hyperlipidemia        Plan:   Acute pancreatitis  Abdominal pain  Nausea, vomiting, diarrhea   -CT s/p: acute pancreatitis with small areas of suspected pancreatic necrosis in the pancreatic tail and head.  There is a thin walled fluid collection surrounding the pancreatic body and tail that may represent pseudocyst formation.  Hepatomegaly and hepatic steatosis.  Cholelithiasis without evidence of acute cholecystitis or biliary distention.  Small right-sided pleural effusion.   -WBC 14.7, lipase 493, alk phos 120 otherwise normal liver enzymes  -gastrointestinal panel and cdiff pending  -NPO/gut rest, IVFs, prn analgesics, antiemetics  -GI consulted and plan MRCP/ERC     Right upper lobe lung CA s/p robot-assisted right upper lobectomy by Dr. Kearney 1/9/2023     Hypertension  Hyperlipidemia  -resume home metoprolol, statin when able to take p.o.     Diabetes mellitus type 2  -hold home metformin  -SSI AC/HS     Chronic pain  -pt currently NPO due to pancreatitis  -resume home meds Norco, Robaxin, Lyrica when able to take p.o.        DVT prophylaxis:  Mechanical DVT prophylaxis orders are present.     CODE STATUS:    Level Of Support Discussed With: Patient  Code Status (Patient has no pulse and is not breathing): CPR (Attempt to Resuscitate)  Medical Interventions (Patient has pulse or is breathing): Full Support     Admission Status:  I believe this patient meets inpatient status.     I discussed the patient's findings and my recommendations with patient.     This patient has been examined wearing appropriate Personal Protective Equipment   02/01/23      Electronically signed by Mohit Phillips MD,  02/01/23, 13:44 EST.  [facility] Hospitalist Team

## 2023-02-01 NOTE — CASE MANAGEMENT/SOCIAL WORK
Case Management Readmission Assessment Note    Case Management Readmission Assessment (all recorded)     Readmission Interview     [facility] Name 02/01/23 1026             Readmission Indications    Is this hospitalization related to the prior hospital diagnosis? No      What was the reason you were admitted? Pancreatitis      [facility] Name 02/01/23 1026             Recommendation for rehospitalization    Did you speak with your physician prior to coming to the hospital No      Did you seek care elsewhere prior to coming to the hospital? No      [facility] Name 02/01/23 1026             Follow up appointment    Do you have a PCP? Yes      Did you have an appointment with PCP/specialist after hospitalization within 7 days? Yes      Did you keep your appointment? Yes      [facility] Name 02/01/23 1026             Medications    Did you have newly prescribed medications at discharge? Yes      Did you understand the reasons for your medications at discharge and how to take them? Yes      Did you understand the side effects of your medications? Yes      Are you taking all of you prescribed medications? Yes      [facility] Name 02/01/23 1026             Discharge Instructions    Did you understand your discharge instructions? Yes      Did your family/caregiver hear your instructions? Yes      Were you told to eat a special diet? No      Were you given a number of someone to call if you had questions or concerns? Yes      [facility] Name 02/01/23 1026             Index discharge location/services    Where did you go upon discharge? Home      Do you have supportive family or friends in the home? Yes      [facility] 02/01/23 1026             Discharge Readiness    On a scale of 1-5 (5 being well prepared), how ready were you for discharge 5

## 2023-02-01 NOTE — OP NOTE
ENDOSCOPIC RETROGRADE CHOLANGIOPANCREATOGRAPHY  Procedure Report    Patient Name:  Rahel Ramsey  YOB: 1965    Date of Surgery:  2/1/2023     Indications: Choledocholithiasis by MRCP  Gallstone pancreatitis    Pre-op Diagnosis:   Choledocholithiasis [K80.50]         Post-op Diagnosis:  Significant compression of the mid and distal common bile duct by pancreatic edema making cholangiography and identification of distal common duct stone extremely difficult  Contracted abnormal appearance to the gallbladder  Biliary sphincterotomy and balloon sweep followed by placement of a 10 [place], 9 cm biliary stent      Procedure(s):  ENDOSCOPIC RETROGRADE CHOLANGIOPANCREATOGRAPHY with sphicterotomy and balloon guided sweeping of bile duct up to 9mm and placement of 10Fr x 9cm biliary stent    Staff:  Surgeon(s):  Naman Blackmon MD         Anesthesia: General    Estimated Blood Loss: None  Implants:    Implant Name Type Inv. Item Serial No.  Lot No. LRB No. Used Action   STNT BRANDIE ADVANIX RX CNTR/BEND 10F 9CM - [id number] Stent STNT BRANDIE ADVANIX RX CNTR/BEND 10F 9CM  [employer]ESIS [id number] N/A 1 Implanted       Specimen:          None    Complications:  No immediate    Description of Procedure:  Informed consent was obtained from the patient.  She was placed under general anesthesia, positioned prone with her head turned to the right and a wedge under the right shoulder, and continuously throughout the procedure.  The therapeutic video Olympus duodena scope was inserted into the esophagus and advanced into the second portion of the duodenum.  The ampulla is identified there is a moderate amount of edema in the second portion of the duodenum.  It was somewhat difficult to access the ampulla and a short position and I was able to visualize it fairly adequately in long position.  I eventually got into a good short position for cannulation and we were obtained fairly rapid free  cannulation of the common bile duct utilizing an autotome short wire system.  With deep cannulation of the catheter some bile was aspirated cholangiography was performed.  The intrahepatic biliary tree did not appear dilated.  The common hepatic duct was approximately 5 to 6 mm in diameter.  There is one filling defect in the common hepatic duct that may represent an air bubble or stone the cystic duct feels but the gallbladder is markedly contracted and abnormal.  I could not visualize the distal common duct.  Where the common hepatic duct enters the pancreatic head there is such compression of the common bile duct distally that no contrast is visible despite approximately 20 cc.  Given the choledocholithiasis seen on MRCP by radiology, the papillotome was then pulled down into the ampulla and an 8 to 9 mm sphincterotomy was cut in the 1 to 12 o'clock position with hemostasis and flow of contrast and bile.  The papillotome was removed and a 9-12 mm biliary extraction balloon was passed into the biliary tree and pulled down to the common hepatic duct at 12 mm, deflated 9 mm and pulled through the intrapancreatic portion of the biliary tree.  It came out of the sphincterotomy site without much resistance.  Several additional sweeps were made and I did not definitively identify a stone.  Because of the pancreatic edema compressing the common duct I then placed a 10 Citizen of [place]-9 cm biliary stent releasing into good endoscopic and fluoroscopic position.  The scope at this juncture was removed.  The pancreatic duct was purposefully not studied or entered during the procedure.  She tolerated the procedure well returned to recovery good condition.    Impression:  Choledocholithiasis by MRCP status post biliary sphincterotomy, balloon sweep of the common duct, and biliary stenting as described    Recommendations:  Continue n.p.o.  Call for any postop abdominal pain fever vomiting or melena  Sequential compression devices and  hold all anticoagulants including Lovenox  Monitor liver profile lipase  Continue IV hydration and supportive care.      Naman Blackmon MD     Date: 2/1/2023  Time: 15:55 EST

## 2023-02-01 NOTE — ANESTHESIA PROCEDURE NOTES
Airway  Urgency: elective    Date/Time: 2/1/2023 3:18 PM  Airway not difficult    General Information and Staff    Patient location during procedure: OR  Anesthesiologist: Enio Dallas MD  CRNA/CAA: Neeru Hooks CRNA    Indications and Patient Condition  Indications for airway management: airway protection    Preoxygenated: yes  MILS maintained throughout  Mask difficulty assessment: 0 - not attempted    Final Airway Details  Final airway type: endotracheal airway      Successful airway: ETT  Cuffed: yes   Successful intubation technique: direct laryngoscopy  Facilitating devices/methods: intubating stylet  Endotracheal tube insertion site: oral  Blade: Maurisio  Blade size: 3  ETT size (mm): 7.0  Cormack-Lehane Classification: grade I - full view of glottis  Placement verified by: chest auscultation and capnometry   Measured from: lips  ETT/EBT  to lips (cm): 20  Number of attempts at approach: 1  Assessment: lips, teeth, and gum same as pre-op and atraumatic intubation

## 2023-02-01 NOTE — ANESTHESIA POSTPROCEDURE EVALUATION
Patient: Rahel Ramsey    Procedure Summary     Date: 02/01/23 Room / Location: [facility] ENDOSCOPY 2 / [facility] ENDOSCOPY    Anesthesia Start: 1511 Anesthesia Stop: 1558    Procedure: ENDOSCOPIC RETROGRADE CHOLANGIOPANCREATOGRAPHY with sphicterotomy and balloon guided sweeping of bile duct up to 9mm and placement of 10Fr x 9cm biliary stent Diagnosis:       Choledocholithiasis      (Choledocholithiasis [K80.50])    Surgeons: Naman Blackmon MD Provider: Enio Dallas MD    Anesthesia Type: MAC ASA Status: 3          Anesthesia Type: MAC    Vitals  Vitals Value Taken Time   /70 02/01/23 1648   Temp     Pulse 96 02/01/23 1652   Resp 20 02/01/23 1620   SpO2 95 % 02/01/23 1652   Vitals shown include unvalidated device data.        Post Anesthesia Care and Evaluation    Patient location during evaluation: PACU  Patient participation: complete - patient participated  Level of consciousness: awake  Pain scale: See nurse's notes for pain score.  Pain management: adequate    Airway patency: patent  Anesthetic complications: No anesthetic complications  PONV Status: none  Cardiovascular status: acceptable  Respiratory status: acceptable  Hydration status: acceptable    Comments: Patient seen and examined postoperatively; vital signs stable; SpO2 greater than or equal to 90%; cardiopulmonary status stable; nausea/vomiting adequately controlled; pain adequately controlled; no apparent anesthesia complications; patient discharged from anesthesia care when discharge criteria were met

## 2023-02-02 ENCOUNTER — INPATIENT HOSPITAL (AMBULATORY)
Dept: URBAN - METROPOLITAN AREA HOSPITAL 84 | Facility: HOSPITAL | Age: 58
End: 2023-02-02

## 2023-02-02 DIAGNOSIS — K80.50 CALCULUS OF BILE DUCT WITHOUT CHOLANGITIS OR CHOLECYSTITIS W: ICD-10-CM

## 2023-02-02 DIAGNOSIS — K86.89 OTHER SPECIFIED DISEASES OF PANCREAS: ICD-10-CM

## 2023-02-02 DIAGNOSIS — R19.7 DIARRHEA, UNSPECIFIED: ICD-10-CM

## 2023-02-02 DIAGNOSIS — R74.01 ELEVATION OF LEVELS OF LIVER TRANSAMINASE LEVELS: ICD-10-CM

## 2023-02-02 DIAGNOSIS — R11.2 NAUSEA WITH VOMITING, UNSPECIFIED: ICD-10-CM

## 2023-02-02 DIAGNOSIS — K85.90 ACUTE PANCREATITIS WITHOUT NECROSIS OR INFECTION, UNSPECIFIE: ICD-10-CM

## 2023-02-02 LAB
ALBUMIN SERPL-MCNC: 3.1 G/DL (ref 3.5–5.2)
ALBUMIN/GLOB SERPL: 1.1 G/DL
ALP SERPL-CCNC: 101 U/L (ref 39–117)
ALT SERPL W P-5'-P-CCNC: 9 U/L (ref 1–33)
ANION GAP SERPL CALCULATED.3IONS-SCNC: 10 MMOL/L (ref 5–15)
AST SERPL-CCNC: 17 U/L (ref 1–32)
BASOPHILS # BLD AUTO: 0.1 10*3/MM3 (ref 0–0.2)
BASOPHILS NFR BLD AUTO: 0.9 % (ref 0–1.5)
BILIRUB SERPL-MCNC: 0.7 MG/DL (ref 0–1.2)
BUN SERPL-MCNC: 8 MG/DL (ref 6–20)
BUN/CREAT SERPL: 11.6 (ref 7–25)
CALCIUM SPEC-SCNC: 8.6 MG/DL (ref 8.6–10.5)
CHLORIDE SERPL-SCNC: 103 MMOL/L (ref 98–107)
CO2 SERPL-SCNC: 26 MMOL/L (ref 22–29)
CREAT SERPL-MCNC: 0.69 MG/DL (ref 0.57–1)
CRP SERPL-MCNC: 12.71 MG/DL (ref 0–0.5)
DEPRECATED RDW RBC AUTO: 44.6 FL (ref 37–54)
EGFRCR SERPLBLD CKD-EPI 2021: 100.7 ML/MIN/1.73
EOSINOPHIL # BLD AUTO: 0.5 10*3/MM3 (ref 0–0.4)
EOSINOPHIL NFR BLD AUTO: 5.1 % (ref 0.3–6.2)
ERYTHROCYTE [DISTWIDTH] IN BLOOD BY AUTOMATED COUNT: 14.1 % (ref 12.3–15.4)
ERYTHROCYTE [SEDIMENTATION RATE] IN BLOOD: 28 MM/HR (ref 0–30)
GLOBULIN UR ELPH-MCNC: 2.7 GM/DL
GLUCOSE SERPL-MCNC: 152 MG/DL (ref 65–99)
HCT VFR BLD AUTO: 38.1 % (ref 34–46.6)
HGB BLD-MCNC: 12.9 G/DL (ref 12–15.9)
LIPASE SERPL-CCNC: 205 U/L (ref 13–60)
LYMPHOCYTES # BLD AUTO: 1.8 10*3/MM3 (ref 0.7–3.1)
LYMPHOCYTES NFR BLD AUTO: 19.4 % (ref 19.6–45.3)
MAGNESIUM SERPL-MCNC: 2 MG/DL (ref 1.6–2.6)
MCH RBC QN AUTO: 30.9 PG (ref 26.6–33)
MCHC RBC AUTO-ENTMCNC: 33.7 G/DL (ref 31.5–35.7)
MCV RBC AUTO: 91.6 FL (ref 79–97)
MONOCYTES # BLD AUTO: 0.6 10*3/MM3 (ref 0.1–0.9)
MONOCYTES NFR BLD AUTO: 6.9 % (ref 5–12)
NEUTROPHILS NFR BLD AUTO: 6.3 10*3/MM3 (ref 1.7–7)
NEUTROPHILS NFR BLD AUTO: 67.7 % (ref 42.7–76)
NRBC BLD AUTO-RTO: 0 /100 WBC (ref 0–0.2)
PHOSPHATE SERPL-MCNC: 4.4 MG/DL (ref 2.5–4.5)
PLATELET # BLD AUTO: 347 10*3/MM3 (ref 140–450)
PMV BLD AUTO: 7.1 FL (ref 6–12)
POTASSIUM SERPL-SCNC: 3.7 MMOL/L (ref 3.5–5.2)
PROT SERPL-MCNC: 5.8 G/DL (ref 6–8.5)
RBC # BLD AUTO: 4.16 10*6/MM3 (ref 3.77–5.28)
SODIUM SERPL-SCNC: 139 MMOL/L (ref 136–145)
WBC NRBC COR # BLD: 9.3 10*3/MM3 (ref 3.4–10.8)

## 2023-02-02 PROCEDURE — 99232 SBSQ HOSP IP/OBS MODERATE 35: CPT | Performed by: NURSE PRACTITIONER

## 2023-02-02 PROCEDURE — 83735 ASSAY OF MAGNESIUM: CPT | Performed by: INTERNAL MEDICINE

## 2023-02-02 PROCEDURE — 84100 ASSAY OF PHOSPHORUS: CPT | Performed by: INTERNAL MEDICINE

## 2023-02-02 PROCEDURE — 25010000002 HYDROMORPHONE 1 MG/ML SOLUTION: Performed by: INTERNAL MEDICINE

## 2023-02-02 PROCEDURE — 99223 1ST HOSP IP/OBS HIGH 75: CPT | Performed by: SURGERY

## 2023-02-02 PROCEDURE — 25010000002 HYDROMORPHONE 1 MG/ML SOLUTION: Performed by: HOSPITALIST

## 2023-02-02 PROCEDURE — 25010000002 ONDANSETRON PER 1 MG: Performed by: NURSE PRACTITIONER

## 2023-02-02 PROCEDURE — 80053 COMPREHEN METABOLIC PANEL: CPT | Performed by: INTERNAL MEDICINE

## 2023-02-02 PROCEDURE — 85025 COMPLETE CBC W/AUTO DIFF WBC: CPT | Performed by: INTERNAL MEDICINE

## 2023-02-02 PROCEDURE — 85652 RBC SED RATE AUTOMATED: CPT | Performed by: INTERNAL MEDICINE

## 2023-02-02 PROCEDURE — 25010000002 PIPERACILLIN SOD-TAZOBACTAM PER 1 G: Performed by: NURSE PRACTITIONER

## 2023-02-02 PROCEDURE — 86140 C-REACTIVE PROTEIN: CPT | Performed by: INTERNAL MEDICINE

## 2023-02-02 PROCEDURE — 83690 ASSAY OF LIPASE: CPT | Performed by: INTERNAL MEDICINE

## 2023-02-02 RX ADMIN — Medication 10 ML: at 20:14

## 2023-02-02 RX ADMIN — SODIUM CHLORIDE, POTASSIUM CHLORIDE, SODIUM LACTATE AND CALCIUM CHLORIDE 150 ML/HR: 600; 310; 30; 20 INJECTION, SOLUTION INTRAVENOUS at 05:54

## 2023-02-02 RX ADMIN — ONDANSETRON 4 MG: 2 INJECTION INTRAMUSCULAR; INTRAVENOUS at 16:54

## 2023-02-02 RX ADMIN — Medication 10 ML: at 08:21

## 2023-02-02 RX ADMIN — PIPERACILLIN AND TAZOBACTAM 3.38 G: 3; .375 INJECTION, POWDER, LYOPHILIZED, FOR SOLUTION INTRAVENOUS at 00:06

## 2023-02-02 RX ADMIN — ONDANSETRON 4 MG: 2 INJECTION INTRAMUSCULAR; INTRAVENOUS at 21:03

## 2023-02-02 RX ADMIN — ONDANSETRON 4 MG: 2 INJECTION INTRAMUSCULAR; INTRAVENOUS at 03:36

## 2023-02-02 RX ADMIN — HYDROMORPHONE HYDROCHLORIDE 1 MG: 1 INJECTION, SOLUTION INTRAMUSCULAR; INTRAVENOUS; SUBCUTANEOUS at 21:03

## 2023-02-02 RX ADMIN — HYDROMORPHONE HYDROCHLORIDE 1 MG: 1 INJECTION, SOLUTION INTRAMUSCULAR; INTRAVENOUS; SUBCUTANEOUS at 16:54

## 2023-02-02 RX ADMIN — PIPERACILLIN AND TAZOBACTAM 3.38 G: 3; .375 INJECTION, POWDER, LYOPHILIZED, FOR SOLUTION INTRAVENOUS at 16:55

## 2023-02-02 RX ADMIN — PANTOPRAZOLE SODIUM 40 MG: 40 INJECTION, POWDER, LYOPHILIZED, FOR SOLUTION INTRAVENOUS at 05:25

## 2023-02-02 RX ADMIN — PIPERACILLIN AND TAZOBACTAM 3.38 G: 3; .375 INJECTION, POWDER, LYOPHILIZED, FOR SOLUTION INTRAVENOUS at 23:19

## 2023-02-02 RX ADMIN — ONDANSETRON 4 MG: 2 INJECTION INTRAMUSCULAR; INTRAVENOUS at 10:50

## 2023-02-02 RX ADMIN — HYDROMORPHONE HYDROCHLORIDE 1 MG: 1 INJECTION, SOLUTION INTRAMUSCULAR; INTRAVENOUS; SUBCUTANEOUS at 10:50

## 2023-02-02 RX ADMIN — PIPERACILLIN AND TAZOBACTAM 3.38 G: 3; .375 INJECTION, POWDER, LYOPHILIZED, FOR SOLUTION INTRAVENOUS at 08:17

## 2023-02-02 RX ADMIN — HYDROMORPHONE HYDROCHLORIDE 1 MG: 1 INJECTION, SOLUTION INTRAMUSCULAR; INTRAVENOUS; SUBCUTANEOUS at 03:36

## 2023-02-02 NOTE — PROGRESS NOTES
LOS: 2 days   Patient Care Team:  Vickie Gomez APRN as PCP - General (Nurse Practitioner)  Cheri Prabhakar MD as Consulting Physician (Hematology and Oncology)  Veronique Brothers, TIMBO as Nurse Navigator  Erwin Kearney MD as Surgeon (Thoracic Surgery)      Subjective     Subjective: Patient is feeling some better today with less abdominal pain.  No vomiting.  Has remained n.p.o. and starting to feel hungry.      ROS:   Review of Systems   Constitutional: Negative for chills and fever.   Respiratory: Negative for cough and shortness of breath.    Cardiovascular: Negative for chest pain and palpitations.   Gastrointestinal: Positive for abdominal pain and nausea. Negative for blood in stool and vomiting.   Musculoskeletal: Positive for arthralgias. Negative for back pain.   Neurological: Positive for weakness. Negative for dizziness.   Psychiatric/Behavioral: Negative for agitation and confusion.        Medication Review:   Scheduled Meds:pantoprazole, 40 mg, Intravenous, Q AM  piperacillin-tazobactam, 3.375 g, Intravenous, Q8H  sodium chloride, 10 mL, Intravenous, Q12H      Continuous Infusions:lactated ringers, 150 mL/hr, Last Rate: 150 mL/hr (02/02/23 0554)  Pharmacy to Dose Zosyn,   sodium chloride, 100 mL/hr, Last Rate: 100 mL/hr (02/01/23 5681)      PRN Meds:.•  acetaminophen **OR** acetaminophen **OR** acetaminophen  •  aluminum-magnesium hydroxide-simethicone  •  HYDROmorphone  •  melatonin  •  naloxone  •  ondansetron  •  Pharmacy to Dose Zosyn  •  sodium chloride  •  sodium chloride      Objective     Vital Signs  Temp:  [97.3 °F (36.3 °C)-97.9 °F (36.6 °C)] 97.9 °F (36.6 °C)  Heart Rate:  [80-96] 80  Resp:  [13-22] 13  BP: (130-164)/(65-79) 132/65    Physical Exam:    General Appearance:    Awake and alert, in no acute distress   Head:    Normocephalic, without obvious abnormality   Eyes:          Conjunctivae normal, anicteric sclera   Ears:    Hearing intact   Throat:   No oral lesions, no thrush,  oral mucosa moist   Neck:   No adenopathy, supple, no JVD   Lungs:     Respirations regular, even and unlabored       Abdomen:     Soft, tender across upper abdomen, no rebound or guarding, non-distended, no hepatosplenomegaly   Rectal:     Deferred   Extremities:   No edema, no cyanosis, no redness   Skin:   No bleeding, bruising or rash, no jaundice   Neurologic:   Sensation intact        Results Review:    CBC  Results from last 7 days   Lab Units 02/02/23  0522 02/01/23  0507 01/31/23  0607   RBC 10*6/mm3 4.16 4.67 5.58*   WBC 10*3/mm3 9.30 10.30 14.70*   HEMOGLOBIN g/dL 12.9 14.1 16.7*   PLATELETS 10*3/mm3 347 403 575*       CMP  Results from last 7 days   Lab Units 02/02/23  0522 02/01/23  1656 01/31/23  0607   SODIUM mmol/L 139 137 136   POTASSIUM mmol/L 3.7 3.7 4.0   CHLORIDE mmol/L 103 99 95*   CO2 mmol/L 26.0 28.0 28.0   BUN mg/dL 8 9 14   CREATININE mg/dL 0.69 0.68 0.71   GLUCOSE mg/dL 152* 127* 163*   ALBUMIN g/dL 3.1* 3.4* 4.3   BILIRUBIN mg/dL 0.7 0.7 0.7   ALK PHOS U/L 101 98 120*   AST (SGOT) U/L 17 17 18   ALT (SGPT) U/L 9 12 17       Amylase and Lipase  Results from last 7 days   Lab Units 02/02/23  0522 02/01/23  1656 01/31/23  0607   AMYLASE U/L  --  253*  --    LIPASE U/L 205* 257* 493*       CRP     Results from last 7 days   Lab Units 02/02/23  0522 02/01/23  1656 01/31/23  0607   CRP mg/dL 12.71* 11.40* 5.04*       Imaging Results (Last 24 Hours)     Procedure Component Value Units Date/Time    FL ercp biliary duct only [[id number]] Collected: 02/01/23 1629     Updated: 02/01/23 1632    Narrative:      FL ERCP BILIARY DUCT ONLY    Date of Exam: 2/1/2023 3:25 PM EST    Indication: ENDOSCOPIC RETROGRADE CHOLANGIOPANCREATOGRAPHY with sphicterotomy and balloon guided sweeping of bile duct up to 9mm and placement of 10Fr x 9cm biliary stent.    Comparison: None available.    Technique:  A series of radiographic digital spot films were obtained in conjunction with a endoscopic catheterization of the  biliary ductal system, performed by the gastroenterologist.    Fluoroscopic Time: 3.5 minutes    Number of Images: 26 spot fluoroscopic series images were obtained.    Findings:  Fluoroscopy was utilized for ERCP guidance. Common bile duct was cannulated and injected with contrast. CBD stent was placed.      Impression:      Impression:  ERCP with fluoroscopy. Please refer to the procedure report for findings and recommendations.    Electronically Signed: Alisha Santos    2/1/2023 4:30 PM EST    Workstation ID: [id number]            Assessment & Plan     Acute pancreatitis with suspected small areas of necrosis and possible pseudocyst  Cholelithiasis  Leukocytosis  Elevated alkaline phosphatase and recently elevated transaminases  Nausea/vomiting/diarrhea  Adenocarcinoma right lung status post right upper lobe lobectomy and lymph node dissection 1/9/2023  COPD  Paroxysmal A. Fib  Chronic pain        Plan:  Status post ERCP 2/1/2023 by Dr. Blackmon with biliary sphincterotomy performed as well as balloon sweep of common bile duct and CBD stent placed.  She is feeling some better today with improvement in her abdominal pain.  Liver enzymes are normal.  Lipase down to 205.  CRP 12.71.  Recommend continuing on IV fluids as well as antiemetics/analgesics as needed.  She also continues on PPI.  Suspect she had gallstone pancreatitis.  CHRISTOPHE and IgG subclasses normal.  Advance to clear liquid diet but she understands to stop drinking if pain increases.       Amina Ferguson, APRKIARA  02/02/23  10:05 EST

## 2023-02-02 NOTE — PROGRESS NOTES
[facility] Medicine Services Daily Progress Note    Patient Name: Rahel Ramsey  : 1965  MRN: [id number]  Primary Care Physician:  Vickie Gomez APRN  Date of admission: 2023      Subjective      Chief Complaint: Abdominal pain nausea vomiting        Patient Reports still having nausea but no vomiting.  Received Zofran.    Status post ERCP.  Feeling a lot better however overall.  General surgery consulted for lap ralf evaluation post gallstone pancreatitis.        ROS negative except as above    Objective      Vitals:   Temp:  [97.4 °F (36.3 °C)-98 °F (36.7 °C)] 98 °F (36.7 °C)  Heart Rate:  [80-96] 86  Resp:  [13-22] 15  BP: (132-164)/(65-79) 141/73  Flow (L/min):  [3] 3    Physical Exam  Vitals reviewed.   Constitutional:       Comments: Nauseous during my evaluation   HENT:      Head: Normocephalic.      Nose: Nose normal.      Mouth/Throat:      Mouth: Mucous membranes are moist.   Cardiovascular:      Rate and Rhythm: Normal rate and regular rhythm.      Pulses: Normal pulses.      Heart sounds: Normal heart sounds.   Pulmonary:      Effort: Pulmonary effort is normal.      Breath sounds: Normal breath sounds.   Abdominal:      General: Abdomen is flat.      Palpations: Abdomen is soft.      Tenderness: There is abdominal tenderness.   Musculoskeletal:         General: Normal range of motion.      Cervical back: Normal range of motion.   Skin:     General: Skin is warm.   Neurological:      General: No focal deficit present.      Mental Status: She is alert and oriented to person, place, and time.   Psychiatric:         Mood and Affect: Mood normal.         Behavior: Behavior normal.        Result Review    Result Review:  I have personally reviewed the results from the time of this admission to 2023 15:54 EST and agree with these findings:  [x]  Laboratory  []  Microbiology  []  Radiology  []  EKG/Telemetry   []  Cardiology/Vascular   []  Pathology  []  Old  records  []  Other:  Most notable findings include: CRP 12.7       Assessment & Plan          Active Hospital Problems:          Active Hospital Problems     Diagnosis     • **Acute pancreatitis with infected necrosis, unspecified pancreatitis type     • Upper abdominal pain     • Diarrhea     • History of lung cancer     • Primary cancer of right upper lobe of lung (HCC)     • Essential hypertension     • Mixed hyperlipidemia        Plan:   Acute pancreatitis  Abdominal pain  Nausea, vomiting, diarrhea   -CT s/p: acute pancreatitis with small areas of suspected pancreatic necrosis in the pancreatic tail and head.  There is a thin walled fluid collection surrounding the pancreatic body and tail that may represent pseudocyst formation.  Hepatomegaly and hepatic steatosis.  Cholelithiasis without evidence of acute cholecystitis or biliary distention.  Small right-sided pleural effusion.   -WBC 14.7, lipase 493, alk phos 120 otherwise normal liver enzymes  -gastrointestinal panel and cdiff pending  -NPO/gut rest, IVFs, prn analgesics, antiemetics  -GI consulted and plan MRCP/ERCP tolerated ERCP well  -General surgery consulted for gall stone pancreatitis possible lap ralf     Right upper lobe lung CA s/p robot-assisted right upper lobectomy by Dr. Kearney 1/9/2023     Hypertension  Hyperlipidemia  -resume home metoprolol, statin when able to take p.o.     Diabetes mellitus type 2  -hold home metformin  -SSI AC/HS     Chronic pain  -pt currently NPO due to pancreatitis  -resume home meds Norco, Robaxin, Lyrica when able to take p.o.        DVT prophylaxis:  Mechanical DVT prophylaxis orders are present.     CODE STATUS:    Level Of Support Discussed With: Patient  Code Status (Patient has no pulse and is not breathing): CPR (Attempt to Resuscitate)  Medical Interventions (Patient has pulse or is breathing): Full Support     Admission Status:  I believe this patient meets inpatient status.     I discussed the patient's  findings and my recommendations with patient.     This patient has been examined wearing appropriate Personal Protective Equipment     02/02/23      Electronically signed by Mohit Phillips MD, 02/02/23, 15:54 EST.  [facility] Hospitalist Team

## 2023-02-02 NOTE — PLAN OF CARE
Goal Outcome Evaluation:  Plan of Care Reviewed With: patient        Progress: no change  Outcome Evaluation: Slept at intervals. PRN IV meds given for c/o abd pain and nausea. NPO. IV fluids infusing. Will continue to monitor.

## 2023-02-02 NOTE — CONSULTS
Inpatient General Surgery Consult  Consult performed by: Mich Bansal MD  Consult ordered by: Mohit Phillips MD  Reason for consult: Cholecystectomy          General Surgery Consult Note      Name: Rahel Ramsey ADMIT: 2023   : 1965  PCP: Vickie Gomez APRN    MRN: [id number] LOS: 2 days   AGE/SEX: 57 y.o. female  ROOM: [address] [place] [address]      Patient Care Team:  Vickie Gomez APRN as PCP - General (Nurse Practitioner)  Cheri Prabhakar MD as Consulting Physician (Hematology and Oncology)  Veronique Brothers, TIMBO as Nurse Navigator  Erwin Kearney MD as Surgeon (Thoracic Surgery)  Chief Complaint   Patient presents with   • Abdominal Pain       Subjective   57-year-old lady who I been asked to see for consideration for cholecystectomy.  She presented to hospital with several days of sharp abdominal pain bandlike across the upper abdomen associated with nausea vomiting and diarrhea.  Upon arrival she was found to have evidence of pancreatitis.  There was concern for common bile duct stone so she underwent an ERCP and stent placement.  She does have cholelithiasis on her imaging so I was asked to see her for consideration for cholecystectomy.    She is a couple weeks out from robotic assisted right upper lobectomy with mediastinal lymph node dissection and partial decortication for lung cancer    Past Medical History:   Diagnosis Date   • Allergic    • Arthritis    • Atrial fibrillation (HCC)    • COPD (chronic obstructive pulmonary disease) (HCC)    • Diabetes mellitus (HCC)    • Headache    • History of herniated intervertebral disc    • History of skin cancer     Left lower extremity   • Hyperlipidemia    • Hypertension    • Injury of back    • Leukocytosis    • Lung nodule    • Urinary tract infection with hematuria 2021     Past Surgical History:   Procedure Laterality Date   • BACK SURGERY     • CARDIAC CATHETERIZATION     • DILATATION AND CURETTAGE     • LOBECTOMY Right 2023     Procedure: THORASCOPIC RIGHT UPPER LOBECTOMY WITH DAVINCI ROBOT, MEDIASTINAL LYMPH NODE DISSECTION;  Surgeon: Erwin Kearney MD;  Location: [facility] OR;  Service: Robotics - DaVinci;  Laterality: Right;   • LUMBAR DECOMPRESSION      L4-L5    • LUNG BIOPSY Right 2022   • SKIN CANCER EXCISION Left     Cao   • SUBTOTAL HYSTERECTOMY       Family History   Problem Relation Age of Onset   • Breast cancer Mother    • Lung cancer Father    • Lung cancer Other    • Colon cancer Other    • Skin cancer Other      Social History     Tobacco Use   • Smoking status: Former     Packs/day: 1.00     Years: 40.00     Pack years: 40.00     Types: Cigarettes     Quit date: 2023     Years since quittin.0   • Smokeless tobacco: Never   Vaping Use   • Vaping Use: Never used   Substance Use Topics   • Alcohol use: Yes     Comment: rare   • Drug use: No     Medications Prior to Admission   Medication Sig Dispense Refill Last Dose   • albuterol sulfate  (90 Base) MCG/ACT inhaler Inhale 2 puffs Every 4 (Four) Hours As Needed for Wheezing or Shortness of Air. 18 g 5    • amitriptyline (ELAVIL) 25 MG tablet Take 25 mg by mouth every night at bedtime. May take 1 to 2 tablets at bedtime if needed.  3    • aspirin (aspirin) 81 MG EC tablet Take 81 mg by mouth Daily.      • fenofibrate (TRICOR) 145 MG tablet Take 1 tablet by mouth Daily. 90 tablet 1    • fluticasone (FLONASE) 50 MCG/ACT nasal spray SHAKE LIQUID AND USE 2 SPRAYS IN EACH NOSTRIL EVERY DAY 48 g 3    • Fluticasone-Umeclidin-Vilant (TRELEGY) 100-62.5-25 MCG/ACT inhaler Inhale 1 puff Daily.      • HYDROcodone-acetaminophen (NORCO)  MG per tablet Take 1 tablet by mouth Every 6 (Six) Hours As Needed for Moderate Pain.      • metFORMIN (GLUCOPHAGE) 500 MG tablet Take 1 tablet by mouth Daily With Breakfast. 90 tablet 1    • methocarbamol (ROBAXIN) 500 MG tablet Take 1 tablet by mouth Every 8 (Eight) Hours for 30 days. 90 tablet 0    • metoprolol succinate XL  (TOPROL-XL) 25 MG 24 hr tablet Take 1 tablet by mouth Daily. 90 tablet 1    • nystatin (MYCOSTATIN) 526927 UNIT/GM cream Apply 1 application topically to the appropriate area as directed As Needed.      • pregabalin (Lyrica) 100 MG capsule Take 1 capsule by mouth 2 (Two) Times a Day. 90 capsule 1    • vitamin D (ERGOCALCIFEROL) 1.25 MG (19483 UT) capsule capsule TAKE 1 CAPSULE BY MOUTH EVERY 7 DAYS 12 capsule 1      pantoprazole, 40 mg, Intravenous, Q AM  piperacillin-tazobactam, 3.375 g, Intravenous, Q8H  sodium chloride, 10 mL, Intravenous, Q12H      lactated ringers, 150 mL/hr, Last Rate: 150 mL/hr (02/02/23 0554)  Pharmacy to Dose Zosyn,   sodium chloride, 100 mL/hr, Last Rate: 100 mL/hr (02/01/23 1721)      •  acetaminophen **OR** acetaminophen **OR** acetaminophen  •  aluminum-magnesium hydroxide-simethicone  •  melatonin  •  naloxone  •  ondansetron  •  Pharmacy to Dose Zosyn  •  sodium chloride  •  sodium chloride  Morphine, Percocet [oxycodone-acetaminophen], and Tramadol    Review of Systems   Constitutional: Negative for chills and fever.   Respiratory: Positive for shortness of breath.    Cardiovascular: Positive for chest pain.   Gastrointestinal: Positive for abdominal pain, diarrhea, nausea and vomiting.   Neurological: Positive for weakness.   Hematological: Does not bruise/bleed easily.        Objective     Vital Signs and Labs:  Vital Signs Patient Vitals for the past 24 hrs:   BP Temp Temp src Pulse Resp SpO2   02/02/23 1400 141/73 98 °F (36.7 °C) Oral 86 15 95 %   02/02/23 0426 132/65 97.9 °F (36.6 °C) Oral 80 13 92 %   02/01/23 1919 158/79 97.4 °F (36.3 °C) Oral 85 19 95 %       Physical Exam:  Physical Exam  Constitutional:       General: She is not in acute distress.     Appearance: She is ill-appearing.   HENT:      Head: Normocephalic and atraumatic.   Eyes:      General: No scleral icterus.     Conjunctiva/sclera: Conjunctivae normal.   Cardiovascular:      Rate and Rhythm: Normal rate.    Pulmonary:      Effort: Pulmonary effort is normal. No respiratory distress.      Comments: On room air  Abdominal:      Comments: Moderate distention general diffuse tenderness to palpation with some voluntary guarding   Musculoskeletal:         General: No swelling or tenderness.   Skin:     General: Skin is warm and dry.   Neurological:      General: No focal deficit present.      Mental Status: Mental status is at baseline.   Psychiatric:         Mood and Affect: Mood normal.         Behavior: Behavior normal.         CBC    Results from last 7 days   Lab Units 02/02/23  0522 02/01/23  0507 01/31/23  0607   WBC 10*3/mm3 9.30 10.30 14.70*   HEMOGLOBIN g/dL 12.9 14.1 16.7*   PLATELETS 10*3/mm3 347 403 575*     CMP   Results from last 7 days   Lab Units 02/02/23  0522 02/01/23  1656 01/31/23  0607   SODIUM mmol/L 139 137 136   POTASSIUM mmol/L 3.7 3.7 4.0   CHLORIDE mmol/L 103 99 95*   CO2 mmol/L 26.0 28.0 28.0   BUN mg/dL 8 9 14   CREATININE mg/dL 0.69 0.68 0.71   GLUCOSE mg/dL 152* 127* 163*   ALBUMIN g/dL 3.1* 3.4* 4.3   BILIRUBIN mg/dL 0.7 0.7 0.7   ALK PHOS U/L 101 98 120*   AST (SGOT) U/L 17 17 18   ALT (SGPT) U/L 9 12 17   AMYLASE U/L  --  253*  --    LIPASE U/L 205* 257* 493*     Radiology(recent) FL ercp biliary duct only    Result Date: 2/1/2023  Impression: ERCP with fluoroscopy. Please refer to the procedure report for findings and recommendations. Electronically Signed: Alisha Santos  2/1/2023 4:30 PM EST  Workstation ID: [id number]  CT Abdomen Pelvis With Contrast    Result Date: 1/31/2023  CT ABDOMEN PELVIS W CONTRAST Date of Exam: 1/31/2023 6:54 AM EST Indication: Epigastric and left lower quadrant pain, history of persistent diarrhea. Comparison: Correlation with CT chest 12 2820.2 and PET/CT dated 11/23/2022. Technique: Axial CT images were obtained of the abdomen and pelvis following the uneventful intravenous administration of iodinated contrast. Sagittal and coronal reconstructions were  performed.  Automated exposure control and iterative reconstruction methods were used. Findings: There is small right-sided pleural effusion. There is left basilar atelectasis. Distal esophagus is unremarkable. The heart size is normal. There are coronary artery calcifications. Superficial soft tissues appear unremarkable. There are no acute osseous  or normalities or destructive bone lesions. There is moderate L4-L5 disc degeneration with minimal degenerative changes present throughout the rest of the lumbar spine. The liver is enlarged measuring up to 24.5 cm. There is low attenuation throughout the liver suggesting steatosis. There is cholelithiasis without evidence of acute cholecystitis. The bile ducts are normal diameter. There are findings of acute interstitial pancreatitis with probable areas of chronic necrosis in the pancreatic tail and within the head of the pancreas. There is a thin-walled irregular fluid collection surrounding the pancreatic body and tail and extending into the anterior pararenal space and the left paracolic gutter while abutting the undersurface of the stomach. The collection measures approximately 10 x 6 cm in the axial plane and up to 9 cm craniocaudal. Spleen is normal size. The stomach is nondistended. There is wall thickening of the distal stomach, likely reactive. The duodenum and the remainder of the small bowel appear nondistended. The urinary bladder and uterus appear unremarkable. The appendix is not seen. The colon appears unremarkable. There is stranding mesenteric edema. No no pneumoperitoneum or lymphadenopathy. There is trace free pelvic fluid. There is mild-to-moderate aortoiliac atherosclerosis.     Impression: Impression: 1. Acute pancreatitis with small areas of suspected pancreatic necrosis in the pancreatic tail and head. There is a thin-walled fluid collection surrounding the pancreatic body and tail that may represent pseudocyst formation. 2. Hepatomegaly and  hepatic steatosis. 3. Cholelithiasis without evidence of acute cholecystitis or biliary distention. 4. Small right-sided pleural effusion. Electronically Signed: Hu Thakur  1/31/2023 7:24 AM EST  Workstation ID: [id number]      I reviewed the patient's new clinical results.  I reviewed the patient's new imaging results and agree with the interpretation.    Assessment & Plan       Acute pancreatitis with infected necrosis, unspecified pancreatitis type    Essential hypertension    Mixed hyperlipidemia    Primary cancer of right upper lobe of lung (HCC)    History of lung cancer    Diarrhea    Upper abdominal pain    Choledocholithiasis      57 y.o. female admitted with acute pancreatitis with peripancreatic fluid collection.     Have counseled her about the role for cholecystectomy in the setting of gallstone pancreatitis.  I talked to her about this being a more elective operation especially in the setting of peripancreatic fluid collection.  She is feeling a little bit better since her ERCP.  Will need supportive care and ongoing management of the pancreatitis.  Will defer cholecystectomy to a month or 2 down the road when the inflammatory process begins to resolve and she has more time out from her lobectomy.     This is an acute illness that does pose a threat to life and counseling about a major elective surgery with significant risk factors for morbidity including her recent lobectomy and the pancreatitis.      This note was created using Dragon Voice Recognition software.    Mich Bansal MD  02/02/23  17:38 EST

## 2023-02-02 NOTE — CASE MANAGEMENT/SOCIAL WORK
Continued Stay Note   [facility]     Patient Name: Rahel Ramsey  MRN: [id number]  Today's Date: 2/2/2023    Admit Date: 1/31/2023    Plan: Routine home with S.O. S.O to transport   Discharge Plan     Row Name 02/02/23 1336       Plan    Plan Routine home with S.O. S.O to transport    Plan Comments Barriers: ERCP yesterday; diet clear liquids, IV abx, IVFs. At discharge Ms. Ramsey will return home with her S.O. No anticipated discharge needs              Phone communication or documentation only - no physical contact with patient or family.    Melany MEADEN,RN Case Manager  [facility]  Phone: Desk- [phone] cell- [phone]

## 2023-02-03 LAB
ALBUMIN SERPL-MCNC: 2.9 G/DL (ref 3.5–5.2)
ALBUMIN/GLOB SERPL: 0.9 G/DL
ALP SERPL-CCNC: 99 U/L (ref 39–117)
ALT SERPL W P-5'-P-CCNC: 8 U/L (ref 1–33)
ANION GAP SERPL CALCULATED.3IONS-SCNC: 10 MMOL/L (ref 5–15)
AST SERPL-CCNC: 14 U/L (ref 1–32)
BASOPHILS # BLD AUTO: 0.1 10*3/MM3 (ref 0–0.2)
BASOPHILS NFR BLD AUTO: 1.4 % (ref 0–1.5)
BILIRUB SERPL-MCNC: 0.6 MG/DL (ref 0–1.2)
BUN SERPL-MCNC: 7 MG/DL (ref 6–20)
BUN/CREAT SERPL: 12.3 (ref 7–25)
CALCIUM SPEC-SCNC: 8.5 MG/DL (ref 8.6–10.5)
CHLORIDE SERPL-SCNC: 103 MMOL/L (ref 98–107)
CO2 SERPL-SCNC: 27 MMOL/L (ref 22–29)
CREAT SERPL-MCNC: 0.57 MG/DL (ref 0.57–1)
CRP SERPL-MCNC: 15.98 MG/DL (ref 0–0.5)
DEPRECATED RDW RBC AUTO: 46.4 FL (ref 37–54)
EGFRCR SERPLBLD CKD-EPI 2021: 105.5 ML/MIN/1.73
EOSINOPHIL # BLD AUTO: 0.6 10*3/MM3 (ref 0–0.4)
EOSINOPHIL NFR BLD AUTO: 5.6 % (ref 0.3–6.2)
ERYTHROCYTE [DISTWIDTH] IN BLOOD BY AUTOMATED COUNT: 14.4 % (ref 12.3–15.4)
ERYTHROCYTE [SEDIMENTATION RATE] IN BLOOD: 39 MM/HR (ref 0–30)
GLOBULIN UR ELPH-MCNC: 3.1 GM/DL
GLUCOSE SERPL-MCNC: 141 MG/DL (ref 65–99)
HCT VFR BLD AUTO: 39.6 % (ref 34–46.6)
HGB BLD-MCNC: 12.7 G/DL (ref 12–15.9)
LIPASE SERPL-CCNC: 156 U/L (ref 13–60)
LYMPHOCYTES # BLD AUTO: 1.6 10*3/MM3 (ref 0.7–3.1)
LYMPHOCYTES NFR BLD AUTO: 15.9 % (ref 19.6–45.3)
MAGNESIUM SERPL-MCNC: 1.9 MG/DL (ref 1.6–2.6)
MCH RBC QN AUTO: 29.4 PG (ref 26.6–33)
MCHC RBC AUTO-ENTMCNC: 32.1 G/DL (ref 31.5–35.7)
MCV RBC AUTO: 91.7 FL (ref 79–97)
MONOCYTES # BLD AUTO: 0.6 10*3/MM3 (ref 0.1–0.9)
MONOCYTES NFR BLD AUTO: 5.6 % (ref 5–12)
NEUTROPHILS NFR BLD AUTO: 7 10*3/MM3 (ref 1.7–7)
NEUTROPHILS NFR BLD AUTO: 71.5 % (ref 42.7–76)
NRBC BLD AUTO-RTO: 0 /100 WBC (ref 0–0.2)
PHOSPHATE SERPL-MCNC: 3.7 MG/DL (ref 2.5–4.5)
PLATELET # BLD AUTO: 362 10*3/MM3 (ref 140–450)
PMV BLD AUTO: 7.3 FL (ref 6–12)
POTASSIUM SERPL-SCNC: 3.8 MMOL/L (ref 3.5–5.2)
PROT SERPL-MCNC: 6 G/DL (ref 6–8.5)
RBC # BLD AUTO: 4.32 10*6/MM3 (ref 3.77–5.28)
SODIUM SERPL-SCNC: 140 MMOL/L (ref 136–145)
WBC NRBC COR # BLD: 9.9 10*3/MM3 (ref 3.4–10.8)

## 2023-02-03 PROCEDURE — 83735 ASSAY OF MAGNESIUM: CPT | Performed by: INTERNAL MEDICINE

## 2023-02-03 PROCEDURE — 83690 ASSAY OF LIPASE: CPT | Performed by: NURSE PRACTITIONER

## 2023-02-03 PROCEDURE — 25010000002 HYDROMORPHONE 1 MG/ML SOLUTION: Performed by: HOSPITALIST

## 2023-02-03 PROCEDURE — 86140 C-REACTIVE PROTEIN: CPT | Performed by: INTERNAL MEDICINE

## 2023-02-03 PROCEDURE — 25010000002 PIPERACILLIN SOD-TAZOBACTAM PER 1 G: Performed by: NURSE PRACTITIONER

## 2023-02-03 PROCEDURE — 85652 RBC SED RATE AUTOMATED: CPT | Performed by: INTERNAL MEDICINE

## 2023-02-03 PROCEDURE — 84100 ASSAY OF PHOSPHORUS: CPT | Performed by: INTERNAL MEDICINE

## 2023-02-03 PROCEDURE — 85025 COMPLETE CBC W/AUTO DIFF WBC: CPT | Performed by: NURSE PRACTITIONER

## 2023-02-03 PROCEDURE — 25010000002 ONDANSETRON PER 1 MG: Performed by: NURSE PRACTITIONER

## 2023-02-03 PROCEDURE — 80053 COMPREHEN METABOLIC PANEL: CPT | Performed by: NURSE PRACTITIONER

## 2023-02-03 RX ADMIN — PIPERACILLIN AND TAZOBACTAM 3.38 G: 3; .375 INJECTION, POWDER, LYOPHILIZED, FOR SOLUTION INTRAVENOUS at 08:43

## 2023-02-03 RX ADMIN — MAGNESIUM HYDROXIDE 10 ML: 2400 SUSPENSION ORAL at 17:47

## 2023-02-03 RX ADMIN — HYDROMORPHONE HYDROCHLORIDE 1 MG: 1 INJECTION, SOLUTION INTRAMUSCULAR; INTRAVENOUS; SUBCUTANEOUS at 08:43

## 2023-02-03 RX ADMIN — HYDROMORPHONE HYDROCHLORIDE 1 MG: 1 INJECTION, SOLUTION INTRAMUSCULAR; INTRAVENOUS; SUBCUTANEOUS at 15:39

## 2023-02-03 RX ADMIN — Medication 10 ML: at 20:14

## 2023-02-03 RX ADMIN — HYDROMORPHONE HYDROCHLORIDE 1 MG: 1 INJECTION, SOLUTION INTRAMUSCULAR; INTRAVENOUS; SUBCUTANEOUS at 20:14

## 2023-02-03 RX ADMIN — PANTOPRAZOLE SODIUM 40 MG: 40 INJECTION, POWDER, LYOPHILIZED, FOR SOLUTION INTRAVENOUS at 04:07

## 2023-02-03 RX ADMIN — ONDANSETRON 4 MG: 2 INJECTION INTRAMUSCULAR; INTRAVENOUS at 08:43

## 2023-02-03 RX ADMIN — ALUMINUM HYDROXIDE, MAGNESIUM HYDROXIDE, AND DIMETHICONE 15 ML: 400; 400; 40 SUSPENSION ORAL at 01:09

## 2023-02-03 RX ADMIN — SODIUM CHLORIDE, POTASSIUM CHLORIDE, SODIUM LACTATE AND CALCIUM CHLORIDE 150 ML/HR: 600; 310; 30; 20 INJECTION, SOLUTION INTRAVENOUS at 04:04

## 2023-02-03 RX ADMIN — ONDANSETRON 4 MG: 2 INJECTION INTRAMUSCULAR; INTRAVENOUS at 20:14

## 2023-02-03 RX ADMIN — Medication 10 ML: at 08:44

## 2023-02-03 RX ADMIN — HYDROMORPHONE HYDROCHLORIDE 1 MG: 1 INJECTION, SOLUTION INTRAMUSCULAR; INTRAVENOUS; SUBCUTANEOUS at 04:03

## 2023-02-03 RX ADMIN — PIPERACILLIN AND TAZOBACTAM 3.38 G: 3; .375 INJECTION, POWDER, LYOPHILIZED, FOR SOLUTION INTRAVENOUS at 17:47

## 2023-02-03 RX ADMIN — ONDANSETRON 4 MG: 2 INJECTION INTRAMUSCULAR; INTRAVENOUS at 15:39

## 2023-02-03 RX ADMIN — SODIUM CHLORIDE, POTASSIUM CHLORIDE, SODIUM LACTATE AND CALCIUM CHLORIDE 150 ML/HR: 600; 310; 30; 20 INJECTION, SOLUTION INTRAVENOUS at 15:27

## 2023-02-03 NOTE — PROGRESS NOTES
[facility] Medicine Services Daily Progress Note    Patient Name: Rahel Ramsey  : 1965  MRN: [id number]  Primary Care Physician:  Vickie Gomez APRN  Date of admission: 2023      Subjective      Chief Complaint: Abdominal pain nausea vomiting     Patient Reports still having nausea but no vomiting.  Received Zofran.    Status post ERCP. and delayed cholecystectomy recommended.  Patient still having some trouble eating and abdominal pain.      ROS negative except as above    Objective      Vitals:   Temp:  [97.8 °F (36.6 °C)-97.9 °F (36.6 °C)] 97.9 °F (36.6 °C)  Heart Rate:  [82-92] 92  Resp:  [15-17] 15  BP: (133-145)/(67-73) 145/73    Physical Exam  Vitals reviewed.   Constitutional:       Comments: Nauseous during my evaluation   HENT:      Head: Normocephalic.      Nose: Nose normal.      Mouth/Throat:      Mouth: Mucous membranes are moist.   Cardiovascular:      Rate and Rhythm: Normal rate and regular rhythm.      Pulses: Normal pulses.      Heart sounds: Normal heart sounds.   Pulmonary:      Effort: Pulmonary effort is normal.      Breath sounds: Normal breath sounds.   Abdominal:      General: Abdomen is flat.      Palpations: Abdomen is soft.      Tenderness: There is abdominal tenderness.   Musculoskeletal:         General: Normal range of motion.      Cervical back: Normal range of motion.   Skin:     General: Skin is warm.   Neurological:      General: No focal deficit present.      Mental Status: She is alert and oriented to person, place, and time.   Psychiatric:         Mood and Affect: Mood normal.         Behavior: Behavior normal.        Result Review    Result Review:  I have personally reviewed the results from the time of this admission to 2/3/2023 16:32 EST and agree with these findings:  [x]  Laboratory  []  Microbiology  []  Radiology  []  EKG/Telemetry   []  Cardiology/Vascular   []  Pathology  []  Old records  []  Other:  Most notable findings  include: Lipase remains 156    Wounds (last 24 hours)     LDA Wound     Row Name 02/03/23 0843 02/02/23 2016 02/02/23 1702       Wound 02/02/23 0700 coccyx MASD (Moisture associated skin damage)    Wound - Properties Group Placement Date: 02/02/23  - Placement Time: 0700 -JP Location: coccyx  -KYARA Primary Wound Type: MASD  -KYARA    Wound Image -- -- Images linked: 1  -KYARA    Pressure Injury Stage O  MASD  -KYARA -- --    Base slough;pink  -KYARA slough;pink  -BK --    Care, Wound barrier applied  -KYARA barrier applied  -BK --    Dressing Care skin barrier agent applied  -KYARA skin barrier agent applied  -BK --    Retired Wound - Properties Group Placement Date: 02/02/23  - Placement Time: 0700 -JP Location: coccyx  -KYARA Primary Wound Type: MASD  -KYARA    Retired Wound - Properties Group Date first assessed: 02/02/23  - Time first assessed: 0700 -JP Location: coccyx  -KYARA Primary Wound Type: MASD  -KYARA          User Key  (r) = Recorded By, (t) = Taken By, (c) = Cosigned By    Initials Name Provider Type    Chiquita Sears RN Registered Nurse    Checo Gomes LPN Licensed Nurse                 Assessment & Plan    57-year-old female with gallstone pancreatitis     Active Hospital Problems:          Active Hospital Problems     Diagnosis     • **Acute pancreatitis with infected necrosis, unspecified pancreatitis type     • Upper abdominal pain     • Diarrhea     • History of lung cancer     • Primary cancer of right upper lobe of lung (HCC)     • Essential hypertension     • Mixed hyperlipidemia        Plan:   Acute gallstone pancreatitis  Abdominal pain  Nausea, vomiting, diarrhea   -CT s/p: acute pancreatitis with small areas of suspected pancreatic necrosis in the pancreatic tail and head.  There is a thin walled fluid collection surrounding the pancreatic body and tail that may represent pseudocyst formation.  Hepatomegaly and hepatic steatosis.  Cholelithiasis without evidence of acute cholecystitis or biliary  distention.  Small right-sided pleural effusion.   -WBC 14.7, lipase 493, alk phos 120 otherwise normal liver enzymes  -gastrointestinal panel and cdiff pending  -NPO/gut rest, IVFs, prn analgesics, antiemetics  -GI consulted and plan MRCP/ERCP tolerated ERCP well  -General surgery consulted for gall stone pancreatitis possible lap ralf.  Recommending delayed lap ralf     Right upper lobe lung CA s/p robot-assisted right upper lobectomy by Dr. Kearney 1/9/2023     Hypertension  Hyperlipidemia  -resume home metoprolol, statin when able to take p.o.     Diabetes mellitus type 2  -hold home metformin  -SSI AC/HS     Chronic pain  -pt currently NPO due to pancreatitis  -resume home meds Norco, Robaxin, Lyrica when able to take p.o.        DVT prophylaxis:  Mechanical DVT prophylaxis orders are present.     CODE STATUS:    Level Of Support Discussed With: Patient  Code Status (Patient has no pulse and is not breathing): CPR (Attempt to Resuscitate)  Medical Interventions (Patient has pulse or is breathing): Full Support     Admission Status:  I believe this patient meets inpatient status.     I discussed the patient's findings and my recommendations with patient.     This patient has been examined wearing appropriate Personal Protective Equipment     02/03/23      Electronically signed by Mohit Phillips MD, 02/03/23, 16:32 EST.  [facility] Hospitalist Team

## 2023-02-03 NOTE — CASE MANAGEMENT/SOCIAL WORK
Continued Stay Note   [facility]     Patient Name: Rahel Ramsey  MRN: [id number]  Today's Date: 2/3/2023    Admit Date: 1/31/2023    Plan: Routine Home with S.O. S.O to transport. DC today if tolerates diet and cleared by GI   Discharge Plan     Row Name 02/03/23 1100       Plan    Plan Routine Home with S.O. S.O to transport. DC today if tolerates diet and cleared by GI    Plan Comments Ms. Ramsey has discharge orders for today if cleared by GI and tolerates advanced diet. She will return home with S.O and he will transport. No anticipated discharge needs from            Phone communication or documentation only - no physical contact with patient or family.  Melany CORDOBA,RN Case Manager  [facility]  Phone: Desk- [phone] cell- [phone]

## 2023-02-03 NOTE — PLAN OF CARE
Problem: Adult Inpatient Plan of Care  Goal: Plan of Care Review  Outcome: Ongoing, Progressing  Flowsheets (Taken 2/3/2023 0547)  Progress: no change  Plan of Care Reviewed With: patient  Goal: Patient-Specific Goal (Individualized)  Outcome: Ongoing, Progressing  Goal: Absence of Hospital-Acquired Illness or Injury  Outcome: Ongoing, Progressing  Intervention: Identify and Manage Fall Risk  Recent Flowsheet Documentation  Taken 2/3/2023 0200 by Checo Cleaning LPN  Safety Promotion/Fall Prevention: safety round/check completed  Taken 2/3/2023 0000 by Checo Cleaning LPN  Safety Promotion/Fall Prevention: safety round/check completed  Taken 2/2/2023 2200 by Checo Cleaning LPN  Safety Promotion/Fall Prevention: safety round/check completed  Taken 2/2/2023 2016 by Checo Cleaning LPN  Safety Promotion/Fall Prevention:   safety round/check completed   room organization consistent   nonskid shoes/slippers when out of bed   fall prevention program maintained   clutter free environment maintained   assistive device/personal items within reach  Intervention: Prevent Skin Injury  Recent Flowsheet Documentation  Taken 2/3/2023 0200 by Checo Cleaning LPN  Body Position: position changed independently  Taken 2/3/2023 0000 by Checo Cleaning LPN  Body Position: position changed independently  Taken 2/2/2023 2200 by Checo Cleaning LPN  Body Position: position changed independently  Taken 2/2/2023 2016 by Checo Cleaning LPN  Body Position: position changed independently  Skin Protection:   adhesive use limited   tubing/devices free from skin contact  Intervention: Prevent and Manage VTE (Venous Thromboembolism) Risk  Recent Flowsheet Documentation  Taken 2/2/2023 2016 by Checo Cleaning LPN  Activity Management: activity adjusted per tolerance  VTE Prevention/Management:   bilateral   sequential compression devices on  Range of Motion: active ROM (range of motion) encouraged  Intervention: Prevent  Infection  Recent Flowsheet Documentation  Taken 2/3/2023 0200 by Checo Cleaning LPN  Infection Prevention: environmental surveillance performed  Taken 2/3/2023 0000 by Checo Cleaning LPN  Infection Prevention: environmental surveillance performed  Taken 2/2/2023 2200 by Checo Cleaning LPN  Infection Prevention: environmental surveillance performed  Taken 2/2/2023 2016 by Checo Cleaning LPN  Infection Prevention:   hand hygiene promoted   personal protective equipment utilized   rest/sleep promoted  Goal: Optimal Comfort and Wellbeing  Outcome: Ongoing, Progressing  Intervention: Provide Person-Centered Care  Recent Flowsheet Documentation  Taken 2/2/2023 2016 by Checo Cleaning LPN  Trust Relationship/Rapport:   care explained   thoughts/feelings acknowledged   questions answered  Goal: Readiness for Transition of Care  Outcome: Ongoing, Progressing     Problem: Skin Injury Risk Increased  Goal: Skin Health and Integrity  Outcome: Ongoing, Progressing  Intervention: Optimize Skin Protection  Recent Flowsheet Documentation  Taken 2/3/2023 0200 by Checo Cleaning LPN  Head of Bed (HOB) Positioning: HOB elevated  Taken 2/3/2023 0000 by Checo Cleaning LPN  Head of Bed (HOB) Positioning: HOB elevated  Taken 2/2/2023 2200 by Checo Cleaning LPN  Head of Bed (HOB) Positioning: HOB elevated  Taken 2/2/2023 2016 by Checo Cleaning LPN  Pressure Reduction Techniques: frequent weight shift encouraged  Head of Bed (HOB) Positioning: HOB elevated  Pressure Reduction Devices: pressure-redistributing mattress utilized  Skin Protection:   adhesive use limited   tubing/devices free from skin contact     Problem: COPD (Chronic Obstructive Pulmonary Disease) Comorbidity  Goal: Maintenance of COPD Symptom Control  Outcome: Ongoing, Progressing  Intervention: Maintain COPD-Symptom Control  Recent Flowsheet Documentation  Taken 2/2/2023 2016 by Checo Cleaning LPN  Supportive Measures: active listening  utilized  Medication Review/Management: medications reviewed     Problem: Pain Chronic (Persistent) (Comorbidity Management)  Goal: Acceptable Pain Control and Functional Ability  Outcome: Ongoing, Progressing  Intervention: Manage Persistent Pain  Recent Flowsheet Documentation  Taken 2/2/2023 2016 by Checo Cleaning LPN  Bowel Elimination Promotion: adequate fluid intake promoted  Medication Review/Management: medications reviewed  Intervention: Optimize Psychosocial Wellbeing  Recent Flowsheet Documentation  Taken 2/2/2023 2016 by Checo Cleaning LPN  Supportive Measures: active listening utilized  Diversional Activities: television     Problem: Fall Injury Risk  Goal: Absence of Fall and Fall-Related Injury  Outcome: Ongoing, Progressing  Intervention: Identify and Manage Contributors  Recent Flowsheet Documentation  Taken 2/2/2023 2016 by Checo Cleaning LPN  Medication Review/Management: medications reviewed  Self-Care Promotion:   independence encouraged   BADL personal objects within reach  Intervention: Promote Injury-Free Environment  Recent Flowsheet Documentation  Taken 2/3/2023 0200 by Checo Cleaning LPN  Safety Promotion/Fall Prevention: safety round/check completed  Taken 2/3/2023 0000 by Checo Cleaning LPN  Safety Promotion/Fall Prevention: safety round/check completed  Taken 2/2/2023 2200 by Checo Cleaning LPN  Safety Promotion/Fall Prevention: safety round/check completed  Taken 2/2/2023 2016 by Checo Cleaning LPN  Safety Promotion/Fall Prevention:   safety round/check completed   room organization consistent   nonskid shoes/slippers when out of bed   fall prevention program maintained   clutter free environment maintained   assistive device/personal items within reach   Goal Outcome Evaluation:  Plan of Care Reviewed With: patient        Progress: no change

## 2023-02-03 NOTE — CONSULTS
Nutrition Services    Patient Name: Rahel Ramsey  YOB: 1965  MRN: [id number]  Admission date: 1/31/2023    PPE Documentation        PPE Worn By Provider Did not enter room this encounter    PPE Worn By Patient  None      NUTRITION SCREENING      Encounter Information: Check on for NPO/Clear liquids x 3 days.  Admitted for diarrhea. Diagnosed with pancreatitis.  Noted with plans to D/C.  RD to follow up with full initial assessment on 2/6 if patient remains admitted.         PO Diet: Diet: Liquid Diets; Clear Liquid; Texture: Regular Texture (IDDSI 7); Fluid Consistency: Thin (IDDSI 0)   PO Supplements: None ordered    PO Intake:  0-25%, minimal due to clear liquids        Labs (reviewed below): Reviewed, management per attending         GI Function:  No BM since admission (x 3 days)       Skin: Intact (MASD to coccyx)       Weight Hx Review: Wt Readings from Last 40 Encounters:   02/01/23 84 kg (185 lb 3 oz)   01/24/23 86.2 kg (190 lb)   01/10/23 96.1 kg (211 lb 13.8 oz)   12/22/22 90.7 kg (200 lb)   12/20/22 90.8 kg (200 lb 3.2 oz)   11/21/22 90.7 kg (200 lb)   11/02/22 93 kg (205 lb)   09/29/22 93 kg (205 lb)   08/31/22 90.3 kg (199 lb)   08/16/22 91.2 kg (201 lb)   08/03/22 90.7 kg (200 lb)   06/28/22 90.3 kg (199 lb)   06/03/22 91.2 kg (201 lb)   05/20/22 87.7 kg (193 lb 6.4 oz)   04/08/22 89.8 kg (198 lb)   03/22/22 90.1 kg (198 lb 9.6 oz)   12/21/21 89.3 kg (196 lb 12.8 oz)   09/21/21 89.6 kg (197 lb 9.6 oz)   09/09/21 90.7 kg (200 lb)   07/22/21 87.9 kg (193 lb 12.8 oz)   05/24/21 90.7 kg (200 lb)   05/04/21 91.2 kg (201 lb)   03/24/21 90.7 kg (200 lb)   02/22/21 90.9 kg (200 lb 6.4 oz)   01/22/21 89.8 kg (198 lb)   11/24/20 89.8 kg (198 lb)   05/07/20 92.8 kg (204 lb 8 oz)   04/28/20 89.8 kg (198 lb)   03/12/20 89.8 kg (198 lb)   02/11/20 90.7 kg (200 lb)   01/21/20 90.7 kg (200 lb)   01/09/20 89.8 kg (198 lb)   01/03/20 90.6 kg (199 lb 11.8 oz)   12/09/19 92.1 kg (203 lb)   05/01/19 90  kg (198 lb 6.4 oz)   04/30/19 89.8 kg (198 lb)   03/22/19 90.5 kg (199 lb 9.6 oz)   12/04/18 88.5 kg (195 lb)   11/01/18 89.8 kg (198 lb)   10/17/18 90.7 kg (200 lb)          Nutrition Intervention: Add Boost Breeze BID (provides 500 kcals, 18 g protein if consumed)     Diet advancement per MD       Results from last 7 days   Lab Units 02/03/23  0541 02/02/23  0522 02/01/23  1656   SODIUM mmol/L 140 139 137   POTASSIUM mmol/L 3.8 3.7 3.7   CHLORIDE mmol/L 103 103 99   CO2 mmol/L 27.0 26.0 28.0   BUN mg/dL 7 8 9   CREATININE mg/dL 0.57 0.69 0.68   CALCIUM mg/dL 8.5* 8.6 9.1   BILIRUBIN mg/dL 0.6 0.7 0.7   ALK PHOS U/L 99 101 98   ALT (SGPT) U/L 8 9 12   AST (SGOT) U/L 14 17 17   GLUCOSE mg/dL 141* 152* 127*     Results from last 7 days   Lab Units 02/03/23  0541 02/02/23  0522 02/01/23  1656 02/01/23  0507 01/31/23  0607   MAGNESIUM mg/dL 1.9 2.0 1.8  --   --    PHOSPHORUS mg/dL 3.7 4.4 3.9   < >  --    HEMOGLOBIN g/dL 12.7 12.9  --    < > 16.7*   HEMATOCRIT % 39.6 38.1  --    < > 49.0*   TRIGLYCERIDES mg/dL  --   --   --   --  241*    < > = values in this interval not displayed.     COVID19   Date Value Ref Range Status   01/31/2023 Not Detected Not Detected - Ref. Range Final     Lab Results   Component Value Date    HGBA1C 6.6 (H) 09/29/2022       RD to follow up per protocol.    Electronically signed by:  Liat Pierce, NAVI  02/03/23 14:06 EST

## 2023-02-04 LAB
ALBUMIN SERPL-MCNC: 3 G/DL (ref 3.5–5.2)
ALBUMIN/GLOB SERPL: 1.2 G/DL
ALP SERPL-CCNC: 87 U/L (ref 39–117)
ALT SERPL W P-5'-P-CCNC: 8 U/L (ref 1–33)
ANION GAP SERPL CALCULATED.3IONS-SCNC: 8 MMOL/L (ref 5–15)
AST SERPL-CCNC: 14 U/L (ref 1–32)
BASOPHILS # BLD AUTO: 0.1 10*3/MM3 (ref 0–0.2)
BASOPHILS NFR BLD AUTO: 0.8 % (ref 0–1.5)
BILIRUB SERPL-MCNC: 0.5 MG/DL (ref 0–1.2)
BUN SERPL-MCNC: 4 MG/DL (ref 6–20)
BUN/CREAT SERPL: 6.7 (ref 7–25)
CALCIUM SPEC-SCNC: 8 MG/DL (ref 8.6–10.5)
CHLORIDE SERPL-SCNC: 103 MMOL/L (ref 98–107)
CO2 SERPL-SCNC: 28 MMOL/L (ref 22–29)
CREAT SERPL-MCNC: 0.6 MG/DL (ref 0.57–1)
DEPRECATED RDW RBC AUTO: 45.5 FL (ref 37–54)
EGFRCR SERPLBLD CKD-EPI 2021: 104.2 ML/MIN/1.73
EOSINOPHIL # BLD AUTO: 0.8 10*3/MM3 (ref 0–0.4)
EOSINOPHIL NFR BLD AUTO: 8.7 % (ref 0.3–6.2)
ERYTHROCYTE [DISTWIDTH] IN BLOOD BY AUTOMATED COUNT: 14.2 % (ref 12.3–15.4)
GLOBULIN UR ELPH-MCNC: 2.5 GM/DL
GLUCOSE SERPL-MCNC: 90 MG/DL (ref 65–99)
HCT VFR BLD AUTO: 36.9 % (ref 34–46.6)
HGB BLD-MCNC: 12 G/DL (ref 12–15.9)
LYMPHOCYTES # BLD AUTO: 1.7 10*3/MM3 (ref 0.7–3.1)
LYMPHOCYTES NFR BLD AUTO: 19.3 % (ref 19.6–45.3)
MAGNESIUM SERPL-MCNC: 2.1 MG/DL (ref 1.6–2.6)
MCH RBC QN AUTO: 29.6 PG (ref 26.6–33)
MCHC RBC AUTO-ENTMCNC: 32.5 G/DL (ref 31.5–35.7)
MCV RBC AUTO: 91.3 FL (ref 79–97)
MONOCYTES # BLD AUTO: 0.7 10*3/MM3 (ref 0.1–0.9)
MONOCYTES NFR BLD AUTO: 7.3 % (ref 5–12)
NEUTROPHILS NFR BLD AUTO: 5.8 10*3/MM3 (ref 1.7–7)
NEUTROPHILS NFR BLD AUTO: 63.9 % (ref 42.7–76)
NRBC BLD AUTO-RTO: 0.1 /100 WBC (ref 0–0.2)
PHOSPHATE SERPL-MCNC: 3.2 MG/DL (ref 2.5–4.5)
PLATELET # BLD AUTO: 351 10*3/MM3 (ref 140–450)
PMV BLD AUTO: 7.4 FL (ref 6–12)
POTASSIUM SERPL-SCNC: 3.7 MMOL/L (ref 3.5–5.2)
PROT SERPL-MCNC: 5.5 G/DL (ref 6–8.5)
RBC # BLD AUTO: 4.05 10*6/MM3 (ref 3.77–5.28)
SODIUM SERPL-SCNC: 139 MMOL/L (ref 136–145)
WBC NRBC COR # BLD: 9 10*3/MM3 (ref 3.4–10.8)

## 2023-02-04 PROCEDURE — 25010000002 PIPERACILLIN SOD-TAZOBACTAM PER 1 G: Performed by: NURSE PRACTITIONER

## 2023-02-04 PROCEDURE — 85025 COMPLETE CBC W/AUTO DIFF WBC: CPT | Performed by: INTERNAL MEDICINE

## 2023-02-04 PROCEDURE — 84100 ASSAY OF PHOSPHORUS: CPT | Performed by: INTERNAL MEDICINE

## 2023-02-04 PROCEDURE — 83735 ASSAY OF MAGNESIUM: CPT | Performed by: INTERNAL MEDICINE

## 2023-02-04 PROCEDURE — 25010000002 ONDANSETRON PER 1 MG: Performed by: NURSE PRACTITIONER

## 2023-02-04 PROCEDURE — 80053 COMPREHEN METABOLIC PANEL: CPT | Performed by: INTERNAL MEDICINE

## 2023-02-04 PROCEDURE — 25010000002 HYDROMORPHONE 1 MG/ML SOLUTION: Performed by: HOSPITALIST

## 2023-02-04 RX ORDER — SORBITOL SOLUTION 70 %
50 SOLUTION, ORAL MISCELLANEOUS ONCE
Status: DISCONTINUED | OUTPATIENT
Start: 2023-02-04 | End: 2023-02-05 | Stop reason: HOSPADM

## 2023-02-04 RX ORDER — SORBITOL SOLUTION 70 %
50 SOLUTION, ORAL MISCELLANEOUS ONCE
Status: COMPLETED | OUTPATIENT
Start: 2023-02-04 | End: 2023-02-04

## 2023-02-04 RX ADMIN — HYDROMORPHONE HYDROCHLORIDE 1 MG: 1 INJECTION, SOLUTION INTRAMUSCULAR; INTRAVENOUS; SUBCUTANEOUS at 10:58

## 2023-02-04 RX ADMIN — PANTOPRAZOLE SODIUM 40 MG: 40 INJECTION, POWDER, LYOPHILIZED, FOR SOLUTION INTRAVENOUS at 05:08

## 2023-02-04 RX ADMIN — Medication 10 ML: at 20:57

## 2023-02-04 RX ADMIN — SODIUM CHLORIDE, POTASSIUM CHLORIDE, SODIUM LACTATE AND CALCIUM CHLORIDE 150 ML/HR: 600; 310; 30; 20 INJECTION, SOLUTION INTRAVENOUS at 08:22

## 2023-02-04 RX ADMIN — ONDANSETRON 4 MG: 2 INJECTION INTRAMUSCULAR; INTRAVENOUS at 10:58

## 2023-02-04 RX ADMIN — MAGNESIUM HYDROXIDE 10 ML: 2400 SUSPENSION ORAL at 08:22

## 2023-02-04 RX ADMIN — SORBITOL SOLUTION (BULK) 50 ML: 70 SOLUTION at 14:38

## 2023-02-04 RX ADMIN — PIPERACILLIN AND TAZOBACTAM 3.38 G: 3; .375 INJECTION, POWDER, LYOPHILIZED, FOR SOLUTION INTRAVENOUS at 23:15

## 2023-02-04 RX ADMIN — HYDROMORPHONE HYDROCHLORIDE 1 MG: 1 INJECTION, SOLUTION INTRAMUSCULAR; INTRAVENOUS; SUBCUTANEOUS at 05:08

## 2023-02-04 RX ADMIN — HYDROMORPHONE HYDROCHLORIDE 1 MG: 1 INJECTION, SOLUTION INTRAMUSCULAR; INTRAVENOUS; SUBCUTANEOUS at 00:53

## 2023-02-04 RX ADMIN — PIPERACILLIN AND TAZOBACTAM 3.38 G: 3; .375 INJECTION, POWDER, LYOPHILIZED, FOR SOLUTION INTRAVENOUS at 00:47

## 2023-02-04 RX ADMIN — PIPERACILLIN AND TAZOBACTAM 3.38 G: 3; .375 INJECTION, POWDER, LYOPHILIZED, FOR SOLUTION INTRAVENOUS at 15:47

## 2023-02-04 RX ADMIN — ONDANSETRON 4 MG: 2 INJECTION INTRAMUSCULAR; INTRAVENOUS at 00:53

## 2023-02-04 RX ADMIN — HYDROMORPHONE HYDROCHLORIDE 1 MG: 1 INJECTION, SOLUTION INTRAMUSCULAR; INTRAVENOUS; SUBCUTANEOUS at 15:47

## 2023-02-04 RX ADMIN — ONDANSETRON 4 MG: 2 INJECTION INTRAMUSCULAR; INTRAVENOUS at 05:08

## 2023-02-04 RX ADMIN — Medication 10 ML: at 08:29

## 2023-02-04 RX ADMIN — HYDROMORPHONE HYDROCHLORIDE 1 MG: 1 INJECTION, SOLUTION INTRAMUSCULAR; INTRAVENOUS; SUBCUTANEOUS at 20:57

## 2023-02-04 RX ADMIN — PIPERACILLIN AND TAZOBACTAM 3.38 G: 3; .375 INJECTION, POWDER, LYOPHILIZED, FOR SOLUTION INTRAVENOUS at 08:22

## 2023-02-04 RX ADMIN — ONDANSETRON 4 MG: 2 INJECTION INTRAMUSCULAR; INTRAVENOUS at 20:57

## 2023-02-04 RX ADMIN — ONDANSETRON 4 MG: 2 INJECTION INTRAMUSCULAR; INTRAVENOUS at 15:47

## 2023-02-04 NOTE — PLAN OF CARE
Goal Outcome Evaluation:  Plan of Care Reviewed With: patient        Progress: no change  Outcome Evaluation: Pt cont to complaints of  pain that is relief some with dilaudid. intermittent nausea, zofran given. Pt rested and getting ivf and iv antibiotics. Up with assist to bsc, no distress noted, will cont to monitor.

## 2023-02-04 NOTE — PROGRESS NOTES
[facility] Medicine Services Daily Progress Note    Patient Name: Rahel Ramsey  : 1965  MRN: [id number]  Primary Care Physician:  Vickie Gomez APRN  Date of admission: 2023      Subjective      Chief Complaint: Abdominal pain nausea vomiting        Patient Reports still having nausea but no vomiting.  Received Zofran.    Status post ERCP. plans for delayed cholecystectomy outpatient.  Patient still complaining of pain constipation back pain.  Sorbitol ordered.        ROS negative except as above    Objective      Vitals:   Temp:  [97.2 °F (36.2 °C)-98.3 °F (36.8 °C)] 97.7 °F (36.5 °C)  Heart Rate:  [83-96] 83  Resp:  [16-26] 16  BP: (122-152)/(67-81) 125/67    Physical Exam  Vitals reviewed.   Constitutional:       Comments: Nurse at bedside.  Family bedside.  HENT:      Head: Normocephalic.      Nose: Nose normal.      Mouth/Throat:      Mouth: Mucous membranes are moist.   Cardiovascular:      Rate and Rhythm: Normal rate and regular rhythm.      Pulses: Normal pulses.      Heart sounds: Normal heart sounds.   Pulmonary:      Effort: Pulmonary effort is normal.      Breath sounds: Normal breath sounds.   Abdominal:      General: Abdomen is mildly distended.      Palpations: Abdomen is soft.      Tenderness: There is abdominal tenderness.  Still somewhat distended.  Musculoskeletal:         General: Normal range of motion.      Cervical back: Normal range of motion.   Skin:     General: Skin is warm.   Neurological:      General: No focal deficit present.      Mental Status: She is alert and oriented to person, place, and time.   Psychiatric:         Mood and Affect: Mood normal.         Behavior: Behavior normal.        Result Review    Result Review:  I have personally reviewed the results from the time of this admission to 2023 14:27 EST and agree with these findings:  [x]  Laboratory  []  Microbiology  []  Radiology  []  EKG/Telemetry   []  Cardiology/Vascular    []  Pathology  []  Old records  []  Other:  Most notable findings include: Lab work is fairly normal    Wounds (last 24 hours)     LDA Wound     Row Name 02/04/23 0820 02/03/23 2014          Wound 02/02/23 0700 coccyx MASD (Moisture associated skin damage)    Wound - Properties Group Placement Date: 02/02/23 -KYARA Placement Time: 0700 -JP Location: coccyx  -KYARA Primary Wound Type: MASD  -KYARA    Pressure Injury Stage -- O  -ER     Dressing Appearance -- dry;open to air  -ER     Drainage Amount none  -KA --     Care, Wound -- cleansed with;water;barrier applied  -ER     Dressing Care -- non-adherent;skin barrier agent applied  -ER     Retired Wound - Properties Group Placement Date: 02/02/23  -KYARA Placement Time: 0700 -JP Location: coccyx  -KYARA Primary Wound Type: MASD  -KYARA    Retired Wound - Properties Group Date first assessed: 02/02/23 -KYARA Time first assessed: 0700 -JP Location: coccyx  -KYARA Primary Wound Type: MASD  -KYARA          User Key  (r) = Recorded By, (t) = Taken By, (c) = Cosigned By    Initials Name Provider Type    Chiquita Sears RN Registered Nurse    Alyce David RN Registered Nurse    Vane Pastor LPN Licensed Nurse                 Assessment & Plan    57-year-old female with gallstone pancreatitis     Active Hospital Problems:          Active Hospital Problems     Diagnosis     • **Acute pancreatitis with infected necrosis, unspecified pancreatitis type     • Upper abdominal pain     • Diarrhea     • History of lung cancer     • Primary cancer of right upper lobe of lung (HCC)     • Essential hypertension     • Mixed hyperlipidemia        Plan:   Acute pancreatitis  Abdominal pain  Nausea, vomiting, diarrhea   -CT s/p: acute pancreatitis with small areas of suspected pancreatic necrosis in the pancreatic tail and head.  There is a thin walled fluid collection surrounding the pancreatic body and tail that may represent pseudocyst formation.  Hepatomegaly and hepatic steatosis.   Cholelithiasis without evidence of acute cholecystitis or biliary distention.  Small right-sided pleural effusion.   -WBC 14.7, lipase 493, alk phos 120 otherwise normal liver enzymes  -gastrointestinal panel and cdiff pending  -NPO/gut rest, IVFs, prn analgesics, antiemetics  -GI consulted and plan MRCP/ERCP tolerated ERCP well  -General surgery consulted for gall stone pancreatitis possible lap ralf.  Plans for delayed cholecystectomy after inflammation settles down     Right upper lobe lung CA s/p robot-assisted right upper lobectomy by Dr. Kearney 1/9/2023     Hypertension  Hyperlipidemia  -resume home metoprolol, statin when able to take p.o.     Diabetes mellitus type 2  -hold home metformin  -SSI AC/HS     Chronic pain  -pt currently NPO due to pancreatitis  -resume home meds Norco, Robaxin, Lyrica when able to take p.o.     Constipation  Sorbitol ordered     DVT prophylaxis:  Mechanical DVT prophylaxis orders are present.     CODE STATUS:    Level Of Support Discussed With: Patient  Code Status (Patient has no pulse and is not breathing): CPR (Attempt to Resuscitate)  Medical Interventions (Patient has pulse or is breathing): Full Support     Admission Status:  I believe this patient meets inpatient status.     I discussed the patient's findings and my recommendations with patient.     This patient has been examined wearing appropriate Personal Protective Equipment     02/04/23      Electronically signed by Mohit Phillips MD, 02/04/23, 14:27 EST.  [facility]ist Team

## 2023-02-05 ENCOUNTER — READMISSION MANAGEMENT (OUTPATIENT)
Dept: CALL CENTER | Facility: HOSPITAL | Age: 58
End: 2023-02-05
Payer: MEDICARE

## 2023-02-05 VITALS
WEIGHT: 185.19 LBS | BODY MASS INDEX: 27.43 KG/M2 | SYSTOLIC BLOOD PRESSURE: 137 MMHG | DIASTOLIC BLOOD PRESSURE: 67 MMHG | OXYGEN SATURATION: 93 % | HEIGHT: 69 IN | RESPIRATION RATE: 22 BRPM | TEMPERATURE: 98.1 F | HEART RATE: 85 BPM

## 2023-02-05 LAB
ALBUMIN SERPL-MCNC: 3 G/DL (ref 3.5–5.2)
ALBUMIN/GLOB SERPL: 1 G/DL
ALP SERPL-CCNC: 93 U/L (ref 39–117)
ALT SERPL W P-5'-P-CCNC: 8 U/L (ref 1–33)
ANION GAP SERPL CALCULATED.3IONS-SCNC: 7 MMOL/L (ref 5–15)
AST SERPL-CCNC: 14 U/L (ref 1–32)
BASOPHILS # BLD AUTO: 0.1 10*3/MM3 (ref 0–0.2)
BASOPHILS NFR BLD AUTO: 0.7 % (ref 0–1.5)
BILIRUB SERPL-MCNC: 0.4 MG/DL (ref 0–1.2)
BUN SERPL-MCNC: 3 MG/DL (ref 6–20)
BUN/CREAT SERPL: 5.6 (ref 7–25)
CALCIUM SPEC-SCNC: 8.4 MG/DL (ref 8.6–10.5)
CHLORIDE SERPL-SCNC: 105 MMOL/L (ref 98–107)
CO2 SERPL-SCNC: 27 MMOL/L (ref 22–29)
CREAT SERPL-MCNC: 0.54 MG/DL (ref 0.57–1)
DEPRECATED RDW RBC AUTO: 45.5 FL (ref 37–54)
EGFRCR SERPLBLD CKD-EPI 2021: 106.9 ML/MIN/1.73
EOSINOPHIL # BLD AUTO: 0.7 10*3/MM3 (ref 0–0.4)
EOSINOPHIL NFR BLD AUTO: 8.4 % (ref 0.3–6.2)
ERYTHROCYTE [DISTWIDTH] IN BLOOD BY AUTOMATED COUNT: 13.8 % (ref 12.3–15.4)
GLOBULIN UR ELPH-MCNC: 3.1 GM/DL
GLUCOSE SERPL-MCNC: 126 MG/DL (ref 65–99)
HCT VFR BLD AUTO: 36.8 % (ref 34–46.6)
HGB BLD-MCNC: 12.5 G/DL (ref 12–15.9)
LYMPHOCYTES # BLD AUTO: 1.8 10*3/MM3 (ref 0.7–3.1)
LYMPHOCYTES NFR BLD AUTO: 22.1 % (ref 19.6–45.3)
MAGNESIUM SERPL-MCNC: 2.1 MG/DL (ref 1.6–2.6)
MCH RBC QN AUTO: 30.5 PG (ref 26.6–33)
MCHC RBC AUTO-ENTMCNC: 34 G/DL (ref 31.5–35.7)
MCV RBC AUTO: 89.6 FL (ref 79–97)
MONOCYTES # BLD AUTO: 0.5 10*3/MM3 (ref 0.1–0.9)
MONOCYTES NFR BLD AUTO: 6.1 % (ref 5–12)
NEUTROPHILS NFR BLD AUTO: 5.2 10*3/MM3 (ref 1.7–7)
NEUTROPHILS NFR BLD AUTO: 62.7 % (ref 42.7–76)
NRBC BLD AUTO-RTO: 0.1 /100 WBC (ref 0–0.2)
PHOSPHATE SERPL-MCNC: 3.3 MG/DL (ref 2.5–4.5)
PLATELET # BLD AUTO: 371 10*3/MM3 (ref 140–450)
PMV BLD AUTO: 7.3 FL (ref 6–12)
POTASSIUM SERPL-SCNC: 3.9 MMOL/L (ref 3.5–5.2)
PROT SERPL-MCNC: 6.1 G/DL (ref 6–8.5)
RBC # BLD AUTO: 4.11 10*6/MM3 (ref 3.77–5.28)
SODIUM SERPL-SCNC: 139 MMOL/L (ref 136–145)
WBC NRBC COR # BLD: 8.3 10*3/MM3 (ref 3.4–10.8)

## 2023-02-05 PROCEDURE — 83735 ASSAY OF MAGNESIUM: CPT | Performed by: INTERNAL MEDICINE

## 2023-02-05 PROCEDURE — 25010000002 HYDROMORPHONE 1 MG/ML SOLUTION: Performed by: HOSPITALIST

## 2023-02-05 PROCEDURE — 85025 COMPLETE CBC W/AUTO DIFF WBC: CPT | Performed by: INTERNAL MEDICINE

## 2023-02-05 PROCEDURE — 80053 COMPREHEN METABOLIC PANEL: CPT | Performed by: INTERNAL MEDICINE

## 2023-02-05 PROCEDURE — 84100 ASSAY OF PHOSPHORUS: CPT | Performed by: INTERNAL MEDICINE

## 2023-02-05 PROCEDURE — 25010000002 PIPERACILLIN SOD-TAZOBACTAM PER 1 G: Performed by: NURSE PRACTITIONER

## 2023-02-05 RX ORDER — ONDANSETRON 4 MG/1
4 TABLET, FILM COATED ORAL EVERY 8 HOURS PRN
Qty: 30 TABLET | Refills: 0 | Status: SHIPPED | OUTPATIENT
Start: 2023-02-05 | End: 2023-04-04 | Stop reason: SDUPTHER

## 2023-02-05 RX ADMIN — PANTOPRAZOLE SODIUM 40 MG: 40 INJECTION, POWDER, LYOPHILIZED, FOR SOLUTION INTRAVENOUS at 05:27

## 2023-02-05 RX ADMIN — HYDROMORPHONE HYDROCHLORIDE 1 MG: 1 INJECTION, SOLUTION INTRAMUSCULAR; INTRAVENOUS; SUBCUTANEOUS at 09:58

## 2023-02-05 RX ADMIN — PIPERACILLIN AND TAZOBACTAM 3.38 G: 3; .375 INJECTION, POWDER, LYOPHILIZED, FOR SOLUTION INTRAVENOUS at 09:41

## 2023-02-05 RX ADMIN — SODIUM CHLORIDE, POTASSIUM CHLORIDE, SODIUM LACTATE AND CALCIUM CHLORIDE 150 ML/HR: 600; 310; 30; 20 INJECTION, SOLUTION INTRAVENOUS at 05:28

## 2023-02-05 RX ADMIN — Medication 10 ML: at 10:15

## 2023-02-05 RX ADMIN — HYDROMORPHONE HYDROCHLORIDE 1 MG: 1 INJECTION, SOLUTION INTRAMUSCULAR; INTRAVENOUS; SUBCUTANEOUS at 03:40

## 2023-02-05 NOTE — OUTREACH NOTE
Prep Survey    Flowsheet Row Responses   [facility] patient discharged from? [facility]   Is LACE score < 7 ? No   Eligibility [facility]   Date of Admission 01/31/23   Date of Discharge 02/05/23   Discharge Disposition Home or Self Care   Discharge diagnosis Acute pancreatitis with infected necrosis   Does the patient have one of the following disease processes/diagnoses(primary or secondary)? Other   Does the patient have Home health ordered? No   Is there a DME ordered? No   Prep survey completed? Yes          CANDIDA DESAI - Registered Nurse

## 2023-02-05 NOTE — DISCHARGE SUMMARY
[facility] Medicine Services  DISCHARGE SUMMARY    Patient Name: Rahel Ramsey  : 1965  MRN: [id number]    Discharge condition: Stabilized  Date of Admission: 2023  Discharge Diagnosis: Acute gallstone pancreatitis  Date of Discharge: 2023  Primary Care Physician: Vickie Gomez APRN      Presenting Problem:   History of lung cancer [Z85.118]  Left sided abdominal pain [R10.9]  Diarrhea, unspecified type [R19.7]  Acute pancreatitis with infected necrosis, unspecified pancreatitis type [K85.92]    Active and Resolved Hospital Problems:  Active Hospital Problems    Diagnosis POA   • **Acute pancreatitis with infected necrosis, unspecified pancreatitis type [K85.92] Yes   • History of lung cancer [Z85.118] Not Applicable   • Diarrhea [R19.7] Unknown   • Upper abdominal pain [R10.10] Yes   • Choledocholithiasis [K80.50] Unknown   • Primary cancer of right upper lobe of lung (HCC) [C34.11] Yes   • Essential hypertension [I10] Yes   • Mixed hyperlipidemia [E78.2] Yes      Resolved Hospital Problems   No resolved problems to display.         Hospital Course     Hospital Course:  Rahel Ramsey is a 57 y.o. female who presented to the hospital with abdominal pain.  The patient was evaluated and diagnosed with acute gallstone pancreatitis.  Patient had a MRCP followed by ERCP.  Her pain was controlled and she was maintained on IV antibiotics and fluids.  Patient was seen by general surgery and delayed cholecystectomy was planned due to a lot of swelling and persistent pancreatitis.  Once the patient was feeling better and tolerating p.o. diet she was cleared by GI and surgery and sent home in stable condition with stable vitals.            Reasons For Change In Medications and Indications for New Medications:      Day of Discharge     Vital Signs:  Temp:  [97.8 °F (36.6 °C)-98.5 °F (36.9 °C)] 98.5 °F (36.9 °C)  Heart Rate:  [75-93] 85  Resp:  [16-24] 16  BP:  (139-155)/(71-80) 155/78    Physical Exam:  Physical Exam  Vitals reviewed.   HENT:      Head: Normocephalic.      Nose: Nose normal.      Mouth/Throat:      Mouth: Mucous membranes are moist.   Cardiovascular:      Rate and Rhythm: Normal rate and regular rhythm.      Pulses: Normal pulses.      Heart sounds: Normal heart sounds.   Pulmonary:      Effort: Pulmonary effort is normal.      Breath sounds: Normal breath sounds.   Abdominal:      General: Abdomen is flat.      Palpations: Abdomen is soft.   Musculoskeletal:         General: Normal range of motion.      Cervical back: Normal range of motion.   Skin:     General: Skin is warm.   Neurological:      General: No focal deficit present.      Mental Status: She is alert and oriented to person, place, and time.   Psychiatric:         Mood and Affect: Mood normal.         Behavior: Behavior normal.            Pertinent  and/or Most Recent Results     LAB RESULTS:      Lab 02/05/23 0518 02/04/23  0642 02/03/23  0541 02/02/23  0522 02/01/23  1656 02/01/23  0507 01/31/23  0607   WBC 8.30 9.00 9.90 9.30  --  10.30 14.70*   HEMOGLOBIN 12.5 12.0 12.7 12.9  --  14.1 16.7*   HEMATOCRIT 36.8 36.9 39.6 38.1  --  41.4 49.0*   PLATELETS 371 351 362 347  --  403 575*   NEUTROS ABS 5.20 5.80 7.00 6.30  --  6.50 10.50*   LYMPHS ABS 1.80 1.70 1.60 1.80  --  2.60 3.00   MONOS ABS 0.50 0.70 0.60 0.60  --  0.70 0.60   EOS ABS 0.70* 0.80* 0.60* 0.50*  --  0.50* 0.40   MCV 89.6 91.3 91.7 91.6  --  88.8 87.9   SED RATE  --   --  39* 28  --  32*  --    CRP  --   --  15.98* 12.71* 11.40*  --  5.04*   PROCALCITONIN  --   --   --   --   --  0.17  --          Lab 02/05/23 0518 02/04/23  0642 02/03/23  0541 02/02/23  0522 02/01/23  1656   SODIUM 139 139 140 139 137   POTASSIUM 3.9 3.7 3.8 3.7 3.7   CHLORIDE 105 103 103 103 99   CO2 27.0 28.0 27.0 26.0 28.0   ANION GAP 7.0 8.0 10.0 10.0 10.0   BUN 3* 4* 7 8 9   CREATININE 0.54* 0.60 0.57 0.69 0.68   EGFR 106.9 104.2 105.5 100.7 101.1    GLUCOSE 126* 90 141* 152* 127*   CALCIUM 8.4* 8.0* 8.5* 8.6 9.1   MAGNESIUM 2.1 2.1 1.9 2.0 1.8   PHOSPHORUS 3.3 3.2 3.7 4.4 3.9         Lab 02/05/23  0518 02/04/23  0642 02/03/23  0541 02/02/23  0522 02/01/23  1656 01/31/23  0607   TOTAL PROTEIN 6.1 5.5* 6.0 5.8* 6.1 8.1   ALBUMIN 3.0* 3.0* 2.9* 3.1* 3.4* 4.3   GLOBULIN 3.1 2.5 3.1 2.7 2.7 3.8   ALT (SGPT) 8 8 8 9 12 17   AST (SGOT) 14 14 14 17 17 18   BILIRUBIN 0.4 0.5 0.6 0.7 0.7 0.7   ALK PHOS 93 87 99 101 98 120*   AMYLASE  --   --   --   --  253*  --    LIPASE  --   --  156* 205* 257* 493*             Lab 01/31/23  0607   CHOLESTEROL 154   LDL CHOL 78   HDL CHOL 36*   TRIGLYCERIDES 241*             Brief Urine Lab Results  (Last result in the past 365 days)      Color   Clarity   Blood   Leuk Est   Nitrite   Protein   CREAT   Urine HCG        01/31/23 1545 Yellow   Cloudy  Comment: Result checked     Negative   Small (1+)   Negative   Trace               Microbiology Results (last 10 days)     Procedure Component Value - Date/Time    COVID-19 and FLU A/B PCR - Swab, Nasopharynx [[id number]]  (Normal) Collected: 01/31/23 0608    Lab Status: Final result Specimen: Swab from Nasopharynx Updated: 01/31/23 0630     COVID19 Not Detected     Influenza A PCR Not Detected     Influenza B PCR Not Detected    Narrative:      Fact sheet for providers: https://www.fda.gov/media/227891/download    Fact sheet for patients: https://www.fda.gov/media/347210/download    Test performed by PCR.          XR Chest 2 View    Result Date: 1/24/2023  Impression: Impression: Bibasilar linear consolidation suggesting atelectasis. Question small amounts of bilateral pleural fluid. Right hilar nodular density may be due to adenopathy. This could be more definitively characterized by CT. Electronically Signed: Alberto Nicole  1/24/2023 1:45 PM EST  Workstation ID: [id number]    CT Abdomen Pelvis With Contrast    Result Date: 1/31/2023  Impression: Impression: 1. Acute pancreatitis with small  areas of suspected pancreatic necrosis in the pancreatic tail and head. There is a thin-walled fluid collection surrounding the pancreatic body and tail that may represent pseudocyst formation. 2. Hepatomegaly and hepatic steatosis. 3. Cholelithiasis without evidence of acute cholecystitis or biliary distention. 4. Small right-sided pleural effusion. Electronically Signed: Hu Thakur  1/31/2023 7:24 AM EST  Workstation ID: [id number]    XR Chest 1 View    Result Date: 1/12/2023  Impression: Impression: 1. Right chest tube has been removed. Only a tiny right apical pneumothorax remains. 2. Stable right chest wall subcutaneous air. 3. Mild groundglass alveolar disease in the paramediastinal right upper lobe, improved since the prior examination, may represent improving postoperative contusion, atelectasis or pneumonia. 4. Stable minimal left basilar atelectasis. Electronically Signed: Alisha Santos  1/12/2023 8:18 AM EST  Workstation ID: [id number]    XR Chest 1 View    Result Date: 1/11/2023  Impression: Impression: 1.Right-sided chest tube in place. No pneumothorax identified. 2.Stable focal opacity within right upper lung. 3.Subcutaneous emphysema along right lateral chest wall appears unchanged. Electronically Signed: Naman Barnes  1/11/2023 8:22 AM EST  Workstation ID: [id number]    XR Chest 1 View    Result Date: 1/10/2023  Impression: Impression: 1. Right-sided chest tube remains in satisfactory position. No evidence of significant pneumothorax. There is persistent subcutaneous emphysema on the right. 2. Perihilar opacities are overall similar compared to prior exam given differences in technique. Electronically Signed: Josh Murillo  1/10/2023 7:49 AM EST  Workstation ID: [id number]    XR Chest 1 View    Result Date: 1/9/2023  Impression: Impression: 1. Right-sided thoracostomy tube in place. No evidence pneumothorax. There is a large amount of subcutaneous air over the right lateral chest wall base of the right  neck. 2. Right perihilar and right basilar airspace disease favored to be due to atelectasis. Electronically Signed: Josh Purdy  1/9/2023 2:37 PM EST  Workstation ID: [id number]    MRI abdomen wo contrast mrcp    Result Date: 1/31/2023  Impression: 1. Pancreatitis with focal necrosis in the tail the pancreas and partially loculated complex fluid collection around the tail of pancreas measuring 10 x 3.8 cm as seen on recent CT, indicative of a developing pseudocyst. 2. Gallstones with mild dilation of the common duct and distal common duct stones. There is a small amount of, correlation with any clinical signs and symptoms of acute cholecystitis is recommended. This may be reactive from the pancreatitis. 3. Diffuse fatty infiltration of the liver with a small amount of surrounding free fluid as well as a small right pleural effusion. Electronically Signed: Olu Berrios  1/31/2023 5:46 PM EST  Workstation ID: [id number]    FL ercp biliary duct only    Result Date: 2/1/2023  Impression: Impression: ERCP with fluoroscopy. Please refer to the procedure report for findings and recommendations. Electronically Signed: Alisha Santos  2/1/2023 4:30 PM EST  Workstation ID: [id number]      Results for orders placed during the hospital encounter of 08/25/22    Duplex Carotid Ultrasound CAR    Interpretation Summary  · Right internal carotid artery mild stenosis.  · Left internal carotid artery mild stenosis.      Results for orders placed during the hospital encounter of 08/25/22    Duplex Carotid Ultrasound CAR    Interpretation Summary  · Right internal carotid artery mild stenosis.  · Left internal carotid artery mild stenosis.          Labs Pending at Discharge:      Procedures Performed  Procedure(s):  ENDOSCOPIC RETROGRADE CHOLANGIOPANCREATOGRAPHY with sphicterotomy and balloon guided sweeping of bile duct up to 9mm and placement of 10Fr x 9cm biliary stent         Consults:   Consults     Date and Time Order Name Status  Description    2/2/2023  3:59 PM Inpatient General Surgery Consult Completed     1/31/2023 10:53 AM Inpatient Gastroenterology Consult              Discharge Details        Discharge Medications      New Medications      Instructions Start Date   ondansetron 4 MG tablet  Commonly known as: Zofran   4 mg, Oral, Every 8 Hours PRN         Continue These Medications      Instructions Start Date   albuterol sulfate  (90 Base) MCG/ACT inhaler  Commonly known as: PROVENTIL HFA;VENTOLIN HFA;PROAIR HFA   2 puffs, Inhalation, Every 4 Hours PRN      amitriptyline 25 MG tablet  Commonly known as: ELAVIL   25 mg, Oral, Every Night at Bedtime, May take 1 to 2 tablets at bedtime if needed.      aspirin 81 MG EC tablet   81 mg, Oral, Daily      fenofibrate 145 MG tablet  Commonly known as: TRICOR   145 mg, Oral, Daily      fluticasone 50 MCG/ACT nasal spray  Commonly known as: FLONASE   SHAKE LIQUID AND USE 2 SPRAYS IN EACH NOSTRIL EVERY DAY      Fluticasone-Umeclidin-Vilant 100-62.5-25 MCG/ACT inhaler  Commonly known as: TRELEGY   1 puff, Inhalation, Daily - RT      HYDROcodone-acetaminophen  MG per tablet  Commonly known as: NORCO   1 tablet, Oral, Every 6 Hours PRN      metFORMIN 500 MG tablet  Commonly known as: GLUCOPHAGE   500 mg, Oral, Daily With Breakfast      methocarbamol 500 MG tablet  Commonly known as: ROBAXIN   500 mg, Oral, Every 8 Hours Scheduled      metoprolol succinate XL 25 MG 24 hr tablet  Commonly known as: TOPROL-XL   25 mg, Oral, Daily      nystatin 920606 UNIT/GM cream  Commonly known as: MYCOSTATIN   1 application, Topical, As Needed      pregabalin 100 MG capsule  Commonly known as: Lyrica   100 mg, Oral, 2 Times Daily      vitamin D 1.25 MG (83628 UT) capsule capsule  Commonly known as: ERGOCALCIFEROL   TAKE 1 CAPSULE BY MOUTH EVERY 7 DAYS             Allergies   Allergen Reactions   • Morphine Anaphylaxis and Nausea And Vomiting   • Percocet [Oxycodone-Acetaminophen] GI Intolerance   •  Tramadol GI Intolerance         Discharge Disposition:   Home or Self Care    Diet:  Hospital:  Diet Order   Procedures   • Diet: Gastrointestinal Diets; Fiber-Restricted; Texture: Regular Texture (IDDSI 7); Fluid Consistency: Thin (IDDSI 0)         Discharge Activity:         CODE STATUS:  Code Status and Medical Interventions:   Ordered at: 01/31/23 1411     Level Of Support Discussed With:    Patient     Code Status (Patient has no pulse and is not breathing):    CPR (Attempt to Resuscitate)     Medical Interventions (Patient has pulse or is breathing):    Full Support         Future Appointments   Date Time Provider Department Center   3/28/2023  8[facility]   4/4/2023  8:15 AM Vickie Gomez APRN [facility]   7/25/2023 10:30 AM Erwin Kearney MD [facility]   8/15/2023 10:45 AM Alexandro Farah MD [facility]           Time spent on Discharge including face to face service:  45 minutes    This patient has been examined wearing appropriate Personal Protective Equipment and discussed with Patient. 02/05/23      Signature: Mohit Phillips MD

## 2023-02-05 NOTE — PLAN OF CARE
Problem: Pain Chronic (Persistent) (Comorbidity Management)  Goal: Acceptable Pain Control and Functional Ability  Intervention: Manage Persistent Pain  Recent Flowsheet Documentation  Taken 2/4/2023 2000 by Alyce Frank RN  Bowel Elimination Promotion:   commode/bedpan at bedside   adequate fluid intake promoted   privacy promoted  Medication Review/Management: medications reviewed     Problem: Pain Acute  Goal: Acceptable Pain Control and Functional Ability  Outcome: Ongoing, Progressing     Problem: Pain Acute  Goal: Acceptable Pain Control and Functional Ability  Intervention: Prevent or Manage Pain  Recent Flowsheet Documentation  Taken 2/4/2023 2000 by Alyce Frank RN  Bowel Elimination Promotion:   commode/bedpan at bedside   adequate fluid intake promoted   privacy promoted  Medication Review/Management: medications reviewed   Goal Outcome Evaluation:  Plan of Care Reviewed With: patient        Progress: no change  Outcome Evaluation: Pt cont to complaint about abdomen pain, see mar. Education done on narcotics. Pt has had multiple bowel movements. Pt able to make her needs known, Pt rested post medicated, will cont to monitor.

## 2023-02-05 NOTE — PLAN OF CARE
Goal Outcome Evaluation:  Plan of Care Reviewed With: patient        Progress: no change  Outcome Evaluation: Pt has been resting on and off this shift. Pt stil c/o nausea and abdominal pain. PRN medications given and somewhat effective. MD aware. Pt has not had a bowel movement in a few days. MD ordered Sorbitol as pt abdomen appeared distended. Sorbitol was effective. Pt continues with iv fluids and iv abt and tolerating well. Pt diet was upgraded to gi soft, pt able to tolerate small bites. MD and GI aware. Will continue to monitor and document as needed.

## 2023-02-06 ENCOUNTER — TRANSITIONAL CARE MANAGEMENT TELEPHONE ENCOUNTER (OUTPATIENT)
Dept: CALL CENTER | Facility: HOSPITAL | Age: 58
End: 2023-02-06
Payer: MEDICARE

## 2023-02-06 NOTE — OUTREACH NOTE
Call Center TCM Note    Flowsheet Row Responses   [facility] patient discharged from? [facility]   Does the patient have one of the following disease processes/diagnoses(primary or secondary)? Other   TCM attempt successful? Yes   Call start time 1630   Call end time 1633   Discharge diagnosis Acute pancreatitis with infected necrosis   Meds reviewed with patient/caregiver? Yes   Does the patient have all medications ordered at discharge? No   Nursing Interventions No intervention needed   Prescription comments PATIENT STATES SHE IS TRYING TO FIND SOMEONE TO  HER PRESCRIPTION FOR HER TODAY   Is the patient taking all medications as directed (includes completed medication regime)? N/A   Does the patient have an appointment with their PCP within 7 days of discharge? No   Nursing Interventions Patient declined scheduling/rescheduling appointment at this time  [PATIENT STATES SHE IS LAYING DOWN AND DOES NOT FEEL LIKE GETTING UP TO GET HER CALENDAR OR WRITE DOWN AN APPOINTMENT]   Has home health visited the patient within 72 hours of discharge? N/A   Psychosocial issues? No   Did the patient receive a copy of their discharge instructions? Yes   Nursing interventions Reviewed instructions with patient   What is the patient's perception of their health status since discharge? Same   Is the patient/caregiver able to teach back signs and symptoms related to disease process for when to call PCP? Yes   Is the patient/caregiver able to teach back signs and symptoms related to disease process for when to call 911? Yes   Is the patient/caregiver able to teach back the hierarchy of who to call/visit for symptoms/problems? PCP, Specialist, Home health nurse, Urgent Care, ED, 911 Yes   If the patient is a current smoker, are they able to teach back resources for cessation? Not a smoker   TCM call completed? Yes   Call end time 1633   Would this patient benefit from a Referral to [facility] Social Work? No   Is the patient  interested in additional calls from an ambulatory ?  NOTE:  applies to high risk patients requiring additional follow-up. Martha Rick LPN    2/6/2023, 16:35 EST

## 2023-02-06 NOTE — OUTREACH NOTE
Call Center TCM Note    Flowsheet Row Responses   [facility] patient discharged from? [facility]   Does the patient have one of the following disease processes/diagnoses(primary or secondary)? Other   TCM attempt successful? No   Unsuccessful attempts Attempt 1          Malorie Rick LPN    2/6/2023, 15:46 EST

## 2023-02-06 NOTE — TELEPHONE ENCOUNTER
Returned Pt call when I saw that she was out of Hosp. Left v/m asking her to call so we may r/s her appt

## 2023-02-08 NOTE — PROGRESS NOTES
"Enter Query Response Below      Query Response:     Pseudocyst ruled in and POA    Electronically signed by Mohit Phillips MD, 23, 7:47 AM EST.         If applicable, please update the problem list.      Patient: Rahel Ramsey        : 1965  Account: [id number]           Admit Date: 2023        How to Respond to this query:       a. Click New Note     b. Answer query within the yellow box.                c. Update the Problem List, if applicable.    Dr. Phillips,    Patient presents with 6 days of diarrhea, vomiting, fever and chills. Patient found to have acute pancreatitis. CT Abdomen Pelvis, H&P - 23 Hospitalist Note with \"thin-walled fluid collection surrounding the pancreatic body and tail that may represent pseudocyst formation.\" 23 ERCP with balloon sweep and stent placement.    Please clarify,  -Pseudocyst ruled in (please clarify present on admission status)  -Pseudocyst ruled out  -Other-specify __________  -Unable to determine    By submitting this query, we are merely seeking further clarification of documentation to accurately reflect all conditions that you are monitoring, evaluating, treating or that extend the hospitalization or utilize additional resources of care. Please utilize your independent clinical judgment when addressing the question(s) above.     This query and your response, once completed, will be entered into the legal medical record.    Sincerely,  Nhi Meza RN, BSN  [email or web address]  Clinical Documentation Integrity Program   "

## 2023-02-15 ENCOUNTER — HOSPITAL ENCOUNTER (EMERGENCY)
Facility: HOSPITAL | Age: 58
Discharge: HOME | End: 2023-02-15
Attending: EMERGENCY MEDICINE | Admitting: EMERGENCY MEDICINE
Payer: MEDICARE

## 2023-02-15 ENCOUNTER — APPOINTMENT (OUTPATIENT)
Dept: CT IMAGING | Facility: HOSPITAL | Age: 58
End: 2023-02-15
Payer: MEDICARE

## 2023-02-15 ENCOUNTER — READMISSION MANAGEMENT (OUTPATIENT)
Dept: CALL CENTER | Facility: HOSPITAL | Age: 58
End: 2023-02-15
Payer: MEDICARE

## 2023-02-15 VITALS
SYSTOLIC BLOOD PRESSURE: 138 MMHG | TEMPERATURE: 98.2 F | HEIGHT: 69 IN | WEIGHT: 176.37 LBS | DIASTOLIC BLOOD PRESSURE: 70 MMHG | BODY MASS INDEX: 26.12 KG/M2 | RESPIRATION RATE: 18 BRPM | HEART RATE: 76 BPM | OXYGEN SATURATION: 96 %

## 2023-02-15 DIAGNOSIS — N39.0 URINARY TRACT INFECTION WITHOUT HEMATURIA, SITE UNSPECIFIED: ICD-10-CM

## 2023-02-15 DIAGNOSIS — R10.9 ABDOMINAL PAIN, UNSPECIFIED ABDOMINAL LOCATION: Primary | ICD-10-CM

## 2023-02-15 LAB
ALBUMIN SERPL-MCNC: 4.3 G/DL (ref 3.5–5.2)
ALBUMIN/GLOB SERPL: 1.1 G/DL
ALP SERPL-CCNC: 109 U/L (ref 39–117)
ALT SERPL W P-5'-P-CCNC: 39 U/L (ref 1–33)
ANION GAP SERPL CALCULATED.3IONS-SCNC: 13 MMOL/L (ref 5–15)
AST SERPL-CCNC: 46 U/L (ref 1–32)
BACTERIA UR QL AUTO: ABNORMAL /HPF
BASOPHILS # BLD AUTO: 0 10*3/MM3 (ref 0–0.2)
BASOPHILS NFR BLD AUTO: 0.2 % (ref 0–1.5)
BILIRUB SERPL-MCNC: 0.7 MG/DL (ref 0–1.2)
BILIRUB UR QL STRIP: ABNORMAL
BUN SERPL-MCNC: 11 MG/DL (ref 6–20)
BUN/CREAT SERPL: 14.5 (ref 7–25)
CALCIUM SPEC-SCNC: 10.1 MG/DL (ref 8.6–10.5)
CHLORIDE SERPL-SCNC: 99 MMOL/L (ref 98–107)
CLARITY UR: ABNORMAL
CO2 SERPL-SCNC: 24 MMOL/L (ref 22–29)
COD CRY URNS QL: ABNORMAL /HPF
COLOR UR: ABNORMAL
CREAT SERPL-MCNC: 0.76 MG/DL (ref 0.57–1)
DEPRECATED RDW RBC AUTO: 45.1 FL (ref 37–54)
EGFRCR SERPLBLD CKD-EPI 2021: 91 ML/MIN/1.73
EOSINOPHIL # BLD AUTO: 0.3 10*3/MM3 (ref 0–0.4)
EOSINOPHIL NFR BLD AUTO: 2.8 % (ref 0.3–6.2)
ERYTHROCYTE [DISTWIDTH] IN BLOOD BY AUTOMATED COUNT: 14.3 % (ref 12.3–15.4)
GLOBULIN UR ELPH-MCNC: 3.9 GM/DL
GLUCOSE SERPL-MCNC: 129 MG/DL (ref 65–99)
GLUCOSE UR STRIP-MCNC: NEGATIVE MG/DL
HCT VFR BLD AUTO: 48 % (ref 34–46.6)
HGB BLD-MCNC: 15.7 G/DL (ref 12–15.9)
HGB UR QL STRIP.AUTO: ABNORMAL
HYALINE CASTS UR QL AUTO: ABNORMAL /LPF
KETONES UR QL STRIP: ABNORMAL
LEUKOCYTE ESTERASE UR QL STRIP.AUTO: ABNORMAL
LIPASE SERPL-CCNC: 51 U/L (ref 13–60)
LYMPHOCYTES # BLD AUTO: 3.4 10*3/MM3 (ref 0.7–3.1)
LYMPHOCYTES NFR BLD AUTO: 28.9 % (ref 19.6–45.3)
MCH RBC QN AUTO: 29.6 PG (ref 26.6–33)
MCHC RBC AUTO-ENTMCNC: 32.7 G/DL (ref 31.5–35.7)
MCV RBC AUTO: 90.4 FL (ref 79–97)
MONOCYTES # BLD AUTO: 0.7 10*3/MM3 (ref 0.1–0.9)
MONOCYTES NFR BLD AUTO: 5.6 % (ref 5–12)
NEUTROPHILS NFR BLD AUTO: 62.5 % (ref 42.7–76)
NEUTROPHILS NFR BLD AUTO: 7.3 10*3/MM3 (ref 1.7–7)
NITRITE UR QL STRIP: POSITIVE
NRBC BLD AUTO-RTO: 0 /100 WBC (ref 0–0.2)
PH UR STRIP.AUTO: <=5 [PH] (ref 5–8)
PLATELET # BLD AUTO: 384 10*3/MM3 (ref 140–450)
PMV BLD AUTO: 7.2 FL (ref 6–12)
POTASSIUM SERPL-SCNC: 4.3 MMOL/L (ref 3.5–5.2)
PROT SERPL-MCNC: 8.2 G/DL (ref 6–8.5)
PROT UR QL STRIP: ABNORMAL
RBC # BLD AUTO: 5.31 10*6/MM3 (ref 3.77–5.28)
RBC # UR STRIP: ABNORMAL /HPF
REF LAB TEST METHOD: ABNORMAL
SODIUM SERPL-SCNC: 136 MMOL/L (ref 136–145)
SP GR UR STRIP: 1.03 (ref 1–1.03)
SQUAMOUS #/AREA URNS HPF: ABNORMAL /HPF
UROBILINOGEN UR QL STRIP: ABNORMAL
WBC # UR STRIP: ABNORMAL /HPF
WBC NRBC COR # BLD: 11.7 10*3/MM3 (ref 3.4–10.8)

## 2023-02-15 PROCEDURE — 99283 EMERGENCY DEPT VISIT LOW MDM: CPT

## 2023-02-15 PROCEDURE — 74177 CT ABD & PELVIS W/CONTRAST: CPT

## 2023-02-15 PROCEDURE — 83690 ASSAY OF LIPASE: CPT | Performed by: PHYSICIAN ASSISTANT

## 2023-02-15 PROCEDURE — 87086 URINE CULTURE/COLONY COUNT: CPT | Performed by: PHYSICIAN ASSISTANT

## 2023-02-15 PROCEDURE — 80053 COMPREHEN METABOLIC PANEL: CPT | Performed by: PHYSICIAN ASSISTANT

## 2023-02-15 PROCEDURE — 85025 COMPLETE CBC W/AUTO DIFF WBC: CPT | Performed by: PHYSICIAN ASSISTANT

## 2023-02-15 PROCEDURE — 0 IOPAMIDOL PER 1 ML: Performed by: EMERGENCY MEDICINE

## 2023-02-15 PROCEDURE — 81001 URINALYSIS AUTO W/SCOPE: CPT | Performed by: PHYSICIAN ASSISTANT

## 2023-02-15 RX ORDER — SODIUM CHLORIDE 0.9 % (FLUSH) 0.9 %
10 SYRINGE (ML) INJECTION AS NEEDED
Status: DISCONTINUED | OUTPATIENT
Start: 2023-02-15 | End: 2023-02-16 | Stop reason: HOSPADM

## 2023-02-15 RX ORDER — CEFDINIR 300 MG/1
300 CAPSULE ORAL 2 TIMES DAILY
Qty: 14 CAPSULE | Refills: 0 | Status: SHIPPED | OUTPATIENT
Start: 2023-02-15 | End: 2023-02-22

## 2023-02-15 RX ADMIN — IOPAMIDOL 100 ML: 755 INJECTION, SOLUTION INTRAVENOUS at 20:41

## 2023-02-15 NOTE — ED PROVIDER NOTES
Subjective    Provider in Triage Note  Patient is a 57-year-old female who presents today with constipation for 8 days. She was previously hospitalized for acute gallstone pancreatitis and discharged on the 2/5/2023.  Surgery stated she will likely need cholecystectomy in the future. patient states states she takes hydrocodone daily.  Patient has taken MiraLAX with no relief. Patient admits to nausea.  Patient denies vomiting, fever, chills.  She also reports centralized abdominal pain.      History of Present Illness    Review of Systems   Constitutional: Negative for chills and fever.   HENT: Negative for congestion.    Respiratory: Negative for cough and shortness of breath.    Cardiovascular: Negative for chest pain.   Gastrointestinal: Positive for abdominal pain, constipation and nausea. Negative for vomiting.   Genitourinary: Positive for dysuria.   Musculoskeletal: Negative for back pain.   Neurological: Negative for headaches.       Past Medical History:   Diagnosis Date   • Allergic    • Arthritis    • Atrial fibrillation (HCC)    • COPD (chronic obstructive pulmonary disease) (HCC)    • Diabetes mellitus (HCC)    • Headache    • History of herniated intervertebral disc    • History of skin cancer     Left lower extremity   • Hyperlipidemia    • Hypertension    • Injury of back    • Leukocytosis    • Lung nodule    • Urinary tract infection with hematuria 01/22/2021       Allergies   Allergen Reactions   • Morphine Anaphylaxis and Nausea And Vomiting   • Percocet [Oxycodone-Acetaminophen] GI Intolerance   • Tramadol GI Intolerance       Past Surgical History:   Procedure Laterality Date   • BACK SURGERY     • CARDIAC CATHETERIZATION     • DILATATION AND CURETTAGE     • ERCP N/A 2/1/2023    Procedure: ENDOSCOPIC RETROGRADE CHOLANGIOPANCREATOGRAPHY with sphicterotomy and balloon guided sweeping of bile duct up to 9mm and placement of 10Fr x 9cm biliary stent;  Surgeon: Naman Blackmon MD;  Location:  "[facility] ENDOSCOPY;  Service: Gastroenterology;  Laterality: N/A;  post: choledocholithiasis   • LOBECTOMY Right 2023    Procedure: THORASCOPIC RIGHT UPPER LOBECTOMY WITH DAVINCI ROBOT, MEDIASTINAL LYMPH NODE DISSECTION;  Surgeon: Erwin Kearney MD;  Location: [facility] MAIN OR;  Service: Robotics - DaVinci;  Laterality: Right;   • LUMBAR DECOMPRESSION      L4-L5    • LUNG BIOPSY Right 2022   • SKIN CANCER EXCISION Left     Cao   • SUBTOTAL HYSTERECTOMY         Family History   Problem Relation Age of Onset   • Breast cancer Mother    • Lung cancer Father    • Lung cancer Other    • Colon cancer Other    • Skin cancer Other        Social History     Socioeconomic History   • Marital status:    Tobacco Use   • Smoking status: Former     Packs/day: 1.00     Years: 40.00     Pack years: 40.00     Types: Cigarettes     Quit date: 2023     Years since quittin.1   • Smokeless tobacco: Never   Vaping Use   • Vaping Use: Never used   Substance and Sexual Activity   • Alcohol use: Yes     Comment: rare   • Drug use: No   • Sexual activity: Defer       /73 (BP Location: Left arm, Patient Position: Lying)   Pulse 80   Temp 97.6 °F (36.4 °C) (Temporal)   Resp 20   Ht 175.3 cm (69\")   Wt 80 kg (176 lb 5.9 oz)   SpO2 96%   BMI 26.05 kg/m²       Objective   Physical Exam  Vitals and nursing note reviewed.   Constitutional:       Appearance: She is well-developed.   HENT:      Head: Normocephalic and atraumatic.      Mouth/Throat:      Mouth: Mucous membranes are moist.   Cardiovascular:      Rate and Rhythm: Normal rate and regular rhythm.      Heart sounds: Normal heart sounds.   Pulmonary:      Effort: Pulmonary effort is normal. No respiratory distress.      Breath sounds: Normal breath sounds.   Abdominal:      Palpations: Abdomen is soft.      Comments: Tender to palpation diffusely, no rebound or guarding   Skin:     General: Skin is warm and dry.   Neurological:      Mental Status: She is alert " and oriented to person, place, and time.         Procedures           ED Course      Results for orders placed or performed during the hospital encounter of 02/15/23   Comprehensive Metabolic Panel    Specimen: Blood   Result Value Ref Range    Glucose 129 (H) 65 - 99 mg/dL    BUN 11 6 - 20 mg/dL    Creatinine 0.76 0.57 - 1.00 mg/dL    Sodium 136 136 - 145 mmol/L    Potassium 4.3 3.5 - 5.2 mmol/L    Chloride 99 98 - 107 mmol/L    CO2 24.0 22.0 - 29.0 mmol/L    Calcium 10.1 8.6 - 10.5 mg/dL    Total Protein 8.2 6.0 - 8.5 g/dL    Albumin 4.3 3.5 - 5.2 g/dL    ALT (SGPT) 39 (H) 1 - 33 U/L    AST (SGOT) 46 (H) 1 - 32 U/L    Alkaline Phosphatase 109 39 - 117 U/L    Total Bilirubin 0.7 0.0 - 1.2 mg/dL    Globulin 3.9 gm/dL    A/G Ratio 1.1 g/dL    BUN/Creatinine Ratio 14.5 7.0 - 25.0    Anion Gap 13.0 5.0 - 15.0 mmol/L    eGFR 91.0 >60.0 mL/min/1.73   Lipase    Specimen: Blood   Result Value Ref Range    Lipase 51 13 - 60 U/L   Urinalysis With Culture If Indicated - Urine, Clean Catch    Specimen: Urine, Clean Catch   Result Value Ref Range    Color, UA Orange (A) Yellow, Straw    Appearance, UA Cloudy (A) Clear    pH, UA <=5.0 5.0 - 8.0    Specific Gravity, UA 1.030 1.005 - 1.030    Glucose, UA Negative Negative    Ketones, UA Trace (A) Negative    Bilirubin, UA Small (1+) (A) Negative    Blood, UA Trace (A) Negative    Protein, UA Trace (A) Negative    Leuk Esterase, UA Moderate (2+) (A) Negative    Nitrite, UA Positive (A) Negative    Urobilinogen, UA 1.0 E.U./dL 0.2 - 1.0 E.U./dL   CBC Auto Differential    Specimen: Blood   Result Value Ref Range    WBC 11.70 (H) 3.40 - 10.80 10*3/mm3    RBC 5.31 (H) 3.77 - 5.28 10*6/mm3    Hemoglobin 15.7 12.0 - 15.9 g/dL    Hematocrit 48.0 (H) 34.0 - 46.6 %    MCV 90.4 79.0 - 97.0 fL    MCH 29.6 26.6 - 33.0 pg    MCHC 32.7 31.5 - 35.7 g/dL    RDW 14.3 12.3 - 15.4 %    RDW-SD 45.1 37.0 - 54.0 fl    MPV 7.2 6.0 - 12.0 fL    Platelets 384 140 - 450 10*3/mm3    Neutrophil % 62.5 42.7 -  76.0 %    Lymphocyte % 28.9 19.6 - 45.3 %    Monocyte % 5.6 5.0 - 12.0 %    Eosinophil % 2.8 0.3 - 6.2 %    Basophil % 0.2 0.0 - 1.5 %    Neutrophils, Absolute 7.30 (H) 1.70 - 7.00 10*3/mm3    Lymphocytes, Absolute 3.40 (H) 0.70 - 3.10 10*3/mm3    Monocytes, Absolute 0.70 0.10 - 0.90 10*3/mm3    Eosinophils, Absolute 0.30 0.00 - 0.40 10*3/mm3    Basophils, Absolute 0.00 0.00 - 0.20 10*3/mm3    nRBC 0.0 0.0 - 0.2 /100 WBC   Urinalysis, Microscopic Only - Urine, Clean Catch    Specimen: Urine, Clean Catch   Result Value Ref Range    RBC, UA 0-2 (A) None Seen /HPF    WBC, UA 21-30 (A) None Seen /HPF    Bacteria, UA 2+ (A) None Seen /HPF    Squamous Epithelial Cells, UA 31-50 (A) None Seen, 0-2 /HPF    Hyaline Casts, UA 3-6 None Seen /LPF    Calcium Oxalate Crystals, UA Small/1+ None Seen /HPF    Methodology Manual Light Microscopy      CT Abdomen Pelvis With Contrast    Result Date: 2/15/2023  Impression: 1.Inflammatory changes along pancreas have slightly improved, suggesting improving pancreatitis. The multiloculated fluid collection along the pancreatic tail has also improved, most suggestive of pseudocyst. 2.CBD stent appears in good position. 3.Small right pleural effusion. 4.Hepatic steatosis. 5.Cholelithiasis. Electronically Signed: Naman Barnes  2/15/2023 8:52 PM EST  Workstation ID: [id number]                                         Medical Decision Making  Abdominal pain, unspecified abdominal location: acute illness or injury  Urinary tract infection without hematuria, site unspecified: acute illness or injury  Amount and/or Complexity of Data Reviewed  Labs: ordered. Decision-making details documented in ED Course.  Radiology: ordered. Decision-making details documented in ED Course.      Risk  Prescription drug management.        Patient had the above evaluation.  Results were discussed with the patient.  White blood cell count was 11.7.  CMP shows borderline elevation of LFTs and is otherwise  unremarkable.  Lipase is normal.  Urinalysis shows evidence of urinary tract infection with moderate leukocyte esterase, positive nitrites, 21-30 white blood cells, 2+ bacteria.  Patient states she has had some dysuria.  CT of the abdomen and pelvis shows evidence of improving pancreatitis.  Common bile duct stent appears in good position.  I see no indication for admission at this time.  Patient will be discharged with a prescription for cefdinir for urinary tract infection.  She is to follow-up with her gastroenterologist.      Final diagnoses:   Abdominal pain, unspecified abdominal location   Urinary tract infection without hematuria, site unspecified       ED Disposition  ED Disposition     ED Disposition   Discharge    Condition   Stable    Comment   --             Vickie Gomez, APRN  [address]  [place] [address] [phone]    Call in 2 days           Medication List      New Prescriptions    cefdinir 300 MG capsule  Commonly known as: OMNICEF  Take 1 capsule by mouth 2 (Two) Times a Day for 7 days.           Where to Get Your Medications      These medications were sent to [id number] - [place], [place] - [address] [place] [address] AT [address] [place] [address] - [phone] [facility] [phone] [address], [place] [address]    Phone: [phone]   · cefdinir 300 MG capsule          Kingston Love MD  02/15/23 1665

## 2023-02-15 NOTE — OUTREACH NOTE
Medical Week 2 Survey    Flowsheet Row Responses   [facility] [place] patient discharged from? [facility]   Does the patient have one of the following disease processes/diagnoses(primary or secondary)? Other   Week 2 attempt successful? Yes   Call start time 1443   Discharge diagnosis Acute pancreatitis with infected necrosis   Call end time 1445   Meds reviewed with patient/caregiver? Yes   Is the patient taking all medications as directed (includes completed medication regime)? Yes   Has the patient kept scheduled appointments due by today? N/A   What is the patient's perception of their health status since discharge? Same  [constipation and still having problems with stomach-plans to contact PCP after call to schedule an apt ]   Is the patient/caregiver able to teach back signs and symptoms related to disease process for when to call PCP? Yes   Is the patient/caregiver able to teach back signs and symptoms related to disease process for when to call 911? Yes   Is the patient/caregiver able to teach back the hierarchy of who to call/visit for symptoms/problems? PCP, Specialist, Home health nurse, Urgent Care, ED, 911 Yes   Week 2 Call Completed? Yes          Kim H - Registered Nurse

## 2023-02-16 LAB — BACTERIA SPEC AEROBE CULT: NORMAL

## 2023-02-16 NOTE — DISCHARGE INSTRUCTIONS
Follow-up with your primary doctor as well as your gastroenterologist.  Return to the emergency room for any new or worsening symptoms or if you have any other questions or concerns.  Take antibiotic as prescribed.

## 2023-02-21 ENCOUNTER — TELEPHONE (OUTPATIENT)
Dept: ONCOLOGY | Facility: CLINIC | Age: 58
End: 2023-02-21
Payer: MEDICARE

## 2023-02-21 NOTE — TELEPHONE ENCOUNTER
Caller: Rahel Ramsey    Relationship to patient: Self    Best call back number: [phone]    Type of visit: LAB & F/U 1     Requested date: CALL TO R/S     If rescheduling, when is the original appointment: 1/31/2023

## 2023-02-23 ENCOUNTER — TELEPHONE (OUTPATIENT)
Dept: ONCOLOGY | Facility: CLINIC | Age: 58
End: 2023-02-23
Payer: MEDICARE

## 2023-02-23 ENCOUNTER — READMISSION MANAGEMENT (OUTPATIENT)
Dept: CALL CENTER | Facility: HOSPITAL | Age: 58
End: 2023-02-23
Payer: MEDICARE

## 2023-02-23 NOTE — TELEPHONE ENCOUNTER
Caller: Rahel Ramsey    Relationship to patient: Self    Best call back number: [phone]    Chief complaint: PT NEEDS TO RESCHEDULE AN APPOINTMENT SHE MISSED    Type of visit: LAB AND FOLLOW UP    Requested date:  MORNING APPOINTMENTS, NOT 2-28-23    If rescheduling, when is the original appointment: 1-31-23     Additional notes: CAN SCHEDULE APPOINTMENT AND LEAVE MESSAGE FOR PATIENT

## 2023-02-23 NOTE — OUTREACH NOTE
Medical Week 3 Survey    Flowsheet Row Responses   [facility] patient discharged from? [facility]   Does the patient have one of the following disease processes/diagnoses(primary or secondary)? Other   Week 3 attempt successful? Yes   Call start time 1559   Call end time 1601   Discharge diagnosis Acute pancreatitis with infected necrosis   Meds reviewed with patient/caregiver? Yes   Is the patient having any side effects they believe may be caused by any medication additions or changes? No   Is the patient taking all medications as directed (includes completed medication regime)? Yes   Does the patient have a primary care provider?  Yes   Has the patient kept scheduled appointments due by today? N/A   Has home health visited the patient within 72 hours of discharge? N/A   Psychosocial issues? No   What is the patient's perception of their health status since discharge? Improving   Is the patient/caregiver able to teach back the hierarchy of who to call/visit for symptoms/problems? PCP, Specialist, Home health nurse, Urgent Care, ED, 911 Yes   Week 3 Call Completed? Yes   Graduated Yes   Graduated/Revoked comments Pt reports doing well, no concerns at this time          Blossom NULL - Registered Nurse

## 2023-03-13 RX ORDER — METOPROLOL SUCCINATE 25 MG/1
25 TABLET, EXTENDED RELEASE ORAL DAILY
Qty: 90 TABLET | Refills: 1 | Status: SHIPPED | OUTPATIENT
Start: 2023-03-13

## 2023-03-13 RX ORDER — METHOCARBAMOL 500 MG/1
TABLET, FILM COATED ORAL
Qty: 90 TABLET | Refills: 0 | Status: SHIPPED | OUTPATIENT
Start: 2023-03-13

## 2023-03-23 ENCOUNTER — OFFICE VISIT (OUTPATIENT)
Dept: ONCOLOGY | Facility: CLINIC | Age: 58
End: 2023-03-23
Payer: MEDICARE

## 2023-03-23 ENCOUNTER — LAB (OUTPATIENT)
Dept: LAB | Facility: HOSPITAL | Age: 58
End: 2023-03-23
Payer: MEDICARE

## 2023-03-23 VITALS
BODY MASS INDEX: 27.25 KG/M2 | HEART RATE: 71 BPM | RESPIRATION RATE: 18 BRPM | TEMPERATURE: 97 F | SYSTOLIC BLOOD PRESSURE: 144 MMHG | HEIGHT: 69 IN | WEIGHT: 184 LBS | DIASTOLIC BLOOD PRESSURE: 87 MMHG

## 2023-03-23 DIAGNOSIS — R91.1 RIGHT UPPER LOBE PULMONARY NODULE: Primary | ICD-10-CM

## 2023-03-23 DIAGNOSIS — C34.90 MALIGNANT NEOPLASM OF LUNG, UNSPECIFIED LATERALITY, UNSPECIFIED PART OF LUNG: Primary | ICD-10-CM

## 2023-03-23 DIAGNOSIS — R39.9 UTI SYMPTOMS: ICD-10-CM

## 2023-03-23 DIAGNOSIS — D72.829 LEUKOCYTOSIS, UNSPECIFIED TYPE: ICD-10-CM

## 2023-03-23 DIAGNOSIS — C34.90 MALIGNANT NEOPLASM OF LUNG, UNSPECIFIED LATERALITY, UNSPECIFIED PART OF LUNG: ICD-10-CM

## 2023-03-23 PROBLEM — Z86.0100 PERSONAL HISTORY OF COLONIC POLYPS: Status: ACTIVE | Noted: 2023-03-23

## 2023-03-23 PROBLEM — Z98.890 HISTORY OF BILIARY DUCT STENT PLACEMENT: Status: ACTIVE | Noted: 2023-03-23

## 2023-03-23 PROBLEM — Z86.010 PERSONAL HISTORY OF COLONIC POLYPS: Status: ACTIVE | Noted: 2023-03-23

## 2023-03-23 PROBLEM — E78.00 PURE HYPERCHOLESTEROLEMIA: Status: ACTIVE | Noted: 2023-03-23

## 2023-03-23 PROBLEM — E61.1 IRON DEFICIENCY: Status: ACTIVE | Noted: 2023-03-23

## 2023-03-23 PROBLEM — R13.10 DYSPHAGIA: Status: ACTIVE | Noted: 2023-03-23

## 2023-03-23 LAB
BASOPHILS # BLD AUTO: 0.05 10*3/MM3 (ref 0–0.2)
BASOPHILS NFR BLD AUTO: 0.6 % (ref 0–1.5)
BILIRUB UR QL STRIP: ABNORMAL
CLARITY UR: ABNORMAL
COLOR UR: ABNORMAL
DEPRECATED RDW RBC AUTO: 44.7 FL (ref 37–54)
EOSINOPHIL # BLD AUTO: 0.25 10*3/MM3 (ref 0–0.4)
EOSINOPHIL NFR BLD AUTO: 2.9 % (ref 0.3–6.2)
ERYTHROCYTE [DISTWIDTH] IN BLOOD BY AUTOMATED COUNT: 13.7 % (ref 12.3–15.4)
GLUCOSE UR STRIP-MCNC: NEGATIVE MG/DL
HCT VFR BLD AUTO: 43.3 % (ref 34–46.6)
HGB BLD-MCNC: 13.8 G/DL (ref 12–15.9)
HGB UR QL STRIP.AUTO: NEGATIVE
HOLD SPECIMEN: NORMAL
HOLD SPECIMEN: NORMAL
KETONES UR QL STRIP: NEGATIVE
LEUKOCYTE ESTERASE UR QL STRIP.AUTO: NEGATIVE
LYMPHOCYTES # BLD AUTO: 3.05 10*3/MM3 (ref 0.7–3.1)
LYMPHOCYTES NFR BLD AUTO: 35.4 % (ref 19.6–45.3)
MCH RBC QN AUTO: 29.1 PG (ref 26.6–33)
MCHC RBC AUTO-ENTMCNC: 31.9 G/DL (ref 31.5–35.7)
MCV RBC AUTO: 91.2 FL (ref 79–97)
MONOCYTES # BLD AUTO: 0.45 10*3/MM3 (ref 0.1–0.9)
MONOCYTES NFR BLD AUTO: 5.2 % (ref 5–12)
NEUTROPHILS NFR BLD AUTO: 4.82 10*3/MM3 (ref 1.7–7)
NEUTROPHILS NFR BLD AUTO: 55.9 % (ref 42.7–76)
NITRITE UR QL STRIP: NEGATIVE
PH UR STRIP.AUTO: 5.5 [PH] (ref 5–8)
PLATELET # BLD AUTO: 336 10*3/MM3 (ref 140–450)
PMV BLD AUTO: 8.5 FL (ref 6–12)
PROT UR QL STRIP: NEGATIVE
RBC # BLD AUTO: 4.75 10*6/MM3 (ref 3.77–5.28)
SP GR UR STRIP: >=1.03 (ref 1–1.03)
UROBILINOGEN UR QL STRIP: ABNORMAL
WBC NRBC COR # BLD: 8.62 10*3/MM3 (ref 3.4–10.8)

## 2023-03-23 PROCEDURE — 87086 URINE CULTURE/COLONY COUNT: CPT | Performed by: INTERNAL MEDICINE

## 2023-03-23 PROCEDURE — 85025 COMPLETE CBC W/AUTO DIFF WBC: CPT

## 2023-03-23 PROCEDURE — 3079F DIAST BP 80-89 MM HG: CPT | Performed by: INTERNAL MEDICINE

## 2023-03-23 PROCEDURE — 36415 COLL VENOUS BLD VENIPUNCTURE: CPT

## 2023-03-23 PROCEDURE — 99215 OFFICE O/P EST HI 40 MIN: CPT | Performed by: INTERNAL MEDICINE

## 2023-03-23 PROCEDURE — 81003 URINALYSIS AUTO W/O SCOPE: CPT | Performed by: INTERNAL MEDICINE

## 2023-03-23 PROCEDURE — 1125F AMNT PAIN NOTED PAIN PRSNT: CPT | Performed by: INTERNAL MEDICINE

## 2023-03-23 PROCEDURE — 3077F SYST BP >= 140 MM HG: CPT | Performed by: INTERNAL MEDICINE

## 2023-03-23 RX ORDER — PANCRELIPASE 36000; 180000; 114000 [USP'U]/1; [USP'U]/1; [USP'U]/1
36000 CAPSULE, DELAYED RELEASE PELLETS ORAL
COMMUNITY
Start: 2023-03-09 | End: 2023-03-30 | Stop reason: HOSPADM

## 2023-03-23 NOTE — PROGRESS NOTES
Hematology/Oncology Outpatient Consultation    Patient name: Rahel Ramsey  : 1965  MRN: [id number]  Primary Care Physician: Vickie Gomez APRN  Referring Physician: Vickie Gomez APRN  Reason For Consult:     Chief Complaint   Patient presents with   • Follow-up     Leukocytosis, unspecified type       History of Present Illness: Patient is here today for routine follow-up and does not have any specific complaints    This is a 57-year-old female who has been noted to have leukocytosis   from routine blood count.  Patient was also recently diagnosed with skin cancer on the left lower   extremity.  She underwent wire excisional biopsy of the lesion.  Her course was complicated by   wound infection.  She has been on two rounds of antibiotics and currently on Keflex which will be   finished in another three to four days.  She denies any fevers or chills, rigors.  Patient had no   prior history of any hematologic problems.  Review of her CBC from 17 revealed WBC of 12.4,   hemoglobin 15.6, platelet count 271,000.  Differentials were 65% neutrophils, 28% lymphocytes.    There was no basophilia, eosinophilia or monocytosis.  Her chemistry panel had BUN 7, creatinine   0.7.  Alkaline phosphatase was 11.  The rest was unremarkable.  Patient had a CBC repeated on   17 with WBC of 12.6, hemoglobin 15.4, platelet count of 310,000.  Differentials were   unremarkable.  B12 (N) 441.  Folate (N) 13.9.  BUN 5, creatinine 0.7.  Normal thyroid at 0.9   [range 0.3-5.5].  Patient denied any repeated history of infection in the past.  She was alone   for this appointment on 17.  · 17 - Patient had labs done to further evaluate her leukocytosis.  Her WBC was 14.4,   hemoglobin 16.6, platelet count 312,000, ANC 9.3.  Differentials were 64% neutrophils, 28%   lymphocytes.  CHRISTOPHE was negative.  B12 was 337.  LDH (N) 159.  BUN 10, creatinine 0.7.  Uric acid   5.3 (N).  MIRTA-2 was not mutated.  No evidence of  BCR-abl gene rearrangement.  Sed rate 32.    Peripheral smear showed absolute neutrophilia and leukocytosis and polycythemia, reactive vs.   myeloproliferative condition.  · 5/9/17 - Erythropoietin level was low-normal at 3.92.    · 8/17/17 - Ultrasound of the abdomen showed normal spleen size at 11.3 cm.  There was no splenic   lesion identified.    · 4/2/18 - Erythropoietin level 3.79 [range 2.59-18.5].   · 8/24/18 - Bone marrow biopsy showed normocellular marrow with no morphologic evidence of   myeloproliferative neoplasm, but she was found to have low level MIRTA-2 point mutation detected at   less than 10%.  The low level of MIRTA-2 mutation in assessment of leukocytosis and erythrocytosis   is worrisome for early and evolving myeloproliferative neoplasm.  Interval repeat marrow has been   recommended.  BCR-abl RT-PCR was negative.  Flow cytometry was negative.  Cellularity was 50%.    Iron stain was present.  Cytogenetics also showed normal female karyotype.    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  MIRTA-2 analysis was not detected on the   current bone marrow.  Close follow up has been recommended.  Flowy cytometry was negative.    Histopathology had normal maturation sequence.  There were normal megakaryocytes.  Erythroid   maturation was complete.  Myeloid maturation was also complete.  Negative iron stain on the   marrow aspirate.  Recommendation is for close surveillance.  Absent iron stores on bone marrow.      Smoker 1/2 ppd, does not drink alcohol    See pulmonologist for lung nodule    Family hx of breast cancer with breast cancer age 69  Father with lung cancer      · Patient has been lost to follow-up since 2019.    · 6/28/2022: Follow-up appointment leukocytosis, thrombocytosis, polycythemia, CHRISTOPHE positive for SLE, anti-DNA elevated at 13.  US spleen- normal size.  · October 2022 patient was  found to have increasing right upper lobe nodule of.  She is followed up with Dr. Nath.  She had a CT-guided biopsy of this nodule on 11/2/2022 and pathology revealed invasive well to moderately differentiated adenocarcinoma with focal lipidic features  · 11/23/2022: Patient had a PET CT scan which showed a 1.7 cm right upper lobe nodule not PET avid.  There is an 8 mm right upper lobe nodule also stable since 2020 likely benign.  There was no convincing evidence of metastatic disease elsewhere.  · 12/20/2022: Patient was seen by Dr. Kearney.  She is being evaluated for right upper lobectomy in the next 2 to 3 weeks  · 1/9/2023 patient underwent right lobectomy with mediastinal lymph node dissection    History of present illness was reviewed and is unchanged from the previous visit.       Subjective    Patient is here today for routine follow-up.  She has had surgery for her lung malignancy her course was complicated by pancreatitis.    Past Medical History:   Diagnosis Date   • Allergic    • Arthritis    • Atrial fibrillation (HCC)    • Cancer (HCC) 01/09/2023    lung  Right   • COPD (chronic obstructive pulmonary disease) (HCC)    • Diabetes mellitus (HCC)    • Headache    • History of herniated intervertebral disc    • History of skin cancer     Left lower extremity   • Hyperlipidemia    • Hypertension    • Injury of back    • Leukocytosis    • Lung nodule    • Urinary tract infection with hematuria 01/22/2021       Past Surgical History:   Procedure Laterality Date   • BACK SURGERY     • CARDIAC CATHETERIZATION     • DILATATION AND CURETTAGE     • ERCP N/A 2/1/2023    Procedure: ENDOSCOPIC RETROGRADE CHOLANGIOPANCREATOGRAPHY with sphicterotomy and balloon guided sweeping of bile duct up to 9mm and placement of 10Fr x 9cm biliary stent;  Surgeon: Naman Blackmon MD;  Location: [facility] ENDOSCOPY;  Service: Gastroenterology;  Laterality: N/A;  post: choledocholithiasis   • LOBECTOMY Right 1/9/2023    Procedure:  THORASCOPIC RIGHT UPPER LOBECTOMY WITH Super Heat GamesI ROBOT, MEDIASTINAL LYMPH NODE DISSECTION;  Surgeon: Erwin Kearney MD;  Location: [facility] MAIN OR;  Service: Robotics - DaVinci;  Laterality: Right;   • LUMBAR DECOMPRESSION      L4-L5    • LUNG BIOPSY Right 11/2022   • SKIN CANCER EXCISION Left     Cao   • SUBTOTAL HYSTERECTOMY           Current Outpatient Medications:   •  albuterol sulfate  (90 Base) MCG/ACT inhaler, Inhale 2 puffs Every 4 (Four) Hours As Needed for Wheezing or Shortness of Air., Disp: 18 g, Rfl: 5  •  amitriptyline (ELAVIL) 25 MG tablet, Take 1 tablet by mouth every night at bedtime. May take 1 to 2 tablets at bedtime if needed., Disp: , Rfl: 3  •  aspirin (aspirin) 81 MG EC tablet, Take 1 tablet by mouth Daily., Disp: , Rfl:   •  Creon 18684-535129 units capsule delayed-release particles capsule, Take 1 capsule by mouth 3 (Three) Times a Day With Meals., Disp: , Rfl:   •  fenofibrate (TRICOR) 145 MG tablet, Take 1 tablet by mouth Daily., Disp: 90 tablet, Rfl: 1  •  fluticasone (FLONASE) 50 MCG/ACT nasal spray, SHAKE LIQUID AND USE 2 SPRAYS IN EACH NOSTRIL EVERY DAY, Disp: 48 g, Rfl: 3  •  Fluticasone-Umeclidin-Vilant (TRELEGY) 100-62.5-25 MCG/ACT inhaler, Inhale 1 puff Daily., Disp: , Rfl:   •  HYDROcodone-acetaminophen (NORCO)  MG per tablet, Take 1 tablet by mouth Every 6 (Six) Hours As Needed for Moderate Pain., Disp: , Rfl:   •  metFORMIN (GLUCOPHAGE) 500 MG tablet, TAKE 1 TABLET BY MOUTH DAILY WITH BREAKFAST, Disp: 90 tablet, Rfl: 1  •  methocarbamol (ROBAXIN) 500 MG tablet, TAKE 1 TABLET BY MOUTH EVERY 8 HOURS (Patient taking differently: Take 1 tablet by mouth As Needed.), Disp: 90 tablet, Rfl: 0  •  metoprolol succinate XL (TOPROL-XL) 25 MG 24 hr tablet, TAKE 1 TABLET BY MOUTH DAILY, Disp: 90 tablet, Rfl: 1  •  nystatin (MYCOSTATIN) 565759 UNIT/GM cream, Apply 1 application topically to the appropriate area as directed As Needed., Disp: , Rfl:   •  ondansetron (Zofran) 4 MG  tablet, Take 1 tablet by mouth Every 8 (Eight) Hours As Needed for Nausea or Vomiting., Disp: 30 tablet, Rfl: 0  •  pregabalin (Lyrica) 100 MG capsule, Take 1 capsule by mouth 2 (Two) Times a Day., Disp: 90 capsule, Rfl: 1  •  vitamin D (ERGOCALCIFEROL) 1.25 MG (86013 UT) capsule capsule, TAKE 1 CAPSULE BY MOUTH EVERY 7 DAYS (Patient taking differently: Take 1 capsule by mouth Every 7 (Seven) Days.), Disp: 12 capsule, Rfl: 1    Allergies   Allergen Reactions   • Morphine Anaphylaxis and Nausea And Vomiting   • Percocet [Oxycodone-Acetaminophen] GI Intolerance   • Tramadol GI Intolerance       Immunization History   Administered Date(s) Administered   • COVID-19 (SEDLine) 2021   • Covid-19 (Pfizer) Gray Cap 2022   • Flu Vaccine Quad PF >36MO 2018   • FluLaval/Fluzone >6mos 2020, 2022   • Influenza, Unspecified 2016, 10/12/2017   • Pneumococcal Polysaccharide (PPSV23) 2021   • Tdap 2020   • flucelvax quad pfs =>4 YRS 10/03/2019       Family History   Problem Relation Age of Onset   • Breast cancer Mother    • Lung cancer Father    • Lung cancer Other    • Colon cancer Other    • Skin cancer Other        Cancer-related family history includes Breast cancer in her mother; Colon cancer in an other family member; Lung cancer in her father and another family member; Skin cancer in an other family member.    Social History     Tobacco Use   • Smoking status: Former     Packs/day: 1.00     Years: 40.00     Pack years: 40.00     Types: Cigarettes     Quit date: 2023     Years since quittin.2   • Smokeless tobacco: Never   Vaping Use   • Vaping Use: Never used   Substance Use Topics   • Alcohol use: Yes     Comment: rare   • Drug use: No     I have reviewed and confirmed the accuracy of the patient's history: Chief complaint, HPI, ROS and Subjective as entered by the MA/LPN/RN. Cheri Prabhakar MD 23     ROS:    Review of Systems   Constitutional: Positive for  "fatigue. Negative for chills and fever.   HENT: Negative.    Eyes: Negative for visual disturbance.   Respiratory: Negative for shortness of breath.    Cardiovascular: Negative for chest pain and palpitations.   Gastrointestinal: Negative for abdominal pain.   Endocrine: Negative.    Genitourinary: Negative.    Skin: Negative for rash and wound.   Neurological: Positive for weakness.   Hematological: Negative for adenopathy.   Psychiatric/Behavioral: Negative for agitation and confusion.   Chronic back pain due to disc disease    Objective:    Vitals:    03/23/23 1145   BP: 144/87   Pulse: 71   Resp: 18   Temp: 97 °F (36.1 °C)   TempSrc: Infrared   Weight: 83.5 kg (184 lb)   Height: 175.3 cm (69\")   PainSc:   6   PainLoc: Comment: back, stomach     Body mass index is 27.17 kg/m².    ECOG  (1) Restricted in physically strenuous activity, ambulatory and able to do work of light nature    Physical Exam:  Physical Exam  Vitals and nursing note reviewed.   Constitutional:       General: She is not in acute distress.     Appearance: She is not diaphoretic.   HENT:      Head: Normocephalic and atraumatic.   Eyes:      General: No scleral icterus.        Right eye: No discharge.         Left eye: No discharge.      Conjunctiva/sclera: Conjunctivae normal.   Neck:      Thyroid: No thyromegaly.   Cardiovascular:      Rate and Rhythm: Normal rate and regular rhythm.      Heart sounds: Normal heart sounds.     No friction rub. No gallop.   Pulmonary:      Effort: Pulmonary effort is normal. No respiratory distress.      Breath sounds: No stridor. No wheezing.   Abdominal:      General: Bowel sounds are normal.      Palpations: Abdomen is soft. There is no mass.      Tenderness: There is no abdominal tenderness. There is no guarding or rebound.   Musculoskeletal:         General: No tenderness. Normal range of motion.      Cervical back: Normal range of motion and neck supple.   Lymphadenopathy:      Cervical: No cervical " adenopathy.   Skin:     General: Skin is warm.      Findings: No erythema or rash.   Neurological:      Mental Status: She is alert and oriented to person, place, and time.      Motor: No abnormal muscle tone.   Psychiatric:         Behavior: Behavior normal.        I have reexamined the patient and the results are consistent with the previously documented exam. Cheri Prabhakar MD     RECENT LABS    WBC   Date Value Ref Range Status   03/23/2023 8.62 3.40 - 10.80 10*3/mm3 Final     RBC   Date Value Ref Range Status   03/23/2023 4.75 3.77 - 5.28 10*6/mm3 Final     Hemoglobin   Date Value Ref Range Status   03/23/2023 13.8 12.0 - 15.9 g/dL Final     Hematocrit   Date Value Ref Range Status   03/23/2023 43.3 34.0 - 46.6 % Final     MCV   Date Value Ref Range Status   03/23/2023 91.2 79.0 - 97.0 fL Final     MCH   Date Value Ref Range Status   03/23/2023 29.1 26.6 - 33.0 pg Final     MCHC   Date Value Ref Range Status   03/23/2023 31.9 31.5 - 35.7 g/dL Final     RDW   Date Value Ref Range Status   03/23/2023 13.7 12.3 - 15.4 % Final     RDW-SD   Date Value Ref Range Status   03/23/2023 44.7 37.0 - 54.0 fl Final     MPV   Date Value Ref Range Status   03/23/2023 8.5 6.0 - 12.0 fL Final     Platelets   Date Value Ref Range Status   03/23/2023 336 140 - 450 10*3/mm3 Final     Neutrophil %   Date Value Ref Range Status   03/23/2023 55.9 42.7 - 76.0 % Final     Lymphocyte %   Date Value Ref Range Status   03/23/2023 35.4 19.6 - 45.3 % Final     Monocyte %   Date Value Ref Range Status   03/23/2023 5.2 5.0 - 12.0 % Final     Eosinophil %   Date Value Ref Range Status   03/23/2023 2.9 0.3 - 6.2 % Final     Basophil %   Date Value Ref Range Status   03/23/2023 0.6 0.0 - 1.5 % Final     Immature Grans %   Date Value Ref Range Status   01/03/2023 0.4 0.0 - 0.5 % Final     Neutrophils, Absolute   Date Value Ref Range Status   03/23/2023 4.82 1.70 - 7.00 10*3/mm3 Final     Lymphocytes, Absolute   Date Value Ref Range Status    03/23/2023 3.05 0.70 - 3.10 10*3/mm3 Final     Monocytes, Absolute   Date Value Ref Range Status   03/23/2023 0.45 0.10 - 0.90 10*3/mm3 Final     Eosinophils, Absolute   Date Value Ref Range Status   03/23/2023 0.25 0.00 - 0.40 10*3/mm3 Final     Basophils, Absolute   Date Value Ref Range Status   03/23/2023 0.05 0.00 - 0.20 10*3/mm3 Final     Immature Grans, Absolute   Date Value Ref Range Status   01/03/2023 0.04 0.00 - 0.05 10*3/mm3 Final     nRBC   Date Value Ref Range Status   02/15/2023 0.0 0.0 - 0.2 /100 WBC Final       Lab Results   Component Value Date    GLUCOSE 129 (H) 02/15/2023    BUN 11 02/15/2023    CREATININE 0.76 02/15/2023    EGFRIFNONA 99 12/14/2021    BCR 14.5 02/15/2023    K 4.3 02/15/2023    CO2 24.0 02/15/2023    CALCIUM 10.1 02/15/2023    ALBUMIN 4.3 02/15/2023    LABIL2 1.1 12/04/2018    AST 46 (H) 02/15/2023    ALT 39 (H) 02/15/2023         Assessment & Plan   Malignant neoplasm of lung, unspecified laterality, unspecified part of lung (HCC)  - CBC & Differential    UTI symptoms  - Urine Culture - Urine, Urine, Clean Catch  - Urinalysis With Culture If Indicated - Urine, Clean Catch  - Urine Culture - Urine, Urine, Clean Catch  - Urinalysis With Culture If Indicated - Urine, Clean Catch      · Adenocarcinoma of the right lung, status post right upper lobectomy with lymph node dissection.  Tumor measured 1.3 cm, invasive well differentiated adenocarcinoma completely excised.  Enlarging right upper lobe nodule.  Clinical T1b N0 M0.  Stage I A2.  Adjuvant treatment  not recommended  · Postoperative pancreatitis due to gallstones.  She is actively involved with the GI group.  Patient to have elective cholecystectomy in the near future    · Tobacco use disorder: She is working on smoking cessation.  She has been encouraged      · Leukocytosis and polycythemia.  Rule out myeloproliferative disease.  She has a prior history of this in the past.  Ultrasound of spleen- normal size.  Blood counts  improved with WBC of 10.14, Hgb 15.8, platelets 226-improvement in counts.  JAK2 V617F negative.  No BCR ABL arrangement.  Peripheral smear shows leukocytosis, polycythemia, no blasts May of 2022.  We will obtain flow cytometry since she has relative lymphocytosis as well.  I have reviewed the lab results with patient.  I do not think we need to repeat a bone marrow biopsy at this time.  I have asked her to also remain on baby aspirin.  Patient does not have splenomegaly.  Her repeat flow cytometry completed in August 2022 does not show any myeloproliferative disease.  Her CBC is normal    · Possible evolving myeloproliferative neoplasm with low level MIRTA-2 positive at less than 10%. BCR-abl negative.  Low normal erythropoietin level.  Status post bone marrow aspiration and         biopsy 8/24/18.  Repeat bone marrow aspiration and biopsy 2/22/19 showed translocation [2q/11q] has been associated with myeloid neoplasm, but no morphologic evidence seen on         histopathology      · CHRISTOPHE positive, anti-DNA elevated at 13 .Referred to rheumatology,  rheumatology for further evaluation          Plans    · Reviewed her final pathology results  · She has surveillance CT scans already scheduled with Dr. Kearney in July 2023  · Follow-up in 3 months  · UA today with C&S  · Follow-up with [facility] for acute pancreatitis as well as surgery for elective cholecystectomy in the near future  · Reviewed her final pathology results   · Her CBC remains normal  · Follow-up with  rheumatology  · Peripheral blood flow cytometry was negative- 8/3/2022  · Continue baby aspirin 81 mg p.o. daily for procedure  · All questions answered                      I spent 40 total minutes, face-to-face, caring for Rahel today.  90% of this time involved counseling and/or coordination of care as documented within this note.

## 2023-03-24 LAB — BACTERIA SPEC AEROBE CULT: NORMAL

## 2023-03-28 ENCOUNTER — OFFICE VISIT (OUTPATIENT)
Dept: SURGERY | Facility: CLINIC | Age: 58
End: 2023-03-28
Payer: MEDICARE

## 2023-03-28 ENCOUNTER — CLINICAL SUPPORT (OUTPATIENT)
Dept: FAMILY MEDICINE CLINIC | Facility: CLINIC | Age: 58
End: 2023-03-28
Payer: MEDICARE

## 2023-03-28 VITALS
RESPIRATION RATE: 18 BRPM | SYSTOLIC BLOOD PRESSURE: 123 MMHG | BODY MASS INDEX: 27.52 KG/M2 | TEMPERATURE: 97.3 F | OXYGEN SATURATION: 99 % | DIASTOLIC BLOOD PRESSURE: 78 MMHG | HEART RATE: 70 BPM | WEIGHT: 185.8 LBS | HEIGHT: 69 IN

## 2023-03-28 DIAGNOSIS — K80.50 CHOLEDOCHOLITHIASIS: Primary | ICD-10-CM

## 2023-03-28 DIAGNOSIS — E78.5 DIET-CONTROLLED HYPERLIPIDEMIA: Primary | ICD-10-CM

## 2023-03-28 DIAGNOSIS — R73.09 ELEVATED HEMOGLOBIN A1C: ICD-10-CM

## 2023-03-28 DIAGNOSIS — I10 ESSENTIAL HYPERTENSION: ICD-10-CM

## 2023-03-28 PROCEDURE — 83036 HEMOGLOBIN GLYCOSYLATED A1C: CPT | Performed by: NURSE PRACTITIONER

## 2023-03-28 PROCEDURE — 3078F DIAST BP <80 MM HG: CPT | Performed by: SURGERY

## 2023-03-28 PROCEDURE — 80061 LIPID PANEL: CPT | Performed by: NURSE PRACTITIONER

## 2023-03-28 PROCEDURE — 80053 COMPREHEN METABOLIC PANEL: CPT | Performed by: NURSE PRACTITIONER

## 2023-03-28 PROCEDURE — 84443 ASSAY THYROID STIM HORMONE: CPT | Performed by: NURSE PRACTITIONER

## 2023-03-28 PROCEDURE — 3074F SYST BP LT 130 MM HG: CPT | Performed by: SURGERY

## 2023-03-28 PROCEDURE — 36415 COLL VENOUS BLD VENIPUNCTURE: CPT | Performed by: NURSE PRACTITIONER

## 2023-03-28 PROCEDURE — 99214 OFFICE O/P EST MOD 30 MIN: CPT | Performed by: SURGERY

## 2023-03-28 PROCEDURE — 1159F MED LIST DOCD IN RCRD: CPT | Performed by: SURGERY

## 2023-03-28 PROCEDURE — 1160F RVW MEDS BY RX/DR IN RCRD: CPT | Performed by: SURGERY

## 2023-03-28 PROCEDURE — 85027 COMPLETE CBC AUTOMATED: CPT | Performed by: NURSE PRACTITIONER

## 2023-03-28 RX ORDER — SODIUM CHLORIDE 9 MG/ML
40 INJECTION, SOLUTION INTRAVENOUS AS NEEDED
OUTPATIENT
Start: 2023-03-28

## 2023-03-28 RX ORDER — SODIUM CHLORIDE 0.9 % (FLUSH) 0.9 %
3-10 SYRINGE (ML) INJECTION AS NEEDED
OUTPATIENT
Start: 2023-03-28

## 2023-03-28 RX ORDER — SODIUM CHLORIDE 0.9 % (FLUSH) 0.9 %
3 SYRINGE (ML) INJECTION EVERY 12 HOURS SCHEDULED
OUTPATIENT
Start: 2023-03-28

## 2023-03-28 RX ORDER — SODIUM CHLORIDE 9 MG/ML
100 INJECTION, SOLUTION INTRAVENOUS CONTINUOUS
OUTPATIENT
Start: 2023-03-28

## 2023-03-28 NOTE — PROGRESS NOTES
Venipuncture Blood Specimen Collection  Venipuncture performed in right arm by Suzie Salvador MA with good hemostasis. Patient tolerated the procedure well without complications.   03/28/23   Suzie Salvador MA

## 2023-03-28 NOTE — H&P (VIEW-ONLY)
"Subjective   Rahel Ramsey is a 57 y.o. female.   Chief Complaint   Patient presents with   • Abdominal Pain     FU BHF inpt, Discuss Lap Lilibeth sx      /78 (BP Location: Left arm, Patient Position: Sitting, Cuff Size: Adult)   Pulse 70   Temp 97.3 °F (36.3 °C) (Infrared)   Resp 18   Ht 175.3 cm (69\")   Wt 84.3 kg (185 lb 12.8 oz)   SpO2 99%   BMI 27.44 kg/m²     HISTORY OF PRESENT ILLNESS:  57-year-old lady who I met in the hospital month or 2 ago for gallstone pancreatitis.  She was recovering from a robotic lobectomy within the last month.  She underwent an ERCP and stent placement.  She also developed some peripancreatic fluid collections on the left side.  A repeat CT scan done in February shows that those were improving.  She in general is feeling okay she is tolerating a diet she is having some mild right-sided abdominal pain some mild left-sided abdominal pain but is much better than previous.  Denies any severe constipation or diarrhea.  She is here today to  and make a decision about interval cholecystectomy for her biliary pancreatitis.  She is planning on having her stent removed later on this week.      Outpatient Encounter Medications as of 3/28/2023   Medication Sig Dispense Refill   • albuterol sulfate  (90 Base) MCG/ACT inhaler Inhale 2 puffs Every 4 (Four) Hours As Needed for Wheezing or Shortness of Air. 18 g 5   • amitriptyline (ELAVIL) 25 MG tablet Take 1 tablet by mouth every night at bedtime. May take 1 to 2 tablets at bedtime if needed.  3   • aspirin (aspirin) 81 MG EC tablet Take 1 tablet by mouth Daily.     • Creon 94903-065675 units capsule delayed-release particles capsule Take 1 capsule by mouth 3 (Three) Times a Day With Meals.     • fenofibrate (TRICOR) 145 MG tablet Take 1 tablet by mouth Daily. 90 tablet 1   • fluticasone (FLONASE) 50 MCG/ACT nasal spray SHAKE LIQUID AND USE 2 SPRAYS IN EACH NOSTRIL EVERY DAY 48 g 3   • Fluticasone-Umeclidin-Vilant " (TRELEGY) 100-62.5-25 MCG/ACT inhaler Inhale 1 puff Daily.     • HYDROcodone-acetaminophen (NORCO)  MG per tablet Take 1 tablet by mouth Every 6 (Six) Hours As Needed for Moderate Pain.     • metFORMIN (GLUCOPHAGE) 500 MG tablet TAKE 1 TABLET BY MOUTH DAILY WITH BREAKFAST 90 tablet 1   • methocarbamol (ROBAXIN) 500 MG tablet TAKE 1 TABLET BY MOUTH EVERY 8 HOURS (Patient taking differently: Take 1 tablet by mouth As Needed.) 90 tablet 0   • metoprolol succinate XL (TOPROL-XL) 25 MG 24 hr tablet TAKE 1 TABLET BY MOUTH DAILY 90 tablet 1   • nystatin (MYCOSTATIN) 281377 UNIT/GM cream Apply 1 application topically to the appropriate area as directed As Needed.     • ondansetron (Zofran) 4 MG tablet Take 1 tablet by mouth Every 8 (Eight) Hours As Needed for Nausea or Vomiting. 30 tablet 0   • pregabalin (Lyrica) 100 MG capsule Take 1 capsule by mouth 2 (Two) Times a Day. 90 capsule 1   • vitamin D (ERGOCALCIFEROL) 1.25 MG (00219 UT) capsule capsule TAKE 1 CAPSULE BY MOUTH EVERY 7 DAYS (Patient taking differently: Take 1 capsule by mouth Every 7 (Seven) Days.) 12 capsule 1     No facility-administered encounter medications on file as of 3/28/2023.     Past Medical History:   Diagnosis Date   • Allergic    • Arthritis    • Atrial fibrillation (HCC)    • Cancer (HCC) 01/09/2023    lung  Right   • COPD (chronic obstructive pulmonary disease) (HCC)    • Diabetes mellitus (HCC)    • Headache    • History of herniated intervertebral disc    • History of skin cancer     Left lower extremity   • Hyperlipidemia    • Hypertension    • Injury of back    • Leukocytosis    • Lung nodule    • Urinary tract infection with hematuria 01/22/2021     Past Surgical History:   Procedure Laterality Date   • BACK SURGERY     • CARDIAC CATHETERIZATION     • DILATATION AND CURETTAGE     • ERCP N/A 2/1/2023    Procedure: ENDOSCOPIC RETROGRADE CHOLANGIOPANCREATOGRAPHY with sphicterotomy and balloon guided sweeping of bile duct up to 9mm and  placement of 10Fr x 9cm biliary stent;  Surgeon: Naman Blcakmon MD;  Location: [facility] ENDOSCOPY;  Service: Gastroenterology;  Laterality: N/A;  post: choledocholithiasis   • LOBECTOMY Right 2023    Procedure: THORASCOPIC RIGHT UPPER LOBECTOMY WITH DAVINCI ROBOT, MEDIASTINAL LYMPH NODE DISSECTION;  Surgeon: Erwin Kearney MD;  Location: [facility] MAIN OR;  Service: Robotics - DaVinci;  Laterality: Right;   • LUMBAR DECOMPRESSION      L4-L5    • LUNG BIOPSY Right 2022   • SKIN CANCER EXCISION Left     Cao   • SUBTOTAL HYSTERECTOMY       Family History   Problem Relation Age of Onset   • Breast cancer Mother    • Lung cancer Father    • Lung cancer Other    • Colon cancer Other    • Skin cancer Other      Social History     Tobacco Use   • Smoking status: Former     Packs/day: 1.00     Years: 40.00     Pack years: 40.00     Types: Cigarettes     Quit date: 2023     Years since quittin.2     Passive exposure: Past   • Smokeless tobacco: Never   Vaping Use   • Vaping Use: Never used   Substance Use Topics   • Alcohol use: Yes     Comment: rare   • Drug use: No     Morphine, Percocet [oxycodone-acetaminophen], and Tramadol    Review of Systems  Denies any severe fevers chills new chest pain or shortness of breath severe nausea vomiting constipation  Objective     Physical Exam  Constitutional:       Appearance: Normal appearance.   HENT:      Head: Normocephalic.   Cardiovascular:      Rate and Rhythm: Normal rate.   Pulmonary:      Effort: Pulmonary effort is normal. No respiratory distress.   Abdominal:      General: There is no distension.      Palpations: Abdomen is soft.      Tenderness: There is no abdominal tenderness.   Neurological:      General: No focal deficit present.      Mental Status: She is alert. Mental status is at baseline.   Psychiatric:         Mood and Affect: Mood normal.         Behavior: Behavior normal.           Assessment & Plan   Diagnoses and all orders for this  visit:    1. Choledocholithiasis (Primary)  -     Case Request; Standing  -     sodium chloride 0.9 % infusion  -     sodium chloride 0.9 % flush 3 mL  -     sodium chloride 0.9 % flush 3-10 mL  -     sodium chloride 0.9 % infusion 40 mL  -     ceFAZolin (ANCEF) 2 g in sodium chloride 0.9 % 100 mL IVPB  -     Case Request    Other orders  -     Follow Anesthesia Guidelines / Protocol; Future  -     Obtain Informed Consent; Future  -     Provide NPO Instructions to Patient; Future  -     Chlorhexidine Skin Prep; Future  -     Follow Anesthesia Guidelines / Protocol; Standing  -     Verify / Perform Chlorhexidine Skin Prep; Standing  -     Instructions on Coughing, Deep Breathing & Incentive Spirometry; Standing  -     Notify Physician - Standard; Standing  -     Insert Peripheral IV; Standing  -     Saline Lock & Maintain IV Access; Standing    I have counseled her about the indications for cholecystectomy the steps of the operation anticipated recovery  the possible complications.  Talked about possibility for open surgery and the possibility of bile duct injury.  After our discussion about the treatment options and the treatment plan she does understand the risk of cholecystectomy and is willing to proceed.    Moderate data reviewed including a new CT scan abdomen and pelvis from February 15 I have reviewed the images and the read.  Showing the improvement in her peripancreatic fluid collection in the tail.  I reviewed some lab work from mid February including a CMP showed some mild transaminitis and a CBC showed a mild leukocytosis.  This is counseling about a major abdominal surgery with risk factors morbidity including her recent lobectomy and pancreas    Mich Bansal MD  3/28/2023  9:55 AM EDT    This note was created using Dragon Voice Recognition software.

## 2023-03-28 NOTE — PROGRESS NOTES
"Subjective   Rahel Ramsey is a 57 y.o. female.   Chief Complaint   Patient presents with   • Abdominal Pain     FU BHF inpt, Discuss Lap Lilibeth sx      /78 (BP Location: Left arm, Patient Position: Sitting, Cuff Size: Adult)   Pulse 70   Temp 97.3 °F (36.3 °C) (Infrared)   Resp 18   Ht 175.3 cm (69\")   Wt 84.3 kg (185 lb 12.8 oz)   SpO2 99%   BMI 27.44 kg/m²     HISTORY OF PRESENT ILLNESS:  57-year-old lady who I met in the hospital month or 2 ago for gallstone pancreatitis.  She was recovering from a robotic lobectomy within the last month.  She underwent an ERCP and stent placement.  She also developed some peripancreatic fluid collections on the left side.  A repeat CT scan done in February shows that those were improving.  She in general is feeling okay she is tolerating a diet she is having some mild right-sided abdominal pain some mild left-sided abdominal pain but is much better than previous.  Denies any severe constipation or diarrhea.  She is here today to  and make a decision about interval cholecystectomy for her biliary pancreatitis.  She is planning on having her stent removed later on this week.      Outpatient Encounter Medications as of 3/28/2023   Medication Sig Dispense Refill   • albuterol sulfate  (90 Base) MCG/ACT inhaler Inhale 2 puffs Every 4 (Four) Hours As Needed for Wheezing or Shortness of Air. 18 g 5   • amitriptyline (ELAVIL) 25 MG tablet Take 1 tablet by mouth every night at bedtime. May take 1 to 2 tablets at bedtime if needed.  3   • aspirin (aspirin) 81 MG EC tablet Take 1 tablet by mouth Daily.     • Creon 32245-127675 units capsule delayed-release particles capsule Take 1 capsule by mouth 3 (Three) Times a Day With Meals.     • fenofibrate (TRICOR) 145 MG tablet Take 1 tablet by mouth Daily. 90 tablet 1   • fluticasone (FLONASE) 50 MCG/ACT nasal spray SHAKE LIQUID AND USE 2 SPRAYS IN EACH NOSTRIL EVERY DAY 48 g 3   • Fluticasone-Umeclidin-Vilant " (TRELEGY) 100-62.5-25 MCG/ACT inhaler Inhale 1 puff Daily.     • HYDROcodone-acetaminophen (NORCO)  MG per tablet Take 1 tablet by mouth Every 6 (Six) Hours As Needed for Moderate Pain.     • metFORMIN (GLUCOPHAGE) 500 MG tablet TAKE 1 TABLET BY MOUTH DAILY WITH BREAKFAST 90 tablet 1   • methocarbamol (ROBAXIN) 500 MG tablet TAKE 1 TABLET BY MOUTH EVERY 8 HOURS (Patient taking differently: Take 1 tablet by mouth As Needed.) 90 tablet 0   • metoprolol succinate XL (TOPROL-XL) 25 MG 24 hr tablet TAKE 1 TABLET BY MOUTH DAILY 90 tablet 1   • nystatin (MYCOSTATIN) 004744 UNIT/GM cream Apply 1 application topically to the appropriate area as directed As Needed.     • ondansetron (Zofran) 4 MG tablet Take 1 tablet by mouth Every 8 (Eight) Hours As Needed for Nausea or Vomiting. 30 tablet 0   • pregabalin (Lyrica) 100 MG capsule Take 1 capsule by mouth 2 (Two) Times a Day. 90 capsule 1   • vitamin D (ERGOCALCIFEROL) 1.25 MG (79809 UT) capsule capsule TAKE 1 CAPSULE BY MOUTH EVERY 7 DAYS (Patient taking differently: Take 1 capsule by mouth Every 7 (Seven) Days.) 12 capsule 1     No facility-administered encounter medications on file as of 3/28/2023.     Past Medical History:   Diagnosis Date   • Allergic    • Arthritis    • Atrial fibrillation (HCC)    • Cancer (HCC) 01/09/2023    lung  Right   • COPD (chronic obstructive pulmonary disease) (HCC)    • Diabetes mellitus (HCC)    • Headache    • History of herniated intervertebral disc    • History of skin cancer     Left lower extremity   • Hyperlipidemia    • Hypertension    • Injury of back    • Leukocytosis    • Lung nodule    • Urinary tract infection with hematuria 01/22/2021     Past Surgical History:   Procedure Laterality Date   • BACK SURGERY     • CARDIAC CATHETERIZATION     • DILATATION AND CURETTAGE     • ERCP N/A 2/1/2023    Procedure: ENDOSCOPIC RETROGRADE CHOLANGIOPANCREATOGRAPHY with sphicterotomy and balloon guided sweeping of bile duct up to 9mm and  placement of 10Fr x 9cm biliary stent;  Surgeon: Naman Blackmon MD;  Location: [facility] ENDOSCOPY;  Service: Gastroenterology;  Laterality: N/A;  post: choledocholithiasis   • LOBECTOMY Right 2023    Procedure: THORASCOPIC RIGHT UPPER LOBECTOMY WITH DAVINCI ROBOT, MEDIASTINAL LYMPH NODE DISSECTION;  Surgeon: Erwin Kearney MD;  Location: [facility] MAIN OR;  Service: Robotics - DaVinci;  Laterality: Right;   • LUMBAR DECOMPRESSION      L4-L5    • LUNG BIOPSY Right 2022   • SKIN CANCER EXCISION Left     Cao   • SUBTOTAL HYSTERECTOMY       Family History   Problem Relation Age of Onset   • Breast cancer Mother    • Lung cancer Father    • Lung cancer Other    • Colon cancer Other    • Skin cancer Other      Social History     Tobacco Use   • Smoking status: Former     Packs/day: 1.00     Years: 40.00     Pack years: 40.00     Types: Cigarettes     Quit date: 2023     Years since quittin.2     Passive exposure: Past   • Smokeless tobacco: Never   Vaping Use   • Vaping Use: Never used   Substance Use Topics   • Alcohol use: Yes     Comment: rare   • Drug use: No     Morphine, Percocet [oxycodone-acetaminophen], and Tramadol    Review of Systems  Denies any severe fevers chills new chest pain or shortness of breath severe nausea vomiting constipation  Objective     Physical Exam  Constitutional:       Appearance: Normal appearance.   HENT:      Head: Normocephalic.   Cardiovascular:      Rate and Rhythm: Normal rate.   Pulmonary:      Effort: Pulmonary effort is normal. No respiratory distress.   Abdominal:      General: There is no distension.      Palpations: Abdomen is soft.      Tenderness: There is no abdominal tenderness.   Neurological:      General: No focal deficit present.      Mental Status: She is alert. Mental status is at baseline.   Psychiatric:         Mood and Affect: Mood normal.         Behavior: Behavior normal.           Assessment & Plan   Diagnoses and all orders for this  visit:    1. Choledocholithiasis (Primary)  -     Case Request; Standing  -     sodium chloride 0.9 % infusion  -     sodium chloride 0.9 % flush 3 mL  -     sodium chloride 0.9 % flush 3-10 mL  -     sodium chloride 0.9 % infusion 40 mL  -     ceFAZolin (ANCEF) 2 g in sodium chloride 0.9 % 100 mL IVPB  -     Case Request    Other orders  -     Follow Anesthesia Guidelines / Protocol; Future  -     Obtain Informed Consent; Future  -     Provide NPO Instructions to Patient; Future  -     Chlorhexidine Skin Prep; Future  -     Follow Anesthesia Guidelines / Protocol; Standing  -     Verify / Perform Chlorhexidine Skin Prep; Standing  -     Instructions on Coughing, Deep Breathing & Incentive Spirometry; Standing  -     Notify Physician - Standard; Standing  -     Insert Peripheral IV; Standing  -     Saline Lock & Maintain IV Access; Standing    I have counseled her about the indications for cholecystectomy the steps of the operation anticipated recovery  the possible complications.  Talked about possibility for open surgery and the possibility of bile duct injury.  After our discussion about the treatment options and the treatment plan she does understand the risk of cholecystectomy and is willing to proceed.    Moderate data reviewed including a new CT scan abdomen and pelvis from February 15 I have reviewed the images and the read.  Showing the improvement in her peripancreatic fluid collection in the tail.  I reviewed some lab work from mid February including a CMP showed some mild transaminitis and a CBC showed a mild leukocytosis.  This is counseling about a major abdominal surgery with risk factors morbidity including her recent lobectomy and pancreas    Mich Bansal MD  3/28/2023  9:55 AM EDT    This note was created using Dragon Voice Recognition software.

## 2023-03-29 ENCOUNTER — TELEPHONE (OUTPATIENT)
Dept: FAMILY MEDICINE CLINIC | Facility: CLINIC | Age: 58
End: 2023-03-29
Payer: MEDICARE

## 2023-03-29 LAB
ALBUMIN SERPL-MCNC: 4.4 G/DL (ref 3.5–5.2)
ALBUMIN/GLOB SERPL: 1.5 G/DL
ALP SERPL-CCNC: 97 U/L (ref 39–117)
ALT SERPL W P-5'-P-CCNC: 61 U/L (ref 1–33)
ANION GAP SERPL CALCULATED.3IONS-SCNC: 7.6 MMOL/L (ref 5–15)
AST SERPL-CCNC: 57 U/L (ref 1–32)
BILIRUB SERPL-MCNC: 0.5 MG/DL (ref 0–1.2)
BUN SERPL-MCNC: 12 MG/DL (ref 6–20)
BUN/CREAT SERPL: 15.4 (ref 7–25)
CALCIUM SPEC-SCNC: 9.9 MG/DL (ref 8.6–10.5)
CHLORIDE SERPL-SCNC: 103 MMOL/L (ref 98–107)
CHOLEST SERPL-MCNC: 140 MG/DL (ref 0–200)
CO2 SERPL-SCNC: 27.4 MMOL/L (ref 22–29)
CREAT SERPL-MCNC: 0.78 MG/DL (ref 0.57–1)
DEPRECATED RDW RBC AUTO: 41 FL (ref 37–54)
EGFRCR SERPLBLD CKD-EPI 2021: 88.2 ML/MIN/1.73
ERYTHROCYTE [DISTWIDTH] IN BLOOD BY AUTOMATED COUNT: 13 % (ref 12.3–15.4)
GLOBULIN UR ELPH-MCNC: 3 GM/DL
GLUCOSE SERPL-MCNC: 111 MG/DL (ref 65–99)
HBA1C MFR BLD: 6.6 % (ref 4.8–5.6)
HCT VFR BLD AUTO: 41.5 % (ref 34–46.6)
HDLC SERPL-MCNC: 32 MG/DL (ref 40–60)
HGB BLD-MCNC: 13.8 G/DL (ref 12–15.9)
LDLC SERPL CALC-MCNC: 74 MG/DL (ref 0–100)
LDLC/HDLC SERPL: 2.13 {RATIO}
MCH RBC QN AUTO: 29 PG (ref 26.6–33)
MCHC RBC AUTO-ENTMCNC: 33.3 G/DL (ref 31.5–35.7)
MCV RBC AUTO: 87.2 FL (ref 79–97)
PLATELET # BLD AUTO: 251 10*3/MM3 (ref 140–450)
PMV BLD AUTO: 10.6 FL (ref 6–12)
POTASSIUM SERPL-SCNC: 4.5 MMOL/L (ref 3.5–5.2)
PROT SERPL-MCNC: 7.4 G/DL (ref 6–8.5)
RBC # BLD AUTO: 4.76 10*6/MM3 (ref 3.77–5.28)
SODIUM SERPL-SCNC: 138 MMOL/L (ref 136–145)
TRIGL SERPL-MCNC: 200 MG/DL (ref 0–150)
TSH SERPL DL<=0.05 MIU/L-ACNC: 2.31 UIU/ML (ref 0.27–4.2)
VLDLC SERPL-MCNC: 34 MG/DL (ref 5–40)
WBC NRBC COR # BLD: 7.98 10*3/MM3 (ref 3.4–10.8)

## 2023-03-29 NOTE — TELEPHONE ENCOUNTER
Left vm asking pt to return call with info for eye dr as we are updating records and need copy of last diabetic eye exam

## 2023-03-30 ENCOUNTER — ANESTHESIA EVENT (OUTPATIENT)
Dept: GASTROENTEROLOGY | Facility: HOSPITAL | Age: 58
End: 2023-03-30
Payer: MEDICARE

## 2023-03-30 ENCOUNTER — ON CAMPUS - OUTPATIENT (AMBULATORY)
Dept: URBAN - METROPOLITAN AREA HOSPITAL 85 | Facility: HOSPITAL | Age: 58
End: 2023-03-30

## 2023-03-30 ENCOUNTER — HOSPITAL ENCOUNTER (OUTPATIENT)
Facility: HOSPITAL | Age: 58
Setting detail: HOSPITAL OUTPATIENT SURGERY
Discharge: HOME | End: 2023-03-30
Attending: INTERNAL MEDICINE | Admitting: INTERNAL MEDICINE
Payer: MEDICARE

## 2023-03-30 ENCOUNTER — HOSPITAL ENCOUNTER (OUTPATIENT)
Dept: CARDIOLOGY | Facility: HOSPITAL | Age: 58
Discharge: HOME | End: 2023-03-30
Payer: MEDICARE

## 2023-03-30 ENCOUNTER — ANESTHESIA (OUTPATIENT)
Dept: GASTROENTEROLOGY | Facility: HOSPITAL | Age: 58
End: 2023-03-30
Payer: MEDICARE

## 2023-03-30 VITALS — SYSTOLIC BLOOD PRESSURE: 124 MMHG | HEART RATE: 74 BPM | OXYGEN SATURATION: 100 % | DIASTOLIC BLOOD PRESSURE: 65 MMHG

## 2023-03-30 VITALS
OXYGEN SATURATION: 96 % | RESPIRATION RATE: 14 BRPM | TEMPERATURE: 97.9 F | HEIGHT: 69 IN | HEART RATE: 65 BPM | SYSTOLIC BLOOD PRESSURE: 115 MMHG | DIASTOLIC BLOOD PRESSURE: 76 MMHG | WEIGHT: 183.2 LBS | BODY MASS INDEX: 27.13 KG/M2

## 2023-03-30 DIAGNOSIS — T85.520A DISPLACEMENT OF BILE DUCT PROSTHESIS, INITIAL ENCOUNTER: ICD-10-CM

## 2023-03-30 LAB — GLUCOSE BLDC GLUCOMTR-MCNC: 116 MG/DL (ref 70–105)

## 2023-03-30 PROCEDURE — 82962 GLUCOSE BLOOD TEST: CPT

## 2023-03-30 PROCEDURE — 93010 ELECTROCARDIOGRAM REPORT: CPT | Performed by: INTERNAL MEDICINE

## 2023-03-30 PROCEDURE — 25010000002 PROPOFOL 200 MG/20ML EMULSION: Performed by: NURSE ANESTHETIST, CERTIFIED REGISTERED

## 2023-03-30 PROCEDURE — 93005 ELECTROCARDIOGRAM TRACING: CPT

## 2023-03-30 PROCEDURE — C1769 GUIDE WIRE: HCPCS | Performed by: INTERNAL MEDICINE

## 2023-03-30 PROCEDURE — 43247 EGD REMOVE FOREIGN BODY: CPT | Performed by: INTERNAL MEDICINE

## 2023-03-30 RX ORDER — LIDOCAINE HYDROCHLORIDE 20 MG/ML
INJECTION, SOLUTION EPIDURAL; INFILTRATION; INTRACAUDAL; PERINEURAL AS NEEDED
Status: DISCONTINUED | OUTPATIENT
Start: 2023-03-30 | End: 2023-03-30 | Stop reason: SURG

## 2023-03-30 RX ORDER — SODIUM CHLORIDE 9 MG/ML
INJECTION, SOLUTION INTRAVENOUS CONTINUOUS PRN
Status: DISCONTINUED | OUTPATIENT
Start: 2023-03-30 | End: 2023-03-30 | Stop reason: SURG

## 2023-03-30 RX ORDER — PROPOFOL 10 MG/ML
INJECTION, EMULSION INTRAVENOUS AS NEEDED
Status: DISCONTINUED | OUTPATIENT
Start: 2023-03-30 | End: 2023-03-30 | Stop reason: SURG

## 2023-03-30 RX ADMIN — LIDOCAINE HYDROCHLORIDE 100 MG: 20 INJECTION, SOLUTION EPIDURAL; INFILTRATION; INTRACAUDAL; PERINEURAL at 12:50

## 2023-03-30 RX ADMIN — PROPOFOL 80 MG: 10 INJECTION, EMULSION INTRAVENOUS at 12:50

## 2023-03-30 RX ADMIN — PROPOFOL 30 MG: 10 INJECTION, EMULSION INTRAVENOUS at 12:55

## 2023-03-30 RX ADMIN — PROPOFOL 30 MG: 10 INJECTION, EMULSION INTRAVENOUS at 12:51

## 2023-03-30 RX ADMIN — SODIUM CHLORIDE: 0.9 INJECTION, SOLUTION INTRAVENOUS at 12:44

## 2023-03-30 RX ADMIN — PROPOFOL 30 MG: 10 INJECTION, EMULSION INTRAVENOUS at 12:52

## 2023-03-30 NOTE — DISCHARGE INSTRUCTIONS
A responsible adult should stay with you and you should rest quietly for the rest of the day.    Do not drink alcohol, drive, operate any heavy machinery or power tools or make any legal/important decisions for the next 24 hours.     Progress your diet as tolerated.  If you begin to experience severe pain, increased shortness of breath, racing heartbeat or a fever above 101 F, seek immediate medical attention.     Follow up with MD as instructed. Call office for results in 3 to 5 days if needed. [phone]      Impression:  Status post biliary stent removal     Recommendations:  Call for any postop problems  She is supposed to have cholecystectomy in the next week or so and if pain persists postoperatively, she can call the office and make a follow-up appointment.

## 2023-03-30 NOTE — OP NOTE
ESOPHAGOGASTRODUODENOSCOPY  Procedure Report    Patient Name:  Rahel Ramsey  YOB: 1965    Date of Surgery:  3/30/2023     Indications: Biliary stent removal    Pre-op Diagnosis:   History of biliary duct stent placement [Z98.890]         Post-op Diagnosis:  Normal upper GI endoscopy status post biliary stent removal      Procedure(s):  ESOPHAGOGASTRODUODENOSCOPY WITH STENT REMOVAL    Staff:  Surgeon(s):  Naman Blackmon MD         Anesthesia: Monitored Anesthesia Care    Estimated Blood Loss: None  Implants:    Nothing was implanted during the procedure    Specimen:          Biliary stent removed and discarded    Complications:  No immediate    Description of Procedure:  Informed consent was obtained from the patient.  They were placed in the left lateral decubitus position and IV conscious sedation was administered by anesthesia while being monitored continuously throughout the procedure.  The video Olympus endoscope was introduced into the esophagus and slowly advanced to the GE junction there was no evidence of reflux or stricturing.  As we entered the stomach insufflation was performed and the antrum body fundus and cardia were inspected thoroughly including retroflexion views.  No gastric mucosal lesions were identified.  The pylorus is patent and the duodenal bulb was normal.  In the second portion of the duodenum and the stent is seen protruding from the biliary orifice.  It was snared and retracted orally and removed.  The patient tolerated the procedure well returned to recovery good condition.    Impression:  Status post biliary stent removal    Recommendations:  Call for any postop problems  She is supposed to have cholecystectomy in the next week or so and if pain persists postoperatively, she can call the office and make a follow-up appointment.      Naman Blackmon MD     Date: 3/30/2023  Time: 13:03 EDT

## 2023-03-30 NOTE — H&P
" LOS: 0 days   Patient Care Team:  Vickie Gomez APRN as PCP - General (Nurse Practitioner)  Cheri Prabhakar MD as Consulting Physician (Hematology and Oncology)  Veronique Brothers, TIMBO as Nurse Navigator  Erwin Kearney MD as Surgeon (Thoracic Surgery)      Subjective     Interval History:     Subjective: Here for biliary stent removal      ROS:   No chest pain, shortness of breath, or cough.  No vomiting or hematemesis, no melena         Medication Review:   No current facility-administered medications for this encounter.      Objective     Vital Signs  Vitals:    03/20/23 1144   Weight: 79.8 kg (176 lb)   Height: 175.3 cm (69\")       Physical Exam:    General Appearance:    Awake and alert, in no acute distress   Head:    Normocephalic, without obvious abnormality   Eyes:          Conjunctivae normal, anicteric sclera   Throat:   No oral lesions, no thrush, oral mucosa moist   Neck:   No adenopathy, supple, no JVD   CV/Lungs:     RRR, respirations regular, even and unlabored   Abdomen:     Soft, non-tender, no rebound or guarding, non-distended, no hepatosplenomegaly   Rectal:     Deferred   Extremities:   No edema, no cyanosis   Skin:   No bruising or rash, no jaundice        Results Review:    Lab Results (last 24 hours)     ** No results found for the last 24 hours. **          Imaging Results (Last 24 Hours)     ** No results found for the last 24 hours. **            Assessment & Plan   Proceed with scope and MAC anesthesia    Proceed with EGD and biliary stent removal    Naman Blackmon MD  03/30/23  10:14 EDT  "

## 2023-03-30 NOTE — ANESTHESIA POSTPROCEDURE EVALUATION
Patient: Rahel Ramsey    Procedure Summary     Date: 03/30/23 Room / Location: [facility] ENDOSCOPY 1 / [facility] ENDOSCOPY    Anesthesia Start: 1244 Anesthesia Stop: 1303    Procedure: ESOPHAGOGASTRODUODENOSCOPY WITH STENT REMOVAL Diagnosis:       History of biliary duct stent placement      (History of biliary duct stent placement [Z98.890])    Surgeons: Naman Blackmon MD Provider: Jennifer Rojas MD    Anesthesia Type: MAC, general ASA Status: 3          Anesthesia Type: MAC, general    Vitals  Vitals Value Taken Time   /76 03/30/23 1314   Temp     Pulse 66 03/30/23 1315   Resp 14 03/30/23 1314   SpO2 96 % 03/30/23 1315   Vitals shown include unvalidated device data.        Post Anesthesia Care and Evaluation    Patient location during evaluation: PACU  Patient participation: complete - patient participated  Level of consciousness: awake and alert  Pain management: satisfactory to patient    Airway patency: patent  Anesthetic complications: No anesthetic complications  PONV Status: none  Cardiovascular status: acceptable  Respiratory status: acceptable  Hydration status: acceptable

## 2023-03-30 NOTE — ANESTHESIA PREPROCEDURE EVALUATION
Anesthesia Evaluation     Patient summary reviewed and Nursing notes reviewed   NPO Solid Status: > 8 hours  NPO Liquid Status: > 2 hours           Airway   Mallampati: III  TM distance: >3 FB  Neck ROM: full  No difficulty expected  Dental    (+) edentulous    Pulmonary    (+) a smoker Current, lung cancer (s/p RU Lobectomy), COPD mild, home oxygen, rhonchi, decreased breath sounds,   Cardiovascular - normal exam  Exercise tolerance: poor (<4 METS)    ECG reviewed    (+) hypertension, dysrhythmias Atrial Fib, hyperlipidemia,  carotid artery disease right carotid      Neuro/Psych  (+) headaches, numbness,    GI/Hepatic/Renal/Endo    (+)   diabetes mellitus type 2 well controlled,     Musculoskeletal     (+) arthralgias, back pain, chronic pain,   Abdominal  - normal exam   Substance History      OB/GYN          Other   arthritis,    history of cancer    ROS/Med Hx Other: choledocholithiasis                     Anesthesia Plan    ASA 3     MAC and general       Anesthetic plan, risks, benefits, and alternatives have been provided, discussed and informed consent has been obtained with: patient.    Use of blood products discussed with patient .   Plan discussed with CRNA.        CODE STATUS:

## 2023-03-31 ENCOUNTER — TELEPHONE (OUTPATIENT)
Dept: FAMILY MEDICINE CLINIC | Facility: CLINIC | Age: 58
End: 2023-03-31

## 2023-03-31 LAB — QT INTERVAL: 407 MS

## 2023-03-31 NOTE — TELEPHONE ENCOUNTER
Caller: WOODY    Best call back number:   [phone]     OPTION 3    [facility]ODY WANTED TO INFORM PROVIDER THAT THEY CAN VIEW CARE PLAN ON PROVIDER PORTAL. FYI, NO CALLBACK NECESSARY

## 2023-04-04 ENCOUNTER — OFFICE VISIT (OUTPATIENT)
Dept: FAMILY MEDICINE CLINIC | Facility: CLINIC | Age: 58
End: 2023-04-04
Payer: MEDICARE

## 2023-04-04 VITALS
WEIGHT: 184.8 LBS | BODY MASS INDEX: 27.37 KG/M2 | HEART RATE: 76 BPM | OXYGEN SATURATION: 99 % | DIASTOLIC BLOOD PRESSURE: 84 MMHG | HEIGHT: 69 IN | SYSTOLIC BLOOD PRESSURE: 126 MMHG

## 2023-04-04 DIAGNOSIS — M54.42 CHRONIC MIDLINE LOW BACK PAIN WITH BILATERAL SCIATICA: ICD-10-CM

## 2023-04-04 DIAGNOSIS — N39.0 URINARY TRACT INFECTION WITHOUT HEMATURIA, SITE UNSPECIFIED: Primary | ICD-10-CM

## 2023-04-04 DIAGNOSIS — R20.2 FACIAL PARESTHESIA: ICD-10-CM

## 2023-04-04 DIAGNOSIS — C34.11 PRIMARY CANCER OF RIGHT UPPER LOBE OF LUNG: ICD-10-CM

## 2023-04-04 DIAGNOSIS — E11.9 TYPE 2 DIABETES MELLITUS WITHOUT COMPLICATION, WITHOUT LONG-TERM CURRENT USE OF INSULIN: ICD-10-CM

## 2023-04-04 DIAGNOSIS — I10 ESSENTIAL HYPERTENSION: ICD-10-CM

## 2023-04-04 DIAGNOSIS — E55.9 VITAMIN D DEFICIENCY: ICD-10-CM

## 2023-04-04 DIAGNOSIS — G89.29 CHRONIC MIDLINE LOW BACK PAIN WITH BILATERAL SCIATICA: ICD-10-CM

## 2023-04-04 DIAGNOSIS — M54.41 CHRONIC MIDLINE LOW BACK PAIN WITH BILATERAL SCIATICA: ICD-10-CM

## 2023-04-04 DIAGNOSIS — E78.2 MIXED HYPERLIPIDEMIA: ICD-10-CM

## 2023-04-04 DIAGNOSIS — J44.9 CHRONIC OBSTRUCTIVE PULMONARY DISEASE, UNSPECIFIED COPD TYPE: ICD-10-CM

## 2023-04-04 DIAGNOSIS — K80.10 CALCULUS OF GALLBLADDER WITH CHOLECYSTITIS WITHOUT BILIARY OBSTRUCTION, UNSPECIFIED CHOLECYSTITIS ACUITY: ICD-10-CM

## 2023-04-04 RX ORDER — FENOFIBRATE 145 MG/1
145 TABLET, COATED ORAL DAILY
Qty: 90 TABLET | Refills: 1 | Status: SHIPPED | OUTPATIENT
Start: 2023-04-04 | End: 2023-04-20

## 2023-04-04 RX ORDER — ONDANSETRON 4 MG/1
4 TABLET, FILM COATED ORAL EVERY 8 HOURS PRN
Qty: 30 TABLET | Refills: 0 | Status: SHIPPED | OUTPATIENT
Start: 2023-04-04

## 2023-04-04 RX ORDER — CIPROFLOXACIN 250 MG/1
250 TABLET, FILM COATED ORAL 2 TIMES DAILY
Qty: 10 TABLET | Refills: 0 | Status: SHIPPED | OUTPATIENT
Start: 2023-04-04 | End: 2023-04-20

## 2023-04-04 RX ORDER — ERGOCALCIFEROL 1.25 MG/1
50000 CAPSULE ORAL
Qty: 12 CAPSULE | Refills: 1 | Status: SHIPPED | OUTPATIENT
Start: 2023-04-04

## 2023-04-04 NOTE — PROGRESS NOTES
Chief Complaint  Chief Complaint   Patient presents with   • Follow-up     6 month follow up    • Difficulty Urinating     Pt having UTI symptoms for about 2 weeks, pt was seen by oncologist and urinalysis was done. Results in chart. Pt states she was never called by oncologist with results    • Med Refill     Pt requesting refill for ondansetron            Subjective          Rahel Ramsey presents to [facility] for   History of Present Illness     HTN, stable on meds and takes as directed, denies chest pain, headache, shortness of air, palpitations and swelling of extremities.     She presented to ED with abd pain, found to have gallstones and acute pancreatitis is following with gastroenterology, underwent EGD with biliary stent, planning cholecystectomy per general surgery 4/10/23. She c/o nausea, uses zofran prn and is requesting refill. Liver enzymes have been elevated.      Hyperlipidemia, The patient denies muscle aches, constipation, diarrhea, GI upset, fatigue, chest pain/pressure, exercise intolerance, dyspnea, palpitations, syncope and pedal edema.       COPD: reports improvement in breathing, using inhalers as directed.  She quit smoking. She is following with Dr. Easton for a right upper lobe perihilar lung nodule, PET scan previously negative and in October 2022 right upper lobe nodule found to be increasing in size, biopsy of this nodule on 11/2/2022, pathology revealed invasive well to moderately differentiated adenocarcinoma with focal lipidic features. On 1/9/2023 patient underwent right lobectomy with mediastinal lymph node dissection     Diabetes mellitus type II, now on metformin and tolerating.  Denies any signs/symptoms of hyper/hypoglycemia, blurry vision, polydipsia, polyuria, nocturia, and unintentional weight loss     Chronic low back pain, stable on Lyrica, norco q6 hours, often forgets amitriptyline. Continues to complain of occasional facial numbness  and tingling but improving, most likely r/t polycythemia and not cardiac. Follows with pain mgmt Dr. Dixon.       Hammertoes of right third and fourth toes, using toe cushions, following with podiatry prn     Leukocytosis/polycythemia, chronic, She continues following with hematology and work-up revealed a positive CHRISTOPHE, positive for SLE she has been referred to rheumatology and was seen on 9/6/2022 with further lab work-up pending.      Today she complains of recurrence of dysuria, frequency, urine culture per oncology obtained 3/23. Has had multiple urinary tract infections since June 2022.      Colonoscopy over due 2022, she is following with gastro and will plan this         The following portions of the patient's history were reviewed and updated as appropriate: allergies, current medications, past family history, past medical history, past social history, past surgical history and problem list.    Past Medical History:   Diagnosis Date   • Allergic    • Arthritis    • Atrial fibrillation    • Cancer 01/09/2023   • COPD (chronic obstructive pulmonary disease)    • Diabetes mellitus    • Frequent UTI    • Headache    • Heartburn    • History of herniated intervertebral disc    • History of skin cancer    • Hyperlipidemia    • Hypertension    • Injury of back    • Leukocytosis    • Low back pain    • Lung nodule    • Nausea      Past Surgical History:   Procedure Laterality Date   • BACK SURGERY      lumbar   • CARDIAC CATHETERIZATION     • COLONOSCOPY     • DILATATION AND CURETTAGE     • ENDOSCOPY N/A 3/30/2023    Procedure: ESOPHAGOGASTRODUODENOSCOPY WITH STENT REMOVAL;  Surgeon: Naman Blackmon MD;  Location: [facility] ENDOSCOPY;  Service: Gastroenterology;  Laterality: N/A;  normal stent removal   • ERCP N/A 02/01/2023    Procedure: ENDOSCOPIC RETROGRADE CHOLANGIOPANCREATOGRAPHY with sphicterotomy and balloon guided sweeping of bile duct up to 9mm and placement of 10Fr x 9cm biliary stent;  Surgeon:  Naman Blackmon MD;  Location: [facility] ENDOSCOPY;  Service: Gastroenterology;  Laterality: N/A;  post: choledocholithiasis   • LOBECTOMY Right 2023    Procedure: THORASCOPIC RIGHT UPPER LOBECTOMY WITH DAVINCI ROBOT, MEDIASTINAL LYMPH NODE DISSECTION;  Surgeon: Erwin Kearney MD;  Location: [facility] MAIN OR;  Service: Robotics - DaVinci;  Laterality: Right;   • LUMBAR DECOMPRESSION      L4-L5    • LUNG BIOPSY Right 2022   • SKIN CANCER EXCISION Left     Cao   • SUBTOTAL HYSTERECTOMY       Family History   Problem Relation Age of Onset   • Breast cancer Mother    • Lung cancer Father    • Lung cancer Other    • Colon cancer Other    • Skin cancer Other      Social History     Tobacco Use   • Smoking status: Former     Packs/day: 1.00     Years: 40.00     Pack years: 40.00     Types: Cigarettes     Quit date: 2023     Years since quittin.2     Passive exposure: Past   • Smokeless tobacco: Never   Substance Use Topics   • Alcohol use: Yes     Comment: rare       Current Outpatient Medications:   •  albuterol sulfate  (90 Base) MCG/ACT inhaler, Inhale 2 puffs Every 4 (Four) Hours As Needed for Wheezing or Shortness of Air. (Patient taking differently: Inhale 2 puffs Every 4 (Four) Hours As Needed for Wheezing or Shortness of Air. May use dos if needed and bring), Disp: 18 g, Rfl: 5  •  amitriptyline (ELAVIL) 25 MG tablet, Take 1 tablet by mouth every night at bedtime. May take 1 to 2 tablets at bedtime if needed., Disp: , Rfl: 3  •  aspirin (aspirin) 81 MG EC tablet, Take 1 tablet by mouth Daily. LD: 4/- plans to hold, Disp: , Rfl:   •  fenofibrate (TRICOR) 145 MG tablet, Take 1 tablet by mouth Daily., Disp: 90 tablet, Rfl: 1  •  fluticasone (FLONASE) 50 MCG/ACT nasal spray, SHAKE LIQUID AND USE 2 SPRAYS IN EACH NOSTRIL EVERY DAY (Patient taking differently: 1 spray by Each Nare route Daily As Needed. may use dos if needed), Disp: 48 g, Rfl: 3  •  Fluticasone-Umeclidin-Vilant (TRELEGY)  "100-62.5-25 MCG/ACT inhaler, Inhale 1 puff Every Night., Disp: , Rfl:   •  HYDROcodone-acetaminophen (NORCO)  MG per tablet, Take 1 tablet by mouth Every 6 (Six) Hours As Needed for Moderate Pain. May take dos if needed, Disp: , Rfl:   •  metFORMIN (GLUCOPHAGE) 500 MG tablet, TAKE 1 TABLET BY MOUTH DAILY WITH BREAKFAST (Patient taking differently: Take 1 tablet by mouth Daily With Breakfast. None dos), Disp: 90 tablet, Rfl: 1  •  methocarbamol (ROBAXIN) 500 MG tablet, TAKE 1 TABLET BY MOUTH EVERY 8 HOURS (Patient taking differently: Take 1 tablet by mouth Every 8 (Eight) Hours As Needed. None dos), Disp: 90 tablet, Rfl: 0  •  metoprolol succinate XL (TOPROL-XL) 25 MG 24 hr tablet, TAKE 1 TABLET BY MOUTH DAILY (Patient taking differently: Take 1 tablet by mouth Daily. Take dos), Disp: 90 tablet, Rfl: 1  •  nystatin (MYCOSTATIN) 373172 UNIT/GM cream, Apply 1 application topically to the appropriate area as directed As Needed. None dos, Disp: , Rfl:   •  ondansetron (Zofran) 4 MG tablet, Take 1 tablet by mouth Every 8 (Eight) Hours As Needed for Nausea or Vomiting., Disp: 30 tablet, Rfl: 0  •  pregabalin (Lyrica) 100 MG capsule, Take 1 capsule by mouth 2 (Two) Times a Day. (Patient taking differently: Take 1 capsule by mouth 2 (Two) Times a Day. may take dos), Disp: 90 capsule, Rfl: 1  •  vitamin D (ERGOCALCIFEROL) 1.25 MG (64010 UT) capsule capsule, Take 1 capsule by mouth Every 7 (Seven) Days., Disp: 12 capsule, Rfl: 1  •  ciprofloxacin (Cipro) 250 MG tablet, Take 1 tablet by mouth 2 (Two) Times a Day., Disp: 10 tablet, Rfl: 0    Objective   Vital Signs:   /84 (BP Location: Right arm, Patient Position: Sitting, Cuff Size: Large Adult)   Pulse 76   Ht 175.3 cm (69.02\")   Wt 83.8 kg (184 lb 12.8 oz)   SpO2 99%   BMI 27.28 kg/m²           Physical Exam  Constitutional:       General: She is not in acute distress.     Appearance: Normal appearance. She is not ill-appearing.   HENT:      Head: " Normocephalic.   Cardiovascular:      Rate and Rhythm: Normal rate and regular rhythm.      Pulses: Normal pulses.      Heart sounds: No murmur heard.  Pulmonary:      Effort: Pulmonary effort is normal. No respiratory distress.      Breath sounds: Normal breath sounds. No wheezing or rhonchi.   Abdominal:      General: Abdomen is flat. Bowel sounds are normal.      Palpations: Abdomen is soft.      Tenderness: There is no right CVA tenderness or left CVA tenderness.   Musculoskeletal:         General: Tenderness (chronic pain, L-spine good rom) present. No swelling. Normal range of motion.      Cervical back: Neck supple.      Right lower leg: No edema.      Left lower leg: No edema.   Skin:     General: Skin is warm and dry.      Comments: Right lateral chest wall incisions healed well   Neurological:      General: No focal deficit present.      Mental Status: She is alert and oriented to person, place, and time. Mental status is at baseline.      Sensory: Sensory deficit present.      Motor: Weakness present.      Gait: Gait is intact.   Psychiatric:         Attention and Perception: Attention normal.         Mood and Affect: Mood normal.         Speech: Speech normal.         Behavior: Behavior normal.         Thought Content: Thought content normal.         Judgment: Judgment normal.          Result Review :     Preadmission on 04/10/2023   Component Date Value Ref Range Status   • QT Interval 03/30/2023 407  ms Final   Admission on 03/30/2023, Discharged on 03/30/2023   Component Date Value Ref Range Status   • Glucose 03/30/2023 116 (H)  70 - 105 mg/dL Final    Serial Number: [id number]:  [id number]                  BMI is >= 25 and <30. (Overweight) The following options were offered after discussion;: exercise counseling/recommendations and nutrition counseling/recommendations           Assessment and Plan    Diagnoses and all orders for this visit:    1. Urinary tract infection without hematuria,  site unspecified (Primary)  Comments:  start cipro, inc water intake    2. Mixed hyperlipidemia  Comments:  labs reviewed, continue fenofibrate and healthy heart diet.    Consider statin  Orders:  -     fenofibrate (TRICOR) 145 MG tablet; Take 1 tablet by mouth Daily.  Dispense: 90 tablet; Refill: 1    3. Chronic midline low back pain with bilateral sciatica  Comments:  Stable, keep follow-up with pain mgmt.   facial numbness improving, ok to cont lyrica 200mg daily  can not rocio dulox or em d/t UTI.     4. Type 2 diabetes mellitus without complication, without long-term current use of insulin  Comments:  a1c stable, cont metformin  cont DM diet    5. Essential hypertension  Comments:  bp stable    6. Chronic obstructive pulmonary disease, unspecified COPD type  Comments:  praised efforts of quitting smoking  continue inhalers  f/u pulm as directed    7. Primary cancer of right upper lobe of lung  Comments:  f/u with oncology as directed  R lobectomy incisions healed well    8. Calculus of gallbladder with cholecystitis without biliary obstruction, unspecified cholecystitis acuity  Comments:  keep plan for ralf per gen surgery  cont/rf zofran prn    9. Vitamin D deficiency  Comments:  cont vitamin d    10. Facial paresthesia  Comments:  improving, likely 2/2 smoking and polycythemia    Other orders  -     ondansetron (Zofran) 4 MG tablet; Take 1 tablet by mouth Every 8 (Eight) Hours As Needed for Nausea or Vomiting.  Dispense: 30 tablet; Refill: 0  -     vitamin D (ERGOCALCIFEROL) 1.25 MG (50585 UT) capsule capsule; Take 1 capsule by mouth Every 7 (Seven) Days.  Dispense: 12 capsule; Refill: 1  -     ciprofloxacin (Cipro) 250 MG tablet; Take 1 tablet by mouth 2 (Two) Times a Day.  Dispense: 10 tablet; Refill: 0          I spent 45 minutes caring for Rahel Ramsey on this date of service. This time includes time spent by me in the following activities: preparing for the visit, reviewing tests, performing a  medically appropriate examination and/or evaluation , counseling and educating the patient/family/caregiver, ordering medications, tests, or procedures and documenting information in the medical record        Follow Up     Return in about 6 months (around 10/4/2023) for Recheck DM, pain, cancer. DM panel, urine micro, vitamin d, hep c ab prior , Medicare Wellness.  Patient was given instructions and counseling regarding her condition or for health maintenance advice. Please see specific information pulled into the AVS if appropriate.        Part of this note may be an electronic transcription/translation of spoken language to printed text using the Dragon Dictation System

## 2023-04-08 ENCOUNTER — ANESTHESIA EVENT (OUTPATIENT)
Dept: PERIOP | Facility: HOSPITAL | Age: 58
End: 2023-04-08
Payer: MEDICARE

## 2023-04-08 NOTE — ANESTHESIA PREPROCEDURE EVALUATION
Anesthesia Evaluation     Patient summary reviewed and Nursing notes reviewed   no history of anesthetic complications:  NPO Solid Status: > 8 hours  NPO Liquid Status: > 2 hours           Airway   Dental      Pulmonary    (+) a smoker Former, lung cancer, COPD,   Cardiovascular     ECG reviewed  PT is on anticoagulation therapy    (+) hypertension, dysrhythmias Atrial Fib, hyperlipidemia,  carotid artery disease      Neuro/Psych  (+) headaches, numbness,    GI/Hepatic/Renal/Endo    (+)  GERD,  liver disease history of elevated LFT, diabetes mellitus,     Musculoskeletal     (+) chronic pain,   Abdominal    Substance History      OB/GYN          Other   arthritis,    history of cancer    ROS/Med Hx Other: Additional History:  Choledocholithiasis, carotid stenosis, skin cancer, COPD exacerbation, allergies, fatigue, abd pain, lymphadenopathy, dysphagia, nausea    PSH:  BACK SURGERY DILATATION AND CURETTAGE  SUBTOTAL HYSTERECTOMY LUMBAR DECOMPRESSION  SKIN CANCER EXCISION CARDIAC CATHETERIZATION  LUNG BIOPSY LOBECTOMY  ERCP COLONOSCOPY  ENDOSCOPY                   Anesthesia Plan    ASA 3     general     (Patient identified; pre-operative vital signs, all relevant labs/studies, complete medical/surgical/anesthetic history, full medication list, full allergy list, and NPO status obtained/reviewed; physical assessment performed; anesthetic options, side effects, potential complications, risks, and benefits discussed; questions answered; written anesthesia consent obtained; patient cleared for procedure; anesthesia machine and equipment checked and functioning)  intravenous induction     Anesthetic plan, risks, benefits, and alternatives have been provided, discussed and informed consent has been obtained with: patient.    Plan discussed with CRNA and CAA.        CODE STATUS:

## 2023-04-10 ENCOUNTER — HOSPITAL ENCOUNTER (OUTPATIENT)
Facility: HOSPITAL | Age: 58
Discharge: HOME | End: 2023-04-11
Attending: SURGERY | Admitting: SURGERY
Payer: MEDICARE

## 2023-04-10 ENCOUNTER — ANESTHESIA (OUTPATIENT)
Dept: PERIOP | Facility: HOSPITAL | Age: 58
End: 2023-04-10
Payer: MEDICARE

## 2023-04-10 DIAGNOSIS — K80.50 CHOLEDOCHOLITHIASIS: ICD-10-CM

## 2023-04-10 LAB
ALBUMIN SERPL-MCNC: 4.2 G/DL (ref 3.5–5.2)
ALBUMIN/GLOB SERPL: 1.4 G/DL
ALP SERPL-CCNC: 107 U/L (ref 39–117)
ALT SERPL W P-5'-P-CCNC: 94 U/L (ref 1–33)
ANION GAP SERPL CALCULATED.3IONS-SCNC: 12 MMOL/L (ref 5–15)
AST SERPL-CCNC: 108 U/L (ref 1–32)
BASOPHILS # BLD AUTO: 0.1 10*3/MM3 (ref 0–0.2)
BASOPHILS NFR BLD AUTO: 0.8 % (ref 0–1.5)
BILIRUB SERPL-MCNC: 0.7 MG/DL (ref 0–1.2)
BUN SERPL-MCNC: 10 MG/DL (ref 6–20)
BUN/CREAT SERPL: 15.9 (ref 7–25)
CALCIUM SPEC-SCNC: 9.6 MG/DL (ref 8.6–10.5)
CHLORIDE SERPL-SCNC: 103 MMOL/L (ref 98–107)
CO2 SERPL-SCNC: 24 MMOL/L (ref 22–29)
CREAT SERPL-MCNC: 0.63 MG/DL (ref 0.57–1)
DEPRECATED RDW RBC AUTO: 44.6 FL (ref 37–54)
EGFRCR SERPLBLD CKD-EPI 2021: 103 ML/MIN/1.73
EOSINOPHIL # BLD AUTO: 0 10*3/MM3 (ref 0–0.4)
EOSINOPHIL NFR BLD AUTO: 0.3 % (ref 0.3–6.2)
ERYTHROCYTE [DISTWIDTH] IN BLOOD BY AUTOMATED COUNT: 14.4 % (ref 12.3–15.4)
GLOBULIN UR ELPH-MCNC: 3.1 GM/DL
GLUCOSE BLDC GLUCOMTR-MCNC: 125 MG/DL (ref 70–105)
GLUCOSE BLDC GLUCOMTR-MCNC: 137 MG/DL (ref 70–105)
GLUCOSE SERPL-MCNC: 142 MG/DL (ref 65–99)
HCT VFR BLD AUTO: 44.4 % (ref 34–46.6)
HGB BLD-MCNC: 14.3 G/DL (ref 12–15.9)
LYMPHOCYTES # BLD AUTO: 1.9 10*3/MM3 (ref 0.7–3.1)
LYMPHOCYTES NFR BLD AUTO: 18 % (ref 19.6–45.3)
MCH RBC QN AUTO: 28.3 PG (ref 26.6–33)
MCHC RBC AUTO-ENTMCNC: 32.1 G/DL (ref 31.5–35.7)
MCV RBC AUTO: 88 FL (ref 79–97)
MONOCYTES # BLD AUTO: 0.7 10*3/MM3 (ref 0.1–0.9)
MONOCYTES NFR BLD AUTO: 6.2 % (ref 5–12)
NEUTROPHILS NFR BLD AUTO: 74.7 % (ref 42.7–76)
NEUTROPHILS NFR BLD AUTO: 8.1 10*3/MM3 (ref 1.7–7)
NRBC BLD AUTO-RTO: 0 /100 WBC (ref 0–0.2)
PLATELET # BLD AUTO: 328 10*3/MM3 (ref 140–450)
PMV BLD AUTO: 7.2 FL (ref 6–12)
POTASSIUM SERPL-SCNC: 4.1 MMOL/L (ref 3.5–5.2)
PROT SERPL-MCNC: 7.3 G/DL (ref 6–8.5)
RBC # BLD AUTO: 5.05 10*6/MM3 (ref 3.77–5.28)
SODIUM SERPL-SCNC: 139 MMOL/L (ref 136–145)
WBC NRBC COR # BLD: 10.8 10*3/MM3 (ref 3.4–10.8)

## 2023-04-10 PROCEDURE — 94799 UNLISTED PULMONARY SVC/PX: CPT

## 2023-04-10 PROCEDURE — 25010000002 PROPOFOL 10 MG/ML EMULSION: Performed by: ANESTHESIOLOGIST ASSISTANT

## 2023-04-10 PROCEDURE — 25010000002 CEFAZOLIN PER 500 MG: Performed by: SURGERY

## 2023-04-10 PROCEDURE — 25010000002 FENTANYL CITRATE (PF) 50 MCG/ML SOLUTION: Performed by: ANESTHESIOLOGIST ASSISTANT

## 2023-04-10 PROCEDURE — 47562 LAPAROSCOPIC CHOLECYSTECTOMY: CPT | Performed by: SURGERY

## 2023-04-10 PROCEDURE — 88304 TISSUE EXAM BY PATHOLOGIST: CPT | Performed by: SURGERY

## 2023-04-10 PROCEDURE — 0 MEPERIDINE PER 100 MG: Performed by: ANESTHESIOLOGIST ASSISTANT

## 2023-04-10 PROCEDURE — 25010000002 HYDROMORPHONE 1 MG/ML SOLUTION: Performed by: ANESTHESIOLOGIST ASSISTANT

## 2023-04-10 PROCEDURE — 25010000002 HYDRALAZINE PER 20 MG: Performed by: ANESTHESIOLOGIST ASSISTANT

## 2023-04-10 PROCEDURE — 25010000002 ONDANSETRON PER 1 MG: Performed by: ANESTHESIOLOGIST ASSISTANT

## 2023-04-10 PROCEDURE — G0378 HOSPITAL OBSERVATION PER HR: HCPCS

## 2023-04-10 PROCEDURE — 82962 GLUCOSE BLOOD TEST: CPT

## 2023-04-10 PROCEDURE — 80053 COMPREHEN METABOLIC PANEL: CPT | Performed by: SURGERY

## 2023-04-10 PROCEDURE — 25010000002 DEXAMETHASONE PER 1 MG: Performed by: ANESTHESIOLOGIST ASSISTANT

## 2023-04-10 PROCEDURE — 25010000002 MIDAZOLAM PER 1 MG: Performed by: ANESTHESIOLOGIST ASSISTANT

## 2023-04-10 PROCEDURE — 85025 COMPLETE CBC W/AUTO DIFF WBC: CPT | Performed by: SURGERY

## 2023-04-10 PROCEDURE — 94640 AIRWAY INHALATION TREATMENT: CPT

## 2023-04-10 DEVICE — HEMOST ABS SURGICEL PWDR 3GM: Type: IMPLANTABLE DEVICE | Site: ABDOMEN | Status: FUNCTIONAL

## 2023-04-10 DEVICE — LIGAMAX 5 MM ENDOSCOPIC MULTIPLE CLIP APPLIER
Type: IMPLANTABLE DEVICE | Site: ABDOMEN | Status: FUNCTIONAL
Brand: LIGAMAX

## 2023-04-10 RX ORDER — BUPIVACAINE HYDROCHLORIDE 2.5 MG/ML
INJECTION, SOLUTION EPIDURAL; INFILTRATION; INTRACAUDAL AS NEEDED
Status: DISCONTINUED | OUTPATIENT
Start: 2023-04-10 | End: 2023-04-10 | Stop reason: HOSPADM

## 2023-04-10 RX ORDER — DIPHENHYDRAMINE HCL 25 MG
25 CAPSULE ORAL
Status: DISCONTINUED | OUTPATIENT
Start: 2023-04-10 | End: 2023-04-10 | Stop reason: HOSPADM

## 2023-04-10 RX ORDER — ROCURONIUM BROMIDE 10 MG/ML
INJECTION, SOLUTION INTRAVENOUS AS NEEDED
Status: DISCONTINUED | OUTPATIENT
Start: 2023-04-10 | End: 2023-04-10 | Stop reason: SURG

## 2023-04-10 RX ORDER — DIPHENHYDRAMINE HYDROCHLORIDE 50 MG/ML
12.5 INJECTION INTRAMUSCULAR; INTRAVENOUS
Status: DISCONTINUED | OUTPATIENT
Start: 2023-04-10 | End: 2023-04-10 | Stop reason: HOSPADM

## 2023-04-10 RX ORDER — FLUMAZENIL 0.1 MG/ML
0.5 INJECTION INTRAVENOUS AS NEEDED
Status: DISCONTINUED | OUTPATIENT
Start: 2023-04-10 | End: 2023-04-10 | Stop reason: HOSPADM

## 2023-04-10 RX ORDER — PROMETHAZINE HYDROCHLORIDE 25 MG/1
25 TABLET ORAL ONCE AS NEEDED
Status: DISCONTINUED | OUTPATIENT
Start: 2023-04-10 | End: 2023-04-10 | Stop reason: HOSPADM

## 2023-04-10 RX ORDER — SODIUM CHLORIDE 9 MG/ML
50 INJECTION, SOLUTION INTRAVENOUS CONTINUOUS
Status: DISCONTINUED | OUTPATIENT
Start: 2023-04-10 | End: 2023-04-11 | Stop reason: HOSPADM

## 2023-04-10 RX ORDER — ESMOLOL HYDROCHLORIDE 10 MG/ML
INJECTION INTRAVENOUS AS NEEDED
Status: DISCONTINUED | OUTPATIENT
Start: 2023-04-10 | End: 2023-04-10 | Stop reason: SURG

## 2023-04-10 RX ORDER — FENTANYL CITRATE 50 UG/ML
50 INJECTION, SOLUTION INTRAMUSCULAR; INTRAVENOUS
Status: DISCONTINUED | OUTPATIENT
Start: 2023-04-10 | End: 2023-04-10 | Stop reason: HOSPADM

## 2023-04-10 RX ORDER — OXYCODONE HYDROCHLORIDE 5 MG/1
5 TABLET ORAL EVERY 4 HOURS PRN
Status: DISCONTINUED | OUTPATIENT
Start: 2023-04-10 | End: 2023-04-11 | Stop reason: HOSPADM

## 2023-04-10 RX ORDER — NALOXONE HCL 0.4 MG/ML
0.4 VIAL (ML) INJECTION AS NEEDED
Status: DISCONTINUED | OUTPATIENT
Start: 2023-04-10 | End: 2023-04-10 | Stop reason: HOSPADM

## 2023-04-10 RX ORDER — MEPERIDINE HYDROCHLORIDE 25 MG/ML
12.5 INJECTION INTRAMUSCULAR; INTRAVENOUS; SUBCUTANEOUS
Status: DISCONTINUED | OUTPATIENT
Start: 2023-04-10 | End: 2023-04-10 | Stop reason: HOSPADM

## 2023-04-10 RX ORDER — METHOCARBAMOL 500 MG/1
500 TABLET, FILM COATED ORAL EVERY 8 HOURS PRN
Status: DISCONTINUED | OUTPATIENT
Start: 2023-04-10 | End: 2023-04-11 | Stop reason: HOSPADM

## 2023-04-10 RX ORDER — METOPROLOL SUCCINATE 25 MG/1
25 TABLET, EXTENDED RELEASE ORAL DAILY
Status: DISCONTINUED | OUTPATIENT
Start: 2023-04-11 | End: 2023-04-11 | Stop reason: HOSPADM

## 2023-04-10 RX ORDER — HYDROCODONE BITARTRATE AND ACETAMINOPHEN 10; 325 MG/1; MG/1
1 TABLET ORAL EVERY 6 HOURS PRN
Status: DISCONTINUED | OUTPATIENT
Start: 2023-04-10 | End: 2023-04-11 | Stop reason: HOSPADM

## 2023-04-10 RX ORDER — MIDAZOLAM HYDROCHLORIDE 1 MG/ML
INJECTION INTRAMUSCULAR; INTRAVENOUS AS NEEDED
Status: DISCONTINUED | OUTPATIENT
Start: 2023-04-10 | End: 2023-04-10 | Stop reason: SURG

## 2023-04-10 RX ORDER — ONDANSETRON 2 MG/ML
INJECTION INTRAMUSCULAR; INTRAVENOUS AS NEEDED
Status: DISCONTINUED | OUTPATIENT
Start: 2023-04-10 | End: 2023-04-10 | Stop reason: SURG

## 2023-04-10 RX ORDER — SODIUM CHLORIDE 9 MG/ML
40 INJECTION, SOLUTION INTRAVENOUS AS NEEDED
Status: DISCONTINUED | OUTPATIENT
Start: 2023-04-10 | End: 2023-04-10 | Stop reason: HOSPADM

## 2023-04-10 RX ORDER — ACETAMINOPHEN 325 MG/1
650 TABLET ORAL ONCE AS NEEDED
Status: DISCONTINUED | OUTPATIENT
Start: 2023-04-10 | End: 2023-04-10 | Stop reason: HOSPADM

## 2023-04-10 RX ORDER — EPHEDRINE SULFATE 5 MG/ML
5 INJECTION INTRAVENOUS ONCE AS NEEDED
Status: DISCONTINUED | OUTPATIENT
Start: 2023-04-10 | End: 2023-04-10 | Stop reason: HOSPADM

## 2023-04-10 RX ORDER — ALBUTEROL SULFATE 2.5 MG/3ML
2.5 SOLUTION RESPIRATORY (INHALATION) EVERY 4 HOURS PRN
Status: DISCONTINUED | OUTPATIENT
Start: 2023-04-10 | End: 2023-04-11 | Stop reason: HOSPADM

## 2023-04-10 RX ORDER — LORAZEPAM 2 MG/ML
1 INJECTION INTRAMUSCULAR
Status: DISCONTINUED | OUTPATIENT
Start: 2023-04-10 | End: 2023-04-10 | Stop reason: HOSPADM

## 2023-04-10 RX ORDER — ONDANSETRON 2 MG/ML
4 INJECTION INTRAMUSCULAR; INTRAVENOUS ONCE AS NEEDED
Status: COMPLETED | OUTPATIENT
Start: 2023-04-10 | End: 2023-04-10

## 2023-04-10 RX ORDER — FENTANYL CITRATE 50 UG/ML
100 INJECTION, SOLUTION INTRAMUSCULAR; INTRAVENOUS
Status: DISCONTINUED | OUTPATIENT
Start: 2023-04-10 | End: 2023-04-10 | Stop reason: HOSPADM

## 2023-04-10 RX ORDER — SODIUM CHLORIDE, SODIUM LACTATE, POTASSIUM CHLORIDE, AND CALCIUM CHLORIDE .6; .31; .03; .02 G/100ML; G/100ML; G/100ML; G/100ML
20 INJECTION, SOLUTION INTRAVENOUS CONTINUOUS
Status: DISCONTINUED | OUTPATIENT
Start: 2023-04-10 | End: 2023-04-10

## 2023-04-10 RX ORDER — IPRATROPIUM BROMIDE AND ALBUTEROL SULFATE 2.5; .5 MG/3ML; MG/3ML
3 SOLUTION RESPIRATORY (INHALATION) ONCE AS NEEDED
Status: DISCONTINUED | OUTPATIENT
Start: 2023-04-10 | End: 2023-04-10 | Stop reason: HOSPADM

## 2023-04-10 RX ORDER — DEXAMETHASONE SODIUM PHOSPHATE 4 MG/ML
INJECTION, SOLUTION INTRA-ARTICULAR; INTRALESIONAL; INTRAMUSCULAR; INTRAVENOUS; SOFT TISSUE AS NEEDED
Status: DISCONTINUED | OUTPATIENT
Start: 2023-04-10 | End: 2023-04-10 | Stop reason: SURG

## 2023-04-10 RX ORDER — KETAMINE HCL IN NACL, ISO-OSM 100MG/10ML
SYRINGE (ML) INJECTION AS NEEDED
Status: DISCONTINUED | OUTPATIENT
Start: 2023-04-10 | End: 2023-04-10 | Stop reason: SURG

## 2023-04-10 RX ORDER — LIDOCAINE HYDROCHLORIDE 10 MG/ML
INJECTION, SOLUTION EPIDURAL; INFILTRATION; INTRACAUDAL; PERINEURAL AS NEEDED
Status: DISCONTINUED | OUTPATIENT
Start: 2023-04-10 | End: 2023-04-10 | Stop reason: SURG

## 2023-04-10 RX ORDER — AMITRIPTYLINE HYDROCHLORIDE 25 MG/1
25 TABLET, FILM COATED ORAL NIGHTLY
Status: DISCONTINUED | OUTPATIENT
Start: 2023-04-10 | End: 2023-04-11 | Stop reason: HOSPADM

## 2023-04-10 RX ORDER — ONDANSETRON 4 MG/1
4 TABLET, FILM COATED ORAL EVERY 8 HOURS PRN
Status: DISCONTINUED | OUTPATIENT
Start: 2023-04-10 | End: 2023-04-11 | Stop reason: HOSPADM

## 2023-04-10 RX ORDER — METHOCARBAMOL 500 MG/1
500 TABLET, FILM COATED ORAL EVERY 8 HOURS SCHEDULED
Status: DISCONTINUED | OUTPATIENT
Start: 2023-04-10 | End: 2023-04-10

## 2023-04-10 RX ORDER — OXYCODONE HYDROCHLORIDE 5 MG/1
5 TABLET ORAL EVERY 4 HOURS PRN
Status: DISCONTINUED | OUTPATIENT
Start: 2023-04-10 | End: 2023-04-10 | Stop reason: HOSPADM

## 2023-04-10 RX ORDER — PROPOFOL 10 MG/ML
VIAL (ML) INTRAVENOUS AS NEEDED
Status: DISCONTINUED | OUTPATIENT
Start: 2023-04-10 | End: 2023-04-10 | Stop reason: SURG

## 2023-04-10 RX ORDER — SODIUM CHLORIDE 0.9 % (FLUSH) 0.9 %
3 SYRINGE (ML) INJECTION EVERY 12 HOURS SCHEDULED
Status: DISCONTINUED | OUTPATIENT
Start: 2023-04-10 | End: 2023-04-10 | Stop reason: HOSPADM

## 2023-04-10 RX ORDER — LABETALOL HYDROCHLORIDE 5 MG/ML
5 INJECTION, SOLUTION INTRAVENOUS
Status: DISCONTINUED | OUTPATIENT
Start: 2023-04-10 | End: 2023-04-10 | Stop reason: HOSPADM

## 2023-04-10 RX ORDER — FENTANYL CITRATE 50 UG/ML
INJECTION, SOLUTION INTRAMUSCULAR; INTRAVENOUS AS NEEDED
Status: DISCONTINUED | OUTPATIENT
Start: 2023-04-10 | End: 2023-04-10 | Stop reason: SURG

## 2023-04-10 RX ORDER — MIDAZOLAM HYDROCHLORIDE 1 MG/ML
2 INJECTION INTRAMUSCULAR; INTRAVENOUS
Status: DISCONTINUED | OUTPATIENT
Start: 2023-04-10 | End: 2023-04-10 | Stop reason: HOSPADM

## 2023-04-10 RX ORDER — SODIUM CHLORIDE 0.9 % (FLUSH) 0.9 %
3-10 SYRINGE (ML) INJECTION AS NEEDED
Status: DISCONTINUED | OUTPATIENT
Start: 2023-04-10 | End: 2023-04-10 | Stop reason: HOSPADM

## 2023-04-10 RX ORDER — DIPHENHYDRAMINE HYDROCHLORIDE 50 MG/ML
12.5 INJECTION INTRAMUSCULAR; INTRAVENOUS ONCE AS NEEDED
Status: DISCONTINUED | OUTPATIENT
Start: 2023-04-10 | End: 2023-04-10 | Stop reason: HOSPADM

## 2023-04-10 RX ORDER — BUDESONIDE AND FORMOTEROL FUMARATE DIHYDRATE 160; 4.5 UG/1; UG/1
2 AEROSOL RESPIRATORY (INHALATION)
Status: DISCONTINUED | OUTPATIENT
Start: 2023-04-10 | End: 2023-04-11 | Stop reason: HOSPADM

## 2023-04-10 RX ORDER — HYDRALAZINE HYDROCHLORIDE 20 MG/ML
5 INJECTION INTRAMUSCULAR; INTRAVENOUS
Status: DISCONTINUED | OUTPATIENT
Start: 2023-04-10 | End: 2023-04-10 | Stop reason: HOSPADM

## 2023-04-10 RX ORDER — OXYCODONE HYDROCHLORIDE 5 MG/1
10 TABLET ORAL EVERY 4 HOURS PRN
Status: DISCONTINUED | OUTPATIENT
Start: 2023-04-10 | End: 2023-04-10 | Stop reason: HOSPADM

## 2023-04-10 RX ORDER — PREGABALIN 100 MG/1
100 CAPSULE ORAL 2 TIMES DAILY
Status: DISCONTINUED | OUTPATIENT
Start: 2023-04-10 | End: 2023-04-11 | Stop reason: HOSPADM

## 2023-04-10 RX ORDER — PROMETHAZINE HYDROCHLORIDE 25 MG/1
25 SUPPOSITORY RECTAL ONCE AS NEEDED
Status: DISCONTINUED | OUTPATIENT
Start: 2023-04-10 | End: 2023-04-10 | Stop reason: HOSPADM

## 2023-04-10 RX ORDER — SODIUM CHLORIDE 9 MG/ML
100 INJECTION, SOLUTION INTRAVENOUS CONTINUOUS
Status: DISCONTINUED | OUTPATIENT
Start: 2023-04-10 | End: 2023-04-10

## 2023-04-10 RX ORDER — ACETAMINOPHEN 650 MG/1
650 SUPPOSITORY RECTAL ONCE AS NEEDED
Status: DISCONTINUED | OUTPATIENT
Start: 2023-04-10 | End: 2023-04-10 | Stop reason: HOSPADM

## 2023-04-10 RX ADMIN — HYDROMORPHONE HYDROCHLORIDE 1 MG: 1 INJECTION, SOLUTION INTRAMUSCULAR; INTRAVENOUS; SUBCUTANEOUS at 09:58

## 2023-04-10 RX ADMIN — LIDOCAINE HYDROCHLORIDE 50 MG: 10 INJECTION, SOLUTION EPIDURAL; INFILTRATION; INTRACAUDAL; PERINEURAL at 09:45

## 2023-04-10 RX ADMIN — CEFAZOLIN 2 G: 2 INJECTION, POWDER, FOR SOLUTION INTRAMUSCULAR; INTRAVENOUS at 09:39

## 2023-04-10 RX ADMIN — ESMOLOL HYDROCHLORIDE 10 MG: 100 INJECTION, SOLUTION INTRAVENOUS at 10:10

## 2023-04-10 RX ADMIN — ONDANSETRON 4 MG: 2 INJECTION INTRAMUSCULAR; INTRAVENOUS at 11:09

## 2023-04-10 RX ADMIN — HYDRALAZINE HYDROCHLORIDE 5 MG: 20 INJECTION INTRAMUSCULAR; INTRAVENOUS at 11:22

## 2023-04-10 RX ADMIN — METHOCARBAMOL 500 MG: 500 TABLET ORAL at 23:57

## 2023-04-10 RX ADMIN — FENTANYL CITRATE 50 MCG: 50 INJECTION, SOLUTION INTRAMUSCULAR; INTRAVENOUS at 11:10

## 2023-04-10 RX ADMIN — OXYCODONE 5 MG: 5 TABLET ORAL at 15:16

## 2023-04-10 RX ADMIN — ROCURONIUM BROMIDE 10 MG: 10 INJECTION, SOLUTION INTRAVENOUS at 10:11

## 2023-04-10 RX ADMIN — SUGAMMADEX 200 MG: 100 INJECTION, SOLUTION INTRAVENOUS at 10:34

## 2023-04-10 RX ADMIN — DEXAMETHASONE SODIUM PHOSPHATE 4 MG: 4 INJECTION, SOLUTION INTRAMUSCULAR; INTRAVENOUS at 09:50

## 2023-04-10 RX ADMIN — BUDESONIDE AND FORMOTEROL FUMARATE DIHYDRATE 2 PUFF: 160; 4.5 AEROSOL RESPIRATORY (INHALATION) at 19:10

## 2023-04-10 RX ADMIN — OXYCODONE 5 MG: 5 TABLET ORAL at 20:17

## 2023-04-10 RX ADMIN — PREGABALIN 100 MG: 100 CAPSULE ORAL at 20:17

## 2023-04-10 RX ADMIN — AMITRIPTYLINE HYDROCHLORIDE 25 MG: 25 TABLET, FILM COATED ORAL at 20:17

## 2023-04-10 RX ADMIN — MIDAZOLAM 2 MG: 1 INJECTION INTRAMUSCULAR; INTRAVENOUS at 10:05

## 2023-04-10 RX ADMIN — HYDROCODONE BITARTRATE AND ACETAMINOPHEN 1 TABLET: 10; 325 TABLET ORAL at 11:48

## 2023-04-10 RX ADMIN — PROPOFOL 50 MG: 10 INJECTION, EMULSION INTRAVENOUS at 09:57

## 2023-04-10 RX ADMIN — SODIUM CHLORIDE 50 ML/HR: 9 INJECTION, SOLUTION INTRAVENOUS at 15:18

## 2023-04-10 RX ADMIN — Medication 30 MG: at 10:05

## 2023-04-10 RX ADMIN — HYDROCODONE BITARTRATE AND ACETAMINOPHEN 1 TABLET: 10; 325 TABLET ORAL at 23:57

## 2023-04-10 RX ADMIN — HYDROMORPHONE HYDROCHLORIDE 0.5 MG: 1 INJECTION, SOLUTION INTRAMUSCULAR; INTRAVENOUS; SUBCUTANEOUS at 10:14

## 2023-04-10 RX ADMIN — HYDROMORPHONE HYDROCHLORIDE 0.5 MG: 1 INJECTION, SOLUTION INTRAMUSCULAR; INTRAVENOUS; SUBCUTANEOUS at 11:52

## 2023-04-10 RX ADMIN — SODIUM CHLORIDE, SODIUM LACTATE, POTASSIUM CHLORIDE, AND CALCIUM CHLORIDE 20 ML/HR: .6; .31; .03; .02 INJECTION, SOLUTION INTRAVENOUS at 08:24

## 2023-04-10 RX ADMIN — PROPOFOL 150 MG: 10 INJECTION, EMULSION INTRAVENOUS at 09:45

## 2023-04-10 RX ADMIN — HYDROMORPHONE HYDROCHLORIDE 0.5 MG: 1 INJECTION, SOLUTION INTRAMUSCULAR; INTRAVENOUS; SUBCUTANEOUS at 10:21

## 2023-04-10 RX ADMIN — HYDROMORPHONE HYDROCHLORIDE 0.5 MG: 1 INJECTION, SOLUTION INTRAMUSCULAR; INTRAVENOUS; SUBCUTANEOUS at 11:34

## 2023-04-10 RX ADMIN — METHOCARBAMOL 500 MG: 500 TABLET ORAL at 15:16

## 2023-04-10 RX ADMIN — FENTANYL CITRATE 100 MCG: 50 INJECTION, SOLUTION INTRAMUSCULAR; INTRAVENOUS at 09:45

## 2023-04-10 RX ADMIN — HYDROMORPHONE HYDROCHLORIDE 0.5 MG: 1 INJECTION, SOLUTION INTRAMUSCULAR; INTRAVENOUS; SUBCUTANEOUS at 10:53

## 2023-04-10 RX ADMIN — ROCURONIUM BROMIDE 50 MG: 10 INJECTION, SOLUTION INTRAVENOUS at 09:45

## 2023-04-10 RX ADMIN — ONDANSETRON 4 MG: 2 INJECTION INTRAMUSCULAR; INTRAVENOUS at 10:29

## 2023-04-10 RX ADMIN — MEPERIDINE HYDROCHLORIDE 12.5 MG: 25 INJECTION INTRAMUSCULAR; INTRAVENOUS; SUBCUTANEOUS at 11:28

## 2023-04-10 NOTE — OP NOTE
Operative Report:    Patient Name:  Rahel Ramsey  YOB: 1965    Date of Surgery:  4/10/2023     Indications: 57-year-old lady who was recently admitted for choledocholithiasis and acute pancreatitis who underwent ERCP stent placement and has had stent removal.  I counseled her about the risk and benefits of Birgit cystectomy for cholelithiasis.  She understood the risk of the operation and was ready to proceed.    Pre-op Diagnosis:   Choledocholithiasis [K80.50]       Post-Op Diagnosis Codes:     * Choledocholithiasis [K80.50]    Procedure/CPT® Codes:      Procedure(s):  CHOLECYSTECTOMY LAPAROSCOPIC    Staff:  Surgeon(s):  Mich Bansal MD    Circulator: Piedad Quinn RN  Scrub Person: Kim Espinoza RN  Assistant: Ana Askew  was responsible for performing the following activities: Retraction, Closing and Held/Positioned Camera and their skilled assistance was necessary for the success of this case.        Anesthesia: General    Estimated Blood Loss: minimal    Implants:    Implant Name Type Inv. Item Serial No.  Lot No. LRB No. Used Action   CLIPAPPLR M/ ENDO LIGAMAX5 5MM 33CM MD/LG - [id number] Implant CLIPAPPLR M/ ENDO LIGAMAX5 5MM 33CM MD/LG  [employer]  DIV OF J AND J [id number] N/A 1 Implanted   HEMOST ABS SURGICEL PWDR 3GM - [id number] Implant HEMOST ABS SURGICEL PWDR 3GM  [employer]  DIV OF J AND J SPBEAC N/A 1 Implanted       Specimen:          Specimens     ID Source Type Tests Collected By Collected At Frozen?    A Gallbladder Tissue · TISSUE PATHOLOGY EXAM   Mich Bansal MD 4/10/23 1007               Findings: Gallbladder containing gallstone    Complications: Small liver laceration secondary to left upper quadrant optical trocar entry with minimal bleeding    Description of Procedure: Patient was taken to the operating placed on the operating table in the supine position under general anesthesia.  Her abdomen was prepped and draped normal sterile  fashion.  Timeout was performed identifying the correct patient procedure and site of operation.  I began the operation Mitran the abdomen through a left upper quadrant optical trocar entry.  As the trocar penetrated the peritoneum the inner cannula was removed the abdomen was insufflated and I went ahead and fully advanced the blunt trocar.  As the camera was introduced there was some minor bleeding and a small liver laceration on the left lobe of the liver.  I went ahead and placed my periumbilical 12 mm port and 2 right subcostal 5 mm ports and then inspected the right upper quadrant.  The liver edge itself had minimal bleeding but I went ahead and cauterized the lacerated service to provide hemostasis.   the retroperitoneal structures were inspected and appeared to be okay.  The gallbladder was grasped retractors right upper quadrant infundibulum retracted laterally dissection the base the gallbladder was performed using the hook Bovie.  I was able to dissected out the cystic duct and cystic artery get the base the gallbladder free off the liver edge revealed the critical view.  The cystic duct and cystic artery were doubly clipped and divided.  The gallbladder was removed off the liver using the Bovie.  There was some moderate bleeding off of the liver edge  at the lateral aspect of the gallbladder fossa.  I took extra time to cauterize the surface as well.  Liver itself was fairly congested and obviously bled fairly easily.  Once the bleeding off the liver edge of the gallbladder fossa.  The gallbladder was placed in Endo Catch bag and removed.  I used the suction  to evacuate small amount of hematoma that formed in the gallbladder fossa and a scant amount of spilled bile.  The ports were then serially removed without any abdominal wall hemorrhage.  I reinspected the left lobe of the liver and it appeared to be hemostatic.  Due to the ease that she bled I went ahead and placed some Surgicel powder on  all the dissection planes in the left lobe of the liver.  The periumbilical port site was closed with 0 Vicryl suture and suture passer the skin was closed with 4-0 Vicryl suture and skin affix.  She tolerated procedure well is being transferred recovery in satisfactory condition.  I do want to go ahead and admit her overnight for observation.       Mich Bansal MD     Date: 4/10/2023  Time: 10:59 EDT    This note was created using Dragon Voice Recognition software.

## 2023-04-10 NOTE — ANESTHESIA POSTPROCEDURE EVALUATION
Patient: Rahel Ramsey    Procedure Summary     Date: 04/10/23 Room / Location: [facility] OR 04 / [facility] MAIN OR    Anesthesia Start: 0935 Anesthesia Stop: 1044    Procedure: CHOLECYSTECTOMY LAPAROSCOPIC (Abdomen) Diagnosis:       Choledocholithiasis      (Choledocholithiasis [K80.50])    Surgeons: Mich Bansal MD Provider: Zheng Venegas MD    Anesthesia Type: general ASA Status: 3          Anesthesia Type: general    Vitals  Vitals Value Taken Time   /87 04/10/23 1101   Temp 97.3 °F (36.3 °C) 04/10/23 1044   Pulse 93 04/10/23 1105   Resp 14 04/10/23 1055   SpO2 100 % 04/10/23 1105   Vitals shown include unvalidated device data.        Post Anesthesia Care and Evaluation    Patient location during evaluation: PACU  Patient participation: complete - patient participated  Level of consciousness: awake  Pain scale: See nurse's notes for pain score.  Pain management: adequate    Airway patency: patent  Anesthetic complications: No anesthetic complications  PONV Status: none  Cardiovascular status: acceptable  Respiratory status: acceptable and spontaneous ventilation  Hydration status: acceptable    Comments: Patient seen and examined postoperatively; vital signs stable; SpO2 greater than or equal to 90%; cardiopulmonary status stable; nausea/vomiting adequately controlled; pain adequately controlled; no apparent anesthesia complications; patient discharged from anesthesia care when discharge criteria were met

## 2023-04-10 NOTE — PLAN OF CARE
Goal Outcome Evaluation:  Plan of Care Reviewed With: patient        Progress: improving  Outcome Evaluation: Patient is stable and complains of pain often. Patient does have chronic pain and on pain rx at home. Patient tolerated regular diet with no complications. Cane at bedside. Patient likely to discharge home tomorrow. Safety measures in place. Call light within reach. Patient able to make needs knonw. Will contiue to monitor.

## 2023-04-11 ENCOUNTER — READMISSION MANAGEMENT (OUTPATIENT)
Dept: CALL CENTER | Facility: HOSPITAL | Age: 58
End: 2023-04-11
Payer: MEDICARE

## 2023-04-11 ENCOUNTER — TELEPHONE (OUTPATIENT)
Dept: SURGERY | Facility: CLINIC | Age: 58
End: 2023-04-11
Payer: MEDICARE

## 2023-04-11 VITALS
RESPIRATION RATE: 14 BRPM | OXYGEN SATURATION: 96 % | DIASTOLIC BLOOD PRESSURE: 60 MMHG | SYSTOLIC BLOOD PRESSURE: 111 MMHG | HEART RATE: 78 BPM | TEMPERATURE: 98.3 F | BODY MASS INDEX: 26.42 KG/M2 | WEIGHT: 178.4 LBS | HEIGHT: 69 IN

## 2023-04-11 LAB
GLUCOSE BLDC GLUCOMTR-MCNC: 129 MG/DL (ref 70–105)
LAB AP CASE REPORT: NORMAL
PATH REPORT.FINAL DX SPEC: NORMAL
PATH REPORT.GROSS SPEC: NORMAL

## 2023-04-11 PROCEDURE — 94664 DEMO&/EVAL PT USE INHALER: CPT

## 2023-04-11 PROCEDURE — 99024 POSTOP FOLLOW-UP VISIT: CPT | Performed by: SURGERY

## 2023-04-11 PROCEDURE — 82962 GLUCOSE BLOOD TEST: CPT

## 2023-04-11 PROCEDURE — 94799 UNLISTED PULMONARY SVC/PX: CPT

## 2023-04-11 PROCEDURE — G0378 HOSPITAL OBSERVATION PER HR: HCPCS

## 2023-04-11 PROCEDURE — 25010000002 HYDROMORPHONE 1 MG/ML SOLUTION: Performed by: STUDENT IN AN ORGANIZED HEALTH CARE EDUCATION/TRAINING PROGRAM

## 2023-04-11 RX ORDER — OXYCODONE HYDROCHLORIDE 5 MG/1
5 TABLET ORAL EVERY 4 HOURS PRN
Qty: 10 TABLET | Refills: 0 | Status: SHIPPED | OUTPATIENT
Start: 2023-04-11 | End: 2023-04-20

## 2023-04-11 RX ADMIN — METFORMIN HYDROCHLORIDE 500 MG: 500 TABLET ORAL at 08:15

## 2023-04-11 RX ADMIN — BUDESONIDE AND FORMOTEROL FUMARATE DIHYDRATE 2 PUFF: 160; 4.5 AEROSOL RESPIRATORY (INHALATION) at 08:27

## 2023-04-11 RX ADMIN — TIOTROPIUM BROMIDE INHALATION SPRAY 2 PUFF: 3.12 SPRAY, METERED RESPIRATORY (INHALATION) at 08:27

## 2023-04-11 RX ADMIN — HYDROCODONE BITARTRATE AND ACETAMINOPHEN 1 TABLET: 10; 325 TABLET ORAL at 08:15

## 2023-04-11 RX ADMIN — METOPROLOL SUCCINATE 25 MG: 25 TABLET, EXTENDED RELEASE ORAL at 08:15

## 2023-04-11 RX ADMIN — HYDROMORPHONE HYDROCHLORIDE 0.5 MG: 1 INJECTION, SOLUTION INTRAMUSCULAR; INTRAVENOUS; SUBCUTANEOUS at 06:26

## 2023-04-11 RX ADMIN — PREGABALIN 100 MG: 100 CAPSULE ORAL at 08:15

## 2023-04-11 RX ADMIN — METHOCARBAMOL 500 MG: 500 TABLET ORAL at 08:15

## 2023-04-11 NOTE — PLAN OF CARE
Goal Outcome Evaluation:  Plan of Care Reviewed With: patient        Progress: improving  Patient discharging home and will follow up with dr beck in 2 weeks

## 2023-04-11 NOTE — OUTREACH NOTE
Prep Survey    Flowsheet Row Responses   [facility] patient discharged from? [facility]   Is LACE score < 7 ? No   Eligibility [facility]   Date of Admission 04/10/23   Date of Discharge 04/11/23   Discharge Disposition Home or Self Care   Discharge diagnosis CHOLECYSTECTOMY LAPAROSCOPIC   Does the patient have one of the following disease processes/diagnoses(primary or secondary)? General Surgery   Does the patient have Home health ordered? No   Is there a DME ordered? No   Prep survey completed? Yes          Jackie DESAI - Registered Nurse

## 2023-04-11 NOTE — CASE MANAGEMENT/SOCIAL WORK
Case Management Discharge Note      Final Note: home         Selected Continued Care - Discharged on 4/11/2023 Admission date: 4/10/2023 - Discharge disposition: Home or Self Care                    Transportation Services  Private: Car    Final Discharge Disposition Code: 01 - home or self-care   Methotrexate Counseling:  Patient counseled regarding adverse effects of methotrexate including but not limited to nausea, vomiting, abnormalities in liver function tests. Patients may develop mouth sores, rash, diarrhea, and abnormalities in blood counts. The patient understands that monitoring is required including LFT's and blood counts.  There is a rare possibility of scarring of the liver and lung problems that can occur when taking methotrexate. Persistent nausea, loss of appetite, pale stools, dark urine, cough, and shortness of breath should be reported immediately. Patient advised to discontinue methotrexate treatment at least three months before attempting to become pregnant.  I discussed the need for folate supplements while taking methotrexate.  These supplements can decrease side effects during methotrexate treatment. The patient verbalized understanding of the proper use and possible adverse effects of methotrexate.  All of the patient's questions and concerns were addressed.

## 2023-04-11 NOTE — PLAN OF CARE
Goal Outcome Evaluation:  Plan of Care Reviewed With: patient           Outcome Evaluation: Patient complains of 10/10 pain when she is awake, she had increased in RR and HR in the night with increased abs pain. PRN IV medication ordered for breakthrough pain.

## 2023-04-11 NOTE — DISCHARGE SUMMARY
Date of Discharge:  4/11/2023    Discharge Diagnosis: History of choledocholithiasis cholelithiasis    Presenting Problem/History of Present Illness  Active Hospital Problems   No active problems to display.      Resolved Hospital Problems    Diagnosis Date Resolved POA   • **Choledocholithiasis [K80.50] 04/10/2023 Yes   • Choledocholithiasis [K80.50] 04/10/2023 Yes        Hospital Course  57-year-old lady taken the operating room for laparoscopic cholecystectomy for history of choledocholithiasis.  The procedure was completed on April 10.  During the operation there was a minor complication with initial port placement and some bleeding of the left lobe of the liver.  The remainder of the gallbladder operation was fairly routine.  I did admit her to the hospital overnight for observation.  Overnight she has had stable vital signs she has been afebrile lab work shows that her hemoglobin is stable her LFTs are consistent with some cauterization of the liver for hemostasis she is tolerating a diet she is voiding spontaneously her pain is controlled.    Procedures Performed    Procedure(s):  CHOLECYSTECTOMY LAPAROSCOPIC  -------------------        Consults:   Consults     No orders found for last 30 day(s).          Condition on Discharge:  Satisfactory    Vital Signs  Temp:  [97.3 °F (36.3 °C)-98.4 °F (36.9 °C)] 98.3 °F (36.8 °C)  Heart Rate:  [] 78  Resp:  [9-26] 14  BP: (111-190)/(60-94) 111/60    Physical Exam:  On exam she is resting comfortably in bed  Abdomen is soft has some moderate to tenderness palpation along the rectus sheaths bilaterally without any significant bruising no lower extremity tenderness palpation or edema     Discharge Disposition  Home or Self Care    Discharge Medications     Discharge Medications      New Medications      Instructions Start Date   oxyCODONE 5 MG immediate release tablet  Commonly known as: ROXICODONE   5 mg, Oral, Every 4 Hours PRN         Changes to Medications       Instructions Start Date   fluticasone 50 MCG/ACT nasal spray  Commonly known as: FLONASE  What changed:   · how much to take  · when to take this  · reasons to take this  · additional instructions   SHAKE LIQUID AND USE 2 SPRAYS IN EACH NOSTRIL EVERY DAY      methocarbamol 500 MG tablet  Commonly known as: ROBAXIN  What changed:   · when to take this  · reasons to take this  · additional instructions   TAKE 1 TABLET BY MOUTH EVERY 8 HOURS         Continue These Medications      Instructions Start Date   amitriptyline 25 MG tablet  Commonly known as: ELAVIL   25 mg, Oral, Every Night at Bedtime, May take 1 to 2 tablets at bedtime if needed.      aspirin 81 MG EC tablet   81 mg, Oral, Daily, LD: 4/5- plans to hold      ciprofloxacin 250 MG tablet  Commonly known as: Cipro   250 mg, Oral, 2 Times Daily      fenofibrate 145 MG tablet  Commonly known as: TRICOR   145 mg, Oral, Daily      Fluticasone-Umeclidin-Vilant 100-62.5-25 MCG/ACT inhaler  Commonly known as: TRELEGY   1 puff, Inhalation, Nightly      HYDROcodone-acetaminophen  MG per tablet  Commonly known as: NORCO   1 tablet, Oral, Every 6 Hours PRN, May take dos if needed      nystatin 989655 UNIT/GM cream  Commonly known as: MYCOSTATIN   1 application, Topical, As Needed, None dos      ondansetron 4 MG tablet  Commonly known as: Zofran   4 mg, Oral, Every 8 Hours PRN      vitamin D 1.25 MG (15922 UT) capsule capsule  Commonly known as: ERGOCALCIFEROL   50,000 Units, Oral, Every 7 Days         ASK your doctor about these medications      Instructions Start Date   albuterol sulfate  (90 Base) MCG/ACT inhaler  Commonly known as: PROVENTIL HFA;VENTOLIN HFA;PROAIR HFA   2 puffs, Inhalation, Every 4 Hours PRN      metFORMIN 500 MG tablet  Commonly known as: GLUCOPHAGE   500 mg, Oral, Daily With Breakfast      metoprolol succinate XL 25 MG 24 hr tablet  Commonly known as: TOPROL-XL   25 mg, Oral, Daily      pregabalin 100 MG capsule  Commonly known as:  Lyrica   100 mg, Oral, 2 Times Daily             Discharge Diet:     Activity at Discharge:   Activity Instructions     Driving Restrictions      Type of Restriction: Driving    Driving Restrictions: No Driving While Taking Narcotics    Lifting Restrictions      Type of Restriction: Lifting    Lifting Restrictions: Other    Explain Lifing Restrictions: No lifting more than 15 lbs for 2 weeks. Or otherwise specified the day of surgery.    Other Activity Instructions      Activity Instructions: May shower in 24 hours. Do not tub soak incision or swim for at least 2 weeks.          Follow-up Appointments  Future Appointments   Date Time Provider Department Center   6/29/2023 11:15 AM Cheri Prabhakar MD [facility]   6/29/2023 11:15 AM LAB MD [facility] LAB NA  [facility]   7/25/2023 10:30 AM Erwin Kearney MD [facility]   8/15/2023 10:45 AM Alexandro Farah MD [facility] NA [facility]   9/27/2023  8:00 AM NURSE/MA NUSRAT MCNEIL [facility]   10/4/2023  8:15 AM Vickie Gomez APRN [facility]     Additional Instructions for the Follow-ups that You Need to Schedule     Call MD With Problems / Concerns   As directed      Call the office, [phone] with any questions or concerns following your surgery.    Order Comments: Call the office, [phone] with any questions or concerns following your surgery.          Discharge Follow-up with Specified Provider: Dr. Bansal, General Surgery. Call [phone] to schedule; 2 Weeks   As directed      To: Dr. Bansal, General Surgery. Call [phone] to schedule    Follow Up: 2 Weeks               Test Results Pending at Discharge  Pending Labs     Order Current Status    Tissue Pathology Exam In process           Mich Bansal MD  04/11/23  09:12 EDT

## 2023-04-12 ENCOUNTER — TRANSITIONAL CARE MANAGEMENT TELEPHONE ENCOUNTER (OUTPATIENT)
Dept: CALL CENTER | Facility: HOSPITAL | Age: 58
End: 2023-04-12
Payer: MEDICARE

## 2023-04-12 RX ORDER — METHOCARBAMOL 500 MG/1
TABLET, FILM COATED ORAL
Qty: 90 TABLET | Refills: 0 | Status: SHIPPED | OUTPATIENT
Start: 2023-04-12

## 2023-04-12 NOTE — TELEPHONE ENCOUNTER
Refill request for Methocarbamol 500mg. Last office visit 01/24/2023, next office visit 7/25/2023. Thank you.

## 2023-04-12 NOTE — OUTREACH NOTE
Call Center TCM Note    Flowsheet Row Responses   [facility] facility patient discharged from? [facility]   Does the patient have one of the following disease processes/diagnoses(primary or secondary)? General Surgery   TCM attempt successful? No   Unsuccessful attempts Attempt 1          Melany Slater MA    4/12/2023, 09:18 EDT

## 2023-04-12 NOTE — OUTREACH NOTE
Call Center TCM Note    Flowsheet Row Responses   [facility] facility patient discharged from? [facility]   Does the patient have one of the following disease processes/diagnoses(primary or secondary)? General Surgery   TCM attempt successful? No   Unsuccessful attempts Attempt 2          Melany Slater MA    4/12/2023, 15:40 EDT

## 2023-04-13 ENCOUNTER — TRANSITIONAL CARE MANAGEMENT TELEPHONE ENCOUNTER (OUTPATIENT)
Dept: CALL CENTER | Facility: HOSPITAL | Age: 58
End: 2023-04-13
Payer: MEDICARE

## 2023-04-13 NOTE — OUTREACH NOTE
Call Center TCM Note    Flowsheet Row Responses   [facility] patient discharged from? [facility]   Does the patient have one of the following disease processes/diagnoses(primary or secondary)? General Surgery   TCM attempt successful? Yes   Call start time 0941   Call end time 0948   Discharge diagnosis CHOLECYSTECTOMY LAPAROSCOPIC   Person spoke with today (if not patient) and relationship pt   Meds reviewed with patient/caregiver? Yes   Is the patient having any side effects they believe may be caused by any medication additions or changes? No   Does the patient have all medications related to this admission filled (includes all antibiotics, pain medications, etc.) Yes   Is the patient taking all medications as directed (includes completed medication regime)? Yes   Comments Post-Op 4/25/23 at 8:00 AM   Does the patient have an appointment with their PCP within 7 days of discharge? Yes  [4/20/2023 at 11:00 AM]   Psychosocial issues? No   Did the patient receive a copy of their discharge instructions? Yes   Nursing interventions Reviewed instructions with patient   What is the patient's perception of their health status since discharge? Improving   Nursing interventions Nurse provided patient education   Is the patient /caregiver able to teach back basic post-op care? Lifting as instructed by MD in discharge instructions, Take showers only when approved by MD-sponge bathe until then, No tub bath, swimming, or hot tub until instructed by MD, Keep incision areas clean,dry and protected   Is the patient/caregiver able to teach back signs and symptoms of incisional infection? Increased redness, swelling or pain at the incisonal site, Increased drainage or bleeding, Incisional warmth, Pus or odor from incision, Fever   Is the patient/caregiver able to teach back steps to recovery at home? Rest and rebuild strength, gradually increase activity, Eat a well-balance diet   If the patient is a current smoker, are they able  to teach back resources for cessation? Not a smoker   Is the patient/caregiver able to teach back the hierarchy of who to call/visit for symptoms/problems? PCP, Specialist, Home health nurse, Urgent Care, ED, 911 Yes   TCM call completed? Yes   Wrap up additional comments Pt states she is really sore at incisional site, and denies fever, or increased redness, swelling, drainage at incisional site. Reviewed AVS/medications/instructions with pt. Pt is taking oxycodone/Norco for pain management. Pt verified PCP hospital fu appt on 4/20/23, and Post-Op appt on 4/25/23.   Call end time 0948   Would this patient benefit from a Referral to [facility] Social Work? No   Is the patient interested in additional calls from an ambulatory ?  NOTE:  applies to high risk patients requiring additional follow-up. No          Josefa Mandujano RN    4/13/2023, 09:51 EDT

## 2023-04-20 ENCOUNTER — READMISSION MANAGEMENT (OUTPATIENT)
Dept: CALL CENTER | Facility: HOSPITAL | Age: 58
End: 2023-04-20
Payer: MEDICARE

## 2023-04-20 ENCOUNTER — OFFICE VISIT (OUTPATIENT)
Dept: FAMILY MEDICINE CLINIC | Facility: CLINIC | Age: 58
End: 2023-04-20
Payer: MEDICARE

## 2023-04-20 VITALS
HEIGHT: 69 IN | BODY MASS INDEX: 26.93 KG/M2 | HEART RATE: 86 BPM | DIASTOLIC BLOOD PRESSURE: 78 MMHG | WEIGHT: 181.8 LBS | OXYGEN SATURATION: 99 % | SYSTOLIC BLOOD PRESSURE: 116 MMHG

## 2023-04-20 DIAGNOSIS — E78.2 MIXED HYPERLIPIDEMIA: ICD-10-CM

## 2023-04-20 DIAGNOSIS — Z90.49 STATUS POST CHOLECYSTECTOMY: Primary | ICD-10-CM

## 2023-04-20 DIAGNOSIS — R79.89 ELEVATED LFTS: ICD-10-CM

## 2023-04-20 DIAGNOSIS — I10 ESSENTIAL HYPERTENSION: ICD-10-CM

## 2023-04-20 PROCEDURE — 80053 COMPREHEN METABOLIC PANEL: CPT | Performed by: NURSE PRACTITIONER

## 2023-04-20 NOTE — PROGRESS NOTES
Chief Complaint  Chief Complaint   Patient presents with   • Hospital Follow Up Visit     Pt recently had laparoscopic cholecystectomy done and is here for follow up. Pt states she was told by surgeon pt's liver is not healthy and needs to follow up with pcp. Pt is seeing pain management and surgeon on Tuesday. Pt stopped taking all meds 2-3 days ago due to fear of effects on liver            Subjective          Rahel Ramsey presents to [facility] for   History of Present Illness     On 4/10/2023 patient underwent elective laparoscopic cholecystectomy for history of choledocholithiasis per Dr. Bansal. During the operation there was a minor complication with some bleeding of the left lobe of the liver the remainder of the gallbladder operation was routine. She was admitted to the hospital overnight for observation.  LFTs elevated but presumed to be consistent with cauterization of the liver for hemostasis.  Her pain was controlled, vital signs stable, she was afebrile and discharged home on 4/11/2023. She stopped all meds, will be discussing with pain mgmt about changing norco to eliminate tylenol, she is tolerating oxycodone provided by Dr. Bansal. She is overall doing well, reports some mild abdominal discomfort, she is eating well and having regular BM's.       The following portions of the patient's history were reviewed and updated as appropriate: allergies, current medications, past family history, past medical history, past social history, past surgical history and problem list.    Past Medical History:   Diagnosis Date   • Allergic    • Arthritis    • Atrial fibrillation    • Cancer 01/09/2023   • COPD (chronic obstructive pulmonary disease)    • Diabetes mellitus    • Frequent UTI    • Headache    • Heartburn    • History of herniated intervertebral disc    • History of skin cancer    • Hyperlipidemia    • Hypertension    • Injury of back    • Leukocytosis    • Low back  pain    • Lung nodule    • Nausea      Past Surgical History:   Procedure Laterality Date   • BACK SURGERY      lumbar   • CARDIAC CATHETERIZATION     • CHOLECYSTECTOMY N/A 4/10/2023    Procedure: CHOLECYSTECTOMY LAPAROSCOPIC;  Surgeon: Mich Bansal MD;  Location: [facility] MAIN OR;  Service: General;  Laterality: N/A;   • COLONOSCOPY     • DILATATION AND CURETTAGE     • ENDOSCOPY N/A 3/30/2023    Procedure: ESOPHAGOGASTRODUODENOSCOPY WITH STENT REMOVAL;  Surgeon: Naman Blackmon MD;  Location: [facility] ENDOSCOPY;  Service: Gastroenterology;  Laterality: N/A;  normal stent removal   • ERCP N/A 2023    Procedure: ENDOSCOPIC RETROGRADE CHOLANGIOPANCREATOGRAPHY with sphicterotomy and balloon guided sweeping of bile duct up to 9mm and placement of 10Fr x 9cm biliary stent;  Surgeon: Naman Blackmon MD;  Location: [facility] ENDOSCOPY;  Service: Gastroenterology;  Laterality: N/A;  post: choledocholithiasis   • LOBECTOMY Right 2023    Procedure: THORASCOPIC RIGHT UPPER LOBECTOMY WITH DAVINCI ROBOT, MEDIASTINAL LYMPH NODE DISSECTION;  Surgeon: Erwin Kearney MD;  Location: [facility] MAIN OR;  Service: Robotics - DaVinci;  Laterality: Right;   • LUMBAR DECOMPRESSION      L4-L5    • LUNG BIOPSY Right 2022   • SKIN CANCER EXCISION Left     Cao   • SUBTOTAL HYSTERECTOMY       Family History   Problem Relation Age of Onset   • Breast cancer Mother    • Lung cancer Father    • Lung cancer Other    • Colon cancer Other    • Skin cancer Other      Social History     Tobacco Use   • Smoking status: Former     Packs/day: 1.00     Years: 40.00     Pack years: 40.00     Types: Cigarettes     Quit date: 2023     Years since quittin.2     Passive exposure: Past   • Smokeless tobacco: Never   Substance Use Topics   • Alcohol use: Yes     Comment: rare       Current Outpatient Medications:   •  albuterol sulfate  (90 Base) MCG/ACT inhaler, Inhale 2 puffs Every 4 (Four) Hours As Needed for Wheezing or  Shortness of Air. (Patient taking differently: Inhale 2 puffs Every 4 (Four) Hours As Needed for Wheezing or Shortness of Air. May use dos if needed and bring), Disp: 18 g, Rfl: 5  •  aspirin (aspirin) 81 MG EC tablet, Take 1 tablet by mouth Daily. LD: 4/5- plans to hold, Disp: , Rfl:   •  fluticasone (FLONASE) 50 MCG/ACT nasal spray, SHAKE LIQUID AND USE 2 SPRAYS IN EACH NOSTRIL EVERY DAY (Patient taking differently: 1 spray by Each Nare route Daily As Needed. may use dos if needed), Disp: 48 g, Rfl: 3  •  Fluticasone-Umeclidin-Vilant (TRELEGY) 100-62.5-25 MCG/ACT inhaler, Inhale 1 puff Every Night., Disp: , Rfl:   •  HYDROcodone-acetaminophen (NORCO)  MG per tablet, Take 1 tablet by mouth Every 6 (Six) Hours As Needed for Moderate Pain. May take dos if needed, Disp: , Rfl:   •  metFORMIN (GLUCOPHAGE) 500 MG tablet, TAKE 1 TABLET BY MOUTH DAILY WITH BREAKFAST (Patient taking differently: Take 1 tablet by mouth Daily With Breakfast. None dos), Disp: 90 tablet, Rfl: 1  •  methocarbamol (ROBAXIN) 500 MG tablet, TAKE 1 TABLET BY MOUTH EVERY 8 HOURS, Disp: 90 tablet, Rfl: 0  •  nystatin (MYCOSTATIN) 075143 UNIT/GM cream, Apply 1 application topically to the appropriate area as directed As Needed. None dos, Disp: , Rfl:   •  ondansetron (Zofran) 4 MG tablet, Take 1 tablet by mouth Every 8 (Eight) Hours As Needed for Nausea or Vomiting., Disp: 30 tablet, Rfl: 0  •  oxyCODONE (ROXICODONE) 5 MG immediate release tablet, Take 1 tablet by mouth Every 4 (Four) Hours As Needed for Severe Pain for up to 9 days., Disp: 10 tablet, Rfl: 0  •  pregabalin (Lyrica) 100 MG capsule, Take 1 capsule by mouth 2 (Two) Times a Day. (Patient taking differently: Take 1 capsule by mouth 2 (Two) Times a Day. may take dos), Disp: 90 capsule, Rfl: 1  •  vitamin D (ERGOCALCIFEROL) 1.25 MG (49293 UT) capsule capsule, Take 1 capsule by mouth Every 7 (Seven) Days., Disp: 12 capsule, Rfl: 1    Objective   Vital Signs:   /78 (BP Location:  "Left arm, Patient Position: Sitting, Cuff Size: Adult)   Pulse 86   Ht 175.3 cm (69.02\")   Wt 82.5 kg (181 lb 12.8 oz)   SpO2 99%   BMI 26.83 kg/m²           Physical Exam  Vitals and nursing note reviewed.   Constitutional:       General: She is not in acute distress.     Appearance: Normal appearance. She is well-developed. She is not ill-appearing or diaphoretic.   Eyes:      Pupils: Pupils are equal, round, and reactive to light.   Neck:      Thyroid: No thyromegaly.      Vascular: No JVD.   Cardiovascular:      Rate and Rhythm: Normal rate and regular rhythm.      Heart sounds: Normal heart sounds. No murmur heard.  Pulmonary:      Effort: Pulmonary effort is normal. No respiratory distress.      Breath sounds: Normal breath sounds.   Abdominal:      General: Bowel sounds are normal. There is no distension.      Palpations: Abdomen is soft.      Tenderness: There is no abdominal tenderness.      Comments: x4 laparoscopic incisions healing with skin glue present   Musculoskeletal:         General: No swelling or tenderness. Normal range of motion.      Cervical back: Normal range of motion and neck supple.   Skin:     General: Skin is warm and dry.   Neurological:      General: No focal deficit present.      Mental Status: She is alert and oriented to person, place, and time. Mental status is at baseline.      Sensory: No sensory deficit.      Motor: No weakness.      Gait: Gait abnormal (Uses cane for mobility).   Psychiatric:         Mood and Affect: Mood normal.         Behavior: Behavior normal.         Thought Content: Thought content normal.         Judgment: Judgment normal.          Result Review :     No visits with results within 7 Day(s) from this visit.   Latest known visit with results is:   Admission on 04/10/2023, Discharged on 04/11/2023   Component Date Value Ref Range Status   • QT Interval 03/30/2023 407  ms Final   • Glucose 04/10/2023 125 (H)  70 - 105 mg/dL Final    Serial Number: " [id number]:  [id number]   • Case Report 04/10/2023    Final                    Value:Surgical Pathology Report                         Case: [id number]                                  Authorizing Provider:  Mich Bansal MD        Collected:           04/10/2023 10:07 AM          [facility]ing [facility]     [facility]  Received:            04/10/2023 01:01 PM                                 OR                                                                           Pathologist:           Naman Nance MD                                                            Specimen:    Gallbladder                                                                               • Final Diagnosis 04/10/2023    Final                    Value:This result contains rich text formatting which cannot be displayed here.   • Gross Description 04/10/2023    Final                    Value:This result contains rich text formatting which cannot be displayed here.   • Glucose 04/10/2023 137 (H)  70 - 105 mg/dL Final    Serial Number: [id number]:  [id number]   • Glucose 04/10/2023 142 (H)  65 - 99 mg/dL Final   • BUN 04/10/2023 10  6 - 20 mg/dL Final   • Creatinine 04/10/2023 0.63  0.57 - 1.00 mg/dL Final   • Sodium 04/10/2023 139  136 - 145 mmol/L Final   • Potassium 04/10/2023 4.1  3.5 - 5.2 mmol/L Final   • Chloride 04/10/2023 103  98 - 107 mmol/L Final   • CO2 04/10/2023 24.0  22.0 - 29.0 mmol/L Final   • Calcium 04/10/2023 9.6  8.6 - 10.5 mg/dL Final   • Total Protein 04/10/2023 7.3  6.0 - 8.5 g/dL Final   • Albumin 04/10/2023 4.2  3.5 - 5.2 g/dL Final   • ALT (SGPT) 04/10/2023 94 (H)  1 - 33 U/L Final   • AST (SGOT) 04/10/2023 108 (H)  1 - 32 U/L Final   • Alkaline Phosphatase 04/10/2023 107  39 - 117 U/L Final   • Total Bilirubin 04/10/2023 0.7  0.0 - 1.2 mg/dL Final   • Globulin 04/10/2023 3.1  gm/dL Final   • A/G Ratio 04/10/2023 1.4  g/dL Final   • BUN/Creatinine Ratio 04/10/2023 15.9  7.0 - 25.0 Final   •  Anion Gap 04/10/2023 12.0  5.0 - 15.0 mmol/L Final   • eGFR 04/10/2023 103.0  >60.0 mL/min/1.73 Final   • WBC 04/10/2023 10.80  3.40 - 10.80 10*3/mm3 Final   • RBC 04/10/2023 5.05  3.77 - 5.28 10*6/mm3 Final   • Hemoglobin 04/10/2023 14.3  12.0 - 15.9 g/dL Final   • Hematocrit 04/10/2023 44.4  34.0 - 46.6 % Final   • MCV 04/10/2023 88.0  79.0 - 97.0 fL Final   • MCH 04/10/2023 28.3  26.6 - 33.0 pg Final   • MCHC 04/10/2023 32.1  31.5 - 35.7 g/dL Final   • RDW 04/10/2023 14.4  12.3 - 15.4 % Final   • RDW-SD 04/10/2023 44.6  37.0 - 54.0 fl Final   • MPV 04/10/2023 7.2  6.0 - 12.0 fL Final   • Platelets 04/10/2023 328  140 - 450 10*3/mm3 Final   • Neutrophil % 04/10/2023 74.7  42.7 - 76.0 % Final   • Lymphocyte % 04/10/2023 18.0 (L)  19.6 - 45.3 % Final   • Monocyte % 04/10/2023 6.2  5.0 - 12.0 % Final   • Eosinophil % 04/10/2023 0.3  0.3 - 6.2 % Final   • Basophil % 04/10/2023 0.8  0.0 - 1.5 % Final   • Neutrophils, Absolute 04/10/2023 8.10 (H)  1.70 - 7.00 10*3/mm3 Final   • Lymphocytes, Absolute 04/10/2023 1.90  0.70 - 3.10 10*3/mm3 Final   • Monocytes, Absolute 04/10/2023 0.70  0.10 - 0.90 10*3/mm3 Final   • Eosinophils, Absolute 04/10/2023 0.00  0.00 - 0.40 10*3/mm3 Final   • Basophils, Absolute 04/10/2023 0.10  0.00 - 0.20 10*3/mm3 Final   • nRBC 04/10/2023 0.0  0.0 - 0.2 /100 WBC Final   • Glucose 04/11/2023 129 (H)  70 - 105 mg/dL Final    Serial Number: [id number]:  [id number]                  BMI is >= 25 and <30. (Overweight) The following options were offered after discussion;: exercise counseling/recommendations and nutrition counseling/recommendations           Assessment and Plan    Diagnoses and all orders for this visit:    1. Status post cholecystectomy (Primary)  Comments:  Recovering well, incisions healing    2. Elevated LFTs  Comments:  cmp today  d/c fenofibrate and amitriptyline  Okay to continue all other meds  Recommend start vitamin E OTC daily  Orders:  -     Comprehensive Metabolic  Panel    3. Essential hypertension  Comments:  d/c metop, bp stable  check at home BID and notify if >150 systolic    4. Mixed hyperlipidemia  Comments:  d/c fenofibrate  start fishoil (burpless) 2 grams otc daily          I spent 30 minutes caring for Rahel Ramsey on this date of service. This time includes time spent by me in the following activities: preparing for the visit, reviewing tests, performing a medically appropriate examination and/or evaluation , counseling and educating the patient/family/caregiver, ordering medications, tests, or procedures and documenting information in the medical record        Follow Up     Return if symptoms worsen or fail to improve, for Next scheduled follow up.  Patient was given instructions and counseling regarding her condition or for health maintenance advice. Please see specific information pulled into the AVS if appropriate.        Part of this note may be an electronic transcription/translation of spoken language to printed text using the Dragon Dictation System

## 2023-04-20 NOTE — OUTREACH NOTE
Prep Survey    Flowsheet Row Responses   [facility] facility patient discharged from? [facility]   Does the patient have one of the following disease processes/diagnoses(primary or secondary)? General Surgery          NAZIA MOROCHO - Registered Nurse

## 2023-04-20 NOTE — PROGRESS NOTES
Venipuncture Blood Specimen Collection  Venipuncture performed in right arm by Suzie Salvador MA with good hemostasis. Patient tolerated the procedure well without complications.   04/20/23   Suzie Salvador MA

## 2023-04-21 LAB
ALBUMIN SERPL-MCNC: 4.2 G/DL (ref 3.5–5.2)
ALBUMIN/GLOB SERPL: 1.2 G/DL
ALP SERPL-CCNC: 105 U/L (ref 39–117)
ALT SERPL W P-5'-P-CCNC: 57 U/L (ref 1–33)
ANION GAP SERPL CALCULATED.3IONS-SCNC: 11 MMOL/L (ref 5–15)
AST SERPL-CCNC: 51 U/L (ref 1–32)
BILIRUB SERPL-MCNC: 0.5 MG/DL (ref 0–1.2)
BUN SERPL-MCNC: 10 MG/DL (ref 6–20)
BUN/CREAT SERPL: 13.7 (ref 7–25)
CALCIUM SPEC-SCNC: 10.4 MG/DL (ref 8.6–10.5)
CHLORIDE SERPL-SCNC: 105 MMOL/L (ref 98–107)
CO2 SERPL-SCNC: 28 MMOL/L (ref 22–29)
CREAT SERPL-MCNC: 0.73 MG/DL (ref 0.57–1)
EGFRCR SERPLBLD CKD-EPI 2021: 95.5 ML/MIN/1.73
GLOBULIN UR ELPH-MCNC: 3.6 GM/DL
GLUCOSE SERPL-MCNC: 111 MG/DL (ref 65–99)
POTASSIUM SERPL-SCNC: 3.9 MMOL/L (ref 3.5–5.2)
PROT SERPL-MCNC: 7.8 G/DL (ref 6–8.5)
SODIUM SERPL-SCNC: 144 MMOL/L (ref 136–145)

## 2023-04-25 ENCOUNTER — OFFICE VISIT (OUTPATIENT)
Dept: SURGERY | Facility: CLINIC | Age: 58
End: 2023-04-25
Payer: MEDICARE

## 2023-04-25 VITALS
WEIGHT: 185 LBS | BODY MASS INDEX: 27.4 KG/M2 | TEMPERATURE: 97.1 F | HEART RATE: 69 BPM | RESPIRATION RATE: 18 BRPM | HEIGHT: 69 IN | SYSTOLIC BLOOD PRESSURE: 116 MMHG | OXYGEN SATURATION: 99 % | DIASTOLIC BLOOD PRESSURE: 75 MMHG

## 2023-04-25 DIAGNOSIS — K80.50 CHOLEDOCHOLITHIASIS: Primary | ICD-10-CM

## 2023-04-25 PROCEDURE — 1160F RVW MEDS BY RX/DR IN RCRD: CPT | Performed by: SURGERY

## 2023-04-25 PROCEDURE — 99024 POSTOP FOLLOW-UP VISIT: CPT | Performed by: SURGERY

## 2023-04-25 PROCEDURE — 1159F MED LIST DOCD IN RCRD: CPT | Performed by: SURGERY

## 2023-04-25 PROCEDURE — 3074F SYST BP LT 130 MM HG: CPT | Performed by: SURGERY

## 2023-04-25 PROCEDURE — 3078F DIAST BP <80 MM HG: CPT | Performed by: SURGERY

## 2023-04-25 NOTE — PROGRESS NOTES
"Subjective   Rahel Ramsey is a 57 y.o. female.   Now a couple weeks out from laparoscopic cholecystectomy for interval secondary to choledocholithiasis seen in the hospital.  In general recovery is fairly uneventful.  Says she is tolerating a diet still has a little bit of soreness at her incisions but is having regular bowel function not too much too little.    Objective   /75 (BP Location: Right arm, Patient Position: Sitting, Cuff Size: Adult)   Pulse 69   Temp 97.1 °F (36.2 °C) (Infrared)   Resp 18   Ht 175.3 cm (69\")   Wt 83.9 kg (185 lb)   SpO2 99%   BMI 27.32 kg/m²   Physical Exam  On exam her abdomen is soft nondistended minimally tender to palpation incisions are all healing well with out significant erythema or evidence of infection.  There is appropriate induration at the incisions.  Assessment & Plan   Diagnoses and all orders for this visit:    1. Choledocholithiasis (Primary)    Status post laparoscopic cholecystectomy for choledocholithiasis and cholelithiasis.  Seems to be recovering well.  Activity as tolerated.  Diet as tolerated.  Follow-up as needed.    Mich Bansal MD  4/25/2023  8:09 AM EDT    This note was created using Dragon Voice Recognition software.  "

## 2023-05-12 RX ORDER — METHOCARBAMOL 500 MG/1
TABLET, FILM COATED ORAL
Qty: 90 TABLET | Refills: 0 | Status: SHIPPED | OUTPATIENT
Start: 2023-05-12

## 2023-05-22 DIAGNOSIS — G89.29 CHRONIC MIDLINE LOW BACK PAIN WITH BILATERAL SCIATICA: ICD-10-CM

## 2023-05-22 DIAGNOSIS — M54.41 CHRONIC MIDLINE LOW BACK PAIN WITH BILATERAL SCIATICA: ICD-10-CM

## 2023-05-22 DIAGNOSIS — M54.42 CHRONIC MIDLINE LOW BACK PAIN WITH BILATERAL SCIATICA: ICD-10-CM

## 2023-05-22 RX ORDER — PREGABALIN 100 MG/1
CAPSULE ORAL
Qty: 60 CAPSULE | Refills: 2 | Status: SHIPPED | OUTPATIENT
Start: 2023-05-22

## 2023-06-02 RX ORDER — METHOCARBAMOL 500 MG/1
TABLET, FILM COATED ORAL
Qty: 90 TABLET | Refills: 0 | Status: SHIPPED | OUTPATIENT
Start: 2023-06-02

## 2023-07-24 NOTE — PROGRESS NOTES
THORACIC SURGERY CLINIC FOLLOW UP    REASON FOR VISIT: Stage I Right upper lobe adenocarcinoma s/p Robotic assisted Right Upper lobectomy    Subjective   HISTORY OF PRESENTING ILLNESS:   Rahel Ramsey is a 57 y.o. female was initially seen for consultation at the request of Dr. Rustam Aldana.    She has a history of tobacco abuse, 40-pack-year history of smoking and has chronic obstructive pulmonary disease.  She was getting lung cancer surveillance CT scan. CT scan of the chest on 10/6/2022 found increasing size of the right upper lobe nodule.  CT-guided biopsy of the right upper lobe nodule on 11/2/2022 revealed invasive well to moderately differentiated adenocarcinoma with focal lepidic features.  PET/CT on 11/23/2022 showed a 1.7 cm right upper lobe nodule that was not PET avid.  Also noted an 8 mm right upper lobe nodule which has been stable since 2020 and likely benign.  There was no evidence of distant metastases. She had good performance status, her functional status was limited due to chronic back pain.      On 1/9/2023, she underwent robotic assisted right upper lobectomy, partial decortication and systemic mediastinal lymph node dissection.     Intraoperative findings:  Right upper lobe adherent to the chest wall.  No evidence of pleural implants or effusion.  Large calcified and necrotic 11 R lymph node between the right upper lobe takeoff and bronchus intermedius noted.  Specimen sent for frozen section and came back as necrotizing granulomatous inflammation.  Partial complete fissures.  Right middle lobe inflated under direct vision without evidence of torsion.  Small airleak at the end of the procedure    She tolerated the procedure well.  There were no intraoperative or immediate postoperative complication.  The pathology confirmed 1.3 cm invasive lepidi well differentiated c adenocarcinoma with 0.8 cm invasive component.  All margins were negative for carcinoma invasion.  Total of 12 lymph nodes  were examined and all were negative.  The final pathological stage was stage I (pT1bN0).    She had a good recovery postop.  She had cholecystitis which was managed with cholecystostomy tube followed by interval cholecystectomy.  She had CT scan of the chest for cancer surveillance after 6 months which showed no evidence of recurrent intrathoracic malignancy.  There was mild fullness of the pancreatic tail and a dedicated CT abdomen with pancreatic protocol was obtained which showed interval improvement in the peripancreatic inflammation.    She came to clinic for follow-up visit.  She was in good spirits.  She denied incisional pain.  She is ambulating with walker.  She denies fever, chills, rigors, unintentional weight loss.    Past Medical History:   Diagnosis Date    Allergic     Arthritis     Atrial fibrillation     Cancer 01/09/2023    lung  Right    COPD (chronic obstructive pulmonary disease)     Diabetes mellitus     type 2    Frequent UTI     Headache     Heartburn     History of herniated intervertebral disc     lumbar    History of skin cancer     Left lower extremity    Hyperlipidemia     Hypertension     Injury of back     Leukocytosis     Low back pain     Lung nodule     Nausea        Past Surgical History:   Procedure Laterality Date    BACK SURGERY      lumbar    CARDIAC CATHETERIZATION      CHOLECYSTECTOMY N/A 4/10/2023    Procedure: CHOLECYSTECTOMY LAPAROSCOPIC;  Surgeon: Mich Bansal MD;  Location: [facility] MAIN OR;  Service: General;  Laterality: N/A;    COLONOSCOPY      DILATATION AND CURETTAGE      ENDOSCOPY N/A 3/30/2023    Procedure: ESOPHAGOGASTRODUODENOSCOPY WITH STENT REMOVAL;  Surgeon: Naman Blackmon MD;  Location: [facility] ENDOSCOPY;  Service: Gastroenterology;  Laterality: N/A;  normal stent removal    ERCP N/A 02/01/2023    Procedure: ENDOSCOPIC RETROGRADE CHOLANGIOPANCREATOGRAPHY with sphicterotomy and balloon guided sweeping of bile duct up to 9mm and placement of 10Fr x  9cm biliary stent;  Surgeon: Naman Blackmon MD;  Location: [facility] ENDOSCOPY;  Service: Gastroenterology;  Laterality: N/A;  post: choledocholithiasis    LOBECTOMY Right 2023    Procedure: THORASCOPIC RIGHT UPPER LOBECTOMY WITH DAVINCI ROBOT, MEDIASTINAL LYMPH NODE DISSECTION;  Surgeon: Erwin Kearney MD;  Location: [facility] MAIN OR;  Service: Robotics - DaVinci;  Laterality: Right;    LUMBAR DECOMPRESSION      L4-L5     LUNG BIOPSY Right 2022    SKIN CANCER EXCISION Left     Shin    SUBTOTAL HYSTERECTOMY         Family History   Problem Relation Age of Onset    Breast cancer Mother     Lung cancer Father     Lung cancer Other     Colon cancer Other     Skin cancer Other        Social History     Socioeconomic History    Marital status:    Tobacco Use    Smoking status: Former     Packs/day: 1.00     Years: 40.00     Pack years: 40.00     Types: Cigarettes     Quit date: 2023     Years since quittin.5     Passive exposure: Current    Smokeless tobacco: Never   Vaping Use    Vaping Use: Never used   Substance and Sexual Activity    Alcohol use: Yes     Comment: rare    Drug use: No    Sexual activity: Defer         Current Outpatient Medications:     aspirin (aspirin) 81 MG EC tablet, Take 1 tablet by mouth Daily. LD: 4- plans to hold, Disp: , Rfl:     fenofibrate (TRICOR) 145 MG tablet, Take 1 tablet by mouth Daily., Disp: , Rfl:     fluticasone (FLONASE) 50 MCG/ACT nasal spray, SHAKE LIQUID AND USE 2 SPRAYS IN EACH NOSTRIL EVERY DAY (Patient taking differently: 1 spray by Each Nare route Daily As Needed.), Disp: 48 g, Rfl: 3    Fluticasone-Umeclidin-Vilant (TRELEGY) 100-62.5-25 MCG/ACT inhaler, Inhale 1 puff Every Night., Disp: , Rfl:     HYDROcodone-acetaminophen (NORCO)  MG per tablet, Take 1 tablet by mouth Every 6 (Six) Hours As Needed for Moderate Pain. May take dos if needed, Disp: , Rfl:     metFORMIN (GLUCOPHAGE) 500 MG tablet, TAKE 1 TABLET BY MOUTH DAILY WITH  "BREAKFAST, Disp: 90 tablet, Rfl: 1    metoprolol succinate XL (TOPROL-XL) 25 MG 24 hr tablet, Take 1 tablet by mouth Daily., Disp: , Rfl:     nystatin (MYCOSTATIN) 496043 UNIT/GM cream, Apply 1 application  topically to the appropriate area as directed As Needed. None dos, Disp: , Rfl:     ondansetron (Zofran) 4 MG tablet, Take 1 tablet by mouth Every 8 (Eight) Hours As Needed for Nausea or Vomiting., Disp: 30 tablet, Rfl: 0    pregabalin (LYRICA) 100 MG capsule, TAKE 1 CAPSULE BY MOUTH TWICE DAILY, Disp: 60 capsule, Rfl: 2    vitamin D (ERGOCALCIFEROL) 1.25 MG (23350 UT) capsule capsule, Take 1 capsule by mouth Every 7 (Seven) Days., Disp: 12 capsule, Rfl: 1    albuterol sulfate  (90 Base) MCG/ACT inhaler, INHALE 2 PUFFS BY MOUTH EVERY 4 HOURS AS NEEDED FOR WHEEZING OR SHORTNESS OF AIR, Disp: 18 g, Rfl: 5     Allergies   Allergen Reactions    Morphine Anaphylaxis and Nausea And Vomiting    Percocet [Oxycodone-Acetaminophen] GI Intolerance    Tramadol GI Intolerance             Objective    OBJECTIVE:     VITAL SIGNS:  /78 (BP Location: Left arm, Patient Position: Sitting, Cuff Size: Adult)   Pulse 78   Temp 97.8 øF (36.6 øC) (Infrared)   Ht 175.3 cm (69\")   Wt 85.3 kg (188 lb)   SpO2 99%   BMI 27.76 kg/mý     PHYSICAL EXAM:  Constitutional:       Appearance: Normal appearance.   HENT:      Head: Normocephalic.      Right Ear: External ear normal.      Left Ear: External ear normal.      Nose: Nose normal.      Mouth/Throat: No obvious deformity     Pharynx: Oropharynx is clear.   Eyes:      Conjunctiva/sclera: Conjunctivae normal.   Cardiovascular:      Rate and Rhythm: Normal rate.      Pulses: Normal pulses.   Pulmonary:      Effort: Pulmonary effort is normal.  Incision clean, dry, intact.  Abdominal:      Palpations: Abdomen is soft.   Musculoskeletal:         General: Normal range of motion.      Cervical back: Normal range of motion.   Skin:     General: Skin is warm.   Neurological:      " General: No focal deficit present.      Mental Status: She is alert and oriented to person, place, and time.     LAB RESULTS:  I have reviewed all the available laboratory results in the chart.    RESULTS REVIEW:  I have reviewed the patient's all relevant laboratory and imaging findings.     IMAGING RESULTS:    CT chest 10/6/2022:  1.  The groundglass opacity in the posterior right upper lobe has increased in size since November 2020 and more recently December 2021.  Tissue diagnosis recommended given the continued growth.  Slow-growing neoplasm such as adenocarcinoma suspected.  2.  The other nodule in the right lung are unchanged since 2020.  Therefore benign.  Mild emphysema.    CT-guided lung biopsy on 11/2/2022:  Lung, right, CT-guided core biopsy:    Invasive well to moderately differentiated adenocarcinoma with focal lepidic features    CT chest with IV contrast:  Pending    PET/ CT 11/23/2022:  1. 1.7 cm right upper lobe ground glass nodular density, which has  undergone recent CT-guided biopsy with positive findings for  adenocarcinoma, demonstrates no appreciable FDG uptake.  2. An 8 mm right upper lobe perihilar nodule is stable since 3/10/2020,  smaller than on the 1/17/2020 examination. It demonstrates no abnormal  FDG uptake, and is in keeping with benign finding.  3. There is no convincing evidence of metastatic disease elsewhere  within the chest. No evidence of primary malignancy or metastatic  disease in the neck, abdomen, pelvis, or skeleton.    MRI brain:  Not indicated    Cardiac testing:  Not done    Carotid Doppler on 8/25/2022:      Right ICA Prox:  Imaging indicates 16%-49% stenosis.        Right Vertebral:  Antegrade flow is present.         Left ICA Prox:  Imaging indicates 16-49% stenosis.       Left Vertebral:  Antegrade flow is present.     Pulmonary Function Test 12/14/2022:  FEV1 72%  FVC 82%  DLCO 50%:    Robotic Assisted Right Upper Lobectomy, Lymph node dissection on  1/9/2023:  LUNG: RESECTION - 13  SPECIMEN   Procedure  Lobectomy    Specimen Laterality  Right    TUMOR   Tumor Focality  Single focus    Tumor Site  Upper lobe of lung    Tumor Size     Total Tumor Size (size of entire tumor)  Greatest Dimension (Centimeters): 1.3 cm   Size of Invasive Component  Greatest Dimension (Centimeters): 0.8 cm   Percentage of Total Tumor Size (above)  50% %   Histologic Type  Invasive lepidic adenocarcinoma    Histologic Patterns Present  Acinar: 30      Lepidic: 70    Histologic Grade  G1, well differentiated    Visceral Pleura Invasion  Not identified    Direct Invasion of Adjacent Structures  Not applicable (no adjacent structures present)    Treatment Effect  No known presurgical therapy    Lymphovascular Invasion  Not identified    MARGINS   Margin Status for Invasive Carcinoma  All margins negative for invasive carcinoma    Closest Margin(s) to Invasive Carcinoma  Bronchial    Distance from Invasive Carcinoma to Closest Margin  4 cm   Margin Status for Non-Invasive Tumor  All margins negative for non-invasive tumor    REGIONAL LYMPH NODES   Lymph Node(s) from Prior Procedures  No known prior lymph node sampling performed    Regional Lymph Node Status  All regional lymph nodes negative for tumor    Number of Lymph Nodes Examined  12    Tai Site(s) Examined  4R: Lower paratracheal      8R: Para-esophageal (below caroline)      9R: Pulmonary ligament      10R: Hilar      11R: Interlobar      12R: Lobar      7: Subcarinal    PATHOLOGIC STAGE CLASSIFICATION (pTNM, AJCC 8th Edition)   Reporting of pT, pN, and (when applicable) pM categories is based on information available to the pathologist at the time the report is issued. As per the AJCC (Chapter 1, 8th Ed.) it is the managing physician's responsibility to establish the final pathologic stage based upon all pertinent information, including but potentially not limited to this pathology report.   pT Category  pT1b    pN Category  pN0     ADDITIONAL FINDINGS   Additional Findings  Inflammation: Necrotizing granulomatous inflammation    .     I agree with the pathology report.           ASSESSMENT & PLAN:  Rahel Ramsey is a 57 y.o. female with significant medical conditions as mentioned above presented to clinic.    Diagnosis: Right upper lobe well-differentiated lepidic invasive adenocarcinoma  Staging: Stage I ([id number])    The final pathology confirmed stage I well-differentiated lipidic invasive adenocarcinoma.  All the resection margins were negative for cancer involvement.  She will not require additional treatment.    I recommended following up in 6-month with repeat CT chest for cancer surveillance.    I discussed the patients findings and my recommendations with the patient. The patient was given adequate time to ask questions and all questions were answered to patient satisfaction.    Erwin Kearney MD  Thoracic Surgeon  [facility]        Dictated utilizing Dragon dictation    I spent 30 minutes caring for Rahel on this date of service. This time includes time spent by me in the following activities:preparing for the visit, reviewing tests, obtaining and/or reviewing a separately obtained history, performing a medically appropriate examination and/or evaluation , counseling and educating the patient/family/caregiver, ordering medications, tests, or procedures, referring and communicating with other health care professionals , documenting information in the medical record, independently interpreting results and communicating that information with the patient/family/caregiver and care coordination and more than half the time was spent in direct face to face evaluation and decision making.

## 2023-07-25 ENCOUNTER — OFFICE VISIT (OUTPATIENT)
Dept: SURGERY | Facility: CLINIC | Age: 58
End: 2023-07-25
Payer: MEDICARE

## 2023-07-25 ENCOUNTER — NURSE NAVIGATOR (OUTPATIENT)
Dept: ONCOLOGY | Facility: CLINIC | Age: 58
End: 2023-07-25
Payer: MEDICARE

## 2023-07-25 VITALS
WEIGHT: 188 LBS | TEMPERATURE: 97.8 F | SYSTOLIC BLOOD PRESSURE: 132 MMHG | OXYGEN SATURATION: 99 % | BODY MASS INDEX: 27.85 KG/M2 | HEART RATE: 78 BPM | DIASTOLIC BLOOD PRESSURE: 78 MMHG | HEIGHT: 69 IN

## 2023-07-25 DIAGNOSIS — R91.1 RIGHT UPPER LOBE PULMONARY NODULE: Primary | ICD-10-CM

## 2023-07-25 PROCEDURE — 99214 OFFICE O/P EST MOD 30 MIN: CPT | Performed by: SURGERY

## 2023-07-25 PROCEDURE — 3075F SYST BP GE 130 - 139MM HG: CPT | Performed by: SURGERY

## 2023-07-25 PROCEDURE — 3078F DIAST BP <80 MM HG: CPT | Performed by: SURGERY

## 2023-07-25 RX ORDER — FENOFIBRATE 145 MG/1
1 TABLET, COATED ORAL DAILY
COMMUNITY
Start: 2023-07-05

## 2023-07-25 NOTE — PROGRESS NOTES
I accompanied patient to Dr. Kearney's office visit.     Dr. Kearney reviewed scan results and discussed. Will continue surveillance q 6 months. She will call with any questions or concerns.

## 2023-07-28 DIAGNOSIS — J44.9 CHRONIC OBSTRUCTIVE PULMONARY DISEASE, UNSPECIFIED COPD TYPE: ICD-10-CM

## 2023-07-28 RX ORDER — ALBUTEROL SULFATE 90 UG/1
AEROSOL, METERED RESPIRATORY (INHALATION)
Qty: 18 G | Refills: 5 | Status: SHIPPED | OUTPATIENT
Start: 2023-07-28

## 2023-08-15 ENCOUNTER — OFFICE VISIT (OUTPATIENT)
Dept: CARDIOLOGY | Facility: CLINIC | Age: 58
End: 2023-08-15
Payer: MEDICARE

## 2023-08-15 VITALS
SYSTOLIC BLOOD PRESSURE: 113 MMHG | HEART RATE: 83 BPM | WEIGHT: 191 LBS | BODY MASS INDEX: 28.29 KG/M2 | RESPIRATION RATE: 18 BRPM | DIASTOLIC BLOOD PRESSURE: 78 MMHG | HEIGHT: 69 IN

## 2023-08-15 DIAGNOSIS — I65.23 CAROTID STENOSIS, BILATERAL: Primary | ICD-10-CM

## 2023-08-15 DIAGNOSIS — R06.09 DOE (DYSPNEA ON EXERTION): ICD-10-CM

## 2023-08-15 DIAGNOSIS — R07.89 CHEST PAIN, ATYPICAL: ICD-10-CM

## 2023-08-15 PROCEDURE — 93000 ELECTROCARDIOGRAM COMPLETE: CPT | Performed by: INTERNAL MEDICINE

## 2023-08-15 PROCEDURE — 3078F DIAST BP <80 MM HG: CPT | Performed by: INTERNAL MEDICINE

## 2023-08-15 PROCEDURE — 3074F SYST BP LT 130 MM HG: CPT | Performed by: INTERNAL MEDICINE

## 2023-08-15 PROCEDURE — 99214 OFFICE O/P EST MOD 30 MIN: CPT | Performed by: INTERNAL MEDICINE

## 2023-08-15 NOTE — PROGRESS NOTES
Cardiology Clinic Note  Alexandro Farah MD, PhD    Subjective:     Encounter Date:08/15/2023      Patient ID: Rahel Ramsey is a 57 y.o. female.    Chief Complaint:  Chief Complaint   Patient presents with    Follow-up       HPI:         I the pleasure to have a telehealth visit with this 56-year-old patient previously seen in follow-up with diabetes hypertension hyperlipidemia and paroxysmal atrial fibrillation historically.  She is a pack per day smoker counseled to quit.  No alcohol.  EKG reviewed and interpreted by me demonstrated sinus rhythm with normal axis and ST segments at that time.  At that time we ordered bilateral carotid Dopplers which demonstrated mild disease 15 to 45% which is nonobstructive and needs really primary and secondary prevention.  She is on fenofibrate only but no statin therapy.  She is on metoprolol XL daily with underlying sinus rhythm but no systemic anticoagulation and has had no recurrence of her A. fib in quite some time.  Her [id number] score is historically 3 but she did not want anticoagulation has been deferred since prior to my establishment of care.  She requires epidural injections for chronic back pain.  She follows up today, interim hospitalization with lung removal surgery/lobectomy.  She had history of pancreatitis as well with hospitalization.  She quit smoking ultimately which is great.  She has an atypical chest pain dyspnea on exertion which is chronic.  No clinical heart failure today, remains in sinus rhythm with unchanged EKG.  We discussed risk ratification, lipid goals, prior carotid Dopplers which were abnormal, surveillance, CV health, paroxysmal A-fib which has had no recurrence to date.  Otherwise she is getting back on track after complex year     Prior EKG normal normal normal sinus rhythm nonspecific ST-T wave abnormalities     Review of systems otherwise negative x14 point review of systems except as mentioned above     Historical data copied  forward from previous encounters in EMR including the history, exam, and assessment/plan has been reviewed and is unchanged unless noted otherwise.     Cardiac medicines reviewed with risk, benefits, and necessity of each discussed.     Risk and benefit of cardiac testing reviewed including death heart attack stroke pain bleeding infection need for vascular /cardiovascular surgery were discussed and the patient      Objective:      Vitals reviewed below  Regular rate and rhythm no rubs murmurs or gallops  No heave or lift  No clubbing cyanosis or edema  Normal pulses normal cap refill  No carotid bruits on auscultation, no JVD  Intact grossly  Clear to auscultation    Assessment:         Assessment          Paroxysmal atrial fibrillation  Sinus rhythm  Continue metoprolol  Aspirin only  No systemic anticoagulation on board     Essential hypertension well-controlled     Hyper triglyceridemia, fenofibrate, last LDL was 70 no need for statin therapy at this time     Carotid disease mild nonobstructive 15 to 45% by carotid Dopplers bilaterally, last LDL 70 on no statins, will defer, repeat in 3 months to 6 months  Reorder carotid Dopplers as nothing in the last 2 to 3 years mild to moderate disease previously needing surveillance    Coronary calcium scoring for risk stratification  CMP fasting lipid panel 1 to 4 weeks     Neuropathic pain, follow-up with primary care, on Lyrica and Elavil     See her back in 1 year,    Historical data copied forward from previous encounters in EMR including the history, exam, and assessment/plan has been reviewed and is unchanged unless noted otherwise.    Cardiac medicines reviewed with risk, benefits, and necessity of each discussed.    Risk and benefit of cardiac testing reviewed including death heart attack stroke pain bleeding infection need for vascular /cardiovascular surgery were discussed and the patient     Objective:         /78 (BP Location: Left arm, Patient Position:  "Sitting)   Pulse 83   Resp 18   Ht 175.3 cm (69\")   Wt 86.6 kg (191 lb)   BMI 28.21 kg/mý   Diagnoses and all orders for this visit:    1. Carotid stenosis, bilateral (Primary)  -     Comprehensive Metabolic Panel; Future  -     Lipid Panel; Future  -     Duplex Carotid Ultrasound CAR; Future    2. Chest pain, atypical  -     CT Cardiac Calcium Score Without Dye; Future  -     Comprehensive Metabolic Panel; Future  -     Lipid Panel; Future  -     Adult Transthoracic Echo Complete W/ Color, Spectral and Contrast if Necessary Per Protocol; Future    3. MIKE (dyspnea on exertion)  -     CT Cardiac Calcium Score Without Dye; Future  -     Comprehensive Metabolic Panel; Future  -     Adult Transthoracic Echo Complete W/ Color, Spectral and Contrast if Necessary Per Protocol; Future        The pleasure to be involved in this patient's cardiovascular care.  Please call with any questions or concerns  Alexandro Farah MD, PhD    Most recent EKG as reviewed and interpreted by me:    ECG 12 Lead    Date/Time: 8/15/2023 12:53 PM  Performed by: Alexandro Farah MD  Authorized by: Alexandro Farah MD   Comparison: not compared with previous ECG   Previous ECG: no previous ECG available  Rhythm: sinus rhythm  Rate: normal  QRS axis: normal  Other findings: non-specific ST-T wave changes    Clinical impression: non-specific ECG         Most recent echo as reviewed and interpreted by me:      Most recent stress test as reviewed and interpreted by me:      Most recent cardiac catheterization as reviewed interpreted by me:  No results found for this or any previous visit.    The following portions of the patient's history were reviewed and updated as appropriate: allergies, current medications, past family history, past medical history, past social history, past surgical history, and problem list.      ROS:  14 point review of systems negative except as mentioned above    Current Outpatient Medications:     " albuterol sulfate  (90 Base) MCG/ACT inhaler, INHALE 2 PUFFS BY MOUTH EVERY 4 HOURS AS NEEDED FOR WHEEZING OR SHORTNESS OF AIR, Disp: 18 g, Rfl: 5    aspirin (aspirin) 81 MG EC tablet, Take 1 tablet by mouth Daily. LD: 4/5- plans to hold, Disp: , Rfl:     fenofibrate (TRICOR) 145 MG tablet, Take 1 tablet by mouth Daily., Disp: , Rfl:     fluticasone (FLONASE) 50 MCG/ACT nasal spray, SHAKE LIQUID AND USE 2 SPRAYS IN EACH NOSTRIL EVERY DAY (Patient taking differently: 1 spray by Each Nare route Daily As Needed.), Disp: 48 g, Rfl: 3    Fluticasone-Umeclidin-Vilant (TRELEGY) 100-62.5-25 MCG/ACT inhaler, Inhale 1 puff Every Night., Disp: , Rfl:     HYDROcodone-acetaminophen (NORCO)  MG per tablet, Take 1 tablet by mouth Every 6 (Six) Hours As Needed for Moderate Pain. May take dos if needed, Disp: , Rfl:     metFORMIN (GLUCOPHAGE) 500 MG tablet, TAKE 1 TABLET BY MOUTH DAILY WITH BREAKFAST, Disp: 90 tablet, Rfl: 1    metoprolol succinate XL (TOPROL-XL) 25 MG 24 hr tablet, Take 1 tablet by mouth Daily., Disp: , Rfl:     ondansetron (Zofran) 4 MG tablet, Take 1 tablet by mouth Every 8 (Eight) Hours As Needed for Nausea or Vomiting., Disp: 30 tablet, Rfl: 0    pregabalin (LYRICA) 100 MG capsule, TAKE 1 CAPSULE BY MOUTH TWICE DAILY, Disp: 60 capsule, Rfl: 2    vitamin D (ERGOCALCIFEROL) 1.25 MG (84405 UT) capsule capsule, Take 1 capsule by mouth Every 7 (Seven) Days., Disp: 12 capsule, Rfl: 1    nystatin (MYCOSTATIN) 113082 UNIT/GM cream, Apply 1 application  topically to the appropriate area as directed As Needed. None dos (Patient not taking: Reported on 8/15/2023), Disp: , Rfl:     Problem List:  Patient Active Problem List   Diagnosis    Allergic rhinitis    Degeneration of lumbar or lumbosacral intervertebral disc    Degenerative joint disease of right acromioclavicular joint    Fatigue    History of skin cancer    Hyperglycemia    Essential hypertension    Mixed hyperlipidemia    Hypertriglyceridemia     Leukocytosis    Lumbar disc disease with radiculopathy    Lumbar herniated disc    Lumbar radiculopathy    Other cervical disc degeneration, unspecified cervical region    Paroxysmal atrial fibrillation    Stenosis of right carotid artery    Tobacco use    Vitamin D deficiency    Right upper lobe pulmonary nodule    Hilar adenopathy    Urinary tract infection with hematuria    UTI symptoms    Dysuria    COPD with exacerbation    Primary cancer of right upper lobe of lung    Acute pancreatitis with infected necrosis, unspecified pancreatitis type    History of lung cancer    Diarrhea    Upper abdominal pain    Angiodysplasia of colon without hemorrhage    Dysphagia    History of biliary duct stent placement    Iron deficiency    Occult blood in stools    Personal history of colonic polyps    Pure hypercholesterolemia    Rectal polyp     Past Medical History:  Past Medical History:   Diagnosis Date    Allergic     Arthritis     Atrial fibrillation     Cancer 01/09/2023    lung  Right    COPD (chronic obstructive pulmonary disease)     Diabetes mellitus     type 2    Frequent UTI     Headache     Heartburn     History of herniated intervertebral disc     lumbar    History of skin cancer     Left lower extremity    Hyperlipidemia     Hypertension     Injury of back     Leukocytosis     Low back pain     Lung nodule     Nausea      Past Surgical History:  Past Surgical History:   Procedure Laterality Date    BACK SURGERY      lumbar    CARDIAC CATHETERIZATION      CHOLECYSTECTOMY N/A 4/10/2023    Procedure: CHOLECYSTECTOMY LAPAROSCOPIC;  Surgeon: Mich Bansal MD;  Location: [facility] MAIN OR;  Service: General;  Laterality: N/A;    COLONOSCOPY      DILATATION AND CURETTAGE      ENDOSCOPY N/A 3/30/2023    Procedure: ESOPHAGOGASTRODUODENOSCOPY WITH STENT REMOVAL;  Surgeon: Naman Blackmon MD;  Location: [facility] ENDOSCOPY;  Service: Gastroenterology;  Laterality: N/A;  normal stent removal    ERCP N/A 02/01/2023     Procedure: ENDOSCOPIC RETROGRADE CHOLANGIOPANCREATOGRAPHY with sphicterotomy and balloon guided sweeping of bile duct up to 9mm and placement of 10Fr x 9cm biliary stent;  Surgeon: Naman Blackmon MD;  Location: [facility] ENDOSCOPY;  Service: Gastroenterology;  Laterality: N/A;  post: choledocholithiasis    LOBECTOMY Right 2023    Procedure: THORASCOPIC RIGHT UPPER LOBECTOMY WITH DAVINCI ROBOT, MEDIASTINAL LYMPH NODE DISSECTION;  Surgeon: Erwin Kearney MD;  Location: [facility] MAIN OR;  Service: Robotics - DaVinci;  Laterality: Right;    LUMBAR DECOMPRESSION      L4-L5     LUNG BIOPSY Right 2022    SKIN CANCER EXCISION Left     Shin    SUBTOTAL HYSTERECTOMY       Social History:  Social History     Socioeconomic History    Marital status:    Tobacco Use    Smoking status: Former     Packs/day: 1.00     Years: 40.00     Pack years: 40.00     Types: Cigarettes     Quit date: 2023     Years since quittin.6     Passive exposure: Current    Smokeless tobacco: Never   Vaping Use    Vaping Use: Never used   Substance and Sexual Activity    Alcohol use: Yes     Comment: rare    Drug use: No    Sexual activity: Defer     Allergies:  Allergies   Allergen Reactions    Morphine Anaphylaxis and Nausea And Vomiting    Percocet [Oxycodone-Acetaminophen] GI Intolerance    Tramadol GI Intolerance     Immunizations:  Immunization History   Administered Date(s) Administered    COVID-19 ([employer]) 2021    Covid-19 (Pfizer) Gray Cap Monovalent 2022    Flu Vaccine Quad PF >36MO 2018    Fluzone >6mos 2020, 2022    Influenza, Unspecified 2016, 10/12/2017    Pneumococcal Polysaccharide (PPSV23) 2021    Tdap 2020    flucelvax quad pfs =>4 YRS 10/03/2019            In-Office Procedure(s):  No orders to display        ASCVD RIsk Score::  The 10-year ASCVD risk score (Hosea YE, et al., 2019) is: 6%    Values used to calculate the score:      Age: 57 years      Sex:  Female      Is Non- : No      Diabetic: Yes      Tobacco smoker: No      Systolic Blood Pressure: 113 mmHg      Is BP treated: Yes      HDL Cholesterol: 32 mg/dL      Total Cholesterol: 140 mg/dL    Imaging:    Results for orders placed during the hospital encounter of 01/24/23    XR Chest 2 View    Narrative  XR CHEST 2 VW    Date of Exam: 1/24/2023 9:10 AM EST    Indication: post-op.    Comparison: 1/12/2023    Findings:  There is linear consolidation in the bases bilaterally suggesting atelectasis or scarring. Probable small amounts of pleural fluid are noted in the CP angles. Right hilar nodular density may be due to adenopathy. No pneumothorax is seen. Heart size and  mediastinal contour appear within normal limits. Calcified granulomatous changes are noted.    Impression  Impression:  Bibasilar linear consolidation suggesting atelectasis. Question small amounts of bilateral pleural fluid.    Right hilar nodular density may be due to adenopathy. This could be more definitively characterized by CT.    Electronically Signed: Alberto Nicole  1/24/2023 1:45 PM EST  Workstation ID: [id number]       Results for orders placed during the hospital encounter of 07/15/23    CT Abd With & Without Contrast Pelvis With Contrast    Narrative  CT ABD W WO CONTRAST PELVIS W CONTRAST    Date of Exam: 7/15/2023 11:02 AM EDT    Indication: Pancreatic mass.    Comparison: CT abdomen and pelvis 2/15/2023.    Technique: Axial CT images were obtained of the abdomen and pelvis before and after the uneventful intravenous administration of iodinated contrast. Sagittal and coronal reconstructions were performed.  Automated exposure control and iterative  reconstruction methods were used.      Findings:  Multiloculated fluid collection within the pancreatic tail measuring 4.3 x 3.3 (series 8 image 45) has diminished in size since the prior examination, where it measured nearly 6.4 x 5.4 cm (prior series 2 image 46). The  findings are favored to represent  improving and collapsing pancreatic pseudocysts, and improving peripancreatic inflammation. The downstream pancreatic body and head are mildly atrophic but otherwise within normal limits.    No abnormal pancreatic ductal dilation is seen. No abnormal biliary ductal dilation is evident. The gallbladder surgically absent. The CBD stent has been removed since the prior study.    The liver is diffusely steatotic without focal abnormality.    The spleen size is upper limits normal, 14 cm in length, without focal abnormality. The splenic vein remains patent.    Portal vein, hepatic veins, IVC, and SMV remain patent.    The adrenal glands and kidneys are within normal limits.    The large and small bowel loops do not appear abnormally thickened, dilated, or inflamed. A few sparse diverticular changes are scattered within the colon. Mild to moderate colonic stool burden is present. The appendix is normal. The stomach is within  normal limits.    The urinary bladder, uterus, and rectum are within normal limits.    No pathologically enlarged lymph nodes are identified within the abdomen or pelvis, and no gross ascites is seen.    The heart size is normal. Mild coronary artery calcifications are present. There are is no acute basilar lung consolidation. The right pleural effusion has resolved since the prior study.    There are no acute or suspicious osseous abnormalities. Advanced degenerative disc and endplate changes are present at L4-5.    Impression  1. Interval improvement in peripancreatic inflammation.  2. Multiloculated fluid collection centered at the pancreatic tail has significantly diminished in size, since 2/15/2023, thought to represent improving pseudocysts.  3. Interval removal of the CBD stent. No abnormal biliary dilation is identified. Interval cholecystectomy.  4.. Hepatic steatosis.  5. Uncomplicated colonic diverticulosis.  6. Interval resolution of previous small right  pleural effusion.      Electronically Signed: Alisha Santos  7/16/2023 7:13 AM EDT  Workstation ID: [id number]      Results for orders placed during the hospital encounter of 07/15/23    CT Abd With & Without Contrast Pelvis With Contrast    Narrative  CT ABD W WO CONTRAST PELVIS W CONTRAST    Date of Exam: 7/15/2023 11:02 AM EDT    Indication: Pancreatic mass.    Comparison: CT abdomen and pelvis 2/15/2023.    Technique: Axial CT images were obtained of the abdomen and pelvis before and after the uneventful intravenous administration of iodinated contrast. Sagittal and coronal reconstructions were performed.  Automated exposure control and iterative  reconstruction methods were used.      Findings:  Multiloculated fluid collection within the pancreatic tail measuring 4.3 x 3.3 (series 8 image 45) has diminished in size since the prior examination, where it measured nearly 6.4 x 5.4 cm (prior series 2 image 46). The findings are favored to represent  improving and collapsing pancreatic pseudocysts, and improving peripancreatic inflammation. The downstream pancreatic body and head are mildly atrophic but otherwise within normal limits.    No abnormal pancreatic ductal dilation is seen. No abnormal biliary ductal dilation is evident. The gallbladder surgically absent. The CBD stent has been removed since the prior study.    The liver is diffusely steatotic without focal abnormality.    The spleen size is upper limits normal, 14 cm in length, without focal abnormality. The splenic vein remains patent.    Portal vein, hepatic veins, IVC, and SMV remain patent.    The adrenal glands and kidneys are within normal limits.    The large and small bowel loops do not appear abnormally thickened, dilated, or inflamed. A few sparse diverticular changes are scattered within the colon. Mild to moderate colonic stool burden is present. The appendix is normal. The stomach is within  normal limits.    The urinary bladder, uterus, and  rectum are within normal limits.    No pathologically enlarged lymph nodes are identified within the abdomen or pelvis, and no gross ascites is seen.    The heart size is normal. Mild coronary artery calcifications are present. There are is no acute basilar lung consolidation. The right pleural effusion has resolved since the prior study.    There are no acute or suspicious osseous abnormalities. Advanced degenerative disc and endplate changes are present at L4-5.    Impression  1. Interval improvement in peripancreatic inflammation.  2. Multiloculated fluid collection centered at the pancreatic tail has significantly diminished in size, since 2/15/2023, thought to represent improving pseudocysts.  3. Interval removal of the CBD stent. No abnormal biliary dilation is identified. Interval cholecystectomy.  4.. Hepatic steatosis.  5. Uncomplicated colonic diverticulosis.  6. Interval resolution of previous small right pleural effusion.      Electronically Signed: Alisha Santos  7/16/2023 7:13 AM EDT  Workstation ID: [id number]      Lab Review:   Hospital Outpatient Visit on 07/15/2023   Component Date Value    Creatinine 07/15/2023 0.60     eGFR 07/15/2023 104.2    Lab on 06/29/2023   Component Date Value    WBC 06/29/2023 8.48     RBC 06/29/2023 5.01     Hemoglobin 06/29/2023 14.9     Hematocrit 06/29/2023 45.0     MCV 06/29/2023 89.8     MCH 06/29/2023 29.7     MCHC 06/29/2023 33.1     RDW 06/29/2023 15.1     RDW-SD 06/29/2023 48.2     MPV 06/29/2023 9.1     Platelets 06/29/2023 246     Neutrophil % 06/29/2023 52.9     Lymphocyte % 06/29/2023 39.3     Monocyte % 06/29/2023 5.7     Eosinophil % 06/29/2023 1.5     Basophil % 06/29/2023 0.6     Neutrophils, Absolute 06/29/2023 4.49     Lymphocytes, Absolute 06/29/2023 3.33 (H)     Monocytes, Absolute 06/29/2023 0.48     Eosinophils, Absolute 06/29/2023 0.13     Basophils, Absolute 06/29/2023 0.05     Extra Tube 06/29/2023 Hold for add-ons.     Extra Tube 06/29/2023 Hold  for add-ons.    Office Visit on 04/20/2023   Component Date Value    Glucose 04/20/2023 111 (H)     BUN 04/20/2023 10     Creatinine 04/20/2023 0.73     Sodium 04/20/2023 144     Potassium 04/20/2023 3.9     Chloride 04/20/2023 105     CO2 04/20/2023 28.0     Calcium 04/20/2023 10.4     Total Protein 04/20/2023 7.8     Albumin 04/20/2023 4.2     ALT (SGPT) 04/20/2023 57 (H)     AST (SGOT) 04/20/2023 51 (H)     Alkaline Phosphatase 04/20/2023 105     Total Bilirubin 04/20/2023 0.5     Globulin 04/20/2023 3.6     A/G Ratio 04/20/2023 1.2     BUN/Creatinine Ratio 04/20/2023 13.7     Anion Gap 04/20/2023 11.0     eGFR 04/20/2023 95.5    Admission on 04/10/2023, Discharged on 04/11/2023   Component Date Value    QT Interval 03/30/2023 407     Glucose 04/10/2023 125 (H)     Case Report 04/10/2023                      Value:Surgical Pathology Report                         Case: [id number]                                  Authorizing Provider:  Mich Bansal MD        Collected:           04/10/2023 10:07 AM          Ordering Location:     [facility] MAIN  Received:            04/10/2023 01:01 PM                                 OR                                                                           Pathologist:           Naman Nance MD                                                            Specimen:    Gallbladder                                                                                Final Diagnosis 04/10/2023                      Value:This result contains rich text formatting which cannot be displayed here.    Gross Description 04/10/2023                      Value:This result contains rich text formatting which cannot be displayed here.    Glucose 04/10/2023 137 (H)     Glucose 04/10/2023 142 (H)     BUN 04/10/2023 10     Creatinine 04/10/2023 0.63     Sodium 04/10/2023 139     Potassium 04/10/2023 4.1     Chloride 04/10/2023 103     CO2 04/10/2023 24.0     Calcium 04/10/2023 9.6      Total Protein 04/10/2023 7.3     Albumin 04/10/2023 4.2     ALT (SGPT) 04/10/2023 94 (H)     AST (SGOT) 04/10/2023 108 (H)     Alkaline Phosphatase 04/10/2023 107     Total Bilirubin 04/10/2023 0.7     Globulin 04/10/2023 3.1     A/G Ratio 04/10/2023 1.4     BUN/Creatinine Ratio 04/10/2023 15.9     Anion Gap 04/10/2023 12.0     eGFR 04/10/2023 103.0     WBC 04/10/2023 10.80     RBC 04/10/2023 5.05     Hemoglobin 04/10/2023 14.3     Hematocrit 04/10/2023 44.4     MCV 04/10/2023 88.0     MCH 04/10/2023 28.3     MCHC 04/10/2023 32.1     RDW 04/10/2023 14.4     RDW-SD 04/10/2023 44.6     MPV 04/10/2023 7.2     Platelets 04/10/2023 328     Neutrophil % 04/10/2023 74.7     Lymphocyte % 04/10/2023 18.0 (L)     Monocyte % 04/10/2023 6.2     Eosinophil % 04/10/2023 0.3     Basophil % 04/10/2023 0.8     Neutrophils, Absolute 04/10/2023 8.10 (H)     Lymphocytes, Absolute 04/10/2023 1.90     Monocytes, Absolute 04/10/2023 0.70     Eosinophils, Absolute 04/10/2023 0.00     Basophils, Absolute 04/10/2023 0.10     nRBC 04/10/2023 0.0     Glucose 04/11/2023 129 (H)    Admission on 03/30/2023, Discharged on 03/30/2023   Component Date Value    Glucose 03/30/2023 116 (H)    Clinical Support on 03/28/2023   Component Date Value    WBC 03/28/2023 7.98     RBC 03/28/2023 4.76     Hemoglobin 03/28/2023 13.8     Hematocrit 03/28/2023 41.5     MCV 03/28/2023 87.2     MCH 03/28/2023 29.0     MCHC 03/28/2023 33.3     RDW 03/28/2023 13.0     RDW-SD 03/28/2023 41.0     MPV 03/28/2023 10.6     Platelets 03/28/2023 251     Glucose 03/28/2023 111 (H)     BUN 03/28/2023 12     Creatinine 03/28/2023 0.78     Sodium 03/28/2023 138     Potassium 03/28/2023 4.5     Chloride 03/28/2023 103     CO2 03/28/2023 27.4     Calcium 03/28/2023 9.9     Total Protein 03/28/2023 7.4     Albumin 03/28/2023 4.4     ALT (SGPT) 03/28/2023 61 (H)     AST (SGOT) 03/28/2023 57 (H)     Alkaline Phosphatase 03/28/2023 97     Total Bilirubin 03/28/2023 0.5     Globulin  03/28/2023 3.0     A/G Ratio 03/28/2023 1.5     BUN/Creatinine Ratio 03/28/2023 15.4     Anion Gap 03/28/2023 7.6     eGFR 03/28/2023 88.2     Total Cholesterol 03/28/2023 140     Triglycerides 03/28/2023 200 (H)     HDL Cholesterol 03/28/2023 32 (L)     LDL Cholesterol  03/28/2023 74     VLDL Cholesterol 03/28/2023 34     LDL/HDL Ratio 03/28/2023 2.13     Hemoglobin A1C 03/28/2023 6.60 (H)     TSH 03/28/2023 2.310    Office Visit on 03/23/2023   Component Date Value    Urine Culture 03/23/2023 50,000 CFU/mL Normal Urogenital Kelly     Color, UA 03/23/2023 Kim (A)     Appearance, UA 03/23/2023 Slightly Cloudy (A)     pH, UA 03/23/2023 5.5     Specific Gravity, UA 03/23/2023 >=1.030     Glucose, UA 03/23/2023 Negative     Ketones, UA 03/23/2023 Negative     Bilirubin, UA 03/23/2023 Small (1+) (A)     Blood, UA 03/23/2023 Negative     Protein, UA 03/23/2023 Negative     Leuk Esterase, UA 03/23/2023 Negative     Nitrite, UA 03/23/2023 Negative     Urobilinogen, UA 03/23/2023 0.2 E.U./dL    Lab on 03/23/2023   Component Date Value    WBC 03/23/2023 8.62     RBC 03/23/2023 4.75     Hemoglobin 03/23/2023 13.8     Hematocrit 03/23/2023 43.3     MCV 03/23/2023 91.2     MCH 03/23/2023 29.1     MCHC 03/23/2023 31.9     RDW 03/23/2023 13.7     RDW-SD 03/23/2023 44.7     MPV 03/23/2023 8.5     Platelets 03/23/2023 336     Neutrophil % 03/23/2023 55.9     Lymphocyte % 03/23/2023 35.4     Monocyte % 03/23/2023 5.2     Eosinophil % 03/23/2023 2.9     Basophil % 03/23/2023 0.6     Neutrophils, Absolute 03/23/2023 4.82     Lymphocytes, Absolute 03/23/2023 3.05     Monocytes, Absolute 03/23/2023 0.45     Eosinophils, Absolute 03/23/2023 0.25     Basophils, Absolute 03/23/2023 0.05     Extra Tube 03/23/2023 Hold for add-ons.     Extra Tube 03/23/2023 Hold for add-ons.      Recent labs reviewed and interpreted for clinical significance and application            Level of Care:           Alexandro Farah MD  08/15/23  .

## 2023-09-11 ENCOUNTER — HOSPITAL ENCOUNTER (OUTPATIENT)
Dept: CT IMAGING | Facility: HOSPITAL | Age: 58
Discharge: HOME | End: 2023-09-11
Admitting: INTERNAL MEDICINE
Payer: MEDICARE

## 2023-09-11 ENCOUNTER — HOSPITAL ENCOUNTER (OUTPATIENT)
Dept: CARDIOLOGY | Facility: HOSPITAL | Age: 58
Discharge: HOME | End: 2023-09-11
Admitting: INTERNAL MEDICINE
Payer: MEDICARE

## 2023-09-11 DIAGNOSIS — I65.23 CAROTID STENOSIS, BILATERAL: ICD-10-CM

## 2023-09-11 DIAGNOSIS — R07.89 CHEST PAIN, ATYPICAL: ICD-10-CM

## 2023-09-11 DIAGNOSIS — R06.09 DOE (DYSPNEA ON EXERTION): ICD-10-CM

## 2023-09-11 LAB
BH CV XLRA MEAS LEFT DIST CCA EDV: -21.4 CM/SEC
BH CV XLRA MEAS LEFT DIST CCA PSV: -93.8 CM/SEC
BH CV XLRA MEAS LEFT DIST ICA EDV: -30.2 CM/SEC
BH CV XLRA MEAS LEFT DIST ICA PSV: -81.8 CM/SEC
BH CV XLRA MEAS LEFT ICA/CCA RATIO: -0.83
BH CV XLRA MEAS LEFT PROX CCA EDV: 24.9 CM/SEC
BH CV XLRA MEAS LEFT PROX CCA PSV: 102 CM/SEC
BH CV XLRA MEAS LEFT PROX ECA PSV: -83.4 CM/SEC
BH CV XLRA MEAS LEFT PROX ICA EDV: -28 CM/SEC
BH CV XLRA MEAS LEFT PROX ICA PSV: -85.1 CM/SEC
BH CV XLRA MEAS LEFT PROX SCLA PSV: 132 CM/SEC
BH CV XLRA MEAS LEFT VERTEBRAL A EDV: 18.1 CM/SEC
BH CV XLRA MEAS LEFT VERTEBRAL A PSV: 64.8 CM/SEC
BH CV XLRA MEAS RIGHT DIST CCA EDV: 21.6 CM/SEC
BH CV XLRA MEAS RIGHT DIST CCA PSV: 82.1 CM/SEC
BH CV XLRA MEAS RIGHT DIST ICA EDV: -29 CM/SEC
BH CV XLRA MEAS RIGHT DIST ICA PSV: -71.3 CM/SEC
BH CV XLRA MEAS RIGHT ICA/CCA RATIO: -0.85
BH CV XLRA MEAS RIGHT PROX CCA EDV: 16.2 CM/SEC
BH CV XLRA MEAS RIGHT PROX CCA PSV: 83.9 CM/SEC
BH CV XLRA MEAS RIGHT PROX ECA PSV: -103 CM/SEC
BH CV XLRA MEAS RIGHT PROX ICA EDV: -21.6 CM/SEC
BH CV XLRA MEAS RIGHT PROX ICA PSV: -69 CM/SEC
BH CV XLRA MEAS RIGHT PROX SCLA PSV: 165 CM/SEC
BH CV XLRA MEAS RIGHT VERTEBRAL A EDV: -15.7 CM/SEC
BH CV XLRA MEAS RIGHT VERTEBRAL A PSV: -51.6 CM/SEC
LEFT ARM BP: NORMAL MMHG
RIGHT ARM BP: NORMAL MMHG

## 2023-09-11 PROCEDURE — 75571 CT HRT W/O DYE W/CA TEST: CPT

## 2023-09-11 PROCEDURE — 93880 EXTRACRANIAL BILAT STUDY: CPT

## 2023-09-15 DIAGNOSIS — R07.89 CHEST PAIN, ATYPICAL: Primary | ICD-10-CM

## 2023-09-15 DIAGNOSIS — R93.1 ELEVATED CORONARY ARTERY CALCIUM SCORE: ICD-10-CM

## 2023-09-20 DIAGNOSIS — Z11.59 NEED FOR HEPATITIS C SCREENING TEST: ICD-10-CM

## 2023-09-20 DIAGNOSIS — I10 ESSENTIAL HYPERTENSION: Primary | ICD-10-CM

## 2023-09-20 DIAGNOSIS — E11.9 TYPE 2 DIABETES MELLITUS WITHOUT COMPLICATION, WITHOUT LONG-TERM CURRENT USE OF INSULIN: ICD-10-CM

## 2023-09-20 DIAGNOSIS — E55.9 VITAMIN D DEFICIENCY: ICD-10-CM

## 2023-09-20 DIAGNOSIS — E78.2 MIXED HYPERLIPIDEMIA: ICD-10-CM

## 2023-09-25 ENCOUNTER — HOSPITAL ENCOUNTER (OUTPATIENT)
Dept: CARDIOLOGY | Facility: HOSPITAL | Age: 58
Discharge: HOME | End: 2023-09-25
Payer: MEDICARE

## 2023-09-25 ENCOUNTER — HOSPITAL ENCOUNTER (OUTPATIENT)
Dept: NUCLEAR MEDICINE | Facility: HOSPITAL | Age: 58
Discharge: HOME | End: 2023-09-25
Payer: MEDICARE

## 2023-09-25 DIAGNOSIS — R06.09 DOE (DYSPNEA ON EXERTION): ICD-10-CM

## 2023-09-25 DIAGNOSIS — R93.1 ELEVATED CORONARY ARTERY CALCIUM SCORE: ICD-10-CM

## 2023-09-25 DIAGNOSIS — R07.89 CHEST PAIN, ATYPICAL: ICD-10-CM

## 2023-09-25 LAB
BH CV ECHO MEAS - ACS: 2 CM
BH CV ECHO MEAS - AO MAX PG: 4.8 MMHG
BH CV ECHO MEAS - AO MEAN PG: 3 MMHG
BH CV ECHO MEAS - AO V2 MAX: 109 CM/SEC
BH CV ECHO MEAS - AO V2 VTI: 24 CM
BH CV ECHO MEAS - AVA(I,D): 2.47 CM2
BH CV ECHO MEAS - EDV(CUBED): 97.3 ML
BH CV ECHO MEAS - EDV(MOD-SP4): 103 ML
BH CV ECHO MEAS - EF(MOD-BP): 58 %
BH CV ECHO MEAS - EF(MOD-SP4): 58.4 %
BH CV ECHO MEAS - EF_3D-VOL: 57 %
BH CV ECHO MEAS - ESV(CUBED): 29.8 ML
BH CV ECHO MEAS - ESV(MOD-SP4): 42.8 ML
BH CV ECHO MEAS - FS: 32.6 %
BH CV ECHO MEAS - IVS/LVPW: 1 CM
BH CV ECHO MEAS - IVSD: 1.1 CM
BH CV ECHO MEAS - LA DIMENSION: 3.3 CM
BH CV ECHO MEAS - LAT PEAK E' VEL: 5.9 CM/SEC
BH CV ECHO MEAS - LV DIASTOLIC VOL/BSA (35-75): 50.8 CM2
BH CV ECHO MEAS - LV MASS(C)D: 181.2 GRAMS
BH CV ECHO MEAS - LV MAX PG: 2.5 MMHG
BH CV ECHO MEAS - LV MEAN PG: 1 MMHG
BH CV ECHO MEAS - LV SYSTOLIC VOL/BSA (12-30): 21.1 CM2
BH CV ECHO MEAS - LV V1 MAX: 79.1 CM/SEC
BH CV ECHO MEAS - LV V1 VTI: 17.1 CM
BH CV ECHO MEAS - LVIDD: 4.6 CM
BH CV ECHO MEAS - LVIDS: 3.1 CM
BH CV ECHO MEAS - LVOT AREA: 3.5 CM2
BH CV ECHO MEAS - LVOT DIAM: 2.1 CM
BH CV ECHO MEAS - LVPWD: 1.1 CM
BH CV ECHO MEAS - MED PEAK E' VEL: 5.2 CM/SEC
BH CV ECHO MEAS - MV A MAX VEL: 67.1 CM/SEC
BH CV ECHO MEAS - MV DEC SLOPE: 162 CM/SEC2
BH CV ECHO MEAS - MV DEC TIME: 0.25 SEC
BH CV ECHO MEAS - MV E MAX VEL: 51.7 CM/SEC
BH CV ECHO MEAS - MV E/A: 0.77
BH CV ECHO MEAS - MV MAX PG: 3 MMHG
BH CV ECHO MEAS - MV MEAN PG: 1 MMHG
BH CV ECHO MEAS - MV P1/2T: 125.1 MSEC
BH CV ECHO MEAS - MV V2 VTI: 27.6 CM
BH CV ECHO MEAS - MVA(P1/2T): 1.76 CM2
BH CV ECHO MEAS - MVA(VTI): 2.15 CM2
BH CV ECHO MEAS - PA ACC TIME: 0.14 SEC
BH CV ECHO MEAS - PA V2 MAX: 71.9 CM/SEC
BH CV ECHO MEAS - RAP SYSTOLE: 8 MMHG
BH CV ECHO MEAS - RV MAX PG: 0.72 MMHG
BH CV ECHO MEAS - RV V1 MAX: 42.3 CM/SEC
BH CV ECHO MEAS - RV V1 VTI: 11.8 CM
BH CV ECHO MEAS - RVDD: 3.3 CM
BH CV ECHO MEAS - SI(MOD-SP4): 29.7 ML/M2
BH CV ECHO MEAS - SV(LVOT): 59.2 ML
BH CV ECHO MEAS - SV(MOD-SP4): 60.2 ML
BH CV ECHO MEAS - TAPSE (>1.6): 1.48 CM
BH CV ECHO MEASUREMENTS AVERAGE E/E' RATIO: 9.32
BH CV XLRA - TDI S': 9.7 CM/SEC
LEFT ATRIUM VOLUME INDEX: 28.1 ML/M2
SINUS: 3.2 CM
STJ: 2.5 CM

## 2023-09-25 PROCEDURE — 25010000002 SULFUR HEXAFLUORIDE MICROSPH 60.7-25 MG RECONSTITUTED SUSPENSION: Performed by: INTERNAL MEDICINE

## 2023-09-25 PROCEDURE — 93306 TTE W/DOPPLER COMPLETE: CPT

## 2023-09-25 RX ADMIN — SULFUR HEXAFLUORIDE 2 ML: KIT at 12:42

## 2023-09-27 ENCOUNTER — CLINICAL SUPPORT (OUTPATIENT)
Dept: FAMILY MEDICINE CLINIC | Facility: CLINIC | Age: 58
End: 2023-09-27
Payer: MEDICARE

## 2023-09-27 DIAGNOSIS — I10 ESSENTIAL HYPERTENSION: Primary | ICD-10-CM

## 2023-09-27 PROCEDURE — 86803 HEPATITIS C AB TEST: CPT | Performed by: NURSE PRACTITIONER

## 2023-09-27 PROCEDURE — 36415 COLL VENOUS BLD VENIPUNCTURE: CPT | Performed by: NURSE PRACTITIONER

## 2023-09-27 PROCEDURE — 82043 UR ALBUMIN QUANTITATIVE: CPT | Performed by: NURSE PRACTITIONER

## 2023-09-27 PROCEDURE — 82306 VITAMIN D 25 HYDROXY: CPT | Performed by: NURSE PRACTITIONER

## 2023-09-27 PROCEDURE — 85027 COMPLETE CBC AUTOMATED: CPT | Performed by: NURSE PRACTITIONER

## 2023-09-27 PROCEDURE — 80053 COMPREHEN METABOLIC PANEL: CPT | Performed by: NURSE PRACTITIONER

## 2023-09-27 PROCEDURE — 82570 ASSAY OF URINE CREATININE: CPT | Performed by: NURSE PRACTITIONER

## 2023-09-27 PROCEDURE — 80061 LIPID PANEL: CPT | Performed by: NURSE PRACTITIONER

## 2023-09-27 PROCEDURE — 83036 HEMOGLOBIN GLYCOSYLATED A1C: CPT | Performed by: NURSE PRACTITIONER

## 2023-09-27 NOTE — PROGRESS NOTES
Venipuncture Blood Specimen Collection  Venipuncture performed in the right arm by Gaviota Tan MA with good hemostasis. Patient tolerated the procedure well without complications.   09/27/23   Gaviota Tan MA

## 2023-09-28 LAB
25(OH)D3 SERPL-MCNC: 50.6 NG/ML (ref 30–100)
ALBUMIN SERPL-MCNC: 4.9 G/DL (ref 3.5–5.2)
ALBUMIN UR-MCNC: 3.8 MG/DL
ALBUMIN/GLOB SERPL: 1.6 G/DL
ALP SERPL-CCNC: 151 U/L (ref 39–117)
ALT SERPL W P-5'-P-CCNC: 88 U/L (ref 1–33)
ANION GAP SERPL CALCULATED.3IONS-SCNC: 11.9 MMOL/L (ref 5–15)
AST SERPL-CCNC: 57 U/L (ref 1–32)
BILIRUB SERPL-MCNC: 0.7 MG/DL (ref 0–1.2)
BUN SERPL-MCNC: 12 MG/DL (ref 6–20)
BUN/CREAT SERPL: 16.4 (ref 7–25)
CALCIUM SPEC-SCNC: 10.5 MG/DL (ref 8.6–10.5)
CHLORIDE SERPL-SCNC: 98 MMOL/L (ref 98–107)
CHOLEST SERPL-MCNC: 203 MG/DL (ref 0–200)
CO2 SERPL-SCNC: 26.1 MMOL/L (ref 22–29)
CREAT SERPL-MCNC: 0.73 MG/DL (ref 0.57–1)
CREAT UR-MCNC: 195.7 MG/DL
DEPRECATED RDW RBC AUTO: 41.5 FL (ref 37–54)
EGFRCR SERPLBLD CKD-EPI 2021: 94.9 ML/MIN/1.73
ERYTHROCYTE [DISTWIDTH] IN BLOOD BY AUTOMATED COUNT: 12.9 % (ref 12.3–15.4)
GLOBULIN UR ELPH-MCNC: 3.1 GM/DL
GLUCOSE SERPL-MCNC: 365 MG/DL (ref 65–99)
HBA1C MFR BLD: 9.3 % (ref 4.8–5.6)
HCT VFR BLD AUTO: 42.3 % (ref 34–46.6)
HCV AB SER DONR QL: NORMAL
HDLC SERPL-MCNC: 56 MG/DL (ref 40–60)
HGB BLD-MCNC: 14.1 G/DL (ref 12–15.9)
LDLC SERPL CALC-MCNC: 117 MG/DL (ref 0–100)
LDLC/HDLC SERPL: 2.02 {RATIO}
MCH RBC QN AUTO: 29.5 PG (ref 26.6–33)
MCHC RBC AUTO-ENTMCNC: 33.3 G/DL (ref 31.5–35.7)
MCV RBC AUTO: 88.5 FL (ref 79–97)
MICROALBUMIN/CREAT UR: 19.4 MG/G
PLATELET # BLD AUTO: 200 10*3/MM3 (ref 140–450)
PMV BLD AUTO: 11.4 FL (ref 6–12)
POTASSIUM SERPL-SCNC: 4.3 MMOL/L (ref 3.5–5.2)
PROT SERPL-MCNC: 8 G/DL (ref 6–8.5)
RBC # BLD AUTO: 4.78 10*6/MM3 (ref 3.77–5.28)
SODIUM SERPL-SCNC: 136 MMOL/L (ref 136–145)
TRIGL SERPL-MCNC: 169 MG/DL (ref 0–150)
VLDLC SERPL-MCNC: 30 MG/DL (ref 5–40)
WBC NRBC COR # BLD: 12.59 10*3/MM3 (ref 3.4–10.8)

## 2023-09-28 NOTE — PROGRESS NOTES
Hematology/Oncology Outpatient Consultation    Patient name: Rahel Ramsey  : 1965  MRN: [id number]  Primary Care Physician: Vickie Gomez APRN  Referring Physician: No ref. provider found  Reason For Consult:     Chief Complaint   Patient presents with    Follow-up     Malignant neoplasm of lung, unspecified laterality, unspecified part of lung       History of Present Illness: Patient is here today for routine follow-up and does not have any specific complaints    This is a 57-year-old female who has been noted to have leukocytosis   from routine blood count.  Patient was also recently diagnosed with skin cancer on the left lower   extremity.  She underwent wire excisional biopsy of the lesion.  Her course was complicated by   wound infection.  She has been on two rounds of antibiotics and currently on Keflex which will be   finished in another three to four days.  She denies any fevers or chills, rigors.  Patient had no   prior history of any hematologic problems.  Review of her CBC from 17 revealed WBC of 12.4,   hemoglobin 15.6, platelet count 271,000.  Differentials were 65% neutrophils, 28% lymphocytes.    There was no basophilia, eosinophilia or monocytosis.  Her chemistry panel had BUN 7, creatinine   0.7.  Alkaline phosphatase was 11.  The rest was unremarkable.  Patient had a CBC repeated on   17 with WBC of 12.6, hemoglobin 15.4, platelet count of 310,000.  Differentials were   unremarkable.  B12 (N) 441.  Folate (N) 13.9.  BUN 5, creatinine 0.7.  Normal thyroid at 0.9   [range 0.3-5.5].  Patient denied any repeated history of infection in the past.  She was alone   for this appointment on 17.  · 17 - Patient had labs done to further evaluate her leukocytosis.  Her WBC was 14.4,   hemoglobin 16.6, platelet count 312,000, ANC 9.3.  Differentials were 64% neutrophils, 28%   lymphocytes.  CHRISTOPHE was negative.  B12 was 337.  LDH (N) 159.  BUN 10, creatinine 0.7.  Uric acid    5.3 (N).  MIRTA-2 was not mutated.  No evidence of BCR-abl gene rearrangement.  Sed rate 32.    Peripheral smear showed absolute neutrophilia and leukocytosis and polycythemia, reactive vs.   myeloproliferative condition.  · 5/9/17 - Erythropoietin level was low-normal at 3.92.    · 8/17/17 - Ultrasound of the abdomen showed normal spleen size at 11.3 cm.  There was no splenic   lesion identified.    · 4/2/18 - Erythropoietin level 3.79 [range 2.59-18.5].   · 8/24/18 - Bone marrow biopsy showed normocellular marrow with no morphologic evidence of   myeloproliferative neoplasm, but she was found to have low level MIRTA-2 point mutation detected at   less than 10%.  The low level of MIRTA-2 mutation in assessment of leukocytosis and erythrocytosis   is worrisome for early and evolving myeloproliferative neoplasm.  Interval repeat marrow has been   recommended.  BCR-abl RT-PCR was negative.  Flow cytometry was negative.  Cellularity was 50%.    Iron stain was present.  Cytogenetics also showed normal female karyotype.    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  MIRTA-2 analysis was not detected on the   current bone marrow.  Close follow up has been recommended.  Flowy cytometry was negative.    Histopathology had normal maturation sequence.  There were normal megakaryocytes.  Erythroid   maturation was complete.  Myeloid maturation was also complete.  Negative iron stain on the   marrow aspirate.  Recommendation is for close surveillance.  Absent iron stores on bone marrow.      Smoker 1/2 ppd, does not drink alcohol    See pulmonologist for lung nodule    Family hx of breast cancer with breast cancer age 69  Father with lung cancer      Patient has been lost to follow-up since 2019.    6/28/2022: Follow-up appointment leukocytosis, thrombocytosis, polycythemia, CHRISTOPHE positive for SLE, anti-DNA elevated at 13.  US  spleen- normal size.  October 2022 patient was found to have increasing right upper lobe nodule of.  She is followed up with Dr. Nath.  She had a CT-guided biopsy of this nodule on 11/2/2022 and pathology revealed invasive well to moderately differentiated adenocarcinoma with focal lipidic features  11/23/2022: Patient had a PET CT scan which showed a 1.7 cm right upper lobe nodule not PET avid.  There is an 8 mm right upper lobe nodule also stable since 2020 likely benign.  There was no convincing evidence of metastatic disease elsewhere.  12/20/2022: Patient was seen by Dr. Kearney.  She is being evaluated for right upper lobectomy in the next 2 to 3 weeks  1/9/2023 patient underwent right lobectomy with mediastinal lymph node dissection    History of present illness was reviewed and is unchanged from the previous visit.       Subjective    Patient is here today for routine follow-up.  She has had surgery for her lung malignancy her course was complicated by pancreatitis.      Patient is here today for follow-up.  She recently had a CT scan of the chest completed in June 2, 2023 which was with no evidence of relapsed disease.  Patient was found to have soft tissue shadow at the tail of the pancreas and has recommended to have a CT of the abdomen.  Dr. Kearney has ordered this and she will follow-up with GI.    She is here today for follow-up.  Her follow-up CT scans did not show any significant abnormalities other than possible pseudocyst of the pancreas.  She continues to maintain close follow-ups with her GI physicians.    Past Medical History:   Diagnosis Date    Allergic     Arthritis     Atrial fibrillation     Cancer 01/09/2023    lung  Right    COPD (chronic obstructive pulmonary disease)     Diabetes mellitus     type 2    Frequent UTI     Headache     Heartburn     History of herniated intervertebral disc     lumbar    History of skin cancer     Left lower extremity    Hyperlipidemia     Hypertension     Injury  of back     Leukocytosis     Low back pain     Lung nodule     Nausea        Past Surgical History:   Procedure Laterality Date    BACK SURGERY      lumbar    CARDIAC CATHETERIZATION      CHOLECYSTECTOMY N/A 4/10/2023    Procedure: CHOLECYSTECTOMY LAPAROSCOPIC;  Surgeon: Mich Bansal MD;  Location: [facility] MAIN OR;  Service: General;  Laterality: N/A;    COLONOSCOPY      DILATATION AND CURETTAGE      ENDOSCOPY N/A 3/30/2023    Procedure: ESOPHAGOGASTRODUODENOSCOPY WITH STENT REMOVAL;  Surgeon: Naman Blackmon MD;  Location: [facility] ENDOSCOPY;  Service: Gastroenterology;  Laterality: N/A;  normal stent removal    ERCP N/A 02/01/2023    Procedure: ENDOSCOPIC RETROGRADE CHOLANGIOPANCREATOGRAPHY with sphicterotomy and balloon guided sweeping of bile duct up to 9mm and placement of 10Fr x 9cm biliary stent;  Surgeon: Naman Blackmon MD;  Location: [facility] ENDOSCOPY;  Service: Gastroenterology;  Laterality: N/A;  post: choledocholithiasis    LOBECTOMY Right 01/09/2023    Procedure: THORASCOPIC RIGHT UPPER LOBECTOMY WITH DAVINCI ROBOT, MEDIASTINAL LYMPH NODE DISSECTION;  Surgeon: Erwin Kearney MD;  Location: [facility] MAIN OR;  Service: Robotics - DaVinci;  Laterality: Right;    LUMBAR DECOMPRESSION      L4-L5     LUNG BIOPSY Right 11/2022    SKIN CANCER EXCISION Left     Shin    SUBTOTAL HYSTERECTOMY           Current Outpatient Medications:     albuterol sulfate  (90 Base) MCG/ACT inhaler, INHALE 2 PUFFS BY MOUTH EVERY 4 HOURS AS NEEDED FOR WHEEZING OR SHORTNESS OF AIR, Disp: 18 g, Rfl: 5    aspirin (aspirin) 81 MG EC tablet, Take 1 tablet by mouth Daily. LD: 4/5- plans to hold, Disp: , Rfl:     fenofibrate (TRICOR) 145 MG tablet, Take 1 tablet by mouth Daily., Disp: , Rfl:     fluticasone (FLONASE) 50 MCG/ACT nasal spray, SHAKE LIQUID AND USE 2 SPRAYS IN EACH NOSTRIL EVERY DAY (Patient taking differently: 1 spray by Each Nare route Daily As Needed.), Disp: 48 g, Rfl: 3    Fluticasone-Umeclidin-Vilant  (TRELEGY) 100-62.5-25 MCG/ACT inhaler, Inhale 1 puff Every Night., Disp: , Rfl:     HYDROcodone-acetaminophen (NORCO)  MG per tablet, Take 1 tablet by mouth Every 6 (Six) Hours As Needed for Moderate Pain. May take dos if needed, Disp: , Rfl:     metFORMIN (GLUCOPHAGE) 500 MG tablet, TAKE 1 TABLET BY MOUTH DAILY WITH BREAKFAST, Disp: 90 tablet, Rfl: 1    metoprolol succinate XL (TOPROL-XL) 25 MG 24 hr tablet, Take 1 tablet by mouth Daily., Disp: , Rfl:     nystatin (MYCOSTATIN) 083083 UNIT/GM cream, Apply 1 application  topically to the appropriate area as directed As Needed. None dos (Patient not taking: Reported on 8/15/2023), Disp: , Rfl:     ondansetron (Zofran) 4 MG tablet, Take 1 tablet by mouth Every 8 (Eight) Hours As Needed for Nausea or Vomiting., Disp: 30 tablet, Rfl: 0    pregabalin (LYRICA) 100 MG capsule, TAKE 1 CAPSULE BY MOUTH TWICE DAILY, Disp: 60 capsule, Rfl: 2    vitamin D (ERGOCALCIFEROL) 1.25 MG (31803 UT) capsule capsule, Take 1 capsule by mouth Every 7 (Seven) Days., Disp: 12 capsule, Rfl: 1    Allergies   Allergen Reactions    Morphine Anaphylaxis and Nausea And Vomiting    Percocet [Oxycodone-Acetaminophen] GI Intolerance    Tramadol GI Intolerance       Immunization History   Administered Date(s) Administered    COVID-19 ([employer]) 03/12/2021    Covid-19 ([employer]) Gray Cap Monovalent 01/31/2022    Flu Vaccine Quad PF >36MO 09/17/2018    Fluzone (or Fluarix & Flulaval for VFC) >6mos 09/03/2020, 09/29/2022    Influenza, Unspecified 09/27/2016, 10/12/2017    Pneumococcal Polysaccharide (PPSV23) 09/21/2021    Tdap 05/07/2020    flucelvax quad pfs =>4 YRS 10/03/2019       Family History   Problem Relation Age of Onset    Breast cancer Mother     Lung cancer Father     Lung cancer Other     Colon cancer Other     Skin cancer Other        Cancer-related family history includes Breast cancer in her mother; Colon cancer in an other family member; Lung cancer in her father and another family  "member; Skin cancer in an other family member.    Social History     Tobacco Use    Smoking status: Former     Packs/day: 1.00     Years: 40.00     Pack years: 40.00     Types: Cigarettes     Quit date: 2023     Years since quittin.7     Passive exposure: Current    Smokeless tobacco: Never   Vaping Use    Vaping Use: Never used   Substance Use Topics    Alcohol use: Yes     Comment: rare    Drug use: No       I have reviewed and confirmed the accuracy of the patient's history: Chief complaint, HPI, ROS, and Subjective as entered by the MA/LPN/RN. Cherialessio Prabhakar MD 23          ROS:    Review of Systems   Constitutional:  Positive for fatigue. Negative for chills and fever.   HENT: Negative.     Eyes:  Negative for visual disturbance.   Respiratory:  Negative for shortness of breath.    Cardiovascular:  Negative for chest pain and palpitations.   Gastrointestinal:  Negative for abdominal pain.   Endocrine: Negative.    Genitourinary: Negative.    Skin:  Negative for rash and wound.   Neurological:  Positive for weakness.   Hematological:  Negative for adenopathy.   Psychiatric/Behavioral:  Negative for agitation and confusion.  Chronic back pain due to disc disease    Objective:    Vitals:    23 1050   BP: 112/73   Pulse: 78   Resp: 20   Temp: 98 °F (36.7 °C)   TempSrc: Infrared   SpO2: 98%   Weight: 87.5 kg (193 lb)   Height: 175.3 cm (69\")   PainSc:   7   PainLoc: Generalized     Body mass index is 28.5 kg/m².    ECOG  (1) Restricted in physically strenuous activity, ambulatory and able to do work of light nature    Physical Exam:  Physical Exam  Vitals and nursing note reviewed.   Constitutional:       General: She is not in acute distress.     Appearance: She is not diaphoretic.   HENT:      Head: Normocephalic and atraumatic.   Eyes:      General: No scleral icterus.        Right eye: No discharge.         Left eye: No discharge.      Conjunctiva/sclera: Conjunctivae normal.   Neck: "      Thyroid: No thyromegaly.   Cardiovascular:      Rate and Rhythm: Normal rate and regular rhythm.      Heart sounds: Normal heart sounds.     No friction rub. No gallop.   Pulmonary:      Effort: Pulmonary effort is normal. No respiratory distress.      Breath sounds: No stridor. No wheezing.   Abdominal:      General: Bowel sounds are normal.      Palpations: Abdomen is soft. There is no mass.      Tenderness: There is no abdominal tenderness. There is no guarding or rebound.   Musculoskeletal:         General: No tenderness. Normal range of motion.      Cervical back: Normal range of motion and neck supple.   Lymphadenopathy:      Cervical: No cervical adenopathy.   Skin:     General: Skin is warm.      Findings: No erythema or rash.   Neurological:      Mental Status: She is alert and oriented to person, place, and time.      Motor: No abnormal muscle tone.   Psychiatric:         Behavior: Behavior normal.     I have reexamined the patient and the results are consistent with the previously documented exam. Cheri Prabhakar MD      RECENT LABS    WBC   Date Value Ref Range Status   09/27/2023 12.59 (H) 3.40 - 10.80 10*3/mm3 Final     RBC   Date Value Ref Range Status   09/27/2023 4.78 3.77 - 5.28 10*6/mm3 Final     Hemoglobin   Date Value Ref Range Status   09/27/2023 14.1 12.0 - 15.9 g/dL Final     Hematocrit   Date Value Ref Range Status   09/27/2023 42.3 34.0 - 46.6 % Final     MCV   Date Value Ref Range Status   09/27/2023 88.5 79.0 - 97.0 fL Final     MCH   Date Value Ref Range Status   09/27/2023 29.5 26.6 - 33.0 pg Final     MCHC   Date Value Ref Range Status   09/27/2023 33.3 31.5 - 35.7 g/dL Final     RDW   Date Value Ref Range Status   09/27/2023 12.9 12.3 - 15.4 % Final     RDW-SD   Date Value Ref Range Status   09/27/2023 41.5 37.0 - 54.0 fl Final     MPV   Date Value Ref Range Status   09/27/2023 11.4 6.0 - 12.0 fL Final     Platelets   Date Value Ref Range Status   09/27/2023 200 140 - 450  10*3/mm3 Final     Neutrophil %   Date Value Ref Range Status   06/29/2023 52.9 42.7 - 76.0 % Final     Lymphocyte %   Date Value Ref Range Status   06/29/2023 39.3 19.6 - 45.3 % Final     Monocyte %   Date Value Ref Range Status   06/29/2023 5.7 5.0 - 12.0 % Final     Eosinophil %   Date Value Ref Range Status   06/29/2023 1.5 0.3 - 6.2 % Final     Basophil %   Date Value Ref Range Status   06/29/2023 0.6 0.0 - 1.5 % Final     Immature Grans %   Date Value Ref Range Status   01/03/2023 0.4 0.0 - 0.5 % Final     Neutrophils, Absolute   Date Value Ref Range Status   06/29/2023 4.49 1.70 - 7.00 10*3/mm3 Final     Lymphocytes, Absolute   Date Value Ref Range Status   06/29/2023 3.33 (H) 0.70 - 3.10 10*3/mm3 Final     Monocytes, Absolute   Date Value Ref Range Status   06/29/2023 0.48 0.10 - 0.90 10*3/mm3 Final     Eosinophils, Absolute   Date Value Ref Range Status   06/29/2023 0.13 0.00 - 0.40 10*3/mm3 Final     Basophils, Absolute   Date Value Ref Range Status   06/29/2023 0.05 0.00 - 0.20 10*3/mm3 Final     Immature Grans, Absolute   Date Value Ref Range Status   01/03/2023 0.04 0.00 - 0.05 10*3/mm3 Final     nRBC   Date Value Ref Range Status   04/10/2023 0.0 0.0 - 0.2 /100 WBC Final       Lab Results   Component Value Date    GLUCOSE 365 (H) 09/27/2023    BUN 12 09/27/2023    CREATININE 0.73 09/27/2023    EGFRIFNONA 99 12/14/2021    BCR 16.4 09/27/2023    K 4.3 09/27/2023    CO2 26.1 09/27/2023    CALCIUM 10.5 09/27/2023    ALBUMIN 4.9 09/27/2023    LABIL2 1.1 12/04/2018    AST 57 (H) 09/27/2023    ALT 88 (H) 09/27/2023         Assessment & Plan   Malignant neoplasm of lung, unspecified laterality, unspecified part of lung  - CBC & Differential      Adenocarcinoma of the right lung, status post right upper lobectomy with lymph node dissection.  Tumor measured 1.3 cm, invasive well differentiated adenocarcinoma completely excised.  Enlarging right upper lobe nodule.  Clinical T1b N0 M0.  Stage I A2.  Adjuvant  treatment  not recommended continued CT scan surveillance.  Her scans are ordered by Dr. Kearney  Postoperative pancreatitis due to gallstones.  She is actively involved with the GI group.  Status post cholecystectomy.  Follow-up CT scan shows pseudocyst patient will maintain appointments.  There has been interval removal of the common bile duct stent    Tobacco use disorder: She is working on smoking cessation.  She has been encouraged      Leukocytosis and polycythemia.  Rule out myeloproliferative disease.  She has a prior history of this in the past.  Ultrasound of spleen- normal size.  Blood counts improved with WBC of 10.14, Hgb 15.8, platelets 226-improvement in counts.  JAK2 V617F negative.  No BCR ABL arrangement.  Peripheral smear shows leukocytosis, polycythemia, no blasts May of 2022.  We will obtain flow cytometry since she has relative lymphocytosis as well.  I have reviewed the lab results with patient.  I do not think we need to repeat a bone marrow biopsy at this time.  I have asked her to also remain on baby aspirin.  Patient does not have splenomegaly.  Her repeat flow cytometry completed in August 2022 does not show any myeloproliferative disease.  Her CBC is normal    Possible evolving myeloproliferative neoplasm with low level MIRTA-2 positive at less than 10%. BCR-abl negative.  Low normal erythropoietin level.  Status post bone marrow aspiration and         biopsy 8/24/18.  Repeat bone marrow aspiration and biopsy 2/22/19 showed translocation [2q/11q] has been associated with myeloid neoplasm, but no morphologic evidence seen on         histopathology      CHRISTOPHE positive, anti-DNA elevated at 13 .Referred to rheumatology, She was seen at  rheumatology.          Plans    Reviewed her final pathology results  Reviewed her CT of the chest result  Due to CT scan surveillance by Dr. Kearney  Follow-up 3 months  CBC reviewed  Patient to follow-up with GI  Reviewed her final pathology results   Her CBC  remains normal  Peripheral blood flow cytometry was negative- 8/3/2022  Continue baby aspirin 81 mg p.o. daily for procedure  All questions answered      I spent 30 total minutes, face-to-face, caring for [place] today. 90% of this time involved counseling and/or coordination of care as documented within this note.

## 2023-09-29 ENCOUNTER — OFFICE VISIT (OUTPATIENT)
Dept: ONCOLOGY | Facility: CLINIC | Age: 58
End: 2023-09-29
Payer: MEDICARE

## 2023-09-29 VITALS
DIASTOLIC BLOOD PRESSURE: 73 MMHG | BODY MASS INDEX: 28.58 KG/M2 | RESPIRATION RATE: 20 BRPM | HEIGHT: 69 IN | HEART RATE: 78 BPM | WEIGHT: 193 LBS | TEMPERATURE: 98 F | OXYGEN SATURATION: 98 % | SYSTOLIC BLOOD PRESSURE: 112 MMHG

## 2023-09-29 DIAGNOSIS — C34.90 MALIGNANT NEOPLASM OF LUNG, UNSPECIFIED LATERALITY, UNSPECIFIED PART OF LUNG: Primary | ICD-10-CM

## 2023-10-04 ENCOUNTER — OFFICE VISIT (OUTPATIENT)
Dept: FAMILY MEDICINE CLINIC | Facility: CLINIC | Age: 58
End: 2023-10-04
Payer: MEDICARE

## 2023-10-04 VITALS
WEIGHT: 196 LBS | BODY MASS INDEX: 29.03 KG/M2 | HEART RATE: 78 BPM | SYSTOLIC BLOOD PRESSURE: 120 MMHG | DIASTOLIC BLOOD PRESSURE: 84 MMHG | OXYGEN SATURATION: 98 % | HEIGHT: 69 IN

## 2023-10-04 DIAGNOSIS — C34.91 ADENOCARCINOMA OF RIGHT LUNG: ICD-10-CM

## 2023-10-04 DIAGNOSIS — Z12.31 BREAST CANCER SCREENING BY MAMMOGRAM: ICD-10-CM

## 2023-10-04 DIAGNOSIS — H01.005 BLEPHARITIS OF LEFT LOWER EYELID, UNSPECIFIED TYPE: ICD-10-CM

## 2023-10-04 DIAGNOSIS — Z23 NEED FOR INFLUENZA VACCINATION: ICD-10-CM

## 2023-10-04 DIAGNOSIS — M54.42 CHRONIC MIDLINE LOW BACK PAIN WITH BILATERAL SCIATICA: ICD-10-CM

## 2023-10-04 DIAGNOSIS — E55.9 VITAMIN D DEFICIENCY: ICD-10-CM

## 2023-10-04 DIAGNOSIS — E78.2 MIXED HYPERLIPIDEMIA: ICD-10-CM

## 2023-10-04 DIAGNOSIS — M54.41 CHRONIC MIDLINE LOW BACK PAIN WITH BILATERAL SCIATICA: ICD-10-CM

## 2023-10-04 DIAGNOSIS — R35.0 URINARY FREQUENCY: ICD-10-CM

## 2023-10-04 DIAGNOSIS — R79.89 ELEVATED LFTS: ICD-10-CM

## 2023-10-04 DIAGNOSIS — E11.65 TYPE 2 DIABETES MELLITUS WITH HYPERGLYCEMIA, WITHOUT LONG-TERM CURRENT USE OF INSULIN: ICD-10-CM

## 2023-10-04 DIAGNOSIS — Z00.00 MEDICARE ANNUAL WELLNESS VISIT, SUBSEQUENT: Primary | ICD-10-CM

## 2023-10-04 DIAGNOSIS — G89.29 CHRONIC MIDLINE LOW BACK PAIN WITH BILATERAL SCIATICA: ICD-10-CM

## 2023-10-04 DIAGNOSIS — J44.9 CHRONIC OBSTRUCTIVE PULMONARY DISEASE, UNSPECIFIED COPD TYPE: ICD-10-CM

## 2023-10-04 DIAGNOSIS — I10 ESSENTIAL HYPERTENSION: ICD-10-CM

## 2023-10-04 LAB
BILIRUB BLD-MCNC: ABNORMAL MG/DL
CLARITY, POC: ABNORMAL
COLOR UR: ABNORMAL
GLUCOSE UR STRIP-MCNC: NEGATIVE MG/DL
KETONES UR QL: ABNORMAL
LEUKOCYTE EST, POC: NEGATIVE
NITRITE UR-MCNC: POSITIVE MG/ML
PH UR: 5 [PH] (ref 5–8)
PROT UR STRIP-MCNC: ABNORMAL MG/DL
RBC # UR STRIP: NEGATIVE /UL
SP GR UR: 1.03 (ref 1–1.03)
UROBILINOGEN UR QL: NORMAL

## 2023-10-04 PROCEDURE — 87086 URINE CULTURE/COLONY COUNT: CPT | Performed by: NURSE PRACTITIONER

## 2023-10-04 PROCEDURE — 87186 SC STD MICRODIL/AGAR DIL: CPT | Performed by: NURSE PRACTITIONER

## 2023-10-04 PROCEDURE — 87077 CULTURE AEROBIC IDENTIFY: CPT | Performed by: NURSE PRACTITIONER

## 2023-10-04 RX ORDER — ERGOCALCIFEROL 1.25 MG/1
50000 CAPSULE ORAL
Qty: 12 CAPSULE | Refills: 1 | Status: SHIPPED | OUTPATIENT
Start: 2023-10-04

## 2023-10-04 RX ORDER — OFLOXACIN 3 MG/ML
1 SOLUTION/ DROPS OPHTHALMIC 4 TIMES DAILY
Qty: 5 ML | Refills: 0 | Status: SHIPPED | OUTPATIENT
Start: 2023-10-04 | End: 2023-10-09

## 2023-10-04 RX ORDER — CEPHALEXIN 500 MG/1
500 CAPSULE ORAL 3 TIMES DAILY
Qty: 21 CAPSULE | Refills: 0 | Status: SHIPPED | OUTPATIENT
Start: 2023-10-04 | End: 2023-10-11

## 2023-10-04 RX ORDER — VITAMIN E 268 MG
400 CAPSULE ORAL DAILY
Qty: 90 CAPSULE | Refills: 3 | Status: SHIPPED | OUTPATIENT
Start: 2023-10-04

## 2023-10-04 RX ORDER — PREGABALIN 100 MG/1
100 CAPSULE ORAL 2 TIMES DAILY
Qty: 60 CAPSULE | Refills: 5 | Status: SHIPPED | OUTPATIENT
Start: 2023-10-04

## 2023-10-04 NOTE — PROGRESS NOTES
The ABCs of the Annual Wellness Visit  Subsequent Medicare Wellness Visit    Subjective    Rahel Ramsey is a 58 y.o. female who presents for a Subsequent Medicare Wellness Visit.    The following portions of the patient's history were reviewed and   updated as appropriate: allergies, current medications, past family history, past medical history, past social history, past surgical history, and problem list.    Compared to one year ago, the patient feels her physical   health is the same.    Compared to one year ago, the patient feels her mental   health is the same.    Recent Hospitalizations:  This patient has had a [facility] admission record on file within the last 365 days.    Current Medical Providers:  Patient Care Team:  Vickie Gomez APRN as PCP - General (Nurse Practitioner)  Cheri Prabhakar MD as Consulting Physician (Hematology and Oncology)  Veronique Brothers RN as Nurse Navigator  Erwin Kearney MD as Surgeon (Thoracic Surgery)    Outpatient Medications Prior to Visit   Medication Sig Dispense Refill    albuterol sulfate  (90 Base) MCG/ACT inhaler INHALE 2 PUFFS BY MOUTH EVERY 4 HOURS AS NEEDED FOR WHEEZING OR SHORTNESS OF AIR 18 g 5    aspirin (aspirin) 81 MG EC tablet Take 1 tablet by mouth Daily. LD: 4/5- plans to hold      fenofibrate (TRICOR) 145 MG tablet Take 1 tablet by mouth Daily.      fluticasone (FLONASE) 50 MCG/ACT nasal spray SHAKE LIQUID AND USE 2 SPRAYS IN EACH NOSTRIL EVERY DAY (Patient taking differently: 1 spray by Each Nare route Daily As Needed.) 48 g 3    Fluticasone-Umeclidin-Vilant (TRELEGY) 100-62.5-25 MCG/ACT inhaler Inhale 1 puff Every Night.      HYDROcodone-acetaminophen (NORCO)  MG per tablet Take 1 tablet by mouth Every 6 (Six) Hours As Needed for Moderate Pain. May take dos if needed      metoprolol succinate XL (TOPROL-XL) 25 MG 24 hr tablet Take 1 tablet by mouth Daily.      nystatin (MYCOSTATIN) 555721 UNIT/GM cream Apply 1 application   topically to the appropriate area as directed As Needed. None dos      ondansetron (Zofran) 4 MG tablet Take 1 tablet by mouth Every 8 (Eight) Hours As Needed for Nausea or Vomiting. 30 tablet 0    metFORMIN (GLUCOPHAGE) 500 MG tablet TAKE 1 TABLET BY MOUTH DAILY WITH BREAKFAST 90 tablet 1    pregabalin (LYRICA) 100 MG capsule TAKE 1 CAPSULE BY MOUTH TWICE DAILY 60 capsule 2    vitamin D (ERGOCALCIFEROL) 1.25 MG (48710 UT) capsule capsule Take 1 capsule by mouth Every 7 (Seven) Days. 12 capsule 1     No facility-administered medications prior to visit.       Opioid medication/s are on active medication list.  and I have evaluated her active treatment plan and pain score trends (see table).  Vitals:    10/04/23 0811   PainSc: 0-No pain     I have reviewed the chart for potential of high risk medication and harmful drug interactions in the elderly.          Aspirin is on active medication list. Aspirin use is indicated based on review of current medical condition/s. Pros and cons of this therapy have been discussed today. Benefits of this medication outweigh potential harm.  Patient has been encouraged to continue taking this medication.  .      Patient Active Problem List   Diagnosis    Allergic rhinitis    Degeneration of lumbar or lumbosacral intervertebral disc    Degenerative joint disease of right acromioclavicular joint    Fatigue    History of skin cancer    Hyperglycemia    Essential hypertension    Mixed hyperlipidemia    Hypertriglyceridemia    Leukocytosis    Lumbar disc disease with radiculopathy    Lumbar herniated disc    Lumbar radiculopathy    Other cervical disc degeneration, unspecified cervical region    Paroxysmal atrial fibrillation    Stenosis of right carotid artery    Tobacco use    Vitamin D deficiency    Right upper lobe pulmonary nodule    Hilar adenopathy    Urinary tract infection with hematuria    UTI symptoms    Dysuria    COPD with exacerbation    Primary cancer of right upper lobe of  "lung    Acute pancreatitis with infected necrosis, unspecified pancreatitis type    History of lung cancer    Diarrhea    Upper abdominal pain    Angiodysplasia of colon without hemorrhage    Dysphagia    History of biliary duct stent placement    Iron deficiency    Occult blood in stools    Personal history of colonic polyps    Pure hypercholesterolemia    Rectal polyp     Advance Care Planning   Advance Care Planning     Advance Directive is not on file.  ACP discussion was declined by the patient. Patient does not have an advance directive, declines further assistance.     Objective    Vitals:    10/04/23 0811   BP: 120/84   BP Location: Left arm   Patient Position: Sitting   Cuff Size: Adult   Pulse: 78   SpO2: 98%   Weight: 88.9 kg (196 lb)   Height: 175.3 cm (69\")   PainSc: 0-No pain     Estimated body mass index is 28.94 kg/m² as calculated from the following:    Height as of this encounter: 175.3 cm (69\").    Weight as of this encounter: 88.9 kg (196 lb).           Does the patient have evidence of cognitive impairment? No    Lab Results   Component Value Date    TRIG 169 (H) 2023    HDL 56 2023     (H) 2023    VLDL 30 2023    HGBA1C 9.30 (H) 2023        HEALTH RISK ASSESSMENT    Smoking Status:  Social History     Tobacco Use   Smoking Status Former    Packs/day: 1.00    Years: 40.00    Pack years: 40.00    Types: Cigarettes    Quit date: 2023    Years since quittin.7    Passive exposure: Current   Smokeless Tobacco Never     Alcohol Consumption:  Social History     Substance and Sexual Activity   Alcohol Use Yes    Comment: rare     Fall Risk Screen:    STEADI Fall Risk Assessment was completed, and patient is at HIGH risk for falls. Assessment completed on:10/4/2023    Depression Screening:      10/4/2023     8:00 AM   PHQ-2/PHQ-9 Depression Screening   Little Interest or Pleasure in Doing Things 0-->not at all   Feeling Down, Depressed or Hopeless 0-->not at " all   PHQ-9: Brief Depression Severity Measure Score 0       Health Habits and Functional and Cognitive Screening:      10/4/2023     8:00 AM   Functional & Cognitive Status   Do you have difficulty preparing food and eating? No   Do you have difficulty bathing yourself, getting dressed or grooming yourself? No   Do you have difficulty using the toilet? No   Do you have difficulty moving around from place to place? No   Do you have trouble with steps or getting out of a bed or a chair? No   Do you need help using the phone?  No   Are you deaf or do you have serious difficulty hearing?  Yes   Do you need help to go to places out of walking distance? No   Do you need help shopping? No   Do you need help preparing meals?  No   Do you need help with housework?  No   Do you need help with laundry? No   Do you need help taking your medications? No   Do you need help managing money? No   Do you ever drive or ride in a car without wearing a seat belt? No   Have you felt unusual stress, anger or loneliness in the last month? No   Who do you live with? Other   If you need help, do you have trouble finding someone available to you? No   Have you been bothered in the last four weeks by sexual problems? No   Do you have difficulty concentrating, remembering or making decisions? No     ATTENTION  What is the year: correct  What is the month of the year: correct  What is the day of the week?: correct  What is the date?: correct  MEMORY  Repeat address three times, only score third attempt: [address], [place]: 6  HOW MANY ANIMALS DID THE PATIENT NAME  Verbal Fluency -- Animal Names (0-25): 17-21  CLOCK DRAWING  Clock Drawing: All Correct  MEMORY RECALL  Tell me what you remember about that name and address we were repeating at the beginnin  ACE TOTAL SCORE  Total ACE Score - <25/30 strongly suggests cognitive impairment; <21/30 almost certainly shows dementia: 27      Age-appropriate Screening  Schedule:  Refer to the list below for future screening recommendations based on patient's age, sex and/or medical conditions. Orders for these recommended tests are listed in the plan section. The patient has been provided with a written plan.    Health Maintenance   Topic Date Due    Hepatitis B (1 of 3 - 3-dose series) Never done    ZOSTER VACCINE (1 of 2) Never done    DIABETIC FOOT EXAM  Never done    PAP SMEAR  Never done    DIABETIC EYE EXAM  Never done    ANNUAL WELLNESS VISIT  03/05/2019    COLORECTAL CANCER SCREENING  10/01/2022    COVID-19 Vaccine (3 - 2023-24 season) 09/01/2023    Pneumococcal Vaccine 0-64 (2 - PCV) 04/04/2024 (Originally 9/21/2022)    HEMOGLOBIN A1C  03/27/2024    BMI FOLLOWUP  04/20/2024    LIPID PANEL  09/27/2024    URINE MICROALBUMIN  09/27/2024    MAMMOGRAM  10/17/2024    TDAP/TD VACCINES (2 - Td or Tdap) 05/07/2030    HEPATITIS C SCREENING  Completed    INFLUENZA VACCINE  Completed    LUNG CANCER SCREENING  Discontinued                  CMS Preventative Services Quick Reference  Risk Factors Identified During Encounter  Chronic Pain: Natural history and expected course discussed. Questions answered.  Immunizations Discussed/Encouraged: Influenza, Shingrix, and COVID19    The above risks/problems have been discussed with the patient.  Pertinent information has been shared with the patient in the After Visit Summary.  An After Visit Summary and PPPS were made available to the patient.    Follow Up:   Next Medicare Wellness visit to be scheduled in 1 year.       Additional E&M Note during same encounter follows:  Patient has multiple medical problems which are significant and separately identifiable that require additional work above and beyond the Medicare Wellness Visit.      Chief Complaint  Medicare Wellness-subsequent, Follow-up (6 month follow up DM/pain), Urinary Frequency (Trouble holding urine ), and Eye Pain (Treated for infected tear duct by eye dr and it is still bothering  her. Finished the antibiotic, but it is still painful. )    Subjective        HPI  Rahel Ramsey is also being seen today for:     Adenocarcinoma of the right lung, she is following with oncology, Dr. Prabhakar  October 2022 patient was found to have increasing right upper lobe nodule. She is follows with Dr. Easton.  She had a CT-guided biopsy of this nodule on 11/2/2022 and pathology revealed invasive well to moderately differentiated adenocarcinoma with focal lipidic features  11/23/2022: Patient had a PET CT scan which showed a 1.7 cm right upper lobe nodule not PET avid.  There is an 8 mm right upper lobe nodule also stable since 2020 likely benign.  There was no convincing evidence of metastatic disease elsewhere.  12/20/2022: Patient was seen by Dr. Kearney with plan for right upper lobectomy   1/9/2023 patient underwent right lobectomy with mediastinal lymph node dissection     Carotid stenosis, she is following with cardiology, carotid u/s 9/11/2023 B <50% stenosis.     Coronary artery disease/hyperlipidemia, 9/11/23 CT cardiac calcium score 481.3, echo completed but not read, had to reschedule stress test. She follows with Dr. Farah. She is taking fenofibrate did not start fish oil. The patient denies muscle aches, constipation, diarrhea, GI upset, fatigue, chest pain/pressure, exercise intolerance, dyspnea, palpitations, syncope and pedal edema.      She underwent cholecystectomy 4/10/2023 per Dr. Bansal    COPD: Stable, she follows with Dr. Easton, since right upper lobectomy 1/9/23, respiratory status has significantly improved, she is no longer wheezing, using Trelegy and albuterol as needed.  She quit smoking in January 2023.      Diabetes mellitus type II, reports she ate about 7 lbs of mints in the last few months.  She does not check her sugar at home.  She is on metformin 500mg daily and tolerating.  She does report occasional lightheadedness, denies any signs/symptoms of hyper/hypoglycemia, blurry  "vision, polydipsia, polyuria, nocturia, and unintentional weight loss     Chronic low back pain, stable on Lyrica, norco q6 hours, often forgets amitriptyline. Follows with pain mgmt Dr. Dixon.       Leukocytosis/polycythemia, chronic, had normalized, she continues following with hematology, had positive CHRISTOPHE, was referred to rheumatology and was seen on 9/6/2022 and referred back to oncology w/o need for treatment.      Multiple UTIs, today she complains of dysuria, frequency and urgency.  Has had multiple urinary tract infections since June 2022.     L eye infection, she was seen by ophthalmology, she reports having several infectious spots of skin resolved with keflex, but L inner lower eye lid has not resolved yet, s/s started 3 weeks ago.      Colonoscopy over due 2022, she is following with gastro and will plan this              Objective   Vital Signs:  /84 (BP Location: Left arm, Patient Position: Sitting, Cuff Size: Adult)   Pulse 78   Ht 175.3 cm (69\")   Wt 88.9 kg (196 lb)   SpO2 98%   BMI 28.94 kg/m²     Physical Exam  Constitutional:       General: She is not in acute distress.     Appearance: Normal appearance. She is well-developed. She is not ill-appearing or diaphoretic.   HENT:      Head: Normocephalic.   Eyes:      Conjunctiva/sclera: Conjunctivae normal.      Pupils: Pupils are equal, round, and reactive to light.      Comments: L lower lid erythemic papule   Neck:      Thyroid: No thyromegaly.      Vascular: No JVD.   Cardiovascular:      Rate and Rhythm: Normal rate and regular rhythm.      Heart sounds: Normal heart sounds. No murmur heard.  Pulmonary:      Effort: Pulmonary effort is normal. No respiratory distress.      Breath sounds: Normal breath sounds. No wheezing or rhonchi.   Abdominal:      General: Bowel sounds are normal. There is no distension.      Palpations: Abdomen is soft.      Tenderness: There is no abdominal tenderness.   Musculoskeletal:         General: " Tenderness (chronic lbp, mild garcia rom) present. No swelling. Normal range of motion.      Cervical back: Normal range of motion and neck supple. No tenderness.   Lymphadenopathy:      Cervical: No cervical adenopathy.   Skin:     General: Skin is warm and dry.      Coloration: Skin is not jaundiced.      Findings: No erythema or rash.   Neurological:      General: No focal deficit present.      Mental Status: She is alert and oriented to person, place, and time. Mental status is at baseline.      Sensory: No sensory deficit.      Motor: No weakness.      Gait: Gait abnormal (Uses cane for mobility).   Psychiatric:         Mood and Affect: Mood normal.         Behavior: Behavior normal.         Thought Content: Thought content normal.         Judgment: Judgment normal.                       Assessment and Plan   Diagnoses and all orders for this visit:    1. Medicare annual wellness visit, subsequent (Primary)  Comments:  Labs reviewed with patient  Mammogram ordered  Patient to call and schedule colonoscopy  Flu shot today  Recommend Shingrix at pharmacy    2. Need for influenza vaccination  -     Fluzone >6 Months ([id number])    3. Urinary frequency  Comments:  Urine dip positive nitrates, will send for UA/CS  Start Keflex  Orders:  -     POCT urinalysis dipstick, manual  -     Urine Culture - Urine, Urine, Clean Catch    4. Chronic midline low back pain with bilateral sciatica  Comments:  Stable, keep follow-up with pain mgmt.   New/refill Lyrica to 100 mg daily   can not rocio dulox or em d/t UTI.  Orders:  -     pregabalin (LYRICA) 100 MG capsule; Take 1 capsule by mouth 2 (Two) Times a Day.  Dispense: 60 capsule; Refill: 5    5. Essential hypertension  Comments:  BP stable    6. Mixed hyperlipidemia  Comments:  Lipids borderline elevated, Trigs improved  Continue fenofibrate    7. Type 2 diabetes mellitus with hyperglycemia, without long-term current use of insulin  Comments:  A1c worse at 9.3  increase  metformin to 500mg twice daily  stop eating candy, ok for sugar-free snacks/candy  check BG and notify if remains over >200  Orders:  -     metFORMIN (GLUCOPHAGE) 500 MG tablet; Take 1 tablet by mouth 2 (Two) Times a Day With Meals.  Dispense: 180 tablet; Refill: 1    8. Elevated LFTs  Comments:  Recommend start vitamin E daily  Orders:  -     vitamin E 400 UNIT capsule; Take 1 capsule by mouth Daily.  Dispense: 90 capsule; Refill: 3    9. Vitamin D deficiency  Comments:  Level in good range, continue weekly vitamin D  Orders:  -     vitamin D (ERGOCALCIFEROL) 1.25 MG (54150 UT) capsule capsule; Take 1 capsule by mouth Every 7 (Seven) Days.  Dispense: 12 capsule; Refill: 1    10. Chronic obstructive pulmonary disease, unspecified COPD type  Comments:  Continue with Dr. Rustam lewis to use Trelegy and albuterol seasonally/as needed    11. Adenocarcinoma of right lung  Comments:  no recurrence s/p RUL lobectomy  Follow-up with oncology as directed    12. Blepharitis of left lower eyelid, unspecified type  Comments:  Start Keflex and ofloxacin drops  Follow-up with ophthalmology if symptoms do not improve  Orders:  -     cephalexin (Keflex) 500 MG capsule; Take 1 capsule by mouth 3 (Three) Times a Day for 7 days.  Dispense: 21 capsule; Refill: 0  -     ofloxacin (Ocuflox) 0.3 % ophthalmic solution; Administer 1 drop into the left eye 4 (Four) Times a Day for 5 days.  Dispense: 5 mL; Refill: 0    13. Breast cancer screening by mammogram  -     Mammo Screening Digital Tomosynthesis Bilateral With CAD; Future    Age appropriate preventative counseling provided, including healthy lifestyle modifications and exercise         I spent 35 minutes caring for Rahel on this date of service. This time includes time spent by me in the following activities:preparing for the visit, reviewing tests, obtaining and/or reviewing a separately obtained history, performing a medically appropriate examination and/or evaluation , counseling and  educating the patient/family/caregiver, ordering medications, tests, or procedures, and documenting information in the medical record      Follow Up   Return in about 3 months (around 1/4/2024) for Recheck DM. DM panel prior to appt.  Patient was given instructions and counseling regarding her condition or for health maintenance advice. Please see specific information pulled into the AVS if appropriate.     EMR Dragon transcription disclaimer:  Part of this note may be an electronic transcription/translation of spoken language to printed text using the Dragon Dictation System.    Vickie Gomez, APRN

## 2023-10-06 LAB — BACTERIA SPEC AEROBE CULT: ABNORMAL

## 2023-10-16 ENCOUNTER — CLINICAL SUPPORT (OUTPATIENT)
Dept: FAMILY MEDICINE CLINIC | Facility: CLINIC | Age: 58
End: 2023-10-16
Payer: MEDICARE

## 2023-10-16 ENCOUNTER — TELEPHONE (OUTPATIENT)
Dept: FAMILY MEDICINE CLINIC | Facility: CLINIC | Age: 58
End: 2023-10-16

## 2023-10-16 DIAGNOSIS — R30.0 DYSURIA: ICD-10-CM

## 2023-10-16 DIAGNOSIS — R35.0 URINARY FREQUENCY: Primary | ICD-10-CM

## 2023-10-16 LAB
BILIRUB BLD-MCNC: NEGATIVE MG/DL
CLARITY, POC: CLEAR
COLOR UR: YELLOW
GLUCOSE UR STRIP-MCNC: ABNORMAL MG/DL
KETONES UR QL: NEGATIVE
LEUKOCYTE EST, POC: NEGATIVE
NITRITE UR-MCNC: NEGATIVE MG/ML
PH UR: 5 [PH] (ref 5–8)
PROT UR STRIP-MCNC: NEGATIVE MG/DL
RBC # UR STRIP: NEGATIVE /UL
SP GR UR: 1.03 (ref 1–1.03)
UROBILINOGEN UR QL: NORMAL

## 2023-10-16 PROCEDURE — 81002 URINALYSIS NONAUTO W/O SCOPE: CPT | Performed by: NURSE PRACTITIONER

## 2023-10-16 NOTE — TELEPHONE ENCOUNTER
Pt notified and expressed understanding. He did increase the metformin and stopped the candy. She has not checked her sugars in a few days. She said she will start doing that and let us know how they are running.

## 2023-10-16 NOTE — TELEPHONE ENCOUNTER
Patient came in to do a repeat urine because she finished antibiotic and still having urinary frequency issues. She said things improved, but seemed to come back over the weekend. I sent you a separate message with the results. Please advise.

## 2023-10-16 NOTE — PROGRESS NOTES
Patient came in today to leave a urine sample because she continues to have UTI symptoms. Urine sample was collected and results sent to provider for review.    FELA Jeffries

## 2023-10-19 ENCOUNTER — HOSPITAL ENCOUNTER (OUTPATIENT)
Dept: MAMMOGRAPHY | Facility: HOSPITAL | Age: 58
Discharge: HOME | End: 2023-10-19
Admitting: NURSE PRACTITIONER
Payer: MEDICARE

## 2023-10-19 DIAGNOSIS — Z12.31 BREAST CANCER SCREENING BY MAMMOGRAM: ICD-10-CM

## 2023-10-19 PROCEDURE — 77067 SCR MAMMO BI INCL CAD: CPT

## 2023-10-19 PROCEDURE — 77063 BREAST TOMOSYNTHESIS BI: CPT

## 2023-10-23 ENCOUNTER — HOSPITAL ENCOUNTER (OUTPATIENT)
Dept: NUCLEAR MEDICINE | Facility: HOSPITAL | Age: 58
Discharge: HOME | End: 2023-10-23
Payer: MEDICARE

## 2023-10-23 PROCEDURE — A9502 TC99M TETROFOSMIN: HCPCS | Performed by: INTERNAL MEDICINE

## 2023-10-23 PROCEDURE — 93018 CV STRESS TEST I&R ONLY: CPT | Performed by: INTERNAL MEDICINE

## 2023-10-23 PROCEDURE — 93017 CV STRESS TEST TRACING ONLY: CPT

## 2023-10-23 PROCEDURE — 0 TECHNETIUM TETROFOSMIN KIT: Performed by: INTERNAL MEDICINE

## 2023-10-23 PROCEDURE — 78452 HT MUSCLE IMAGE SPECT MULT: CPT | Performed by: INTERNAL MEDICINE

## 2023-10-23 PROCEDURE — 78452 HT MUSCLE IMAGE SPECT MULT: CPT

## 2023-10-23 PROCEDURE — 93016 CV STRESS TEST SUPVJ ONLY: CPT | Performed by: INTERNAL MEDICINE

## 2023-10-23 PROCEDURE — 25010000002 REGADENOSON 0.4 MG/5ML SOLUTION: Performed by: INTERNAL MEDICINE

## 2023-10-23 RX ORDER — REGADENOSON 0.08 MG/ML
0.4 INJECTION, SOLUTION INTRAVENOUS
Status: COMPLETED | OUTPATIENT
Start: 2023-10-23 | End: 2023-10-23

## 2023-10-23 RX ADMIN — REGADENOSON 0.4 MG: 0.08 INJECTION, SOLUTION INTRAVENOUS at 09:46

## 2023-10-23 RX ADMIN — TETROFOSMIN 1 DOSE: 1.38 INJECTION, POWDER, LYOPHILIZED, FOR SOLUTION INTRAVENOUS at 09:46

## 2023-10-23 RX ADMIN — TETROFOSMIN 1 DOSE: 1.38 INJECTION, POWDER, LYOPHILIZED, FOR SOLUTION INTRAVENOUS at 08:47

## 2023-10-24 LAB
BH CV REST NUCLEAR ISOTOPE DOSE: 11 MCI
BH CV STRESS BP STAGE 1: NORMAL
BH CV STRESS BP STAGE 2: NORMAL
BH CV STRESS BP STAGE 3: NORMAL
BH CV STRESS COMMENTS STAGE 1: NORMAL
BH CV STRESS COMMENTS STAGE 2: NORMAL
BH CV STRESS DOSE REGADENOSON STAGE 1: 0.4
BH CV STRESS DURATION MIN STAGE 1: 0
BH CV STRESS DURATION MIN STAGE 2: 4
BH CV STRESS DURATION SEC STAGE 1: 10
BH CV STRESS DURATION SEC STAGE 2: 0
BH CV STRESS HR STAGE 1: 80
BH CV STRESS HR STAGE 2: 88
BH CV STRESS HR STAGE 3: 82
BH CV STRESS NUCLEAR ISOTOPE DOSE: 33 MCI
BH CV STRESS PROTOCOL 1: NORMAL
BH CV STRESS RECOVERY BP: NORMAL MMHG
BH CV STRESS RECOVERY HR: 80 BPM
BH CV STRESS STAGE 1: 1
BH CV STRESS STAGE 2: 2
BH CV STRESS STAGE 3: 3
LV EF NUC BP: 71 %
MAXIMAL PREDICTED HEART RATE: 161 BPM
PERCENT MAX PREDICTED HR: 54.66 %
STRESS BASELINE BP: NORMAL MMHG
STRESS BASELINE HR: 64 BPM
STRESS PERCENT HR: 64 %
STRESS POST PEAK BP: NORMAL MMHG
STRESS POST PEAK HR: 88 BPM
STRESS TARGET HR: 137 BPM

## 2023-11-02 ENCOUNTER — OFFICE VISIT (OUTPATIENT)
Dept: FAMILY MEDICINE CLINIC | Facility: CLINIC | Age: 58
End: 2023-11-02
Payer: MEDICARE

## 2023-11-02 VITALS
BODY MASS INDEX: 29.18 KG/M2 | WEIGHT: 197 LBS | HEIGHT: 69 IN | DIASTOLIC BLOOD PRESSURE: 82 MMHG | SYSTOLIC BLOOD PRESSURE: 140 MMHG | OXYGEN SATURATION: 99 % | HEART RATE: 69 BPM

## 2023-11-02 DIAGNOSIS — N89.8 VAGINAL LESION: ICD-10-CM

## 2023-11-02 DIAGNOSIS — N39.0 RECURRENT UTI (URINARY TRACT INFECTION): Primary | ICD-10-CM

## 2023-11-02 DIAGNOSIS — N90.89 VULVAR MASS: ICD-10-CM

## 2023-11-02 DIAGNOSIS — R10.2 VAGINAL PAIN: ICD-10-CM

## 2023-11-02 DIAGNOSIS — N95.2 VAGINAL ATROPHY: ICD-10-CM

## 2023-11-02 LAB
BILIRUB BLD-MCNC: NEGATIVE MG/DL
C TRACH RRNA CVX QL NAA+PROBE: NOT DETECTED
CLARITY, POC: CLEAR
COLOR UR: YELLOW
GLUCOSE UR STRIP-MCNC: NEGATIVE MG/DL
HOLD SPECIMEN: NORMAL
KETONES UR QL: ABNORMAL
LEUKOCYTE EST, POC: ABNORMAL
N GONORRHOEA RRNA SPEC QL NAA+PROBE: NOT DETECTED
NITRITE UR-MCNC: NEGATIVE MG/ML
PH UR: 5 [PH] (ref 5–8)
PROT UR STRIP-MCNC: NEGATIVE MG/DL
RBC # UR STRIP: ABNORMAL /UL
SP GR UR: 1.03 (ref 1–1.03)
UROBILINOGEN UR QL: NORMAL

## 2023-11-02 PROCEDURE — 87491 CHLMYD TRACH DNA AMP PROBE: CPT | Performed by: NURSE PRACTITIONER

## 2023-11-02 PROCEDURE — 87086 URINE CULTURE/COLONY COUNT: CPT | Performed by: NURSE PRACTITIONER

## 2023-11-02 PROCEDURE — 86696 HERPES SIMPLEX TYPE 2 TEST: CPT | Performed by: NURSE PRACTITIONER

## 2023-11-02 PROCEDURE — 86695 HERPES SIMPLEX TYPE 1 TEST: CPT | Performed by: NURSE PRACTITIONER

## 2023-11-02 PROCEDURE — 87077 CULTURE AEROBIC IDENTIFY: CPT | Performed by: NURSE PRACTITIONER

## 2023-11-02 PROCEDURE — 81001 URINALYSIS AUTO W/SCOPE: CPT | Performed by: NURSE PRACTITIONER

## 2023-11-02 PROCEDURE — 87186 SC STD MICRODIL/AGAR DIL: CPT | Performed by: NURSE PRACTITIONER

## 2023-11-02 PROCEDURE — 87591 N.GONORRHOEAE DNA AMP PROB: CPT | Performed by: NURSE PRACTITIONER

## 2023-11-02 PROCEDURE — 87661 TRICHOMONAS VAGINALIS AMPLIF: CPT | Performed by: NURSE PRACTITIONER

## 2023-11-02 RX ORDER — VALACYCLOVIR HYDROCHLORIDE 1 G/1
1000 TABLET, FILM COATED ORAL 2 TIMES DAILY
Qty: 14 TABLET | Refills: 0 | Status: SHIPPED | OUTPATIENT
Start: 2023-11-02 | End: 2023-11-09

## 2023-11-02 RX ORDER — FLUCONAZOLE 150 MG/1
150 TABLET ORAL
Qty: 3 TABLET | Refills: 0 | Status: SHIPPED | OUTPATIENT
Start: 2023-11-02

## 2023-11-02 RX ORDER — CIPROFLOXACIN 250 MG/1
250 TABLET, FILM COATED ORAL 2 TIMES DAILY
Qty: 14 TABLET | Refills: 0 | Status: SHIPPED | OUTPATIENT
Start: 2023-11-02 | End: 2023-11-09

## 2023-11-02 NOTE — PROGRESS NOTES
Venipuncture Blood Specimen Collection  Venipuncture performed in the right arm by Gaviota Tan MA with good hemostasis. Patient tolerated the procedure well without complications.   11/02/23   Gaviota Tan MA

## 2023-11-02 NOTE — PROGRESS NOTES
Chief Complaint  Chief Complaint   Patient presents with    Dysuria     Pain after urination/having some pain when walking around/seeing some blood as well    Abdominal Pain     Lower abdominal pain and lower back pain            Subjective          [place] Ramsey presents to [facility] for   History of Present Illness      Patient presents for same-day visit due to dysuria and lower abdominal and low back pain.  She has had x3 UTIs in the last year.  She reports symptoms started 2 days ago with painful urination, yesterday had blood in her underwear, unsure as to where it is coming from, she also reports vaginal discomfort, feels like she is sitting on something.  She is unsure if there are any open sores and denies itching.  She is not sexually active, denies any history of STDs. She has started trying to drink more water, has stopped drinking sodas and is drinking sugar free crystal light in water and sugar-free candy      The following portions of the patient's history were reviewed and updated as appropriate: allergies, current medications, past family history, past medical history, past social history, past surgical history and problem list.    Past Medical History:   Diagnosis Date    Allergic     Arthritis     Atrial fibrillation     Cancer 01/09/2023    COPD (chronic obstructive pulmonary disease)     Diabetes mellitus     Frequent UTI     Headache     Heartburn     History of herniated intervertebral disc     History of skin cancer     Hyperlipidemia     Hypertension     Injury of back     Leukocytosis     Low back pain     Lung nodule     Nausea      Past Surgical History:   Procedure Laterality Date    BACK SURGERY      lumbar    CARDIAC CATHETERIZATION      CHOLECYSTECTOMY N/A 4/10/2023    Procedure: CHOLECYSTECTOMY LAPAROSCOPIC;  Surgeon: Mich Bansal MD;  Location: [facility] MAIN OR;  Service: General;  Laterality: N/A;    COLONOSCOPY      DILATATION AND CURETTAGE       ENDOSCOPY N/A 3/30/2023    Procedure: ESOPHAGOGASTRODUODENOSCOPY WITH STENT REMOVAL;  Surgeon: Naman Blackmon MD;  Location: [facility] ENDOSCOPY;  Service: Gastroenterology;  Laterality: N/A;  normal stent removal    ERCP N/A 2023    Procedure: ENDOSCOPIC RETROGRADE CHOLANGIOPANCREATOGRAPHY with sphicterotomy and balloon guided sweeping of bile duct up to 9mm and placement of 10Fr x 9cm biliary stent;  Surgeon: Naman Blackmon MD;  Location: [facility] ENDOSCOPY;  Service: Gastroenterology;  Laterality: N/A;  post: choledocholithiasis    LOBECTOMY Right 2023    Procedure: THORASCOPIC RIGHT UPPER LOBECTOMY WITH DAVINCI ROBOT, MEDIASTINAL LYMPH NODE DISSECTION;  Surgeon: Erwin Kearney MD;  Location: [facility] MAIN OR;  Service: Robotics - DaVinci;  Laterality: Right;    LUMBAR DECOMPRESSION      L4-L5     LUNG BIOPSY Right 2022    SKIN CANCER EXCISION Left     Shin    SUBTOTAL HYSTERECTOMY       Family History   Problem Relation Age of Onset    Breast cancer Mother     Lung cancer Father     Lung cancer Other     Colon cancer Other     Skin cancer Other      Social History     Tobacco Use    Smoking status: Former     Packs/day: 1.00     Years: 40.00     Additional pack years: 0.00     Total pack years: 40.00     Types: Cigarettes     Quit date: 2023     Years since quittin.8     Passive exposure: Current    Smokeless tobacco: Never   Substance Use Topics    Alcohol use: Yes     Comment: rare       Current Outpatient Medications:     albuterol sulfate  (90 Base) MCG/ACT inhaler, INHALE 2 PUFFS BY MOUTH EVERY 4 HOURS AS NEEDED FOR WHEEZING OR SHORTNESS OF AIR, Disp: 18 g, Rfl: 5    aspirin (aspirin) 81 MG EC tablet, Take 1 tablet by mouth Daily. LD: 4/5- plans to hold, Disp: , Rfl:     fenofibrate (TRICOR) 145 MG tablet, Take 1 tablet by mouth Daily., Disp: , Rfl:     fluticasone (FLONASE) 50 MCG/ACT nasal spray, SHAKE LIQUID AND USE 2 SPRAYS IN EACH NOSTRIL EVERY DAY (Patient  "taking differently: 1 spray by Each Nare route Daily As Needed.), Disp: 48 g, Rfl: 3    Fluticasone-Umeclidin-Vilant (TRELEGY) 100-62.5-25 MCG/ACT inhaler, Inhale 1 puff Every Night., Disp: , Rfl:     HYDROcodone-acetaminophen (NORCO)  MG per tablet, Take 1 tablet by mouth Every 6 (Six) Hours As Needed for Moderate Pain. May take dos if needed, Disp: , Rfl:     metFORMIN (GLUCOPHAGE) 500 MG tablet, Take 1 tablet by mouth 2 (Two) Times a Day With Meals., Disp: 180 tablet, Rfl: 1    metoprolol succinate XL (TOPROL-XL) 25 MG 24 hr tablet, Take 1 tablet by mouth Daily., Disp: , Rfl:     nystatin (MYCOSTATIN) 288916 UNIT/GM cream, Apply 1 application  topically to the appropriate area as directed As Needed. None dos, Disp: , Rfl:     ondansetron (Zofran) 4 MG tablet, Take 1 tablet by mouth Every 8 (Eight) Hours As Needed for Nausea or Vomiting., Disp: 30 tablet, Rfl: 0    pregabalin (LYRICA) 100 MG capsule, Take 1 capsule by mouth 2 (Two) Times a Day., Disp: 60 capsule, Rfl: 5    vitamin D (ERGOCALCIFEROL) 1.25 MG (91785 UT) capsule capsule, Take 1 capsule by mouth Every 7 (Seven) Days., Disp: 12 capsule, Rfl: 1    vitamin E 400 UNIT capsule, Take 1 capsule by mouth Daily., Disp: 90 capsule, Rfl: 3    ciprofloxacin (Cipro) 250 MG tablet, Take 1 tablet by mouth 2 (Two) Times a Day for 7 days., Disp: 14 tablet, Rfl: 0    fluconazole (Diflucan) 150 MG tablet, Take 1 tablet by mouth Every 3 (Three) Days., Disp: 3 tablet, Rfl: 0    valACYclovir (Valtrex) 1000 MG tablet, Take 1 tablet by mouth 2 (Two) Times a Day for 7 days., Disp: 14 tablet, Rfl: 0    Objective   Vital Signs:   /82 (BP Location: Left arm, Patient Position: Sitting, Cuff Size: Adult)   Pulse 69   Ht 175.3 cm (69\")   Wt 89.4 kg (197 lb)   SpO2 99%   BMI 29.09 kg/m²           Physical Exam  Constitutional:       General: She is not in acute distress.     Appearance: She is well-developed. She is ill-appearing.   HENT:      Head: Normocephalic. "   Eyes:      Pupils: Pupils are equal, round, and reactive to light.   Neck:      Thyroid: No thyromegaly.   Pulmonary:      Effort: Pulmonary effort is normal.   Genitourinary:     Labia:         Right: No rash or lesion.         Left: No rash or lesion.       Vagina: Normal. No vaginal discharge.      Adnexa:         Right: No mass or tenderness.          Left: No mass or tenderness.        Rectum: Normal.      Comments: Vaginal exam reveals vaginal atrophy, erythema, thick white discharge, R labial excoriation vs herpetic lesion, left sided vulvar mass, severe tender to bimanual exam  Musculoskeletal:         General: Normal range of motion.      Cervical back: Normal range of motion.   Skin:     General: Skin is warm and dry.   Neurological:      Mental Status: She is alert and oriented to person, place, and time.   Psychiatric:         Behavior: Behavior normal.         Thought Content: Thought content normal.         Judgment: Judgment normal.          Result Review :     Office Visit on 11/02/2023   Component Date Value Ref Range Status    Color 11/02/2023 Yellow  Yellow, Straw, Dark Yellow, Kim Final    Clarity, UA 11/02/2023 Clear  Clear Final    Glucose, UA 11/02/2023 Negative  Negative mg/dL Final    Bilirubin 11/02/2023 Negative  Negative Final    Ketones, UA 11/02/2023 Trace (A)  Negative Final    Specific Gravity  11/02/2023 1.030  1.005 - 1.030 Final    Blood, UA 11/02/2023 Trace (A)  Negative Final    pH, Urine 11/02/2023 5.0  5.0 - 8.0 Final    Protein, POC 11/02/2023 Negative  Negative mg/dL Final    Urobilinogen, UA 11/02/2023 Normal  Normal, 0.2 E.U./dL Final    Leukocytes 11/02/2023 Trace (A)  Negative Final    Nitrite, UA 11/02/2023 Negative  Negative Final                              Assessment and Plan    Diagnoses and all orders for this visit:    1. Recurrent UTI (urinary tract infection) (Primary)  -     POCT urinalysis dipstick, manual  -     HSV 1 and 2-Specific Ab, IgG  -      Urinalysis With Culture If Indicated - Urine, Clean Catch  -     Genital Culture - Swab, Vagina  -     CT/NG, TV, PCR - Urine, Urine, Clean Catch  -     Ambulatory Referral to Gynecology    2. Vulvar mass  -     HSV 1 and 2-Specific Ab, IgG  -     Urinalysis With Culture If Indicated - Urine, Clean Catch  -     Genital Culture - Swab, Vagina  -     CT/NG, TV, PCR - Urine, Urine, Clean Catch  -     Ambulatory Referral to Gynecology    3. Vaginal pain  -     HSV 1 and 2-Specific Ab, IgG  -     Urinalysis With Culture If Indicated - Urine, Clean Catch  -     Genital Culture - Swab, Vagina  -     CT/NG, TV, PCR - Urine, Urine, Clean Catch  -     Ambulatory Referral to Gynecology    4. Vaginal atrophy  -     HSV 1 and 2-Specific Ab, IgG  -     Urinalysis With Culture If Indicated - Urine, Clean Catch  -     Genital Culture - Swab, Vagina  -     CT/NG, TV, PCR - Urine, Urine, Clean Catch  -     Ambulatory Referral to Gynecology    5. Vaginal lesion  -     HSV 1 and 2-Specific Ab, IgG  -     Urinalysis With Culture If Indicated - Urine, Clean Catch  -     Genital Culture - Swab, Vagina  -     CT/NG, TV, PCR - Urine, Urine, Clean Catch  -     Ambulatory Referral to Gynecology    Other orders  -     ciprofloxacin (Cipro) 250 MG tablet; Take 1 tablet by mouth 2 (Two) Times a Day for 7 days.  Dispense: 14 tablet; Refill: 0  -     valACYclovir (Valtrex) 1000 MG tablet; Take 1 tablet by mouth 2 (Two) Times a Day for 7 days.  Dispense: 14 tablet; Refill: 0  -     fluconazole (Diflucan) 150 MG tablet; Take 1 tablet by mouth Every 3 (Three) Days.  Dispense: 3 tablet; Refill: 0      We will treat UTI with Cipro, send for ua/cs  Questionable herpetic lesions, start Valtrex BID  HSV labs today, if negative will DC Valtrex  Start Diflucan  Genital culture and urine for CGT  Referral to GYN for vulvar lesion/mass  Consider topical vaginal estrogen    I spent 30 minutes caring for Rahel Ramsey on this date of service. This time  includes time spent by me in the following activities: preparing for the visit, reviewing tests, performing a medically appropriate examination and/or evaluation , counseling and educating the patient/family/caregiver, ordering medications, tests, or procedures and documenting information in the medical record        Follow Up     Return if symptoms worsen or fail to improve, for Next scheduled follow up.  Patient was given instructions and counseling regarding her condition or for health maintenance advice. Please see specific information pulled into the AVS if appropriate.        Part of this note may be an electronic transcription/translation of spoken language to printed text using the Dragon Dictation System

## 2023-11-03 LAB
AMORPH URATE CRY URNS QL MICRO: NORMAL /HPF
BACTERIA UR QL AUTO: NORMAL /HPF
BILIRUB UR QL STRIP: NEGATIVE
CLARITY UR: ABNORMAL
COLOR UR: YELLOW
GLUCOSE UR STRIP-MCNC: NEGATIVE MG/DL
HGB UR QL STRIP.AUTO: NEGATIVE
HYALINE CASTS UR QL AUTO: NORMAL /LPF
KETONES UR QL STRIP: NEGATIVE
LEUKOCYTE ESTERASE UR QL STRIP.AUTO: ABNORMAL
NITRITE UR QL STRIP: NEGATIVE
PH UR STRIP.AUTO: 5.5 [PH] (ref 5–8)
PROT UR QL STRIP: NEGATIVE
RBC # UR STRIP: NORMAL /HPF
REF LAB TEST METHOD: NORMAL
SP GR UR STRIP: 1.02 (ref 1–1.03)
SQUAMOUS #/AREA URNS HPF: NORMAL /HPF
UROBILINOGEN UR QL STRIP: ABNORMAL
WBC # UR STRIP: NORMAL /HPF

## 2023-11-04 LAB
HSV1 IGG SER IA-ACNC: 44.3 INDEX (ref 0–0.9)
HSV2 IGG SER IA-ACNC: <0.91 INDEX (ref 0–0.9)

## 2023-11-05 LAB — BACTERIA SPEC AEROBE CULT: ABNORMAL

## 2023-11-07 LAB — T VAGINALIS RRNA SPEC QL NAA+PROBE: NEGATIVE

## 2023-12-20 DIAGNOSIS — E11.65 TYPE 2 DIABETES MELLITUS WITH HYPERGLYCEMIA, WITHOUT LONG-TERM CURRENT USE OF INSULIN: ICD-10-CM

## 2023-12-20 DIAGNOSIS — I10 ESSENTIAL HYPERTENSION: Primary | ICD-10-CM

## 2023-12-27 ENCOUNTER — CLINICAL SUPPORT (OUTPATIENT)
Dept: FAMILY MEDICINE CLINIC | Facility: CLINIC | Age: 58
End: 2023-12-27
Payer: MEDICARE

## 2023-12-27 DIAGNOSIS — E78.2 MIXED HYPERLIPIDEMIA: Primary | ICD-10-CM

## 2023-12-27 PROCEDURE — 80061 LIPID PANEL: CPT | Performed by: NURSE PRACTITIONER

## 2023-12-27 PROCEDURE — 80053 COMPREHEN METABOLIC PANEL: CPT | Performed by: NURSE PRACTITIONER

## 2023-12-27 PROCEDURE — 83036 HEMOGLOBIN GLYCOSYLATED A1C: CPT | Performed by: NURSE PRACTITIONER

## 2023-12-27 PROCEDURE — 85027 COMPLETE CBC AUTOMATED: CPT | Performed by: NURSE PRACTITIONER

## 2023-12-27 PROCEDURE — 36415 COLL VENOUS BLD VENIPUNCTURE: CPT | Performed by: NURSE PRACTITIONER

## 2023-12-27 RX ORDER — FENOFIBRATE 145 MG/1
TABLET, COATED ORAL DAILY
Qty: 90 TABLET | Refills: 0 | Status: SHIPPED | OUTPATIENT
Start: 2023-12-27

## 2023-12-27 NOTE — PROGRESS NOTES
Venipuncture Blood Specimen Collection  Venipuncture performed in [place] by Sharmila Rivers MA with good hemostasis. Patient tolerated the procedure well without complications.   12/27/23   Sharmila Rivers MA

## 2023-12-28 LAB
ALBUMIN SERPL-MCNC: 3.9 G/DL (ref 3.5–5.2)
ALBUMIN/GLOB SERPL: 1.3 G/DL
ALP SERPL-CCNC: 131 U/L (ref 39–117)
ALT SERPL W P-5'-P-CCNC: 90 U/L (ref 1–33)
ANION GAP SERPL CALCULATED.3IONS-SCNC: 10.4 MMOL/L (ref 5–15)
AST SERPL-CCNC: 93 U/L (ref 1–32)
BILIRUB SERPL-MCNC: 0.5 MG/DL (ref 0–1.2)
BUN SERPL-MCNC: 13 MG/DL (ref 6–20)
BUN/CREAT SERPL: 19.7 (ref 7–25)
CALCIUM SPEC-SCNC: 9.2 MG/DL (ref 8.6–10.5)
CHLORIDE SERPL-SCNC: 103 MMOL/L (ref 98–107)
CHOLEST SERPL-MCNC: 141 MG/DL (ref 0–200)
CO2 SERPL-SCNC: 24.6 MMOL/L (ref 22–29)
CREAT SERPL-MCNC: 0.66 MG/DL (ref 0.57–1)
DEPRECATED RDW RBC AUTO: 42.3 FL (ref 37–54)
EGFRCR SERPLBLD CKD-EPI 2021: 101.2 ML/MIN/1.73
ERYTHROCYTE [DISTWIDTH] IN BLOOD BY AUTOMATED COUNT: 12.7 % (ref 12.3–15.4)
GLOBULIN UR ELPH-MCNC: 3 GM/DL
GLUCOSE SERPL-MCNC: 264 MG/DL (ref 65–99)
HBA1C MFR BLD: 10.8 % (ref 4.8–5.6)
HCT VFR BLD AUTO: 39.7 % (ref 34–46.6)
HDLC SERPL-MCNC: 26 MG/DL (ref 40–60)
HGB BLD-MCNC: 13.2 G/DL (ref 12–15.9)
LDLC SERPL CALC-MCNC: 67 MG/DL (ref 0–100)
LDLC/HDLC SERPL: 2.14 {RATIO}
MCH RBC QN AUTO: 30.2 PG (ref 26.6–33)
MCHC RBC AUTO-ENTMCNC: 33.2 G/DL (ref 31.5–35.7)
MCV RBC AUTO: 90.8 FL (ref 79–97)
PLATELET # BLD AUTO: 188 10*3/MM3 (ref 140–450)
PMV BLD AUTO: 11.1 FL (ref 6–12)
POTASSIUM SERPL-SCNC: 3.8 MMOL/L (ref 3.5–5.2)
PROT SERPL-MCNC: 6.9 G/DL (ref 6–8.5)
RBC # BLD AUTO: 4.37 10*6/MM3 (ref 3.77–5.28)
SODIUM SERPL-SCNC: 138 MMOL/L (ref 136–145)
TRIGL SERPL-MCNC: 297 MG/DL (ref 0–150)
VLDLC SERPL-MCNC: 48 MG/DL (ref 5–40)
WBC NRBC COR # BLD AUTO: 7.15 10*3/MM3 (ref 3.4–10.8)

## 2024-01-04 ENCOUNTER — OFFICE VISIT (OUTPATIENT)
Dept: FAMILY MEDICINE CLINIC | Facility: CLINIC | Age: 59
End: 2024-01-04
Payer: MEDICARE

## 2024-01-04 VITALS
WEIGHT: 191.4 LBS | HEIGHT: 69 IN | SYSTOLIC BLOOD PRESSURE: 114 MMHG | HEART RATE: 81 BPM | TEMPERATURE: 97.7 F | OXYGEN SATURATION: 98 % | RESPIRATION RATE: 18 BRPM | BODY MASS INDEX: 28.35 KG/M2 | DIASTOLIC BLOOD PRESSURE: 74 MMHG

## 2024-01-04 DIAGNOSIS — J44.9 CHRONIC OBSTRUCTIVE PULMONARY DISEASE, UNSPECIFIED COPD TYPE: ICD-10-CM

## 2024-01-04 DIAGNOSIS — E11.65 TYPE 2 DIABETES MELLITUS WITH HYPERGLYCEMIA, WITHOUT LONG-TERM CURRENT USE OF INSULIN: Primary | ICD-10-CM

## 2024-01-04 DIAGNOSIS — M54.41 CHRONIC MIDLINE LOW BACK PAIN WITH BILATERAL SCIATICA: ICD-10-CM

## 2024-01-04 DIAGNOSIS — E55.9 VITAMIN D DEFICIENCY: ICD-10-CM

## 2024-01-04 DIAGNOSIS — N39.0 RECURRENT UTI (URINARY TRACT INFECTION): ICD-10-CM

## 2024-01-04 DIAGNOSIS — I10 ESSENTIAL HYPERTENSION: ICD-10-CM

## 2024-01-04 DIAGNOSIS — C34.91 ADENOCARCINOMA OF RIGHT LUNG: ICD-10-CM

## 2024-01-04 DIAGNOSIS — R79.89 ELEVATED LFTS: ICD-10-CM

## 2024-01-04 DIAGNOSIS — E78.2 MIXED HYPERLIPIDEMIA: ICD-10-CM

## 2024-01-04 DIAGNOSIS — M54.42 CHRONIC MIDLINE LOW BACK PAIN WITH BILATERAL SCIATICA: ICD-10-CM

## 2024-01-04 DIAGNOSIS — N90.89 VULVAR MASS: ICD-10-CM

## 2024-01-04 DIAGNOSIS — G89.29 CHRONIC MIDLINE LOW BACK PAIN WITH BILATERAL SCIATICA: ICD-10-CM

## 2024-01-04 LAB
BILIRUB BLD-MCNC: ABNORMAL MG/DL
CLARITY, POC: CLEAR
COLOR UR: YELLOW
GLUCOSE UR STRIP-MCNC: ABNORMAL MG/DL
KETONES UR QL: NEGATIVE
LEUKOCYTE EST, POC: NEGATIVE
NITRITE UR-MCNC: NEGATIVE MG/ML
PH UR: 5 [PH] (ref 5–8)
PROT UR STRIP-MCNC: ABNORMAL MG/DL
RBC # UR STRIP: NEGATIVE /UL
SP GR UR: 1.03 (ref 1–1.03)
UROBILINOGEN UR QL: NORMAL

## 2024-01-04 PROCEDURE — 3078F DIAST BP <80 MM HG: CPT | Performed by: NURSE PRACTITIONER

## 2024-01-04 PROCEDURE — 3074F SYST BP LT 130 MM HG: CPT | Performed by: NURSE PRACTITIONER

## 2024-01-04 PROCEDURE — 81002 URINALYSIS NONAUTO W/O SCOPE: CPT | Performed by: NURSE PRACTITIONER

## 2024-01-04 PROCEDURE — 1159F MED LIST DOCD IN RCRD: CPT | Performed by: NURSE PRACTITIONER

## 2024-01-04 PROCEDURE — 99214 OFFICE O/P EST MOD 30 MIN: CPT | Performed by: NURSE PRACTITIONER

## 2024-01-04 PROCEDURE — 1160F RVW MEDS BY RX/DR IN RCRD: CPT | Performed by: NURSE PRACTITIONER

## 2024-01-04 RX ORDER — CALCIUM CITRATE/VITAMIN D3 200MG-6.25
TABLET ORAL
Qty: 100 EACH | Refills: 3 | Status: SHIPPED | OUTPATIENT
Start: 2024-01-04

## 2024-01-04 RX ORDER — LANCETS 28 GAUGE
EACH MISCELLANEOUS
Qty: 100 EACH | Refills: 3 | Status: SHIPPED | OUTPATIENT
Start: 2024-01-04

## 2024-01-04 NOTE — PROGRESS NOTES
Chief Complaint  Chief Complaint   Patient presents with    Follow-up     3m FU , DM. Rx Request - glucometer & supplies.           Subjective          Rahel Ramsey presents to [facility] for   History of Present Illness    Diabetes mellitus type II, last hemoglobin A1c 9.3, she is on metformin 500 mg twice daily, diet is poor.  She did stop eating hard candy, now eating sugar-free candy 1 Pepsi per day.  He missed several days of metformin due to acute illness. Denies any signs/symptoms of hyper/hypoglycemia, blurry vision, polydipsia, polyuria, nocturia, and unintentional weight loss    Lung cancer, following with Dr. Prabhakar, on 1/9/2023 patient underwent right lobectomy with mediastinal lymph node dissection per Dr. Kearney.  CT scan of the chest 6/2/2023 showed no relapse of disease, there was a soft tissue shadow at the tail of the pancreas, she underwent CT of the abdomen which showed improvement, thought to be pseudocyst.  Repeat CT of the chest due and ordered for later this month.    COPD, she follows with Dr. Easton, symptoms include non-productive cough, especially with exertion.   Symptoms have been gradually improving since stopping smoking.  The pt is using Trelegy inhaler as directed and albuterol as needed.    HTN, stable on meds and takes as directed, denies chest pain, headache, shortness of air, palpitations and swelling of extremities.    Elevated LFTs, patient is taking vitamin E daily, follows with gastroenterology, CT abdomen showed diffuse steatosis    Hyperlipidemia, The patient denies muscle aches, constipation, diarrhea, GI upset, fatigue, chest pain/pressure, exercise intolerance, dyspnea, palpitations, syncope and pedal edema.      Vitamin D deficiency, on weekly vitamin D    Chronic low back pain, patient on Lyrica    Patient was seen in November for recurrent UTI, postmenopausal bleeding and vulvar mass, she was referred to gynecology, has not yet made  appointment.  She does report having another episode of bleeding.  She reports dysuria and foul-smelling urine today.    Here to review labs        The following portions of the patient's history were reviewed and updated as appropriate: allergies, current medications, past family history, past medical history, past social history, past surgical history and problem list.    Past Medical History:   Diagnosis Date    Allergic     Arthritis     Atrial fibrillation     Cancer 01/09/2023    COPD (chronic obstructive pulmonary disease)     Diabetes mellitus     Frequent UTI     Headache     Heartburn     History of herniated intervertebral disc     History of skin cancer     Hyperlipidemia     Hypertension     Injury of back     Leukocytosis     Low back pain     Lung nodule     Nausea      Past Surgical History:   Procedure Laterality Date    BACK SURGERY      lumbar    CARDIAC CATHETERIZATION      CHOLECYSTECTOMY N/A 4/10/2023    Procedure: CHOLECYSTECTOMY LAPAROSCOPIC;  Surgeon: Mich Bansal MD;  Location: [facility] MAIN OR;  Service: General;  Laterality: N/A;    COLONOSCOPY      DILATATION AND CURETTAGE      ENDOSCOPY N/A 3/30/2023    Procedure: ESOPHAGOGASTRODUODENOSCOPY WITH STENT REMOVAL;  Surgeon: Naman Blackmon MD;  Location: [facility] ENDOSCOPY;  Service: Gastroenterology;  Laterality: N/A;  normal stent removal    ERCP N/A 02/01/2023    Procedure: ENDOSCOPIC RETROGRADE CHOLANGIOPANCREATOGRAPHY with sphicterotomy and balloon guided sweeping of bile duct up to 9mm and placement of 10Fr x 9cm biliary stent;  Surgeon: Naman Blackmon MD;  Location: [facility] ENDOSCOPY;  Service: Gastroenterology;  Laterality: N/A;  post: choledocholithiasis    LOBECTOMY Right 01/09/2023    Procedure: THORASCOPIC RIGHT UPPER LOBECTOMY WITH DAVINCI ROBOT, MEDIASTINAL LYMPH NODE DISSECTION;  Surgeon: Erwin Kearney MD;  Location: [facility] MAIN OR;  Service: Robotics - DaVinci;  Laterality: Right;    LUMBAR DECOMPRESSION       L4-L5     LUNG BIOPSY Right 2022    SKIN CANCER EXCISION Left     Shin    SUBTOTAL HYSTERECTOMY       Family History   Problem Relation Age of Onset    Breast cancer Mother     Lung cancer Father     Lung cancer Other     Colon cancer Other     Skin cancer Other      Social History     Tobacco Use    Smoking status: Former     Packs/day: 1.00     Years: 40.00     Additional pack years: 0.00     Total pack years: 40.00     Types: Cigarettes     Quit date: 2023     Years since quittin.9     Passive exposure: Current    Smokeless tobacco: Never   Substance Use Topics    Alcohol use: Yes     Comment: rare       Current Outpatient Medications:     albuterol sulfate  (90 Base) MCG/ACT inhaler, INHALE 2 PUFFS BY MOUTH EVERY 4 HOURS AS NEEDED FOR WHEEZING OR SHORTNESS OF AIR, Disp: 18 g, Rfl: 5    aspirin (aspirin) 81 MG EC tablet, Take 1 tablet by mouth Daily. LD: - plans to hold, Disp: , Rfl:     fenofibrate (TRICOR) 145 MG tablet, TAKE 1 TABLET BY MOUTH DAILY, Disp: 90 tablet, Rfl: 0    fluticasone (FLONASE) 50 MCG/ACT nasal spray, SHAKE LIQUID AND USE 2 SPRAYS IN EACH NOSTRIL EVERY DAY (Patient taking differently: 1 spray by Each Nare route Daily As Needed.), Disp: 48 g, Rfl: 3    Fluticasone-Umeclidin-Vilant (TRELEGY) 100-62.5-25 MCG/ACT inhaler, Inhale 1 puff Every Night., Disp: , Rfl:     HYDROcodone-acetaminophen (NORCO)  MG per tablet, Take 1 tablet by mouth Every 6 (Six) Hours As Needed for Moderate Pain. May take dos if needed, Disp: , Rfl:     metFORMIN (GLUCOPHAGE) 1000 MG tablet, Take 1 tablet by mouth 2 (Two) Times a Day With Meals., Disp: 180 tablet, Rfl: 1    metoprolol succinate XL (TOPROL-XL) 25 MG 24 hr tablet, Take 1 tablet by mouth Daily., Disp: , Rfl:     nystatin (MYCOSTATIN) 500472 UNIT/GM cream, Apply 1 Application topically to the appropriate area as directed As Needed. None dos, Disp: , Rfl:     ondansetron (Zofran) 4 MG tablet, Take 1 tablet by mouth Every 8 (Eight)  "Hours As Needed for Nausea or Vomiting., Disp: 30 tablet, Rfl: 0    pregabalin (LYRICA) 100 MG capsule, Take 1 capsule by mouth 2 (Two) Times a Day., Disp: 60 capsule, Rfl: 5    vitamin D (ERGOCALCIFEROL) 1.25 MG (98821 UT) capsule capsule, Take 1 capsule by mouth Every 7 (Seven) Days., Disp: 12 capsule, Rfl: 1    vitamin E 400 UNIT capsule, Take 1 capsule by mouth Daily., Disp: 90 capsule, Rfl: 3    Blood Glucose Monitoring Suppl (True Metrix Meter) w/Device kit, Use 1 each Take As Directed., Disp: 1 kit, Rfl: 0    glucose blood (True Metrix Blood Glucose Test) test strip, Use as instructed to check blood sugar daily, Disp: 100 each, Rfl: 3    Lancets (freestyle) lancets, Use to check blood glucose twice daily as needed DX: E11.65, Disp: 100 each, Rfl: 3    Objective   Vital Signs:   /74 (BP Location: Right arm, Patient Position: Sitting, Cuff Size: Large Adult)   Pulse 81   Temp 97.7 °F (36.5 °C) (Temporal)   Resp 18   Ht 175.3 cm (69\")   Wt 86.8 kg (191 lb 6.4 oz)   SpO2 98%   BMI 28.26 kg/m²           Physical Exam  Constitutional:       General: She is not in acute distress.     Appearance: Normal appearance. She is well-developed. She is not ill-appearing or diaphoretic.   HENT:      Head: Normocephalic.   Eyes:      Conjunctiva/sclera: Conjunctivae normal.      Pupils: Pupils are equal, round, and reactive to light.   Neck:      Thyroid: No thyromegaly.      Vascular: No JVD.   Cardiovascular:      Rate and Rhythm: Normal rate and regular rhythm.      Heart sounds: Normal heart sounds. No murmur heard.  Pulmonary:      Effort: Pulmonary effort is normal. No respiratory distress.      Breath sounds: Normal breath sounds. No wheezing or rhonchi.   Abdominal:      General: Bowel sounds are normal. There is no distension.      Palpations: Abdomen is soft.      Tenderness: There is no abdominal tenderness.   Musculoskeletal:         General: Tenderness (chronic lbp, mild garcia rom) present. No swelling. " Normal range of motion.      Cervical back: Normal range of motion and neck supple. No tenderness.   Lymphadenopathy:      Cervical: No cervical adenopathy.   Skin:     General: Skin is warm and dry.      Coloration: Skin is not jaundiced.      Findings: No erythema or rash.   Neurological:      General: No focal deficit present.      Mental Status: She is alert and oriented to person, place, and time. Mental status is at baseline.      Sensory: No sensory deficit.      Motor: No weakness.      Gait: Gait abnormal (Uses cane for mobility).   Psychiatric:         Mood and Affect: Mood normal.         Behavior: Behavior normal.         Thought Content: Thought content normal.         Judgment: Judgment normal.          Result Review :     No visits with results within 7 Day(s) from this visit.   Latest known visit with results is:   Orders Only on 12/20/2023   Component Date Value Ref Range Status    WBC 12/27/2023 7.15  3.40 - 10.80 10*3/mm3 Final    RBC 12/27/2023 4.37  3.77 - 5.28 10*6/mm3 Final    Hemoglobin 12/27/2023 13.2  12.0 - 15.9 g/dL Final    Hematocrit 12/27/2023 39.7  34.0 - 46.6 % Final    MCV 12/27/2023 90.8  79.0 - 97.0 fL Final    MCH 12/27/2023 30.2  26.6 - 33.0 pg Final    MCHC 12/27/2023 33.2  31.5 - 35.7 g/dL Final    RDW 12/27/2023 12.7  12.3 - 15.4 % Final    RDW-SD 12/27/2023 42.3  37.0 - 54.0 fl Final    MPV 12/27/2023 11.1  6.0 - 12.0 fL Final    Platelets 12/27/2023 188  140 - 450 10*3/mm3 Final    Glucose 12/27/2023 264 (H)  65 - 99 mg/dL Final    BUN 12/27/2023 13  6 - 20 mg/dL Final    Creatinine 12/27/2023 0.66  0.57 - 1.00 mg/dL Final    Sodium 12/27/2023 138  136 - 145 mmol/L Final    Potassium 12/27/2023 3.8  3.5 - 5.2 mmol/L Final    Chloride 12/27/2023 103  98 - 107 mmol/L Final    CO2 12/27/2023 24.6  22.0 - 29.0 mmol/L Final    Calcium 12/27/2023 9.2  8.6 - 10.5 mg/dL Final    Total Protein 12/27/2023 6.9  6.0 - 8.5 g/dL Final    Albumin 12/27/2023 3.9  3.5 - 5.2 g/dL Final     ALT (SGPT) 12/27/2023 90 (H)  1 - 33 U/L Final    AST (SGOT) 12/27/2023 93 (H)  1 - 32 U/L Final    Alkaline Phosphatase 12/27/2023 131 (H)  39 - 117 U/L Final    Total Bilirubin 12/27/2023 0.5  0.0 - 1.2 mg/dL Final    Globulin 12/27/2023 3.0  gm/dL Final    A/G Ratio 12/27/2023 1.3  g/dL Final    BUN/Creatinine Ratio 12/27/2023 19.7  7.0 - 25.0 Final    Anion Gap 12/27/2023 10.4  5.0 - 15.0 mmol/L Final    eGFR 12/27/2023 101.2  >60.0 mL/min/1.73 Final    Total Cholesterol 12/27/2023 141  0 - 200 mg/dL Final    Triglycerides 12/27/2023 297 (H)  0 - 150 mg/dL Final    HDL Cholesterol 12/27/2023 26 (L)  40 - 60 mg/dL Final    LDL Cholesterol  12/27/2023 67  0 - 100 mg/dL Final    VLDL Cholesterol 12/27/2023 48 (H)  5 - 40 mg/dL Final    LDL/HDL Ratio 12/27/2023 2.14   Final    Hemoglobin A1C 12/27/2023 10.80 (H)  4.80 - 5.60 % Final                              Assessment and Plan    Diagnoses and all orders for this visit:    1. Type 2 diabetes mellitus with hyperglycemia, without long-term current use of insulin (Primary)  Comments:  A1c worse at 10.8  inc metformin to 1000mg BID  ok for sugar-free snacks/candy  ordered glucometer, check BG and notify if remains over >200  keep carbs <70/day  Orders:  -     glucose blood (True Metrix Blood Glucose Test) test strip; Use as instructed to check blood sugar daily  Dispense: 100 each; Refill: 3  -     Blood Glucose Monitoring Suppl (True Metrix Meter) w/Device kit; Use 1 each Take As Directed.  Dispense: 1 kit; Refill: 0  -     Lancets (freestyle) lancets; Use to check blood glucose twice daily as needed DX: E11.65  Dispense: 100 each; Refill: 3  -     metFORMIN (GLUCOPHAGE) 1000 MG tablet; Take 1 tablet by mouth 2 (Two) Times a Day With Meals.  Dispense: 180 tablet; Refill: 1    2. Chronic midline low back pain with bilateral sciatica    3. Essential hypertension    4. Mixed hyperlipidemia    5. Elevated LFTs    6. Vitamin D deficiency    7. Chronic obstructive  pulmonary disease, unspecified COPD type    8. Adenocarcinoma of right lung    9. Recurrent UTI (urinary tract infection)  -     POC Urinalysis Dipstick    10. Vulvar mass      Labs reviewed, mostly stable except elevated LFTs, triglycerides and A1c, all discussed with patient  Work on the heart diet/diabetic diet  Increase water intake  Urine dip today  Continue current medication regimen  Mammo UTD, normal  provided GSI # for pt to schedule colonoscopy  Patient to call gynecology to schedule appointment  Follow-up with pulmonology, oncology and thoracic surgery as directed      I spent 30 minutes caring for Rahel Ramsey on this date of service. This time includes time spent by me in the following activities: preparing for the visit, reviewing tests, performing a medically appropriate examination and/or evaluation , counseling and educating the patient/family/caregiver, ordering medications, tests, or procedures and documenting information in the medical record        Follow Up     Return in about 3 months (around 4/4/2024) for Recheck DM, HTN, HLD, lung cancer. DM panel prior to appt.  Patient was given instructions and counseling regarding her condition or for health maintenance advice. Please see specific information pulled into the AVS if appropriate.        Part of this note may be an electronic transcription/translation of spoken language to printed text using the Dragon Dictation System

## 2024-01-05 ENCOUNTER — TELEPHONE (OUTPATIENT)
Dept: FAMILY MEDICINE CLINIC | Facility: CLINIC | Age: 59
End: 2024-01-05

## 2024-01-05 NOTE — TELEPHONE ENCOUNTER
Pharmacy Name: [id number] [place] - [phone] [facility] [phone] FX       What medication are you calling in regards to: glucose blood (True Metrix Blood Glucose Test) test strip , Blood Glucose Monitoring Suppl (True Metrix Meter) w/Device kit , AND Lancets (freestyle) lancets     What question does the pharmacy have: NEEDS A PRIOR AUTH

## 2024-01-22 ENCOUNTER — HOSPITAL ENCOUNTER (OUTPATIENT)
Dept: CT IMAGING | Facility: HOSPITAL | Age: 59
Discharge: HOME | End: 2024-01-22
Admitting: SURGERY
Payer: MEDICARE

## 2024-01-22 DIAGNOSIS — R91.1 RIGHT UPPER LOBE PULMONARY NODULE: ICD-10-CM

## 2024-01-22 PROCEDURE — 71250 CT THORAX DX C-: CPT

## 2024-01-22 RX ORDER — FLUTICASONE PROPIONATE 50 MCG
1 SPRAY, SUSPENSION (ML) NASAL DAILY
Qty: 48 G | Refills: 3 | Status: SHIPPED | OUTPATIENT
Start: 2024-01-22

## 2024-01-23 ENCOUNTER — TELEPHONE (OUTPATIENT)
Dept: SURGERY | Facility: CLINIC | Age: 59
End: 2024-01-23
Payer: MEDICARE

## 2024-01-24 NOTE — PROGRESS NOTES
Hematology/Oncology Outpatient Follow Up    Patient name: Rahel Ramsey  : 1965  MRN: [id number]  Primary Care Physician: Vickie Gomez APRN  Referring Physician: Vickie Gomez APRN  Reason For Consult:       Chief Complaint   Patient presents with    Follow-up     Malignant neoplasm of lung, unspecified laterality, unspecified part of lung       History of Present Illness: Patient is here today for routine follow-up and does not have any specific complaints    This is a 57-year-old female who has been noted to have leukocytosis   from routine blood count.  Patient was also recently diagnosed with skin cancer on the left lower   extremity.  She underwent wire excisional biopsy of the lesion.  Her course was complicated by   wound infection.  She has been on two rounds of antibiotics and currently on Keflex which will be   finished in another three to four days.  She denies any fevers or chills, rigors.  Patient had no   prior history of any hematologic problems.  Review of her CBC from 17 revealed WBC of 12.4,   hemoglobin 15.6, platelet count 271,000.  Differentials were 65% neutrophils, 28% lymphocytes.    There was no basophilia, eosinophilia or monocytosis.  Her chemistry panel had BUN 7, creatinine   0.7.  Alkaline phosphatase was 11.  The rest was unremarkable.  Patient had a CBC repeated on   17 with WBC of 12.6, hemoglobin 15.4, platelet count of 310,000.  Differentials were   unremarkable.  B12 (N) 441.  Folate (N) 13.9.  BUN 5, creatinine 0.7.  Normal thyroid at 0.9   [range 0.3-5.5].  Patient denied any repeated history of infection in the past.  She was alone   for this appointment on 17.  · 17 - Patient had labs done to further evaluate her leukocytosis.  Her WBC was 14.4,   hemoglobin 16.6, platelet count 312,000, ANC 9.3.  Differentials were 64% neutrophils, 28%   lymphocytes.  CHRISTOPHE was negative.  B12 was 337.  LDH (N) 159.  BUN 10, creatinine 0.7.  Uric acid   5.3  (N).  MIRTA-2 was not mutated.  No evidence of BCR-abl gene rearrangement.  Sed rate 32.    Peripheral smear showed absolute neutrophilia and leukocytosis and polycythemia, reactive vs.   myeloproliferative condition.  · 5/9/17 - Erythropoietin level was low-normal at 3.92.    · 8/17/17 - Ultrasound of the abdomen showed normal spleen size at 11.3 cm.  There was no splenic   lesion identified.    · 4/2/18 - Erythropoietin level 3.79 [range 2.59-18.5].   · 8/24/18 - Bone marrow biopsy showed normocellular marrow with no morphologic evidence of   myeloproliferative neoplasm, but she was found to have low level MIRTA-2 point mutation detected at   less than 10%.  The low level of MIRTA-2 mutation in assessment of leukocytosis and erythrocytosis   is worrisome for early and evolving myeloproliferative neoplasm.  Interval repeat marrow has been   recommended.  BCR-abl RT-PCR was negative.  Flow cytometry was negative.  Cellularity was 50%.    Iron stain was present.  Cytogenetics also showed normal female karyotype.    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  MIRTA-2 analysis was not detected on the   current bone marrow.  Close follow up has been recommended.  Flowy cytometry was negative.    Histopathology had normal maturation sequence.  There were normal megakaryocytes.  Erythroid   maturation was complete.  Myeloid maturation was also complete.  Negative iron stain on the   marrow aspirate.  Recommendation is for close surveillance.  Absent iron stores on bone marrow.      Smoker 1/2 ppd, does not drink alcohol    See pulmonologist for lung nodule    Family hx of breast cancer with breast cancer age 69  Father with lung cancer      Patient has been lost to follow-up since 2019.    6/28/2022: Follow-up appointment leukocytosis, thrombocytosis, polycythemia, CHRISTOPHE positive for SLE, anti-DNA elevated at 13.  US spleen-  normal size.  October 2022 patient was found to have increasing right upper lobe nodule of.  She is followed up with Dr. Nath.  She had a CT-guided biopsy of this nodule on 11/2/2022 and pathology revealed invasive well to moderately differentiated adenocarcinoma with focal lipidic features  11/23/2022: Patient had a PET CT scan which showed a 1.7 cm right upper lobe nodule not PET avid.  There is an 8 mm right upper lobe nodule also stable since 2020 likely benign.  There was no convincing evidence of metastatic disease elsewhere.  12/20/2022: Patient was seen by Dr. Kearney.  She is being evaluated for right upper lobectomy in the next 2 to 3 weeks  1/9/2023 patient underwent right lobectomy with mediastinal lymph node dissection    History of present illness was reviewed and is unchanged from the previous visit.       Subjective    Patient is here today for routine follow-up.  She has had surgery for her lung malignancy her course was complicated by pancreatitis.      Patient is here today for follow-up.  She recently had a CT scan of the chest completed in June 2, 2023 which was with no evidence of relapsed disease.  Patient was found to have soft tissue shadow at the tail of the pancreas and has recommended to have a CT of the abdomen.  Dr. Kearney has ordered this and she will follow-up with GI.    She is here today for follow-up.  Her follow-up CT scans did not show any significant abnormalities other than possible pseudocyst of the pancreas.  She continues to maintain close follow-ups with her GI physicians.    Patient is here today for routine follow-up.  She was seen by Dr. Easton yesterday.  Was placed on antibiotics for bronchitis.    Past Medical History:   Diagnosis Date    Allergic     Arthritis     Atrial fibrillation     Cancer 01/09/2023    lung  Right    COPD (chronic obstructive pulmonary disease)     Diabetes mellitus     type 2    Frequent UTI     Headache     Heartburn     History of biliary duct stent  placement 03/23/2023    History of herniated intervertebral disc     lumbar    History of skin cancer     Left lower extremity    Hyperlipidemia     Hypertension     Injury of back     Leukocytosis     Low back pain     Lung nodule     Nausea        Past Surgical History:   Procedure Laterality Date    BACK SURGERY      lumbar    CARDIAC CATHETERIZATION      CHOLECYSTECTOMY N/A 4/10/2023    Procedure: CHOLECYSTECTOMY LAPAROSCOPIC;  Surgeon: Mich Bansal MD;  Location: [facility] MAIN OR;  Service: General;  Laterality: N/A;    COLONOSCOPY      DILATATION AND CURETTAGE      ENDOSCOPY N/A 3/30/2023    Procedure: ESOPHAGOGASTRODUODENOSCOPY WITH STENT REMOVAL;  Surgeon: Naman Blackmon MD;  Location: [facility] ENDOSCOPY;  Service: Gastroenterology;  Laterality: N/A;  normal stent removal    ERCP N/A 02/01/2023    Procedure: ENDOSCOPIC RETROGRADE CHOLANGIOPANCREATOGRAPHY with sphicterotomy and balloon guided sweeping of bile duct up to 9mm and placement of 10Fr x 9cm biliary stent;  Surgeon: Naman Blackmon MD;  Location: [facility] ENDOSCOPY;  Service: Gastroenterology;  Laterality: N/A;  post: choledocholithiasis    LOBECTOMY Right 01/09/2023    Procedure: THORASCOPIC RIGHT UPPER LOBECTOMY WITH DAVINCI ROBOT, MEDIASTINAL LYMPH NODE DISSECTION;  Surgeon: Erwin Kearney MD;  Location: [facility] MAIN OR;  Service: Robotics - DaVinci;  Laterality: Right;    LUMBAR DECOMPRESSION      L4-L5     LUNG BIOPSY Right 11/2022    SKIN CANCER EXCISION Left     Shin    SUBTOTAL HYSTERECTOMY           Current Outpatient Medications:     levoFLOXacin (LEVAQUIN) 750 MG tablet, Take 1 tablet by mouth Daily., Disp: , Rfl:     albuterol sulfate  (90 Base) MCG/ACT inhaler, INHALE 2 PUFFS BY MOUTH EVERY 4 HOURS AS NEEDED FOR WHEEZING OR SHORTNESS OF AIR, Disp: 18 g, Rfl: 5    aspirin (aspirin) 81 MG EC tablet, Take 1 tablet by mouth Daily. LD: 4/5- plans to hold, Disp: , Rfl:     Blood Glucose Monitoring Suppl (True Metrix Meter)  w/Device kit, Use 1 each Take As Directed., Disp: 1 kit, Rfl: 0    fenofibrate (TRICOR) 145 MG tablet, TAKE 1 TABLET BY MOUTH DAILY, Disp: 90 tablet, Rfl: 0    fluticasone (FLONASE) 50 MCG/ACT nasal spray, 1 spray by Each Nare route Daily., Disp: 48 g, Rfl: 3    Fluticasone-Umeclidin-Vilant (TRELEGY) 100-62.5-25 MCG/ACT inhaler, Inhale 1 puff Every Night., Disp: , Rfl:     glucose blood (True Metrix Blood Glucose Test) test strip, Use as instructed to check blood sugar daily, Disp: 100 each, Rfl: 3    HYDROcodone-acetaminophen (NORCO)  MG per tablet, Take 1 tablet by mouth Every 6 (Six) Hours As Needed for Moderate Pain. May take dos if needed, Disp: , Rfl:     Lancets (freestyle) lancets, Use to check blood glucose twice daily as needed DX: E11.65, Disp: 100 each, Rfl: 3    metFORMIN (GLUCOPHAGE) 1000 MG tablet, Take 1 tablet by mouth 2 (Two) Times a Day With Meals., Disp: 180 tablet, Rfl: 1    metoprolol succinate XL (TOPROL-XL) 25 MG 24 hr tablet, Take 1 tablet by mouth Daily., Disp: , Rfl:     nystatin (MYCOSTATIN) 640337 UNIT/GM cream, Apply 1 Application topically to the appropriate area as directed As Needed. None dos, Disp: , Rfl:     ondansetron (Zofran) 4 MG tablet, Take 1 tablet by mouth Every 8 (Eight) Hours As Needed for Nausea or Vomiting., Disp: 30 tablet, Rfl: 0    pregabalin (LYRICA) 100 MG capsule, Take 1 capsule by mouth 2 (Two) Times a Day., Disp: 60 capsule, Rfl: 5    vitamin D (ERGOCALCIFEROL) 1.25 MG (98531 UT) capsule capsule, Take 1 capsule by mouth Every 7 (Seven) Days., Disp: 12 capsule, Rfl: 1    vitamin E 400 UNIT capsule, Take 1 capsule by mouth Daily., Disp: 90 capsule, Rfl: 3    Allergies   Allergen Reactions    Morphine Anaphylaxis and Nausea And Vomiting    Percocet [Oxycodone-Acetaminophen] GI Intolerance    Tramadol GI Intolerance       Immunization History   Administered Date(s) Administered    COVID-19 (Web[employer]) 03/12/2021    Covid-19 (Pfizer) Gray Cap Monovalent  2022    Flu Vaccine Quad PF >36MO 2018    Fluzone (or Fluarix & Flulaval for VFC) >6mos 2020, 2022, 10/04/2023    Influenza, Unspecified 2016, 10/12/2017    Pneumococcal Polysaccharide (PPSV23) 2021    Tdap 2020    flucelvax quad pfs =>4 YRS 10/03/2019       Family History   Problem Relation Age of Onset    Breast cancer Mother     Lung cancer Father     Lung cancer Other     Colon cancer Other     Skin cancer Other        Cancer-related family history includes Breast cancer in her mother; Colon cancer in an other family member; Lung cancer in her father and another family member; Skin cancer in an other family member.    Social History     Tobacco Use    Smoking status: Former     Packs/day: 1.00     Years: 40.00     Additional pack years: 0.00     Total pack years: 40.00     Types: Cigarettes     Quit date: 2023     Years since quittin.0     Passive exposure: Current    Smokeless tobacco: Never   Vaping Use    Vaping Use: Never used   Substance Use Topics    Alcohol use: Yes     Comment: rare    Drug use: No     I have reviewed and confirmed the accuracy of the patient's history: Chief complaint, HPI, ROS, and Subjective as entered by the MA/LPN/RN. Cheri Prabhakar MD 24          ROS:    Review of Systems   Constitutional:  Positive for fatigue. Negative for chills and fever.   HENT: Negative.     Eyes:  Negative for visual disturbance.   Respiratory:  Negative for shortness of breath.    Cardiovascular:  Negative for chest pain and palpitations.   Gastrointestinal:  Negative for abdominal pain.   Endocrine: Negative.    Genitourinary: Negative.    Skin:  Negative for rash and wound.   Neurological:  Positive for weakness.   Hematological:  Negative for adenopathy.   Psychiatric/Behavioral:  Negative for agitation and confusion.    Chronic back pain due to disc disease    Objective:    Vitals:    24 1022   BP: 119/79   Pulse: 79   Resp: 18  "  Temp: 98 °F (36.7 °C)   TempSrc: Infrared   SpO2: 99%   Weight: 86.6 kg (191 lb)   Height: 175.3 cm (69\")   PainSc:   7   PainLoc: Back       Body mass index is 28.21 kg/m².    ECOG  (1) Restricted in physically strenuous activity, ambulatory and able to do work of light nature    Physical Exam:  Physical Exam  Vitals and nursing note reviewed.   Constitutional:       General: She is not in acute distress.     Appearance: She is not diaphoretic.   HENT:      Head: Normocephalic and atraumatic.   Eyes:      General: No scleral icterus.        Right eye: No discharge.         Left eye: No discharge.      Conjunctiva/sclera: Conjunctivae normal.   Neck:      Thyroid: No thyromegaly.   Cardiovascular:      Rate and Rhythm: Normal rate and regular rhythm.      Heart sounds: Normal heart sounds.      No friction rub. No gallop.   Pulmonary:      Effort: Pulmonary effort is normal. No respiratory distress.      Breath sounds: No stridor. No wheezing.   Abdominal:      General: Bowel sounds are normal.      Palpations: Abdomen is soft. There is no mass.      Tenderness: There is no abdominal tenderness. There is no guarding or rebound.   Musculoskeletal:         General: No tenderness. Normal range of motion.      Cervical back: Normal range of motion and neck supple.   Lymphadenopathy:      Cervical: No cervical adenopathy.   Skin:     General: Skin is warm.      Findings: No erythema or rash.   Neurological:      Mental Status: She is alert and oriented to person, place, and time.      Motor: No abnormal muscle tone.   Psychiatric:         Behavior: Behavior normal.         I have reexamined the patient and the results are consistent with the previously documented exam. Cheri Prabhakar MD      RECENT LABS    WBC   Date Value Ref Range Status   01/26/2024 13.08 (H) 3.40 - 10.80 10*3/mm3 Final     RBC   Date Value Ref Range Status   01/26/2024 4.66 3.77 - 5.28 10*6/mm3 Final     Hemoglobin   Date Value Ref Range " Status   01/26/2024 14.1 12.0 - 15.9 g/dL Final     Hematocrit   Date Value Ref Range Status   01/26/2024 43.2 34.0 - 46.6 % Final     MCV   Date Value Ref Range Status   01/26/2024 92.7 79.0 - 97.0 fL Final     MCH   Date Value Ref Range Status   01/26/2024 30.3 26.6 - 33.0 pg Final     MCHC   Date Value Ref Range Status   01/26/2024 32.6 31.5 - 35.7 g/dL Final     RDW   Date Value Ref Range Status   01/26/2024 13.4 12.3 - 15.4 % Final     RDW-SD   Date Value Ref Range Status   01/26/2024 44.4 37.0 - 54.0 fl Final     MPV   Date Value Ref Range Status   01/26/2024 9.4 6.0 - 12.0 fL Final     Platelets   Date Value Ref Range Status   01/26/2024 275 140 - 450 10*3/mm3 Final     Neutrophil %   Date Value Ref Range Status   01/26/2024 73.1 42.7 - 76.0 % Final     Lymphocyte %   Date Value Ref Range Status   01/26/2024 21.8 19.6 - 45.3 % Final     Monocyte %   Date Value Ref Range Status   01/26/2024 4.7 (L) 5.0 - 12.0 % Final     Eosinophil %   Date Value Ref Range Status   01/26/2024 0.2 (L) 0.3 - 6.2 % Final     Basophil %   Date Value Ref Range Status   01/26/2024 0.2 0.0 - 1.5 % Final     Immature Grans %   Date Value Ref Range Status   01/03/2023 0.4 0.0 - 0.5 % Final     Neutrophils, Absolute   Date Value Ref Range Status   01/26/2024 9.58 (H) 1.70 - 7.00 10*3/mm3 Final     Lymphocytes, Absolute   Date Value Ref Range Status   01/26/2024 2.85 0.70 - 3.10 10*3/mm3 Final     Monocytes, Absolute   Date Value Ref Range Status   01/26/2024 0.61 0.10 - 0.90 10*3/mm3 Final     Eosinophils, Absolute   Date Value Ref Range Status   01/26/2024 0.02 0.00 - 0.40 10*3/mm3 Final     Basophils, Absolute   Date Value Ref Range Status   01/26/2024 0.02 0.00 - 0.20 10*3/mm3 Final     Immature Grans, Absolute   Date Value Ref Range Status   01/03/2023 0.04 0.00 - 0.05 10*3/mm3 Final     nRBC   Date Value Ref Range Status   04/10/2023 0.0 0.0 - 0.2 /100 WBC Final       Lab Results   Component Value Date    GLUCOSE 264 (H) 12/27/2023     BUN 13 12/27/2023    CREATININE 0.66 12/27/2023    EGFRIFNONA 99 12/14/2021    BCR 19.7 12/27/2023    K 3.8 12/27/2023    CO2 24.6 12/27/2023    CALCIUM 9.2 12/27/2023    ALBUMIN 3.9 12/27/2023    LABIL2 1.1 12/04/2018    AST 93 (H) 12/27/2023    ALT 90 (H) 12/27/2023         Assessment & Plan   Malignant neoplasm of lung, unspecified laterality, unspecified part of lung  - CBC & Differential        Adenocarcinoma of the right lung, status post right upper lobectomy with lymph node dissection.  Tumor measured 1.3 cm, invasive well differentiated adenocarcinoma completely excised.  Enlarging right upper lobe nodule.  Clinical T1b N0 M0.  Stage I A2.  Adjuvant treatment  not recommended continued CT scan surveillance.  Her scans are ordered by Dr. Kearney.  Reviewed  Postoperative pancreatitis due to gallstones.  She is actively involved with the GI group.  Status post cholecystectomy.  Follow-up CT scan shows pseudocyst patient will maintain appointments.  There has been interval removal of the common bile duct stent    Tobacco use disorder: Smoking cessation      Leukocytosis and polycythemia.  Rule out myeloproliferative disease.  She has a prior history of this in the past.  Ultrasound of spleen- normal size.  Blood counts improved with WBC of 10.14, Hgb 15.8, platelets 226-improvement in counts.  JAK2 V617F negative.  No BCR ABL arrangement.  Peripheral smear shows leukocytosis, polycythemia, no blasts May of 2022.  We will obtain flow cytometry since she has relative lymphocytosis as well.  I have reviewed the lab results with patient.  I do not think we need to repeat a bone marrow biopsy at this time.  I have asked her to also remain on baby aspirin.  Patient does not have splenomegaly.  Her repeat flow cytometry completed in August 2022 does not show any myeloproliferative disease.  CBC reviewed    Possible evolving myeloproliferative neoplasm with low level MIRTA-2 positive at less than 10%. BCR-abl negative.   Low normal erythropoietin level.  Status post bone marrow aspiration and         biopsy 8/24/18.  Repeat bone marrow aspiration and biopsy 2/22/19 showed translocation [2q/11q] has been associated with myeloid neoplasm, but no morphologic evidence seen on         histopathology      CHRISTOPHE positive, anti-DNA elevated at 13 .Referred to rheumatology, She was seen at  rheumatology.          Plans    Reviewed her final pathology results  Reviewed her CT of the chest result completed January 2024  Continue CT scan surveillance by Dr. Kearney  Follow-up 4 months  Follow-up with pulmonary  Patient to follow-up with GI  Peripheral blood flow cytometry was negative- 8/3/2022  Continue baby aspirin 81 mg p.o. daily for procedure  All questions answered            I spent 30 total minutes, face-to-face, caring for Rahel today. 90% of this time involved counseling and/or coordination of care as documented within this note.

## 2024-01-26 ENCOUNTER — OFFICE VISIT (OUTPATIENT)
Dept: ONCOLOGY | Facility: CLINIC | Age: 59
End: 2024-01-26
Payer: MEDICARE

## 2024-01-26 ENCOUNTER — LAB (OUTPATIENT)
Dept: LAB | Facility: HOSPITAL | Age: 59
End: 2024-01-26
Payer: MEDICARE

## 2024-01-26 VITALS
WEIGHT: 191 LBS | OXYGEN SATURATION: 99 % | HEART RATE: 79 BPM | BODY MASS INDEX: 28.29 KG/M2 | RESPIRATION RATE: 18 BRPM | SYSTOLIC BLOOD PRESSURE: 119 MMHG | HEIGHT: 69 IN | DIASTOLIC BLOOD PRESSURE: 79 MMHG | TEMPERATURE: 98 F

## 2024-01-26 DIAGNOSIS — C34.90 MALIGNANT NEOPLASM OF LUNG, UNSPECIFIED LATERALITY, UNSPECIFIED PART OF LUNG: Primary | ICD-10-CM

## 2024-01-26 LAB
BASOPHILS # BLD AUTO: 0.02 10*3/MM3 (ref 0–0.2)
BASOPHILS NFR BLD AUTO: 0.2 % (ref 0–1.5)
DEPRECATED RDW RBC AUTO: 44.4 FL (ref 37–54)
EOSINOPHIL # BLD AUTO: 0.02 10*3/MM3 (ref 0–0.4)
EOSINOPHIL NFR BLD AUTO: 0.2 % (ref 0.3–6.2)
ERYTHROCYTE [DISTWIDTH] IN BLOOD BY AUTOMATED COUNT: 13.4 % (ref 12.3–15.4)
HCT VFR BLD AUTO: 43.2 % (ref 34–46.6)
HGB BLD-MCNC: 14.1 G/DL (ref 12–15.9)
HOLD SPECIMEN: NORMAL
HOLD SPECIMEN: NORMAL
LYMPHOCYTES # BLD AUTO: 2.85 10*3/MM3 (ref 0.7–3.1)
LYMPHOCYTES NFR BLD AUTO: 21.8 % (ref 19.6–45.3)
MCH RBC QN AUTO: 30.3 PG (ref 26.6–33)
MCHC RBC AUTO-ENTMCNC: 32.6 G/DL (ref 31.5–35.7)
MCV RBC AUTO: 92.7 FL (ref 79–97)
MONOCYTES # BLD AUTO: 0.61 10*3/MM3 (ref 0.1–0.9)
MONOCYTES NFR BLD AUTO: 4.7 % (ref 5–12)
NEUTROPHILS NFR BLD AUTO: 73.1 % (ref 42.7–76)
NEUTROPHILS NFR BLD AUTO: 9.58 10*3/MM3 (ref 1.7–7)
PLATELET # BLD AUTO: 275 10*3/MM3 (ref 140–450)
PMV BLD AUTO: 9.4 FL (ref 6–12)
RBC # BLD AUTO: 4.66 10*6/MM3 (ref 3.77–5.28)
WBC NRBC COR # BLD AUTO: 13.08 10*3/MM3 (ref 3.4–10.8)

## 2024-01-26 PROCEDURE — 85025 COMPLETE CBC W/AUTO DIFF WBC: CPT

## 2024-01-26 PROCEDURE — 36415 COLL VENOUS BLD VENIPUNCTURE: CPT

## 2024-01-26 RX ORDER — LEVOFLOXACIN 750 MG/1
1 TABLET, FILM COATED ORAL DAILY
COMMUNITY
Start: 2024-01-22

## 2024-01-30 ENCOUNTER — OFFICE VISIT (OUTPATIENT)
Dept: SURGERY | Facility: CLINIC | Age: 59
End: 2024-01-30
Payer: MEDICARE

## 2024-01-30 ENCOUNTER — PATIENT ROUNDING (BHMG ONLY) (OUTPATIENT)
Dept: SURGERY | Facility: CLINIC | Age: 59
End: 2024-01-30
Payer: MEDICARE

## 2024-01-30 VITALS
SYSTOLIC BLOOD PRESSURE: 120 MMHG | HEART RATE: 90 BPM | WEIGHT: 188 LBS | OXYGEN SATURATION: 98 % | DIASTOLIC BLOOD PRESSURE: 68 MMHG | HEIGHT: 69 IN | BODY MASS INDEX: 27.85 KG/M2

## 2024-01-30 DIAGNOSIS — R91.8 LUNG NODULES: ICD-10-CM

## 2024-01-30 DIAGNOSIS — Z87.891 FORMER SMOKER: ICD-10-CM

## 2024-01-30 DIAGNOSIS — Z85.118 HISTORY OF LUNG CANCER: Primary | ICD-10-CM

## 2024-01-30 NOTE — PROGRESS NOTES
"Chief Complaint  Follow up, RUL resection for NSCLC in January 2023    Subjective        Rahel Ramsey presents to [facility] THORACIC SURGERY  History of Present Illness    Ms. Ramsey is a very pleasant 58-year-old lady who is status post right upper lobe resection by Dr. Kearney in January 2023 for stage I adenocarcinoma.  She is also followed by Dr. Prabhakar of medical oncology.  She is a former smoker with greater than 40-pack-year history.  She denies new onset shortness of breath, cough, hemoptysis or voice change.  Her weight is stable. She also sees Dr. Easton of pulmonary medicine who gave her antibiotics for a recent URI.       Objective   Vital Signs:  /68 (BP Location: Right arm, Patient Position: Sitting, Cuff Size: Adult)   Pulse 90   Ht 175.3 cm (69.02\")   Wt 85.3 kg (188 lb)   SpO2 98%   BMI 27.75 kg/m²   Estimated body mass index is 27.75 kg/m² as calculated from the following:    Height as of this encounter: 175.3 cm (69.02\").    Weight as of this encounter: 85.3 kg (188 lb).             Physical Exam  Constitutional:       General: She is not in acute distress.     Appearance: Normal appearance.   HENT:      Head: Normocephalic and atraumatic.      Nose: Nose normal.   Pulmonary:      Effort: Pulmonary effort is normal. No respiratory distress.   Musculoskeletal:         General: Normal range of motion.      Cervical back: Normal range of motion and neck supple.   Skin:     General: Skin is warm.   Neurological:      General: No focal deficit present.      Mental Status: She is alert.      Motor: No weakness.   Psychiatric:         Mood and Affect: Mood normal.         Thought Content: Thought content normal.        Result Review :            I independently reviewed the CT of the chest performed on 1/22/2024 which demonstrates postoperative changes from a right upper lobectomy with postoperative scarring.  There is a new small cluster of nodules in the right lower " lobe.  No mediastinal or hilar lymphadenopathy.  No pleural or pericardial effusion.         Assessment and Plan     Diagnoses and all orders for this visit:    1. History of lung cancer (Primary)  -     CT Chest Without Contrast; Future    2. Former smoker  -     CT Chest Without Contrast; Future    3. Lung nodules  -     CT Chest Without Contrast; Future      Patient is status post right upper lobectomy by Dr. Kearney in January 2023 for a T1bN0 adenocarcinoma.  Her most recent CT chest shows some small lung nodules in the right lower lobe, likely infectious/inflammatory. She has recently completed antibiotics. Will get a short interval CT chest in 2 months and have her follow-up with us to ensure clearing.  We discussed that if these nodules enlarge, would likely recommend a PET scan at that time.  She is in agreement with this plan.       I spent 32 minutes caring for Rahel on this date of service. This time includes time spent by me in the following activities:preparing for the visit, reviewing tests, obtaining and/or reviewing a separately obtained history, performing a medically appropriate examination and/or evaluation , counseling and educating the patient/family/caregiver, ordering medications, tests, or procedures, documenting information in the medical record, and independently interpreting results and communicating that information with the patient/family/caregiver  Follow Up     Return in about 2 months (around 3/30/2024) for Next scheduled follow up.  Patient was given instructions and counseling regarding her condition or for health maintenance advice. Please see specific information pulled into the AVS if appropriate.

## 2024-02-20 ENCOUNTER — OFFICE VISIT (OUTPATIENT)
Dept: CARDIOLOGY | Facility: CLINIC | Age: 59
End: 2024-02-20
Payer: MEDICARE

## 2024-02-20 VITALS
SYSTOLIC BLOOD PRESSURE: 130 MMHG | HEART RATE: 76 BPM | RESPIRATION RATE: 18 BRPM | HEIGHT: 69 IN | DIASTOLIC BLOOD PRESSURE: 77 MMHG | BODY MASS INDEX: 27.99 KG/M2 | WEIGHT: 189 LBS

## 2024-02-20 DIAGNOSIS — E78.2 MIXED HYPERLIPIDEMIA: Primary | ICD-10-CM

## 2024-02-20 DIAGNOSIS — I10 ESSENTIAL HYPERTENSION: ICD-10-CM

## 2024-02-20 PROCEDURE — 99214 OFFICE O/P EST MOD 30 MIN: CPT | Performed by: INTERNAL MEDICINE

## 2024-02-20 PROCEDURE — G2211 COMPLEX E/M VISIT ADD ON: HCPCS | Performed by: INTERNAL MEDICINE

## 2024-02-20 PROCEDURE — 3075F SYST BP GE 130 - 139MM HG: CPT | Performed by: INTERNAL MEDICINE

## 2024-02-20 PROCEDURE — 3078F DIAST BP <80 MM HG: CPT | Performed by: INTERNAL MEDICINE

## 2024-02-20 RX ORDER — METOPROLOL SUCCINATE 25 MG/1
25 TABLET, EXTENDED RELEASE ORAL DAILY
Qty: 90 TABLET | Refills: 3 | Status: SHIPPED | OUTPATIENT
Start: 2024-02-20

## 2024-02-20 RX ORDER — EZETIMIBE 10 MG/1
10 TABLET ORAL DAILY
Qty: 90 TABLET | Refills: 3 | Status: SHIPPED | OUTPATIENT
Start: 2024-02-20

## 2024-02-20 RX ORDER — ATORVASTATIN CALCIUM 20 MG/1
20 TABLET, FILM COATED ORAL DAILY
Qty: 90 TABLET | Refills: 3 | Status: SHIPPED | OUTPATIENT
Start: 2024-02-20

## 2024-03-05 ENCOUNTER — TELEPHONE (OUTPATIENT)
Dept: FAMILY MEDICINE CLINIC | Facility: CLINIC | Age: 59
End: 2024-03-05

## 2024-03-05 NOTE — TELEPHONE ENCOUNTER
Caller: [facility] - [facility] [address] [place] [address] [phone] [facility] [phone]     Relationship to patient: Pharmacy    Best call back number: [phone]    Patient is needing:     CASE : [id number]    CALLING TO GET MORE INFORMATION FOR A PA FOR THE ACCU CHEK GUIDE ME GLUCOSE MTR.    THEY ARE NEEDING ASK CLINICAL QUESTIONS.

## 2024-03-05 NOTE — PROGRESS NOTES
Cardiology Clinic Note  Alexandro Farah MD, PhD    Subjective:     Encounter Date:02/20/2024      Patient ID: Rahel Ramsey is a 58 y.o. female.    Chief Complaint:  Chief Complaint   Patient presents with    Follow-up       HPI:      I the pleasure to have a telehealth visit with this 58-year-old patient previously seen in follow-up with diabetes hypertension hyperlipidemia and paroxysmal atrial fibrillation historically.  She is a pack per day smoker counseled to quit.  No alcohol.  EKG reviewed and interpreted by me demonstrated sinus rhythm with normal axis and ST segments at that time.  At that time we ordered bilateral carotid Dopplers which demonstrated mild disease 15 to 45% which is nonobstructive and needs really primary and secondary prevention.  She is on fenofibrate only but no statin therapy.  She is on metoprolol XL daily with underlying sinus rhythm but no systemic anticoagulation and has had no recurrence of her A. fib in quite some time.  Her [id number] score is historically 3 but she did not want anticoagulation has been deferred since prior to my establishment of care.  She requires epidural injections for chronic back pain.  She follows up today, she remains abstinent from smoking status post partial lung removal/lobectomy.  She has chronic atypical chest pain dyspnea on exertion.  No clinical heart failure today, remains in sinus rhythm with unchanged EKG.  We discussed risk ratification, lipid goals, prior carotid Dopplers which were abnormal, surveillance, CV health, paroxysmal A-fib which has had no recurrence to date.  Otherwise she is getting back on track after complex year being seen today for 6-month follow-up.  We discussed if she continues to have chest pain would recommend cardiac catheterization with severely elevated calcium score of 360 in the left main with concern for any balanced ischemia.  She appears to be doing okay at this point.  We discussed high intensity statin  therapy and lipid-lowering recommendations    September 2023, carotid Dopplers less than 50% stenosis bilaterally, mild atherosclerotic disease     Prior EKG normal normal normal sinus rhythm nonspecific ST-T wave abnormalities    2D echo October 2023, EF 60% with normal global and regional function, grade 1 diastolic dysfunction, atrial sizes are normal, RV size and function normal, mild MR and TR with normal pulmonary pressures, bubble study was negative for right to left interatrial shunt    Stress testing October 2023, normal stress and rest perfusion, low risk study EF greater than 70%    Coronary calcium scoring September 2023, high risk study, total score 481, left main had 360, LAD 81, circumflex 0, RCA 40     Review of systems otherwise negative x14 point review of systems except as mentioned above     Historical data copied forward from previous encounters in EMR including the history, exam, and assessment/plan has been reviewed and is unchanged unless noted otherwise.     Cardiac medicines reviewed with risk, benefits, and necessity of each discussed.     Risk and benefit of cardiac testing reviewed including death heart attack stroke pain bleeding infection need for vascular /cardiovascular surgery were discussed and the patient      Objective:      Vitals reviewed below  Regular rate and rhythm no rubs murmurs or gallops  No heave or lift  No clubbing cyanosis or edema  Normal pulses normal cap refill  No carotid bruits on auscultation, no JVD  Intact grossly  Clear to auscultation     Assessment:           Paroxysmal atrial fibrillation  Sinus rhythm  Continue metoprolol  Aspirin only  No systemic anticoagulation on board without recurrence     Essential hypertension well-controlled     Dyslipidemia, hyper triglyceridemia, fenofibrate, remains on board  Needs LDL goal less than 55 with high risk calcium score  Start atorvastatin 20 daily in addition to Zetia 10 mg daily, continue fenofibrate  Follow-up  "with panel in 3 months     Carotid disease mild nonobstructive 15 to 45% by carotid Dopplers bilaterally,      Coronary calcium scoring greater than 400, 360 in the left main alone  Recommendations for lipids as above, atorvastatin Zetia and fenofibrate  Diet and exercise per AHA guidelines  Heart healthy low-sodium low-cholesterol diet  Follow lipids in 3 months along with CMP     Neuropathic pain, follow-up with primary care, on Lyrica and Elavil     See her back in 9 months unless otherwise indicated      Alexandro Farah MD, PhD     Objective:         /77 (BP Location: Left arm, Patient Position: Sitting)   Pulse 76   Resp 18   Ht 175.3 cm (69\")   Wt 85.7 kg (189 lb)   BMI 27.91 kg/m²     Diagnoses and all orders for this visit:    1. Mixed hyperlipidemia (Primary)  -     Lipid Panel; Future  -     Comprehensive Metabolic Panel; Future  -     CBC (No Diff); Future  -     TSH; Future  -     Magnesium; Future    2. Essential hypertension  -     Lipid Panel; Future  -     Comprehensive Metabolic Panel; Future  -     CBC (No Diff); Future  -     TSH; Future  -     Magnesium; Future    Other orders  -     metoprolol succinate XL (TOPROL-XL) 25 MG 24 hr tablet; Take 1 tablet by mouth Daily.  Dispense: 90 tablet; Refill: 3  -     atorvastatin (LIPITOR) 20 MG tablet; Take 1 tablet by mouth Daily.  Dispense: 90 tablet; Refill: 3  -     ezetimibe (ZETIA) 10 MG tablet; Take 1 tablet by mouth Daily.  Dispense: 90 tablet; Refill: 3           Plan:              The pleasure to be involved in this patient's cardiovascular care.  Please call with any questions or concerns  Alexandro Farah MD, PhD    Most recent EKG as reviewed and interpreted by me:  Procedures     Most recent echo as reviewed and interpreted by me:  Results for orders placed during the hospital encounter of 09/25/23    Adult Transthoracic Echo Complete W/ Color, Spectral and Contrast if Necessary Per Protocol    Interpretation Summary  Grossly normal " EF 60%, normal regional and global wall motion and thickening  Grade 1 diastolic dysfunction  Left and right atrium are normal size  RV size and function is normal  No masses or effusions  Normal myocardial thickness  Mitral valve appears grossly normal although with mildly thickened leaflet tips, mild regurgitation without stenosis  Aortic valve appears grossly normal, trileaflet, no regurgitation or stenosis  Tricuspid valve appears grossly normal, no regurgitation or stenosis significantly, normal estimated RV systolic pressure  No evidence of pulmonary hypertension  Pulmonic valve not well seen but no stenosis by Doppler imaging  Grossly normal proximal aorta  No Doppler evidence for ASD or PFO  Normal IVC  Bubble study negative for right to left interatrial shunt      Most recent stress test as reviewed and interpreted by me:  Results for orders placed during the hospital encounter of 09/25/23    Stress Test With Myocardial Perfusion One Day    Interpretation Summary    Left ventricular ejection fraction is hyperdynamic (Calculated EF > 70%).    Myocardial perfusion imaging indicates a normal myocardial perfusion study with no evidence of ischemia.    Impressions are consistent with a low risk study.    There is no prior study available for comparison.    Findings consistent with an equivocal ECG stress test.      Most recent cardiac catheterization as reviewed interpreted by me:  No results found for this or any previous visit.    The following portions of the patient's history were reviewed and updated as appropriate: allergies, current medications, past family history, past medical history, past social history, past surgical history, and problem list.      ROS:  14 point review of systems negative except as mentioned above    Current Outpatient Medications:     albuterol sulfate  (90 Base) MCG/ACT inhaler, INHALE 2 PUFFS BY MOUTH EVERY 4 HOURS AS NEEDED FOR WHEEZING OR SHORTNESS OF AIR, Disp: 18 g,  Rfl: 5    aspirin (aspirin) 81 MG EC tablet, Take 1 tablet by mouth Daily. LD: 4/5- plans to hold, Disp: , Rfl:     fenofibrate (TRICOR) 145 MG tablet, TAKE 1 TABLET BY MOUTH DAILY, Disp: 90 tablet, Rfl: 0    fluticasone (FLONASE) 50 MCG/ACT nasal spray, 1 spray by Each Nare route Daily., Disp: 48 g, Rfl: 3    Fluticasone-Umeclidin-Vilant (TRELEGY) 100-62.5-25 MCG/ACT inhaler, Inhale 1 puff Every Night., Disp: , Rfl:     HYDROcodone-acetaminophen (NORCO)  MG per tablet, Take 1 tablet by mouth Every 6 (Six) Hours As Needed for Moderate Pain. May take dos if needed, Disp: , Rfl:     metFORMIN (GLUCOPHAGE) 1000 MG tablet, Take 1 tablet by mouth 2 (Two) Times a Day With Meals., Disp: 180 tablet, Rfl: 1    nystatin (MYCOSTATIN) 565733 UNIT/GM cream, Apply 1 Application topically to the appropriate area as directed As Needed. None dos, Disp: , Rfl:     ondansetron (Zofran) 4 MG tablet, Take 1 tablet by mouth Every 8 (Eight) Hours As Needed for Nausea or Vomiting., Disp: 30 tablet, Rfl: 0    pregabalin (LYRICA) 100 MG capsule, Take 1 capsule by mouth 2 (Two) Times a Day., Disp: 60 capsule, Rfl: 5    vitamin D (ERGOCALCIFEROL) 1.25 MG (82297 UT) capsule capsule, Take 1 capsule by mouth Every 7 (Seven) Days., Disp: 12 capsule, Rfl: 1    atorvastatin (LIPITOR) 20 MG tablet, Take 1 tablet by mouth Daily., Disp: 90 tablet, Rfl: 3    Blood Glucose Monitoring Suppl (True Metrix Meter) w/Device kit, Use 1 each Take As Directed., Disp: 1 kit, Rfl: 0    ezetimibe (ZETIA) 10 MG tablet, Take 1 tablet by mouth Daily., Disp: 90 tablet, Rfl: 3    glucose blood (True Metrix Blood Glucose Test) test strip, Use as instructed to check blood sugar daily, Disp: 100 each, Rfl: 3    Lancets (freestyle) lancets, Use to check blood glucose twice daily as needed DX: E11.65, Disp: 100 each, Rfl: 3    metoprolol succinate XL (TOPROL-XL) 25 MG 24 hr tablet, Take 1 tablet by mouth Daily., Disp: 90 tablet, Rfl: 3    vitamin E 400 UNIT capsule,  Take 1 capsule by mouth Daily. (Patient not taking: Reported on 2/20/2024), Disp: 90 capsule, Rfl: 3    Problem List:  Patient Active Problem List   Diagnosis    Allergic rhinitis    Degeneration of lumbar or lumbosacral intervertebral disc    Degenerative joint disease of right acromioclavicular joint    Fatigue    History of skin cancer    Hyperglycemia    Essential hypertension    Mixed hyperlipidemia    Hypertriglyceridemia    Leukocytosis    Lumbar disc disease with radiculopathy    Lumbar herniated disc    Lumbar radiculopathy    Other cervical disc degeneration, unspecified cervical region    Paroxysmal atrial fibrillation    Stenosis of right carotid artery    Tobacco use    Vitamin D deficiency    Right upper lobe pulmonary nodule    Hilar adenopathy    Urinary tract infection with hematuria    UTI symptoms    Dysuria    COPD with exacerbation    Primary cancer of right upper lobe of lung    Acute pancreatitis with infected necrosis, unspecified pancreatitis type    History of lung cancer    Diarrhea    Upper abdominal pain    Angiodysplasia of colon without hemorrhage    Dysphagia    History of biliary duct stent placement    Iron deficiency    Occult blood in stools    Personal history of colonic polyps    Pure hypercholesterolemia    Rectal polyp     Past Medical History:  Past Medical History:   Diagnosis Date    Allergic     Arthritis     Atrial fibrillation     Cancer 01/09/2023    lung  Right    COPD (chronic obstructive pulmonary disease)     Diabetes mellitus     type 2    Frequent UTI     Headache     Heartburn     History of biliary duct stent placement 03/23/2023    History of herniated intervertebral disc     lumbar    History of skin cancer     Left lower extremity    Hyperlipidemia     Hypertension     Injury of back     Leukocytosis     Low back pain     Lung nodule     Nausea      Past Surgical History:  Past Surgical History:   Procedure Laterality Date    BACK SURGERY      lumbar     CARDIAC CATHETERIZATION      CHOLECYSTECTOMY N/A 4/10/2023    Procedure: CHOLECYSTECTOMY LAPAROSCOPIC;  Surgeon: Mich Bansal MD;  Location: [facility] MAIN OR;  Service: General;  Laterality: N/A;    COLONOSCOPY      DILATATION AND CURETTAGE      ENDOSCOPY N/A 3/30/2023    Procedure: ESOPHAGOGASTRODUODENOSCOPY WITH STENT REMOVAL;  Surgeon: Naman Blackmon MD;  Location: [facility] ENDOSCOPY;  Service: Gastroenterology;  Laterality: N/A;  normal stent removal    ERCP N/A 2023    Procedure: ENDOSCOPIC RETROGRADE CHOLANGIOPANCREATOGRAPHY with sphicterotomy and balloon guided sweeping of bile duct up to 9mm and placement of 10Fr x 9cm biliary stent;  Surgeon: Naman Blackmon MD;  Location: [facility] ENDOSCOPY;  Service: Gastroenterology;  Laterality: N/A;  post: choledocholithiasis    LOBECTOMY Right 2023    Procedure: THORASCOPIC RIGHT UPPER LOBECTOMY WITH DAVINCI ROBOT, MEDIASTINAL LYMPH NODE DISSECTION;  Surgeon: Erwin Kearney MD;  Location: [facility] MAIN OR;  Service: Robotics - DaVinci;  Laterality: Right;    LUMBAR DECOMPRESSION      L4-L5     LUNG BIOPSY Right 2022    SKIN CANCER EXCISION Left     Shin    SUBTOTAL HYSTERECTOMY       Social History:  Social History     Socioeconomic History    Marital status:    Tobacco Use    Smoking status: Former     Current packs/day: 0.00     Average packs/day: 1 pack/day for 40.0 years (40.0 ttl pk-yrs)     Types: Cigarettes     Start date: 1983     Quit date: 2023     Years since quittin.1     Passive exposure: Current    Smokeless tobacco: Never   Vaping Use    Vaping status: Never Used   Substance and Sexual Activity    Alcohol use: Yes     Comment: rare    Drug use: No    Sexual activity: Defer     Allergies:  Allergies   Allergen Reactions    Morphine Anaphylaxis and Nausea And Vomiting    Percocet [Oxycodone-Acetaminophen] GI Intolerance    Tramadol GI Intolerance     Immunizations:  Immunization History   Administered Date(s)  Administered    COVID-19 (AMELIA) 03/12/2021    Covid-19 (Pfizer) Gray Cap Monovalent 01/31/2022    Flu Vaccine Quad PF >36MO 09/17/2018    Fluzone (or Fluarix & Flulaval for VFC) >6mos 09/03/2020, 09/29/2022, 10/04/2023    Influenza, Unspecified 09/27/2016, 10/12/2017    Pneumococcal Polysaccharide (PPSV23) 09/21/2021    Tdap 05/07/2020    flucelvax quad pfs =>4 YRS 10/03/2019            In-Office Procedure(s):  No orders to display        ASCVD RIsk Score::  The 10-year ASCVD risk score (Hosea YE, et al., 2019) is: 10%    Values used to calculate the score:      Age: 58 years      Sex: Female      Is Non- : No      Diabetic: Yes      Tobacco smoker: No      Systolic Blood Pressure: 130 mmHg      Is BP treated: Yes      HDL Cholesterol: 26 mg/dL      Total Cholesterol: 141 mg/dL    Imaging:    Results for orders placed during the hospital encounter of 01/24/23    XR Chest 2 View    Narrative  XR CHEST 2 VW    Date of Exam: 1/24/2023 9:10 AM EST    Indication: post-op.    Comparison: 1/12/2023    Findings:  There is linear consolidation in the bases bilaterally suggesting atelectasis or scarring. Probable small amounts of pleural fluid are noted in the CP angles. Right hilar nodular density may be due to adenopathy. No pneumothorax is seen. Heart size and  mediastinal contour appear within normal limits. Calcified granulomatous changes are noted.    Impression  Impression:  Bibasilar linear consolidation suggesting atelectasis. Question small amounts of bilateral pleural fluid.    Right hilar nodular density may be due to adenopathy. This could be more definitively characterized by CT.    Electronically Signed: Alberto Nicole  1/24/2023 1:45 PM EST  Workstation ID: [id number]       Results for orders placed during the hospital encounter of 01/22/24    CT Chest Without Contrast Diagnostic    Narrative  CT CHEST WO CONTRAST DIAGNOSTIC    Date of Exam: 1/22/2024 9:26 AM EST    Indication: Non-small  cell lung cancer (NSCLC), non-metastatic, assess treatment response.    Comparison: 6/20/2023    Technique: Axial CT images were obtained of the chest without contrast administration.  Sagittal and coronal reconstructions were performed.  Automated exposure control and iterative reconstruction methods were used.      Findings:  Status post right upper lobectomy. There is mild scarring in the right middle lobe and right lower lobe. There is a cluster of nodules in the right lower lobe on image #63 and 64. These were not present on prior exam. No new or suspicious nodules in the  left lung.    No axillary or mediastinal adenopathy is identified. Aorta and pulmonary artery are normal in caliber. Thyroid is normal. The esophagus is unremarkable. Moderate to severe coronary artery calcifications are identified. Limited views of the upper abdomen  demonstrate hepatic steatosis. Status post cholecystectomy.    No aggressive appearing lytic or sclerotic bone lesions are identified.    Impression  Impression:    1. Small cluster of nodules in the right lower lobe on image 63 and 64. These are favored to be an infectious etiology, including a subclinical infection. Recommend follow-up chest CT in 1 to 2 months to document resolution.      Electronically Signed: Josh Murillo MD  1/22/2024 11:52 AM EST  Workstation ID: [id number]      Results for orders placed during the hospital encounter of 01/22/24    CT Chest Without Contrast Diagnostic    Narrative  CT CHEST WO CONTRAST DIAGNOSTIC    Date of Exam: 1/22/2024 9:26 AM EST    Indication: Non-small cell lung cancer (NSCLC), non-metastatic, assess treatment response.    Comparison: 6/20/2023    Technique: Axial CT images were obtained of the chest without contrast administration.  Sagittal and coronal reconstructions were performed.  Automated exposure control and iterative reconstruction methods were used.      Findings:  Status post right upper lobectomy. There is mild scarring  in the right middle lobe and right lower lobe. There is a cluster of nodules in the right lower lobe on image #63 and 64. These were not present on prior exam. No new or suspicious nodules in the  left lung.    No axillary or mediastinal adenopathy is identified. Aorta and pulmonary artery are normal in caliber. Thyroid is normal. The esophagus is unremarkable. Moderate to severe coronary artery calcifications are identified. Limited views of the upper abdomen  demonstrate hepatic steatosis. Status post cholecystectomy.    No aggressive appearing lytic or sclerotic bone lesions are identified.    Impression  Impression:    1. Small cluster of nodules in the right lower lobe on image 63 and 64. These are favored to be an infectious etiology, including a subclinical infection. Recommend follow-up chest CT in 1 to 2 months to document resolution.      Electronically Signed: Josh Murillo MD  1/22/2024 11:52 AM EST  Workstation ID: [id number]      Lab Review:   Lab on 01/26/2024   Component Date Value    WBC 01/26/2024 13.08 (H)     RBC 01/26/2024 4.66     Hemoglobin 01/26/2024 14.1     Hematocrit 01/26/2024 43.2     MCV 01/26/2024 92.7     MCH 01/26/2024 30.3     MCHC 01/26/2024 32.6     RDW 01/26/2024 13.4     RDW-SD 01/26/2024 44.4     MPV 01/26/2024 9.4     Platelets 01/26/2024 275     Neutrophil % 01/26/2024 73.1     Lymphocyte % 01/26/2024 21.8     Monocyte % 01/26/2024 4.7 (L)     Eosinophil % 01/26/2024 0.2 (L)     Basophil % 01/26/2024 0.2     Neutrophils, Absolute 01/26/2024 9.58 (H)     Lymphocytes, Absolute 01/26/2024 2.85     Monocytes, Absolute 01/26/2024 0.61     Eosinophils, Absolute 01/26/2024 0.02     Basophils, Absolute 01/26/2024 0.02     Extra Tube 01/26/2024 Hold for add-ons.     Extra Tube 01/26/2024 Hold for add-ons.    Office Visit on 01/04/2024   Component Date Value    Color 01/04/2024 Yellow     Clarity, UA 01/04/2024 Clear     Glucose, UA 01/04/2024 500 mg/dL (A)     Bilirubin 01/04/2024 Small  (1+) (A)     Ketones, UA 01/04/2024 Negative     Specific Gravity  01/04/2024 1.030     Blood, UA 01/04/2024 Negative     pH, Urine 01/04/2024 5.0     Protein, POC 01/04/2024 Trace (A)     Urobilinogen, UA 01/04/2024 Normal     Leukocytes 01/04/2024 Negative     Nitrite, UA 01/04/2024 Negative    Orders Only on 12/20/2023   Component Date Value    WBC 12/27/2023 7.15     RBC 12/27/2023 4.37     Hemoglobin 12/27/2023 13.2     Hematocrit 12/27/2023 39.7     MCV 12/27/2023 90.8     MCH 12/27/2023 30.2     MCHC 12/27/2023 33.2     RDW 12/27/2023 12.7     RDW-SD 12/27/2023 42.3     MPV 12/27/2023 11.1     Platelets 12/27/2023 188     Glucose 12/27/2023 264 (H)     BUN 12/27/2023 13     Creatinine 12/27/2023 0.66     Sodium 12/27/2023 138     Potassium 12/27/2023 3.8     Chloride 12/27/2023 103     CO2 12/27/2023 24.6     Calcium 12/27/2023 9.2     Total Protein 12/27/2023 6.9     Albumin 12/27/2023 3.9     ALT (SGPT) 12/27/2023 90 (H)     AST (SGOT) 12/27/2023 93 (H)     Alkaline Phosphatase 12/27/2023 131 (H)     Total Bilirubin 12/27/2023 0.5     Globulin 12/27/2023 3.0     A/G Ratio 12/27/2023 1.3     BUN/Creatinine Ratio 12/27/2023 19.7     Anion Gap 12/27/2023 10.4     eGFR 12/27/2023 101.2     Total Cholesterol 12/27/2023 141     Triglycerides 12/27/2023 297 (H)     HDL Cholesterol 12/27/2023 26 (L)     LDL Cholesterol  12/27/2023 67     VLDL Cholesterol 12/27/2023 48 (H)     LDL/HDL Ratio 12/27/2023 2.14     Hemoglobin A1C 12/27/2023 10.80 (H)    Office Visit on 11/02/2023   Component Date Value    Color 11/02/2023 Yellow     Clarity, UA 11/02/2023 Clear     Glucose, UA 11/02/2023 Negative     Bilirubin 11/02/2023 Negative     Ketones, UA 11/02/2023 Trace (A)     Specific [place]  11/02/2023 1.030     Blood, UA 11/02/2023 Trace (A)     pH, Urine 11/02/2023 5.0     Protein, POC 11/02/2023 Negative     Urobilinogen, UA 11/02/2023 Normal     Leukocytes 11/02/2023 Trace (A)     Nitrite, UA 11/02/2023 Negative     HSV  1 IgG, Type Specific 11/02/2023 44.30 (H)     HSV 2 IgG, Type Spec 11/02/2023 <0.91     Color, UA 11/02/2023 Yellow     Appearance, UA 11/02/2023 Turbid (A)     pH, UA 11/02/2023 5.5     Specific [place], UA 11/02/2023 1.023     Glucose, UA 11/02/2023 Negative     Ketones, UA 11/02/2023 Negative     Bilirubin, UA 11/02/2023 Negative     Blood, UA 11/02/2023 Negative     Protein, UA 11/02/2023 Negative     Leuk Esterase, UA 11/02/2023 Moderate (2+) (A)     Nitrite, UA 11/02/2023 Negative     Urobilinogen, UA 11/02/2023 1.0 E.U./dL     Chlamydia DNA by PCR 11/02/2023 Not Detected     Neisseria gonorrhoeae by* 11/02/2023 Not Detected     Trichomonas vaginosis 11/02/2023 Negative     Extra Tube 11/02/2023 Hold for add-ons.     Urine Culture 11/02/2023 25,000 CFU/mL Escherichia coli (A)     RBC, UA 11/02/2023 None Seen     WBC, UA 11/02/2023 0-2     Bacteria, UA 11/02/2023 None Seen     Squamous Epithelial Cell* 11/02/2023 0-2     Hyaline Casts, UA 11/02/2023 None Seen     Amorphous Crystals, UA 11/02/2023 Large/3+     Methodology 11/02/2023 Manual Light Microscopy    Clinical Support on 10/16/2023   Component Date Value    Color 10/16/2023 Yellow     Clarity, UA 10/16/2023 Clear     Glucose, UA 10/16/2023 500 mg/dL (A)     Bilirubin 10/16/2023 Negative     Ketones, UA 10/16/2023 Negative     Specific Gravity  10/16/2023 1.030     Blood, UA 10/16/2023 Negative     pH, Urine 10/16/2023 5.0     Protein, POC 10/16/2023 Negative     Urobilinogen, UA 10/16/2023 Normal     Leukocytes 10/16/2023 Negative     Nitrite, UA 10/16/2023 Negative    Office Visit on 10/04/2023   Component Date Value    Color 10/04/2023 Dark Yellow     Clarity, UA 10/04/2023 Cloudy (A)     Glucose, UA 10/04/2023 Negative     Bilirubin 10/04/2023 Small (1+) (A)     Ketones, UA 10/04/2023 Trace (A)     Specific Gravity  10/04/2023 1.030     Blood, UA 10/04/2023 Negative     pH, Urine 10/04/2023 5.0     Protein, POC 10/04/2023 30 mg/dL (A)      Urobilinogen, UA 10/04/2023 Normal     Leukocytes 10/04/2023 Negative     Nitrite, UA 10/04/2023 Positive (A)     Urine Culture 10/04/2023 >100,000 CFU/mL Escherichia coli (A)    Hospital Outpatient Visit on 09/25/2023   Component Date Value    EF_3D-VOL 09/25/2023 57.0     LVIDd 09/25/2023 4.6     LVIDs 09/25/2023 3.1     IVSd 09/25/2023 1.10     LVPWd 09/25/2023 1.10     FS 09/25/2023 32.6     IVS/LVPW 09/25/2023 1.00     ESV(cubed) 09/25/2023 29.8     LV Sys Vol (BSA correcte* 09/25/2023 21.1     EDV(cubed) 09/25/2023 97.3     LV [place] Vol (BSA correct* 09/25/2023 50.8     LVOT area 09/25/2023 3.5     LV mass(C)d 09/25/2023 181.2     LVOT diam 09/25/2023 2.10     EDV(MOD-sp4) 09/25/2023 103.0     ESV(MOD-sp4) 09/25/2023 42.8     SV(MOD-sp4) 09/25/2023 60.2     SI(MOD-sp4) 09/25/2023 29.7     EF(MOD-sp4) 09/25/2023 58.4     MV E max mane 09/25/2023 51.7     MV A max mane 09/25/2023 67.1     MV dec time 09/25/2023 0.25     MV E/A 09/25/2023 0.77     LA ESV Index (BP) 09/25/2023 28.1     Med Peak E' Mane 09/25/2023 5.2     Lat Peak E' Mane 09/25/2023 5.9     Avg E/e' ratio 09/25/2023 9.32     SV(LVOT) 09/25/2023 59.2     RVIDd 09/25/2023 3.3     TAPSE (>1.6) 09/25/2023 1.48     RV S' 09/25/2023 9.7     LA dimension (2D)  09/25/2023 3.3     LV V1 max 09/25/2023 79.1     LV V1 max PG 09/25/2023 2.5     LV V1 mean PG 09/25/2023 1.00     LV V1 VTI 09/25/2023 17.1     Ao pk mane 09/25/2023 109.0     Ao max PG 09/25/2023 4.8     Ao mean PG 09/25/2023 3.0     Ao V2 VTI 09/25/2023 24.0     ELVIN(I,D) 09/25/2023 2.47     MV max PG 09/25/2023 3.0     MV mean PG 09/25/2023 1.00     MV V2 VTI 09/25/2023 27.6     MV P1/2t 09/25/2023 125.1     MVA(P1/2t) 09/25/2023 1.76     MVA(VTI) 09/25/2023 2.15     MV dec slope 09/25/2023 162.0     RAP systole 09/25/2023 8.0     RV V1 max PG 09/25/2023 0.72     RV V1 max 09/25/2023 42.3     RV V1 VTI 09/25/2023 11.8     PA V2 max 09/25/2023 71.9     PA acc time 09/25/2023 0.14     ACS 09/25/2023 2.00      Sinus 09/25/2023 3.2     STJ 09/25/2023 2.5     EF(MOD-bp) 09/25/2023 58.0    Hospital Outpatient Visit on 09/25/2023   Component Date Value    BH CV STRESS PROTOCOL 1 10/23/2023 Pharmacologic     Stage 1 10/23/2023 1.0     HR Stage 1 10/23/2023 80     BP Stage 1 10/23/2023 124/63     Duration Min Stage 1 10/23/2023 0     Duration Sec Stage 1 10/23/2023 10     Stress Dose Regadenoson * 10/23/2023 0.40     Stress Comments Stage 1 10/23/2023 10 sec bolus injection     Stage 2 10/23/2023 2.0     HR Stage 2 10/23/2023 88     BP Stage 2 10/23/2023 107/49     Duration Min Stage 2 10/23/2023 4     Duration Sec Stage 2 10/23/2023 0     Stress Comments Stage 2 10/23/2023 recovery     Stage 3 10/23/2023 3.0     HR Stage 3 10/23/2023 82     BP Stage 3 10/23/2023 123/68     Baseline HR 10/23/2023 64     Baseline BP 10/23/2023 124/63     Peak HR 10/23/2023 88     Peak BP 10/23/2023 129/60     Recovery HR 10/23/2023 80     Recovery BP 10/23/2023 129/66     Target HR (85%) 10/23/2023 137     Max. Pred. HR (100%) 10/23/2023 161     Percent Max Pred HR 10/23/2023 54.66     Percent Target HR 10/23/2023 64     BH CV REST NUCLEAR ISOTO* 10/23/2023 11.0     BH CV STRESS NUCLEAR ISO* 10/23/2023 33.0     Nuc Stress EF 10/23/2023 71    Orders Only on 09/20/2023   Component Date Value    WBC 09/27/2023 12.59 (H)     RBC 09/27/2023 4.78     Hemoglobin 09/27/2023 14.1     Hematocrit 09/27/2023 42.3     MCV 09/27/2023 88.5     MCH 09/27/2023 29.5     MCHC 09/27/2023 33.3     RDW 09/27/2023 12.9     RDW-SD 09/27/2023 41.5     MPV 09/27/2023 11.4     Platelets 09/27/2023 200     Glucose 09/27/2023 365 (H)     BUN 09/27/2023 12     Creatinine 09/27/2023 0.73     Sodium 09/27/2023 136     Potassium 09/27/2023 4.3     Chloride 09/27/2023 98     CO2 09/27/2023 26.1     Calcium 09/27/2023 10.5     Total Protein 09/27/2023 8.0     Albumin 09/27/2023 4.9     ALT (SGPT) 09/27/2023 88 (H)     AST (SGOT) 09/27/2023 57 (H)     Alkaline Phosphatase  09/27/2023 151 (H)     Total Bilirubin 09/27/2023 0.7     Globulin 09/27/2023 3.1     A/G Ratio 09/27/2023 1.6     BUN/Creatinine Ratio 09/27/2023 16.4     Anion Gap 09/27/2023 11.9     eGFR 09/27/2023 94.9     Total Cholesterol 09/27/2023 203 (H)     Triglycerides 09/27/2023 169 (H)     HDL Cholesterol 09/27/2023 56     LDL Cholesterol  09/27/2023 117 (H)     VLDL Cholesterol 09/27/2023 30     LDL/HDL Ratio 09/27/2023 2.02     Hemoglobin A1C 09/27/2023 9.30 (H)     Microalbumin/Creatinine * 09/27/2023 19.4     Creatinine, Urine 09/27/2023 195.7     Microalbumin, Urine 09/27/2023 3.8     25 Hydroxy, Vitamin D 09/27/2023 50.6     Hepatitis C Ab 09/27/2023 Non-Reactive    Hospital Outpatient Visit on 09/11/2023   Component Date Value    Prox CCA PSV 09/11/2023 83.9     Prox CCA EDV 09/11/2023 16.2     Dist CCA PSV 09/11/2023 82.1     Dist CCA EDV 09/11/2023 21.6     Prox ICA PSV 09/11/2023 -69.0     Prox ICA EDV 09/11/2023 -21.6     Dist ICA PSV 09/11/2023 -71.3     Dist ICA EDV 09/11/2023 -29.0     Prox ECA PSV 09/11/2023 -103.0     Vertebral A PSV 09/11/2023 -51.6     Vertebral A EDV 09/11/2023 -15.7     Prox SCLA PSV 09/11/2023 165.0     ICA/CCA ratio 09/11/2023 -0.85     Prox CCA PSV 09/11/2023 102.0     Prox CCA EDV 09/11/2023 24.9     Dist CCA PSV 09/11/2023 -93.8     Dist CCA EDV 09/11/2023 -21.4     Prox ICA PSV 09/11/2023 -85.1     Prox ICA EDV 09/11/2023 -28.0     Dist ICA PSV 09/11/2023 -81.8     Dist ICA EDV 09/11/2023 -30.2     Prox ECA PSV 09/11/2023 -83.4     Vertebral A PSV 09/11/2023 64.8     Vertebral A EDV 09/11/2023 18.1     Prox SCLA PSV 09/11/2023 132.0     ICA/CCA ratio 09/11/2023 -0.83     Right arm BP 09/11/2023 128/67     Left arm BP 09/11/2023 136/80    There may be more visits with results that are not included.     Recent labs reviewed and interpreted for clinical significance and application            Level of Care:           Alexandro Farah MD  03/05/24  .

## 2024-03-05 NOTE — TELEPHONE ENCOUNTER
Caller: [facility]    REF # [id number]    Best call back number: [phone]    What orders are you requesting (i.e. lab or imaging): REQUESTING A CALL TO GO OVER PRIOR AUTHORIZATION QUESTIONS FOR ACCU CHECK GLUCOSE METER (COULD NOT LOCATE ON CHART)

## 2024-03-07 NOTE — TELEPHONE ENCOUNTER
Spoke with Yoseph. Patient got a new meter on 01/05/2024. Does not qualify for a new meter til next year.

## 2024-03-22 DIAGNOSIS — E11.65 TYPE 2 DIABETES MELLITUS WITH HYPERGLYCEMIA, WITHOUT LONG-TERM CURRENT USE OF INSULIN: Primary | ICD-10-CM

## 2024-03-22 DIAGNOSIS — E78.2 MIXED HYPERLIPIDEMIA: ICD-10-CM

## 2024-03-22 RX ORDER — FENOFIBRATE 145 MG/1
TABLET, COATED ORAL DAILY
Qty: 90 TABLET | Refills: 0 | Status: SHIPPED | OUTPATIENT
Start: 2024-03-22

## 2024-03-26 ENCOUNTER — HOSPITAL ENCOUNTER (OUTPATIENT)
Dept: CT IMAGING | Facility: HOSPITAL | Age: 59
Discharge: HOME | End: 2024-03-26
Admitting: NURSE PRACTITIONER
Payer: MEDICARE

## 2024-03-26 DIAGNOSIS — Z85.118 HISTORY OF LUNG CANCER: ICD-10-CM

## 2024-03-26 DIAGNOSIS — R91.8 LUNG NODULES: ICD-10-CM

## 2024-03-26 DIAGNOSIS — Z87.891 FORMER SMOKER: ICD-10-CM

## 2024-03-26 PROCEDURE — 71250 CT THORAX DX C-: CPT

## 2024-03-28 ENCOUNTER — CLINICAL SUPPORT (OUTPATIENT)
Dept: FAMILY MEDICINE CLINIC | Facility: CLINIC | Age: 59
End: 2024-03-28
Payer: MEDICARE

## 2024-03-28 DIAGNOSIS — I10 ESSENTIAL HYPERTENSION: Primary | ICD-10-CM

## 2024-03-28 PROCEDURE — 85027 COMPLETE CBC AUTOMATED: CPT | Performed by: NURSE PRACTITIONER

## 2024-03-28 PROCEDURE — 80053 COMPREHEN METABOLIC PANEL: CPT | Performed by: NURSE PRACTITIONER

## 2024-03-28 PROCEDURE — 83036 HEMOGLOBIN GLYCOSYLATED A1C: CPT | Performed by: NURSE PRACTITIONER

## 2024-03-28 PROCEDURE — 36415 COLL VENOUS BLD VENIPUNCTURE: CPT | Performed by: NURSE PRACTITIONER

## 2024-03-28 PROCEDURE — 80061 LIPID PANEL: CPT | Performed by: NURSE PRACTITIONER

## 2024-03-28 NOTE — PROGRESS NOTES
Venipuncture Blood Specimen Collection  Venipuncture performed in right arm by Tonya Holman MA with good hemostasis. Patient tolerated the procedure well without complications.   03/28/24   Tonya Holman MA

## 2024-03-29 ENCOUNTER — TELEPHONE (OUTPATIENT)
Dept: SURGERY | Facility: CLINIC | Age: 59
End: 2024-03-29
Payer: MEDICARE

## 2024-03-29 LAB
ALBUMIN SERPL-MCNC: 4.6 G/DL (ref 3.5–5.2)
ALBUMIN/GLOB SERPL: 1.6 G/DL
ALP SERPL-CCNC: 134 U/L (ref 39–117)
ALT SERPL W P-5'-P-CCNC: 66 U/L (ref 1–33)
ANION GAP SERPL CALCULATED.3IONS-SCNC: 15 MMOL/L (ref 5–15)
AST SERPL-CCNC: 40 U/L (ref 1–32)
BILIRUB SERPL-MCNC: 0.6 MG/DL (ref 0–1.2)
BUN SERPL-MCNC: 12 MG/DL (ref 6–20)
BUN/CREAT SERPL: 14.8 (ref 7–25)
CALCIUM SPEC-SCNC: 9.9 MG/DL (ref 8.6–10.5)
CHLORIDE SERPL-SCNC: 105 MMOL/L (ref 98–107)
CHOLEST SERPL-MCNC: 102 MG/DL (ref 0–200)
CO2 SERPL-SCNC: 26 MMOL/L (ref 22–29)
CREAT SERPL-MCNC: 0.81 MG/DL (ref 0.57–1)
DEPRECATED RDW RBC AUTO: 42.4 FL (ref 37–54)
EGFRCR SERPLBLD CKD-EPI 2021: 83.7 ML/MIN/1.73
ERYTHROCYTE [DISTWIDTH] IN BLOOD BY AUTOMATED COUNT: 12.9 % (ref 12.3–15.4)
GLOBULIN UR ELPH-MCNC: 2.8 GM/DL
GLUCOSE SERPL-MCNC: 242 MG/DL (ref 65–99)
HBA1C MFR BLD: 9.1 % (ref 4.8–5.6)
HCT VFR BLD AUTO: 42.1 % (ref 34–46.6)
HDLC SERPL-MCNC: 46 MG/DL (ref 40–60)
HGB BLD-MCNC: 14 G/DL (ref 12–15.9)
LDLC SERPL CALC-MCNC: 35 MG/DL (ref 0–100)
LDLC/HDLC SERPL: 0.7 {RATIO}
MCH RBC QN AUTO: 30 PG (ref 26.6–33)
MCHC RBC AUTO-ENTMCNC: 33.3 G/DL (ref 31.5–35.7)
MCV RBC AUTO: 90.3 FL (ref 79–97)
PLATELET # BLD AUTO: 215 10*3/MM3 (ref 140–450)
PMV BLD AUTO: 11.4 FL (ref 6–12)
POTASSIUM SERPL-SCNC: 4.9 MMOL/L (ref 3.5–5.2)
PROT SERPL-MCNC: 7.4 G/DL (ref 6–8.5)
RBC # BLD AUTO: 4.66 10*6/MM3 (ref 3.77–5.28)
SODIUM SERPL-SCNC: 146 MMOL/L (ref 136–145)
TRIGL SERPL-MCNC: 118 MG/DL (ref 0–150)
VLDLC SERPL-MCNC: 21 MG/DL (ref 5–40)
WBC NRBC COR # BLD AUTO: 13.77 10*3/MM3 (ref 3.4–10.8)

## 2024-04-02 ENCOUNTER — OFFICE VISIT (OUTPATIENT)
Dept: SURGERY | Facility: CLINIC | Age: 59
End: 2024-04-02
Payer: MEDICARE

## 2024-04-02 VITALS
HEIGHT: 69 IN | SYSTOLIC BLOOD PRESSURE: 120 MMHG | WEIGHT: 190.5 LBS | DIASTOLIC BLOOD PRESSURE: 68 MMHG | OXYGEN SATURATION: 98 % | BODY MASS INDEX: 28.22 KG/M2 | HEART RATE: 99 BPM

## 2024-04-02 DIAGNOSIS — Z87.891 FORMER SMOKER: ICD-10-CM

## 2024-04-02 DIAGNOSIS — R91.8 LUNG NODULES: ICD-10-CM

## 2024-04-02 DIAGNOSIS — Z85.118 HISTORY OF LUNG CANCER: Primary | ICD-10-CM

## 2024-04-03 NOTE — PROGRESS NOTES
"Chief Complaint  Follow up, RUL resection for NSCLC in January 2023    Subjective        Rahel Ramsey presents to [facility]    History of Present Illness    Ms. Ramsey is a very pleasant 58-year-old lady who is status post right upper lobe resection by Dr. Kearney in January 2023 for stage I adenocarcinoma.  She is also followed by Dr. Prabhakar of medical oncology.  She is a former smoker with greater than 40-pack-year history.  She denies new onset shortness of breath, cough, hemoptysis or voice change.  She has some slight numbness to her surgical site.  Her weight is stable. She also sees Dr. Easton of pulmonary medicine.  She presents today in her baseline state of health feeling well with CT chest.  Her partner continues to smoke.      Objective   Vital Signs:  /68 (BP Location: Left arm, Patient Position: Sitting, Cuff Size: Adult)   Pulse 99   Ht 175.3 cm (69.02\")   Wt 86.4 kg (190 lb 8 oz)   SpO2 98%   BMI 28.12 kg/m²   Estimated body mass index is 28.12 kg/m² as calculated from the following:    Height as of this encounter: 175.3 cm (69.02\").    Weight as of this encounter: 86.4 kg (190 lb 8 oz).             Physical Exam  Constitutional:       General: She is not in acute distress.     Appearance: Normal appearance.   HENT:      Head: Normocephalic and atraumatic.      Nose: Nose normal.   Pulmonary:      Effort: Pulmonary effort is normal. No respiratory distress.   Musculoskeletal:         General: Normal range of motion.      Cervical back: Normal range of motion and neck supple.   Skin:     General: Skin is warm.   Neurological:      General: No focal deficit present.      Mental Status: She is alert.      Motor: No weakness.   Psychiatric:         Mood and Affect: Mood normal.         Thought Content: Thought content normal.        Result Review :      CT chest 3/26/2024: Postsurgical changes from right upper lobectomy as before.  Stable nodules within " the right middle and lower lobe measuring up to 7 mm.  No mediastinal or hilar lymphadenopathy.  Imaging reviewed independently.      CT chest 1/22/2024: postoperative changes from a right upper lobectomy with postoperative scarring.  There is a new small cluster of nodules in the right lower lobe.  No mediastinal or hilar lymphadenopathy.  No pleural or pericardial effusion.           Assessment and Plan     Diagnoses and all orders for this visit:    1. History of lung cancer (Primary)  -     CT Chest Without Contrast; Future    2. Former smoker  -     CT Chest Without Contrast; Future    3. Lung nodules  -     CT Chest Without Contrast; Future      Patient is status post right upper lobectomy by Dr. Kearney in January 2023 for a T1bN0 adenocarcinoma.  Her most recent CT chest shows some persistent nodules in the right lower lobe without change.  Will continue surveillance with CT chest in 6 months and have her follow-up with us in the office to review this imaging.  She is in agreement with this plan.       I spent 32 minutes caring for Rahel on this date of service. This time includes time spent by me in the following activities:preparing for the visit, reviewing tests, obtaining and/or reviewing a separately obtained history, performing a medically appropriate examination and/or evaluation , counseling and educating the patient/family/caregiver, ordering medications, tests, or procedures, documenting information in the medical record, and independently interpreting results and communicating that information with the patient/family/caregiver  Follow Up     Return in about 6 months (around 10/2/2024) for Next scheduled follow up.  Patient was given instructions and counseling regarding her condition or for health maintenance advice. Please see specific information pulled into the AVS if appropriate.

## 2024-04-04 ENCOUNTER — OFFICE VISIT (OUTPATIENT)
Dept: FAMILY MEDICINE CLINIC | Facility: CLINIC | Age: 59
End: 2024-04-04
Payer: MEDICARE

## 2024-04-04 VITALS
SYSTOLIC BLOOD PRESSURE: 110 MMHG | OXYGEN SATURATION: 98 % | WEIGHT: 189.6 LBS | HEART RATE: 90 BPM | BODY MASS INDEX: 28.08 KG/M2 | HEIGHT: 69 IN | DIASTOLIC BLOOD PRESSURE: 72 MMHG | TEMPERATURE: 98.5 F

## 2024-04-04 DIAGNOSIS — D72.829 LEUKOCYTOSIS, UNSPECIFIED TYPE: ICD-10-CM

## 2024-04-04 DIAGNOSIS — E55.9 VITAMIN D DEFICIENCY: ICD-10-CM

## 2024-04-04 DIAGNOSIS — J44.9 CHRONIC OBSTRUCTIVE PULMONARY DISEASE, UNSPECIFIED COPD TYPE: ICD-10-CM

## 2024-04-04 DIAGNOSIS — I10 ESSENTIAL HYPERTENSION: Primary | ICD-10-CM

## 2024-04-04 DIAGNOSIS — G89.29 CHRONIC MIDLINE LOW BACK PAIN WITH BILATERAL SCIATICA: ICD-10-CM

## 2024-04-04 DIAGNOSIS — R79.89 ELEVATED LFTS: ICD-10-CM

## 2024-04-04 DIAGNOSIS — M51.16 LUMBAR DISC DISEASE WITH RADICULOPATHY: ICD-10-CM

## 2024-04-04 DIAGNOSIS — M54.42 CHRONIC MIDLINE LOW BACK PAIN WITH BILATERAL SCIATICA: ICD-10-CM

## 2024-04-04 DIAGNOSIS — Z85.118 HISTORY OF LUNG CANCER: ICD-10-CM

## 2024-04-04 DIAGNOSIS — E11.65 TYPE 2 DIABETES MELLITUS WITH HYPERGLYCEMIA, WITHOUT LONG-TERM CURRENT USE OF INSULIN: ICD-10-CM

## 2024-04-04 DIAGNOSIS — M54.41 CHRONIC MIDLINE LOW BACK PAIN WITH BILATERAL SCIATICA: ICD-10-CM

## 2024-04-04 DIAGNOSIS — E78.2 MIXED HYPERLIPIDEMIA: ICD-10-CM

## 2024-04-04 RX ORDER — PREGABALIN 100 MG/1
100 CAPSULE ORAL 2 TIMES DAILY
Qty: 60 CAPSULE | Refills: 5 | Status: SHIPPED | OUTPATIENT
Start: 2024-04-04

## 2024-04-04 RX ORDER — ALBUTEROL SULFATE 90 UG/1
2 AEROSOL, METERED RESPIRATORY (INHALATION) EVERY 4 HOURS PRN
Qty: 18 G | Refills: 5 | Status: SHIPPED | OUTPATIENT
Start: 2024-04-04

## 2024-04-04 NOTE — PROGRESS NOTES
Chief Complaint  Chief Complaint   Patient presents with    Follow-up     3 month f/u           Subjective          Rahel Ramsey presents to [facility] for   History of Present Illness    Diabetes mellitus type II, last hemoglobin A1c 10.8, she is on metformin 1000 mg twice daily, diet is poor. She is now only eating sugar-free candy, decreased carbohydrates, only drinking water, no sodas.  Denies any signs/symptoms of hyper/hypoglycemia, blurry vision, polydipsia, polyuria, nocturia, and unintentional weight loss     Lung cancer, following with Dr. Prabhakar, on 1/9/2023 patient underwent right lobectomy with mediastinal lymph node dissection per Dr. Kearney.    -CT scan of the chest 6/2/2023 showed no relapse of disease, there was a soft tissue shadow at the tail of the pancreas  -CT of the abdomen which showed improvement, thought to be pancreatic pseudocyst.    -CT chest 3/26/2024 stable pulmonary nodules  RUL/RLL, hepatic steatosis, pancreatic findings unchanged      COPD, she follows with Dr. Easton, symptoms include non-productive cough, especially with exertion.  Symptoms have been gradually improving since stopping smoking.  The pt is using Trelegy inhaler as directed and albuterol as needed.     HTN, stable on meds and takes as directed, denies chest pain, headache, shortness of air, palpitations and swelling of extremities.     Elevated LFTs/hepatic steatosis, patient is taking vitamin E daily, follows with gastroenterology  -Colonoscopy is overdue     Hyperlipidemia, follows with Dr. Farah, she was started on atorvastatin and Zetia along with continuation of fenofibrate.  The patient denies muscle aches, constipation, diarrhea, GI upset, fatigue, chest pain/pressure, exercise intolerance, dyspnea, palpitations, syncope and pedal edema.       Vitamin D deficiency, on weekly vitamin D     Chronic low back pain, follows with pain management, patient on Lyrica     Recurrent UTI,  postmenopausal bleeding and vulvar mass, she saw gynecology, reports no significant abn findings except atrophy, she was offered a cream but did not get it. She has not experienced any further episodes of bleeding.  She is drinking more water, dysuria and foul-smelling urine has improved.        The following portions of the patient's history were reviewed and updated as appropriate: allergies, current medications, past family history, past medical history, past social history, past surgical history and problem list.    Past Medical History:   Diagnosis Date    Allergic     Arthritis     Atrial fibrillation     Cancer 01/09/2023    COPD (chronic obstructive pulmonary disease)     Diabetes mellitus     Frequent UTI     Headache     Heartburn     History of biliary duct stent placement 03/23/2023    History of herniated intervertebral disc     History of skin cancer     Hyperlipidemia     Hypertension     Injury of back     Leukocytosis     Low back pain     Lung nodule     Nausea      Past Surgical History:   Procedure Laterality Date    BACK SURGERY      lumbar    CARDIAC CATHETERIZATION      CHOLECYSTECTOMY N/A 4/10/2023    Procedure: CHOLECYSTECTOMY LAPAROSCOPIC;  Surgeon: Mihc Bansal MD;  Location: [facility] MAIN OR;  Service: General;  Laterality: N/A;    COLONOSCOPY      DILATATION AND CURETTAGE      ENDOSCOPY N/A 3/30/2023    Procedure: ESOPHAGOGASTRODUODENOSCOPY WITH STENT REMOVAL;  Surgeon: Naman Blackmon MD;  Location: [facility] ENDOSCOPY;  Service: Gastroenterology;  Laterality: N/A;  normal stent removal    ERCP N/A 02/01/2023    Procedure: ENDOSCOPIC RETROGRADE CHOLANGIOPANCREATOGRAPHY with sphicterotomy and balloon guided sweeping of bile duct up to 9mm and placement of 10Fr x 9cm biliary stent;  Surgeon: Naman Blackmon MD;  Location: [facility] ENDOSCOPY;  Service: Gastroenterology;  Laterality: N/A;  post: choledocholithiasis    LOBECTOMY Right 01/09/2023    Procedure: THORASCOPIC RIGHT  UPPER LOBECTOMY WITH DAVINCI ROBOT, MEDIASTINAL LYMPH NODE DISSECTION;  Surgeon: Erwin Kearney MD;  Location: [facility] MAIN OR;  Service: Robotics - DaVinci;  Laterality: Right;    LUMBAR DECOMPRESSION      L4-L5     LUNG BIOPSY Right 2022    SKIN CANCER EXCISION Left     Shin    SUBTOTAL HYSTERECTOMY       Family History   Problem Relation Age of Onset    Breast cancer Mother     Lung cancer Father     Lung cancer Other     Colon cancer Other     Skin cancer Other      Social History     Tobacco Use    Smoking status: Former     Current packs/day: 0.00     Average packs/day: 1 pack/day for 40.0 years (40.0 ttl pk-yrs)     Types: Cigarettes     Start date: 1983     Quit date: 2023     Years since quittin.2     Passive exposure: Current    Smokeless tobacco: Never   Substance Use Topics    Alcohol use: Yes     Comment: rare       Current Outpatient Medications:     albuterol sulfate  (90 Base) MCG/ACT inhaler, Inhale 2 puffs Every 4 (Four) Hours As Needed for Wheezing or Shortness of Air., Disp: 18 g, Rfl: 5    aspirin (aspirin) 81 MG EC tablet, Take 1 tablet by mouth Daily. LD: 4/5- plans to hold, Disp: , Rfl:     atorvastatin (LIPITOR) 20 MG tablet, Take 1 tablet by mouth Daily., Disp: 90 tablet, Rfl: 3    Blood Glucose Monitoring Suppl (True Metrix Meter) w/Device kit, Use 1 each Take As Directed., Disp: 1 kit, Rfl: 0    ezetimibe (ZETIA) 10 MG tablet, Take 1 tablet by mouth Daily., Disp: 90 tablet, Rfl: 3    fenofibrate (TRICOR) 145 MG tablet, TAKE 1 TABLET BY MOUTH DAILY, Disp: 90 tablet, Rfl: 0    fluticasone (FLONASE) 50 MCG/ACT nasal spray, 1 spray by Each Nare route Daily., Disp: 48 g, Rfl: 3    Fluticasone-Umeclidin-Vilant (TRELEGY) 100-62.5-25 MCG/ACT inhaler, Inhale 1 puff Every Night., Disp: , Rfl:     glucose blood (True Metrix Blood Glucose Test) test strip, Use as instructed to check blood sugar daily, Disp: 100 each, Rfl: 3    HYDROcodone-acetaminophen (NORCO)  MG per tablet,  "Take 1 tablet by mouth Every 6 (Six) Hours As Needed for Moderate Pain. May take dos if needed, Disp: , Rfl:     Lancets (freestyle) lancets, Use to check blood glucose twice daily as needed DX: E11.65, Disp: 100 each, Rfl: 3    metFORMIN (GLUCOPHAGE) 1000 MG tablet, Take 1 tablet by mouth 2 (Two) Times a Day With Meals., Disp: 180 tablet, Rfl: 1    metoprolol succinate XL (TOPROL-XL) 25 MG 24 hr tablet, Take 1 tablet by mouth Daily., Disp: 90 tablet, Rfl: 3    nystatin (MYCOSTATIN) 114625 UNIT/GM cream, Apply 1 Application topically to the appropriate area as directed As Needed. None dos, Disp: , Rfl:     ondansetron (Zofran) 4 MG tablet, Take 1 tablet by mouth Every 8 (Eight) Hours As Needed for Nausea or Vomiting., Disp: 30 tablet, Rfl: 0    pregabalin (LYRICA) 100 MG capsule, Take 1 capsule by mouth 2 (Two) Times a Day., Disp: 60 capsule, Rfl: 5    vitamin D (ERGOCALCIFEROL) 1.25 MG (06853 UT) capsule capsule, Take 1 capsule by mouth Every 7 (Seven) Days., Disp: 12 capsule, Rfl: 1    vitamin E 400 UNIT capsule, Take 1 capsule by mouth Daily., Disp: 90 capsule, Rfl: 3    SITagliptin (Januvia) 100 MG tablet, Take 1 tablet by mouth Daily., Disp: 90 tablet, Rfl: 1    Objective   Vital Signs:   /72 (BP Location: Left arm, Patient Position: Sitting, Cuff Size: Adult)   Pulse 90   Temp 98.5 °F (36.9 °C) (Temporal)   Ht 175.3 cm (69\")   Wt 86 kg (189 lb 9.6 oz)   SpO2 98%   BMI 28.00 kg/m²           Physical Exam  Constitutional:       General: She is not in acute distress.     Appearance: Normal appearance. She is well-developed. She is not ill-appearing or diaphoretic.   HENT:      Head: Normocephalic.   Eyes:      Conjunctiva/sclera: Conjunctivae normal.      Pupils: Pupils are equal, round, and reactive to light.   Neck:      Thyroid: No thyromegaly.      Vascular: No JVD.   Cardiovascular:      Rate and Rhythm: Normal rate and regular rhythm.      Heart sounds: Normal heart sounds. No murmur " heard.  Pulmonary:      Effort: Pulmonary effort is normal. No respiratory distress.      Breath sounds: Normal breath sounds. No wheezing or rhonchi.   Abdominal:      General: Bowel sounds are normal. There is no distension.      Palpations: Abdomen is soft.      Tenderness: There is no abdominal tenderness.   Musculoskeletal:         General: Tenderness (chronic lbp, mild garcia rom) present. No swelling. Normal range of motion.      Cervical back: Normal range of motion and neck supple. No tenderness.   Lymphadenopathy:      Cervical: No cervical adenopathy.   Skin:     General: Skin is warm and dry.      Coloration: Skin is not jaundiced.      Findings: No erythema or rash.   Neurological:      General: No focal deficit present.      Mental Status: She is alert and oriented to person, place, and time. Mental status is at baseline.      Sensory: No sensory deficit.      Motor: No weakness.      Gait: Gait abnormal (Uses cane for mobility).   Psychiatric:         Mood and Affect: Mood normal.         Behavior: Behavior normal.         Thought Content: Thought content normal.         Judgment: Judgment normal.          Result Review :     No visits with results within 7 Day(s) from this visit.   Latest known visit with results is:   Orders Only on 03/22/2024   Component Date Value Ref Range Status    Glucose 03/28/2024 242 (H)  65 - 99 mg/dL Final    BUN 03/28/2024 12  6 - 20 mg/dL Final    Creatinine 03/28/2024 0.81  0.57 - 1.00 mg/dL Final    Sodium 03/28/2024 146 (H)  136 - 145 mmol/L Final    Potassium 03/28/2024 4.9  3.5 - 5.2 mmol/L Final    Chloride 03/28/2024 105  98 - 107 mmol/L Final    CO2 03/28/2024 26.0  22.0 - 29.0 mmol/L Final    Calcium 03/28/2024 9.9  8.6 - 10.5 mg/dL Final    Total Protein 03/28/2024 7.4  6.0 - 8.5 g/dL Final    Albumin 03/28/2024 4.6  3.5 - 5.2 g/dL Final    ALT (SGPT) 03/28/2024 66 (H)  1 - 33 U/L Final    AST (SGOT) 03/28/2024 40 (H)  1 - 32 U/L Final    Alkaline Phosphatase  03/28/2024 134 (H)  39 - 117 U/L Final    Total Bilirubin 03/28/2024 0.6  0.0 - 1.2 mg/dL Final    Globulin 03/28/2024 2.8  gm/dL Final    A/G Ratio 03/28/2024 1.6  g/dL Final    BUN/Creatinine Ratio 03/28/2024 14.8  7.0 - 25.0 Final    Anion Gap 03/28/2024 15.0  5.0 - 15.0 mmol/L Final    eGFR 03/28/2024 83.7  >60.0 mL/min/1.73 Final    WBC 03/28/2024 13.77 (H)  3.40 - 10.80 10*3/mm3 Final    RBC 03/28/2024 4.66  3.77 - 5.28 10*6/mm3 Final    Hemoglobin 03/28/2024 14.0  12.0 - 15.9 g/dL Final    Hematocrit 03/28/2024 42.1  34.0 - 46.6 % Final    MCV 03/28/2024 90.3  79.0 - 97.0 fL Final    MCH 03/28/2024 30.0  26.6 - 33.0 pg Final    MCHC 03/28/2024 33.3  31.5 - 35.7 g/dL Final    RDW 03/28/2024 12.9  12.3 - 15.4 % Final    RDW-SD 03/28/2024 42.4  37.0 - 54.0 fl Final    MPV 03/28/2024 11.4  6.0 - 12.0 fL Final    Platelets 03/28/2024 215  140 - 450 10*3/mm3 Final    Hemoglobin A1C 03/28/2024 9.10 (H)  4.80 - 5.60 % Final    Total Cholesterol 03/28/2024 102  0 - 200 mg/dL Final    Triglycerides 03/28/2024 118  0 - 150 mg/dL Final    HDL Cholesterol 03/28/2024 46  40 - 60 mg/dL Final    LDL Cholesterol  03/28/2024 35  0 - 100 mg/dL Final    VLDL Cholesterol 03/28/2024 21  5 - 40 mg/dL Final    LDL/HDL Ratio 03/28/2024 0.70   Final                              Assessment and Plan    Diagnoses and all orders for this visit:    1. Essential hypertension (Primary)    2. Chronic obstructive pulmonary disease, unspecified COPD type  Comments:  Keep follow-ups with pulmonology  continue inhalers  Orders:  -     albuterol sulfate  (90 Base) MCG/ACT inhaler; Inhale 2 puffs Every 4 (Four) Hours As Needed for Wheezing or Shortness of Air.  Dispense: 18 g; Refill: 5    3. Chronic midline low back pain with bilateral sciatica  Comments:  Stable, keep follow-up with pain mgmt.   refill Lyrica to 100 mg daily   can not rocio dulox or em d/t UTI.  Orders:  -     pregabalin (LYRICA) 100 MG capsule; Take 1 capsule by mouth 2  (Two) Times a Day.  Dispense: 60 capsule; Refill: 5    4. Mixed hyperlipidemia    5. Vitamin D deficiency    6. Leukocytosis, unspecified type    7. History of lung cancer    8. Lumbar disc disease with radiculopathy    9. Type 2 diabetes mellitus with hyperglycemia, without long-term current use of insulin  Comments:  A1c improving slightly but still too high, 9.1  Continue metformin 1000mg BID  Add Januvia    10. Elevated LFTs  Comments:  Improved slightly  Continue with diet changes  Continue vitamin E    Other orders  -     SITagliptin (Januvia) 100 MG tablet; Take 1 tablet by mouth Daily.  Dispense: 90 tablet; Refill: 1      provided [facility] # to schedule colonoscopy      I spent 30 minutes caring for Rahel Ramsey on this date of service. This time includes time spent by me in the following activities: preparing for the visit, reviewing tests, performing a medically appropriate examination and/or evaluation , counseling and educating the patient/family/caregiver, ordering medications, tests, or procedures and documenting information in the medical record        Follow Up     Return in about 3 months (around 7/4/2024) for Recheck DM. DM panel prior to appt.  Patient was given instructions and counseling regarding her condition or for health maintenance advice. Please see specific information pulled into the AVS if appropriate.        Part of this note may be an electronic transcription/translation of spoken language to printed text using the Dragon Dictation System

## 2024-05-17 NOTE — PROGRESS NOTES
Hematology/Oncology Outpatient Follow Up    Patient name: Rahel Ramsey  : 1965  MRN: [id number]  Primary Care Physician: Vickie Gomez APRN  Referring Physician: No ref. provider found  Reason For Consult:       Chief Complaint   Patient presents with    Follow-up     Malignant neoplasm of lung, unspecified laterality, unspecified part of lung       History of Present Illness: Patient is here today for routine follow-up and does not have any specific complaints    This is a 57-year-old female who has been noted to have leukocytosis   from routine blood count.  Patient was also recently diagnosed with skin cancer on the left lower   extremity.  She underwent wire excisional biopsy of the lesion.  Her course was complicated by   wound infection.  She has been on two rounds of antibiotics and currently on Keflex which will be   finished in another three to four days.  She denies any fevers or chills, rigors.  Patient had no   prior history of any hematologic problems.  Review of her CBC from 17 revealed WBC of 12.4,   hemoglobin 15.6, platelet count 271,000.  Differentials were 65% neutrophils, 28% lymphocytes.    There was no basophilia, eosinophilia or monocytosis.  Her chemistry panel had BUN 7, creatinine   0.7.  Alkaline phosphatase was 11.  The rest was unremarkable.  Patient had a CBC repeated on   17 with WBC of 12.6, hemoglobin 15.4, platelet count of 310,000.  Differentials were   unremarkable.  B12 (N) 441.  Folate (N) 13.9.  BUN 5, creatinine 0.7.  Normal thyroid at 0.9   [range 0.3-5.5].  Patient denied any repeated history of infection in the past.  She was alone   for this appointment on 17.  · 17 - Patient had labs done to further evaluate her leukocytosis.  Her WBC was 14.4,   hemoglobin 16.6, platelet count 312,000, ANC 9.3.  Differentials were 64% neutrophils, 28%   lymphocytes.  CHRISTOPHE was negative.  B12 was 337.  LDH (N) 159.  BUN 10, creatinine 0.7.  Uric acid    5.3 (N).  MIRTA-2 was not mutated.  No evidence of BCR-abl gene rearrangement.  Sed rate 32.    Peripheral smear showed absolute neutrophilia and leukocytosis and polycythemia, reactive vs.   myeloproliferative condition.  · 5/9/17 - Erythropoietin level was low-normal at 3.92.    · 8/17/17 - Ultrasound of the abdomen showed normal spleen size at 11.3 cm.  There was no splenic   lesion identified.    · 4/2/18 - Erythropoietin level 3.79 [range 2.59-18.5].   · 8/24/18 - Bone marrow biopsy showed normocellular marrow with no morphologic evidence of   myeloproliferative neoplasm, but she was found to have low level MIRTA-2 point mutation detected at   less than 10%.  The low level of MIRTA-2 mutation in assessment of leukocytosis and erythrocytosis   is worrisome for early and evolving myeloproliferative neoplasm.  Interval repeat marrow has been   recommended.  BCR-abl RT-PCR was negative.  Flow cytometry was negative.  Cellularity was 50%.    Iron stain was present.  Cytogenetics also showed normal female karyotype.    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  MIRTA-2 analysis was not detected on the   current bone marrow.  Close follow up has been recommended.  Flowy cytometry was negative.    Histopathology had normal maturation sequence.  There were normal megakaryocytes.  Erythroid   maturation was complete.  Myeloid maturation was also complete.  Negative iron stain on the   marrow aspirate.  Recommendation is for close surveillance.  Absent iron stores on bone marrow.      Smoker 1/2 ppd, does not drink alcohol    See pulmonologist for lung nodule    Family hx of breast cancer with breast cancer age 69  Father with lung cancer      Patient has been lost to follow-up since 2019.    6/28/2022: Follow-up appointment leukocytosis, thrombocytosis, polycythemia, CHRISTOPHE positive for SLE, anti-DNA elevated at 13.  US  spleen- normal size.  October 2022 patient was found to have increasing right upper lobe nodule of.  She is followed up with Dr. Nath.  She had a CT-guided biopsy of this nodule on 11/2/2022 and pathology revealed invasive well to moderately differentiated adenocarcinoma with focal lipidic features  11/23/2022: Patient had a PET CT scan which showed a 1.7 cm right upper lobe nodule not PET avid.  There is an 8 mm right upper lobe nodule also stable since 2020 likely benign.  There was no convincing evidence of metastatic disease elsewhere.  12/20/2022: Patient was seen by Dr. Kearney.  She is being evaluated for right upper lobectomy in the next 2 to 3 weeks  1/9/2023 patient underwent right lobectomy with mediastinal lymph node dissection  3/26/2024: CT of the chest without contrast showed postsurgical changes of prior right upper lobectomy; stable pulmonary nodules within the right middle lobe, right lower lobe; hepatic steatosis and findings of prior pancreatitis    History of present illness was reviewed and is unchanged from the previous visit.       Rosina Mcginnis is here today for her routine follow-up.  She reported that she is doing well.  She has had a recent urinary tract infection being treated by her primary care provider that she feels may not have entirely resolved and she plans to contact their office today for further follow-up.  She does still concern continue to need hydrocodone for postoperative pain that she feels has somewhat plateaued.  Denies any other new or concerning findings.    Past Medical History:   Diagnosis Date    Allergic     Arthritis     Atrial fibrillation     Cancer 01/09/2023    lung  Right    COPD (chronic obstructive pulmonary disease)     Diabetes mellitus     type 2    Frequent UTI     Headache     Heartburn     History of biliary duct stent placement 03/23/2023    History of herniated intervertebral disc     lumbar    History of skin cancer     Left lower extremity     Hyperlipidemia     Hypertension     Injury of back     Leukocytosis     Low back pain     Lung nodule     Nausea        Past Surgical History:   Procedure Laterality Date    BACK SURGERY      lumbar    CARDIAC CATHETERIZATION      CHOLECYSTECTOMY N/A 4/10/2023    Procedure: CHOLECYSTECTOMY LAPAROSCOPIC;  Surgeon: Mich Bansal MD;  Location: [facility] MAIN OR;  Service: General;  Laterality: N/A;    COLONOSCOPY      DILATATION AND CURETTAGE      ENDOSCOPY N/A 3/30/2023    Procedure: ESOPHAGOGASTRODUODENOSCOPY WITH STENT REMOVAL;  Surgeon: Naman Blackmon MD;  Location: [facility] ENDOSCOPY;  Service: Gastroenterology;  Laterality: N/A;  normal stent removal    ERCP N/A 02/01/2023    Procedure: ENDOSCOPIC RETROGRADE CHOLANGIOPANCREATOGRAPHY with sphicterotomy and balloon guided sweeping of bile duct up to 9mm and placement of 10Fr x 9cm biliary stent;  Surgeon: Naman Blackmon MD;  Location: [facility] ENDOSCOPY;  Service: Gastroenterology;  Laterality: N/A;  post: choledocholithiasis    LOBECTOMY Right 01/09/2023    Procedure: THORASCOPIC RIGHT UPPER LOBECTOMY WITH DAVINCI ROBOT, MEDIASTINAL LYMPH NODE DISSECTION;  Surgeon: Erwin Kearney MD;  Location: [facility] MAIN OR;  Service: Robotics - DaVinci;  Laterality: Right;    LUMBAR DECOMPRESSION      L4-L5     LUNG BIOPSY Right 11/2022    SKIN CANCER EXCISION Left     Shin    SUBTOTAL HYSTERECTOMY           Current Outpatient Medications:     albuterol sulfate  (90 Base) MCG/ACT inhaler, Inhale 2 puffs Every 4 (Four) Hours As Needed for Wheezing or Shortness of Air., Disp: 18 g, Rfl: 5    aspirin (aspirin) 81 MG EC tablet, Take 1 tablet by mouth Daily. LD: 4/5- plans to hold, Disp: , Rfl:     atorvastatin (LIPITOR) 20 MG tablet, Take 1 tablet by mouth Daily., Disp: 90 tablet, Rfl: 3    Blood Glucose Monitoring Suppl (True Metrix Meter) w/Device kit, Use 1 each Take As Directed., Disp: 1 kit, Rfl: 0    ezetimibe (ZETIA) 10 MG tablet, Take 1 tablet by mouth  Daily., Disp: 90 tablet, Rfl: 3    fenofibrate (TRICOR) 145 MG tablet, TAKE 1 TABLET BY MOUTH DAILY, Disp: 90 tablet, Rfl: 0    fluticasone (FLONASE) 50 MCG/ACT nasal spray, 1 spray by Each Nare route Daily., Disp: 48 g, Rfl: 3    Fluticasone-Umeclidin-Vilant (TRELEGY) 100-62.5-25 MCG/ACT inhaler, Inhale 1 puff Every Night., Disp: , Rfl:     glucose blood (True Metrix Blood Glucose Test) test strip, Use as instructed to check blood sugar daily, Disp: 100 each, Rfl: 3    HYDROcodone-acetaminophen (NORCO)  MG per tablet, Take 1 tablet by mouth Every 6 (Six) Hours As Needed for Moderate Pain. May take dos if needed, Disp: , Rfl:     Lancets (freestyle) lancets, Use to check blood glucose twice daily as needed DX: E11.65, Disp: 100 each, Rfl: 3    metFORMIN (GLUCOPHAGE) 1000 MG tablet, Take 1 tablet by mouth 2 (Two) Times a Day With Meals., Disp: 180 tablet, Rfl: 1    metoprolol succinate XL (TOPROL-XL) 25 MG 24 hr tablet, Take 1 tablet by mouth Daily., Disp: 90 tablet, Rfl: 3    nystatin (MYCOSTATIN) 336498 UNIT/GM cream, Apply 1 Application topically to the appropriate area as directed As Needed. None dos, Disp: , Rfl:     ondansetron (Zofran) 4 MG tablet, Take 1 tablet by mouth Every 8 (Eight) Hours As Needed for Nausea or Vomiting., Disp: 30 tablet, Rfl: 0    pregabalin (LYRICA) 100 MG capsule, Take 1 capsule by mouth 2 (Two) Times a Day., Disp: 60 capsule, Rfl: 5    SITagliptin (Januvia) 100 MG tablet, Take 1 tablet by mouth Daily., Disp: 90 tablet, Rfl: 1    vitamin D (ERGOCALCIFEROL) 1.25 MG (84750 UT) capsule capsule, Take 1 capsule by mouth Every 7 (Seven) Days., Disp: 12 capsule, Rfl: 1    vitamin E 400 UNIT capsule, Take 1 capsule by mouth Daily., Disp: 90 capsule, Rfl: 3    Allergies   Allergen Reactions    Morphine Anaphylaxis and Nausea And Vomiting    Percocet [Oxycodone-Acetaminophen] GI Intolerance    Tramadol GI Intolerance       Immunization History   Administered Date(s) Administered     COVID-19 ([employer]) 2021    Covid-19 (Pf[employer]) Gray Cap Monovalent 2022    Flu Vaccine Quad PF >36MO 2018    Fluzone (or Fluarix & Flulaval for VFC) >6mos 2020, 2022, 10/04/2023    Influenza, Unspecified 2016, 10/12/2017    Pneumococcal Polysaccharide (PPSV23) 2021    Tdap 2020    flucelvax quad pfs =>4 YRS 10/03/2019       Family History   Problem Relation Age of Onset    Breast cancer Mother     Lung cancer Father     Lung cancer Other     Colon cancer Other     Skin cancer Other        Cancer-related family history includes Breast cancer in her mother; Colon cancer in an other family member; Lung cancer in her father and another family member; Skin cancer in an other family member.    Social History     Tobacco Use    Smoking status: Former     Current packs/day: 0.00     Average packs/day: 1 pack/day for 40.0 years (40.0 ttl pk-yrs)     Types: Cigarettes     Start date: 1983     Quit date: 2023     Years since quittin.3     Passive exposure: Current    Smokeless tobacco: Never   Vaping Use    Vaping status: Never Used   Substance Use Topics    Alcohol use: Yes     Comment: rare    Drug use: No     I have reviewed and confirmed the accuracy of the patient's history: Chief complaint, HPI, ROS, and Subjective as entered by the MA/LPN/RN.           ROS:    Review of Systems   Constitutional:  Positive for fatigue. Negative for chills and fever.   HENT: Negative.     Eyes:  Negative for visual disturbance.   Respiratory:  Negative for shortness of breath.    Cardiovascular:  Negative for chest pain and palpitations.   Gastrointestinal:  Negative for abdominal pain.   Endocrine: Negative.    Genitourinary: Negative.    Skin:  Negative for rash and wound.   Neurological:  Positive for weakness.   Hematological:  Negative for adenopathy.   Psychiatric/Behavioral:  Negative for agitation and confusion.    Chronic back pain due to disc  "disease    Objective:    Vitals:    05/29/24 1032   BP: 118/74   Pulse: 66   Temp: 98.4 °F (36.9 °C)   TempSrc: Temporal   SpO2: 100%   Weight: 86.8 kg (191 lb 4.8 oz)   Height: 175.3 cm (69.02\")   PainSc: 0-No pain         Body mass index is 28.24 kg/m².    ECOG  (1) Restricted in physically strenuous activity, ambulatory and able to do work of light nature    Physical Exam:  Physical Exam  Vitals and nursing note reviewed.   Constitutional:       General: She is not in acute distress.     Appearance: She is not diaphoretic.   HENT:      Head: Normocephalic and atraumatic.   Eyes:      General: No scleral icterus.        Right eye: No discharge.         Left eye: No discharge.      Conjunctiva/sclera: Conjunctivae normal.   Neck:      Thyroid: No thyromegaly.   Cardiovascular:      Rate and Rhythm: Normal rate and regular rhythm.      Heart sounds: Normal heart sounds.      No friction rub. No gallop.   Pulmonary:      Effort: Pulmonary effort is normal. No respiratory distress.      Breath sounds: No stridor. No wheezing.   Abdominal:      General: Bowel sounds are normal.      Palpations: Abdomen is soft. There is no mass.      Tenderness: There is no abdominal tenderness. There is no guarding or rebound.   Musculoskeletal:         General: No tenderness. Normal range of motion.      Cervical back: Normal range of motion and neck supple.      Comments: Uses quad cane for ambulation   Lymphadenopathy:      Cervical: No cervical adenopathy.   Skin:     General: Skin is warm.      Findings: No erythema or rash.   Neurological:      Mental Status: She is alert and oriented to person, place, and time.      Motor: No abnormal muscle tone.   Psychiatric:         Behavior: Behavior normal.         I have reexamined the patient and the results are consistent with the previously documented exam. RECENT LABS    WBC   Date Value Ref Range Status   05/29/2024 6.72 3.40 - 10.80 10*3/mm3 Final     RBC   Date Value Ref Range " Status   05/29/2024 4.59 3.77 - 5.28 10*6/mm3 Final     Hemoglobin   Date Value Ref Range Status   05/29/2024 13.8 12.0 - 15.9 g/dL Final     Hematocrit   Date Value Ref Range Status   05/29/2024 43.4 34.0 - 46.6 % Final     MCV   Date Value Ref Range Status   05/29/2024 94.6 79.0 - 97.0 fL Final     MCH   Date Value Ref Range Status   05/29/2024 30.1 26.6 - 33.0 pg Final     MCHC   Date Value Ref Range Status   05/29/2024 31.8 31.5 - 35.7 g/dL Final     RDW   Date Value Ref Range Status   05/29/2024 13.4 12.3 - 15.4 % Final     RDW-SD   Date Value Ref Range Status   05/29/2024 45.1 37.0 - 54.0 fl Final     MPV   Date Value Ref Range Status   05/29/2024 9.4 6.0 - 12.0 fL Final     Platelets   Date Value Ref Range Status   05/29/2024 199 140 - 450 10*3/mm3 Final     Neutrophil %   Date Value Ref Range Status   05/29/2024 56.0 42.7 - 76.0 % Final     Lymphocyte %   Date Value Ref Range Status   05/29/2024 36.6 19.6 - 45.3 % Final     Monocyte %   Date Value Ref Range Status   05/29/2024 5.4 5.0 - 12.0 % Final     Eosinophil %   Date Value Ref Range Status   05/29/2024 1.6 0.3 - 6.2 % Final     Basophil %   Date Value Ref Range Status   05/29/2024 0.4 0.0 - 1.5 % Final     Immature Grans %   Date Value Ref Range Status   01/03/2023 0.4 0.0 - 0.5 % Final     Neutrophils, Absolute   Date Value Ref Range Status   05/29/2024 3.76 1.70 - 7.00 10*3/mm3 Final     Lymphocytes, Absolute   Date Value Ref Range Status   05/29/2024 2.46 0.70 - 3.10 10*3/mm3 Final     Monocytes, Absolute   Date Value Ref Range Status   05/29/2024 0.36 0.10 - 0.90 10*3/mm3 Final     Eosinophils, Absolute   Date Value Ref Range Status   05/29/2024 0.11 0.00 - 0.40 10*3/mm3 Final     Basophils, Absolute   Date Value Ref Range Status   05/29/2024 0.03 0.00 - 0.20 10*3/mm3 Final     Immature Grans, Absolute   Date Value Ref Range Status   01/03/2023 0.04 0.00 - 0.05 10*3/mm3 Final     nRBC   Date Value Ref Range Status   04/10/2023 0.0 0.0 - 0.2 /100  WBC Final       Lab Results   Component Value Date    GLUCOSE 242 (H) 03/28/2024    BUN 12 03/28/2024    CREATININE 0.81 03/28/2024    EGFRIFNONA 99 12/14/2021    BCR 14.8 03/28/2024    K 4.9 03/28/2024    CO2 26.0 03/28/2024    CALCIUM 9.9 03/28/2024    ALBUMIN 4.6 03/28/2024    LABIL2 1.1 12/04/2018    AST 40 (H) 03/28/2024    ALT 66 (H) 03/28/2024         Assessment & Plan   Malignant neoplasm of lung, unspecified laterality, unspecified part of lung  - CBC & Differential          Adenocarcinoma of the right lung, status post right upper lobectomy with lymph node dissection.  Tumor measured 1.3 cm, invasive well differentiated adenocarcinoma completely excised.  Enlarging right upper lobe nodule.  Clinical T1b N0 M0.  Stage I A2.  Adjuvant treatment  not recommended continued CT scan surveillance.  Her scans are ordered by Dr. Kearney.  Reviewed    Postoperative pancreatitis due to gallstones.  She is actively involved with the GI group.  Status post cholecystectomy.  Follow-up CT scan shows pseudocyst patient will maintain appointments.  There has been interval removal of the common bile duct stent    Tobacco use disorder: Smoking cessation      Leukocytosis and polycythemia.  Rule out myeloproliferative disease.  She has a prior history of this in the past.  Ultrasound of spleen- normal size.  Blood counts improved with WBC of 10.14, Hgb 15.8, platelets 226-improvement in counts.  JAK2 V617F negative.  No BCR ABL arrangement.  Peripheral smear shows leukocytosis, polycythemia, no blasts May of 2022.  We will obtain flow cytometry since she has relative lymphocytosis as well.  I have reviewed the lab results with patient.  I do not think we need to repeat a bone marrow biopsy at this time.  I have asked her to also remain on baby aspirin.  Patient does not have splenomegaly.  Her repeat flow cytometry completed in August 2022 does not show any myeloproliferative disease.  CBC reviewed.  Resolved at this  time.    Possible evolving myeloproliferative neoplasm with low level MIRTA-2 positive at less than 10%. BCR-abl negative.  Low normal erythropoietin level.  Status post bone marrow aspiration and biopsy 8/24/18.  Repeat bone marrow aspiration and biopsy 2/22/19 showed translocation [2q/11q] has been associated with myeloid neoplasm, but no morphologic evidence seen on histopathology      CHRISTOPHE positive, anti-DNA elevated at 13.  Referred to rheumatology, She was seen at  rheumatology.    Plans    CBC today reviewed with patient-unremarkable  CMP ordered  Reviewed her final pathology results  Reviewed her CT of the chest result completed March 2024  Continue CT scan surveillance by Dr. Kearney  Follow-up 4 months Dr. Prabhakar or sooner if needed  Follow-up with pulmonary  Patient to follow-up with GI  Peripheral blood flow cytometry was negative- 8/3/2022  Continue baby aspirin 81 mg p.o. daily   All questions answered            I spent 30 total minutes, face-to-face, caring for Rahel today. 90% of this time involved counseling and/or coordination of care as documented within this note.

## 2024-05-21 ENCOUNTER — TELEPHONE (OUTPATIENT)
Dept: FAMILY MEDICINE CLINIC | Facility: CLINIC | Age: 59
End: 2024-05-21
Payer: MEDICARE

## 2024-05-21 NOTE — TELEPHONE ENCOUNTER
Spoke with patient and she is going to stop into the office tomorrow morning to leave a urine sample.

## 2024-05-21 NOTE — TELEPHONE ENCOUNTER
Caller: Rahel Ramsey    Relationship: Self    Best call back number: [phone]    What orders are you requesting (i.e. lab or imaging):   URINE CULTURE FOR UTI    In what timeframe would the patient need to come in: ASAP    Where will you receive your lab/imaging services: IN OFFICE    Additional notes:   STATES SHE IS HAVING TROUBLE HOLDING HER BLADDER, AND NOT GO ALL THE WAY AT OTHER TIMES. BURNING AND FREQUENT URINATION AS WELL.     PLEASE CALL TO CONFIRM OR DISCUSS.

## 2024-05-22 ENCOUNTER — CLINICAL SUPPORT (OUTPATIENT)
Dept: FAMILY MEDICINE CLINIC | Facility: CLINIC | Age: 59
End: 2024-05-22
Payer: MEDICARE

## 2024-05-22 ENCOUNTER — TELEPHONE (OUTPATIENT)
Dept: FAMILY MEDICINE CLINIC | Facility: CLINIC | Age: 59
End: 2024-05-22

## 2024-05-22 DIAGNOSIS — R30.0 DYSURIA: Primary | ICD-10-CM

## 2024-05-22 LAB
BILIRUB BLD-MCNC: NEGATIVE MG/DL
CLARITY, POC: ABNORMAL
COLOR UR: ABNORMAL
GLUCOSE UR STRIP-MCNC: NEGATIVE MG/DL
KETONES UR QL: NEGATIVE
LEUKOCYTE EST, POC: NEGATIVE
NITRITE UR-MCNC: POSITIVE MG/ML
PH UR: 5.5 [PH] (ref 5–8)
PROT UR STRIP-MCNC: NEGATIVE MG/DL
RBC # UR STRIP: NEGATIVE /UL
SP GR UR: 1.03 (ref 1–1.03)
UROBILINOGEN UR QL: NORMAL

## 2024-05-22 PROCEDURE — 87186 SC STD MICRODIL/AGAR DIL: CPT | Performed by: NURSE PRACTITIONER

## 2024-05-22 PROCEDURE — 87086 URINE CULTURE/COLONY COUNT: CPT | Performed by: NURSE PRACTITIONER

## 2024-05-22 PROCEDURE — 87077 CULTURE AEROBIC IDENTIFY: CPT | Performed by: NURSE PRACTITIONER

## 2024-05-22 PROCEDURE — 81002 URINALYSIS NONAUTO W/O SCOPE: CPT | Performed by: NURSE PRACTITIONER

## 2024-05-22 RX ORDER — CIPROFLOXACIN 250 MG/1
250 TABLET, FILM COATED ORAL 2 TIMES DAILY
Qty: 10 TABLET | Refills: 0 | Status: SHIPPED | OUTPATIENT
Start: 2024-05-22

## 2024-05-22 NOTE — PROGRESS NOTES
Pt came in today to leave a urine sample due to UTI symptoms. Urine sample was collected and results sent to provider for review.    FELA Jeffries

## 2024-05-22 NOTE — TELEPHONE ENCOUNTER
Pt came in today to leave a urine sample because she is having a lot of pain with urination and trouble urinating at time. Sent results in a different message please advise.

## 2024-05-24 LAB — BACTERIA SPEC AEROBE CULT: ABNORMAL

## 2024-05-29 ENCOUNTER — OFFICE VISIT (OUTPATIENT)
Dept: ONCOLOGY | Facility: CLINIC | Age: 59
End: 2024-05-29
Payer: MEDICARE

## 2024-05-29 ENCOUNTER — LAB (OUTPATIENT)
Dept: LAB | Facility: HOSPITAL | Age: 59
End: 2024-05-29
Payer: MEDICARE

## 2024-05-29 VITALS
TEMPERATURE: 98.4 F | SYSTOLIC BLOOD PRESSURE: 118 MMHG | HEIGHT: 69 IN | OXYGEN SATURATION: 100 % | BODY MASS INDEX: 28.33 KG/M2 | DIASTOLIC BLOOD PRESSURE: 74 MMHG | WEIGHT: 191.3 LBS | HEART RATE: 66 BPM

## 2024-05-29 DIAGNOSIS — C34.90 MALIGNANT NEOPLASM OF LUNG, UNSPECIFIED LATERALITY, UNSPECIFIED PART OF LUNG: Primary | ICD-10-CM

## 2024-05-29 DIAGNOSIS — D72.829 LEUKOCYTOSIS, UNSPECIFIED TYPE: ICD-10-CM

## 2024-05-29 LAB
ALBUMIN SERPL-MCNC: 4.6 G/DL (ref 3.5–5.2)
ALBUMIN/GLOB SERPL: 1.8 G/DL
ALP SERPL-CCNC: 110 U/L (ref 39–117)
ALT SERPL W P-5'-P-CCNC: 71 U/L (ref 1–33)
ANION GAP SERPL CALCULATED.3IONS-SCNC: 8.8 MMOL/L (ref 5–15)
AST SERPL-CCNC: 65 U/L (ref 1–32)
BASOPHILS # BLD AUTO: 0.03 10*3/MM3 (ref 0–0.2)
BASOPHILS NFR BLD AUTO: 0.4 % (ref 0–1.5)
BILIRUB SERPL-MCNC: 0.6 MG/DL (ref 0–1.2)
BUN SERPL-MCNC: 12 MG/DL (ref 6–20)
BUN/CREAT SERPL: 16.7 (ref 7–25)
CALCIUM SPEC-SCNC: 10.4 MG/DL (ref 8.6–10.5)
CHLORIDE SERPL-SCNC: 102 MMOL/L (ref 98–107)
CO2 SERPL-SCNC: 29.2 MMOL/L (ref 22–29)
CREAT SERPL-MCNC: 0.72 MG/DL (ref 0.57–1)
DEPRECATED RDW RBC AUTO: 45.1 FL (ref 37–54)
EGFRCR SERPLBLD CKD-EPI 2021: 96.5 ML/MIN/1.73
EOSINOPHIL # BLD AUTO: 0.11 10*3/MM3 (ref 0–0.4)
EOSINOPHIL NFR BLD AUTO: 1.6 % (ref 0.3–6.2)
ERYTHROCYTE [DISTWIDTH] IN BLOOD BY AUTOMATED COUNT: 13.4 % (ref 12.3–15.4)
GLOBULIN UR ELPH-MCNC: 2.6 GM/DL
GLUCOSE SERPL-MCNC: 152 MG/DL (ref 65–99)
HCT VFR BLD AUTO: 43.4 % (ref 34–46.6)
HGB BLD-MCNC: 13.8 G/DL (ref 12–15.9)
HOLD SPECIMEN: NORMAL
HOLD SPECIMEN: NORMAL
LYMPHOCYTES # BLD AUTO: 2.46 10*3/MM3 (ref 0.7–3.1)
LYMPHOCYTES NFR BLD AUTO: 36.6 % (ref 19.6–45.3)
MCH RBC QN AUTO: 30.1 PG (ref 26.6–33)
MCHC RBC AUTO-ENTMCNC: 31.8 G/DL (ref 31.5–35.7)
MCV RBC AUTO: 94.6 FL (ref 79–97)
MONOCYTES # BLD AUTO: 0.36 10*3/MM3 (ref 0.1–0.9)
MONOCYTES NFR BLD AUTO: 5.4 % (ref 5–12)
NEUTROPHILS NFR BLD AUTO: 3.76 10*3/MM3 (ref 1.7–7)
NEUTROPHILS NFR BLD AUTO: 56 % (ref 42.7–76)
PLATELET # BLD AUTO: 199 10*3/MM3 (ref 140–450)
PMV BLD AUTO: 9.4 FL (ref 6–12)
POTASSIUM SERPL-SCNC: 4 MMOL/L (ref 3.5–5.2)
PROT SERPL-MCNC: 7.2 G/DL (ref 6–8.5)
RBC # BLD AUTO: 4.59 10*6/MM3 (ref 3.77–5.28)
SODIUM SERPL-SCNC: 140 MMOL/L (ref 136–145)
WBC NRBC COR # BLD AUTO: 6.72 10*3/MM3 (ref 3.4–10.8)

## 2024-05-29 PROCEDURE — 85025 COMPLETE CBC W/AUTO DIFF WBC: CPT | Performed by: NURSE PRACTITIONER

## 2024-05-29 PROCEDURE — 36415 COLL VENOUS BLD VENIPUNCTURE: CPT

## 2024-05-29 PROCEDURE — 80053 COMPREHEN METABOLIC PANEL: CPT | Performed by: NURSE PRACTITIONER

## 2024-06-20 RX ORDER — FENOFIBRATE 145 MG/1
TABLET, COATED ORAL DAILY
Qty: 90 TABLET | Refills: 0 | Status: SHIPPED | OUTPATIENT
Start: 2024-06-20

## 2024-06-27 DIAGNOSIS — E11.65 TYPE 2 DIABETES MELLITUS WITH HYPERGLYCEMIA, WITHOUT LONG-TERM CURRENT USE OF INSULIN: ICD-10-CM

## 2024-06-27 DIAGNOSIS — I10 ESSENTIAL HYPERTENSION: Primary | ICD-10-CM

## 2024-06-28 DIAGNOSIS — E11.65 TYPE 2 DIABETES MELLITUS WITH HYPERGLYCEMIA, WITHOUT LONG-TERM CURRENT USE OF INSULIN: ICD-10-CM

## 2024-06-28 RX ORDER — LANCETS
EACH MISCELLANEOUS
Qty: 100 EACH | Refills: 3 | Status: SHIPPED | OUTPATIENT
Start: 2024-06-28

## 2024-07-01 ENCOUNTER — LAB (OUTPATIENT)
Dept: FAMILY MEDICINE CLINIC | Facility: CLINIC | Age: 59
End: 2024-07-01
Payer: MEDICARE

## 2024-07-01 PROCEDURE — 83036 HEMOGLOBIN GLYCOSYLATED A1C: CPT | Performed by: NURSE PRACTITIONER

## 2024-07-01 PROCEDURE — 36415 COLL VENOUS BLD VENIPUNCTURE: CPT | Performed by: NURSE PRACTITIONER

## 2024-07-01 PROCEDURE — 80061 LIPID PANEL: CPT | Performed by: NURSE PRACTITIONER

## 2024-07-01 PROCEDURE — 80053 COMPREHEN METABOLIC PANEL: CPT | Performed by: NURSE PRACTITIONER

## 2024-07-01 PROCEDURE — 85027 COMPLETE CBC AUTOMATED: CPT | Performed by: NURSE PRACTITIONER

## 2024-07-02 LAB
ALBUMIN SERPL-MCNC: 4.3 G/DL (ref 3.5–5.2)
ALBUMIN/GLOB SERPL: 1.6 G/DL
ALP SERPL-CCNC: 108 U/L (ref 39–117)
ALT SERPL W P-5'-P-CCNC: 56 U/L (ref 1–33)
ANION GAP SERPL CALCULATED.3IONS-SCNC: 10.1 MMOL/L (ref 5–15)
AST SERPL-CCNC: 40 U/L (ref 1–32)
BILIRUB SERPL-MCNC: 0.5 MG/DL (ref 0–1.2)
BUN SERPL-MCNC: 13 MG/DL (ref 6–20)
BUN/CREAT SERPL: 14.4 (ref 7–25)
CALCIUM SPEC-SCNC: 9.5 MG/DL (ref 8.6–10.5)
CHLORIDE SERPL-SCNC: 101 MMOL/L (ref 98–107)
CHOLEST SERPL-MCNC: 96 MG/DL (ref 0–200)
CO2 SERPL-SCNC: 26.9 MMOL/L (ref 22–29)
CREAT SERPL-MCNC: 0.9 MG/DL (ref 0.57–1)
DEPRECATED RDW RBC AUTO: 41.4 FL (ref 37–54)
EGFRCR SERPLBLD CKD-EPI 2021: 73.8 ML/MIN/1.73
ERYTHROCYTE [DISTWIDTH] IN BLOOD BY AUTOMATED COUNT: 12.5 % (ref 12.3–15.4)
GLOBULIN UR ELPH-MCNC: 2.7 GM/DL
GLUCOSE SERPL-MCNC: 178 MG/DL (ref 65–99)
HBA1C MFR BLD: 8.6 % (ref 4.8–5.6)
HCT VFR BLD AUTO: 42.1 % (ref 34–46.6)
HDLC SERPL-MCNC: 37 MG/DL (ref 40–60)
HGB BLD-MCNC: 13.9 G/DL (ref 12–15.9)
LDLC SERPL CALC-MCNC: 37 MG/DL (ref 0–100)
LDLC/HDLC SERPL: 0.94 {RATIO}
MCH RBC QN AUTO: 30.2 PG (ref 26.6–33)
MCHC RBC AUTO-ENTMCNC: 33 G/DL (ref 31.5–35.7)
MCV RBC AUTO: 91.3 FL (ref 79–97)
PLATELET # BLD AUTO: 173 10*3/MM3 (ref 140–450)
PMV BLD AUTO: 10.3 FL (ref 6–12)
POTASSIUM SERPL-SCNC: 4.1 MMOL/L (ref 3.5–5.2)
PROT SERPL-MCNC: 7 G/DL (ref 6–8.5)
RBC # BLD AUTO: 4.61 10*6/MM3 (ref 3.77–5.28)
SODIUM SERPL-SCNC: 138 MMOL/L (ref 136–145)
TRIGL SERPL-MCNC: 121 MG/DL (ref 0–150)
VLDLC SERPL-MCNC: 22 MG/DL (ref 5–40)
WBC NRBC COR # BLD AUTO: 8.66 10*3/MM3 (ref 3.4–10.8)

## 2024-07-07 DIAGNOSIS — E11.65 TYPE 2 DIABETES MELLITUS WITH HYPERGLYCEMIA, WITHOUT LONG-TERM CURRENT USE OF INSULIN: ICD-10-CM

## 2024-09-03 RX ORDER — FENOFIBRATE 145 MG/1
TABLET, COATED ORAL DAILY
Qty: 90 TABLET | Refills: 1 | Status: SHIPPED | OUTPATIENT
Start: 2024-09-03

## 2024-09-04 ENCOUNTER — PATIENT OUTREACH (OUTPATIENT)
Dept: CASE MANAGEMENT | Facility: OTHER | Age: 59
End: 2024-09-04
Payer: MEDICARE

## 2024-09-04 NOTE — OUTREACH NOTE
AMBULATORY CASE MANAGEMENT NOTE    Names and Relationships of Patient/Support Persons: Contact: Braulio Rahel GRETCHEN; Relationship: Self -     Patient Outreach    Pt identified for proactive outreach. RN-ACM outreach call made to pt. Explained role of RN-ACM and reason for call. Pt states she's doing ok, BG is a little high some days. She reports to be complaint with medications, BG monitoring, healthy diet, reports activity limited due to other health problems. Disease education provided. Pt has next routine PCP appt scheduled. Reviewed SDOH. No needs per pt. She reports to have good support. No questions per pt. Pt engaged in HRCM services. Care plan created this call. Advised pt to call RN-ACM or [facility] 24 hour nurse line with any needs. Follow up outreach scheduled for 1 month.     Adult Patient Profile  Questions/Answers      Flowsheet Row Most Recent Value   Symptoms/Conditions Managed at Home diabetes, type 2   Diabetes Management Strategies adequate rest, blood glucose testing, diet modification, medication therapy, routine screenings   Last A1C Result 8.6   Barriers to Taking Medication as Prescribed none   Equipment Currently Used at Home glucometer   Primary Source of Support/Comfort significant other   People in Home significant other   Current Living Arrangements home          Send Education  Questions/Answers      Flowsheet Row Most Recent Value   Annual Wellness Visit:  Patient Has Completed   Other Patient Education/Resources  24/7 [facility] Nurse Call Line   24/7 Nurse Call Line Education Method Verbal   Advanced Directives: --  [resources provided]          SDOH updated and reviewed with the patient during this program:  Financial Resource Strain: Low Risk  (9/4/2024)    Overall Financial Resource Strain (CARDIA)     Difficulty of Paying Living Expenses: Not very hard      --     Food Insecurity: No Food Insecurity (9/4/2024)    Hunger Vital Sign     Worried About Running Out of Food in  the Last Year: Never true     Ran Out of Food in the Last Year: Never true      --     Transportation Needs: No Transportation Needs (9/4/2024)    PRAPARE - Transportation     Lack of Transportation (Medical): No     Lack of Transportation (Non-Medical): No       Education Documentation  Follow-Up Care, taught by Clarisse Dacosta, RN at 9/4/2024 11:51 AM.  Learner: Patient  Readiness: Acceptance  Method: Explanation  Response: Verbalizes Understanding    Healthy Food Choices, taught by Clarisse Dacosta RN at 9/4/2024 11:51 AM.  Learner: Patient  Readiness: Acceptance  Method: Explanation  Response: Verbalizes Understanding    Oral Medication, taught by Clarisse Dacosta, RN at 9/4/2024 11:51 AM.  Learner: Patient  Readiness: Acceptance  Method: Explanation  Response: Verbalizes Understanding    Blood Glucose Monitoring, taught by Clarisse Dacosta, RN at 9/4/2024 11:51 AM.  Learner: Patient  Readiness: Acceptance  Method: Explanation  Response: Verbalizes Understanding          Clarisse BELLAMY  Ambulatory Case Management    9/4/2024, 11:50 EDT

## 2024-09-23 ENCOUNTER — HOSPITAL ENCOUNTER (OUTPATIENT)
Dept: CT IMAGING | Facility: HOSPITAL | Age: 59
Discharge: HOME | End: 2024-09-23
Admitting: NURSE PRACTITIONER
Payer: MEDICARE

## 2024-09-23 DIAGNOSIS — Z87.891 FORMER SMOKER: ICD-10-CM

## 2024-09-23 DIAGNOSIS — Z85.118 HISTORY OF LUNG CANCER: ICD-10-CM

## 2024-09-23 DIAGNOSIS — R91.8 LUNG NODULES: ICD-10-CM

## 2024-09-23 PROCEDURE — 71250 CT THORAX DX C-: CPT

## 2024-09-24 ENCOUNTER — TELEPHONE (OUTPATIENT)
Dept: SURGERY | Facility: CLINIC | Age: 59
End: 2024-09-24
Payer: MEDICARE

## 2024-09-25 ENCOUNTER — LAB (OUTPATIENT)
Dept: LAB | Facility: HOSPITAL | Age: 59
End: 2024-09-25
Payer: MEDICARE

## 2024-09-25 ENCOUNTER — OFFICE VISIT (OUTPATIENT)
Dept: ONCOLOGY | Facility: CLINIC | Age: 59
End: 2024-09-25
Payer: MEDICARE

## 2024-09-25 VITALS
HEART RATE: 71 BPM | WEIGHT: 191 LBS | BODY MASS INDEX: 28.29 KG/M2 | DIASTOLIC BLOOD PRESSURE: 72 MMHG | RESPIRATION RATE: 20 BRPM | SYSTOLIC BLOOD PRESSURE: 118 MMHG | HEIGHT: 69 IN | OXYGEN SATURATION: 98 % | TEMPERATURE: 98.2 F

## 2024-09-25 DIAGNOSIS — E78.2 MIXED HYPERLIPIDEMIA: ICD-10-CM

## 2024-09-25 DIAGNOSIS — C34.90 MALIGNANT NEOPLASM OF LUNG, UNSPECIFIED LATERALITY, UNSPECIFIED PART OF LUNG: Primary | ICD-10-CM

## 2024-09-25 DIAGNOSIS — I10 ESSENTIAL HYPERTENSION: ICD-10-CM

## 2024-09-25 LAB
BASOPHILS # BLD AUTO: 0.04 10*3/MM3 (ref 0–0.2)
BASOPHILS NFR BLD AUTO: 0.5 % (ref 0–1.5)
DEPRECATED RDW RBC AUTO: 45.7 FL (ref 37–54)
EOSINOPHIL # BLD AUTO: 0.08 10*3/MM3 (ref 0–0.4)
EOSINOPHIL NFR BLD AUTO: 0.9 % (ref 0.3–6.2)
ERYTHROCYTE [DISTWIDTH] IN BLOOD BY AUTOMATED COUNT: 13.8 % (ref 12.3–15.4)
HCT VFR BLD AUTO: 44.9 % (ref 34–46.6)
HGB BLD-MCNC: 14.5 G/DL (ref 12–15.9)
HOLD SPECIMEN: NORMAL
HOLD SPECIMEN: NORMAL
LYMPHOCYTES # BLD AUTO: 3.33 10*3/MM3 (ref 0.7–3.1)
LYMPHOCYTES NFR BLD AUTO: 38.5 % (ref 19.6–45.3)
MCH RBC QN AUTO: 29.7 PG (ref 26.6–33)
MCHC RBC AUTO-ENTMCNC: 32.3 G/DL (ref 31.5–35.7)
MCV RBC AUTO: 91.8 FL (ref 79–97)
MONOCYTES # BLD AUTO: 0.51 10*3/MM3 (ref 0.1–0.9)
MONOCYTES NFR BLD AUTO: 5.9 % (ref 5–12)
NEUTROPHILS NFR BLD AUTO: 4.7 10*3/MM3 (ref 1.7–7)
NEUTROPHILS NFR BLD AUTO: 54.2 % (ref 42.7–76)
PLATELET # BLD AUTO: 204 10*3/MM3 (ref 140–450)
PMV BLD AUTO: 9.3 FL (ref 6–12)
RBC # BLD AUTO: 4.89 10*6/MM3 (ref 3.77–5.28)
WBC NRBC COR # BLD AUTO: 8.66 10*3/MM3 (ref 3.4–10.8)

## 2024-09-25 PROCEDURE — 85025 COMPLETE CBC W/AUTO DIFF WBC: CPT

## 2024-09-25 PROCEDURE — 36415 COLL VENOUS BLD VENIPUNCTURE: CPT

## 2024-10-02 RX ORDER — SITAGLIPTIN 100 MG/1
100 TABLET, FILM COATED ORAL DAILY
Qty: 90 TABLET | Refills: 1 | Status: SHIPPED | OUTPATIENT
Start: 2024-10-02

## 2024-10-04 DIAGNOSIS — E55.9 VITAMIN D DEFICIENCY: ICD-10-CM

## 2024-10-04 RX ORDER — ERGOCALCIFEROL 1.25 MG/1
50000 CAPSULE, LIQUID FILLED ORAL
Qty: 12 CAPSULE | Refills: 1 | Status: SHIPPED | OUTPATIENT
Start: 2024-10-04

## 2024-10-08 ENCOUNTER — PATIENT OUTREACH (OUTPATIENT)
Dept: ONCOLOGY | Facility: CLINIC | Age: 59
End: 2024-10-08
Payer: MEDICARE

## 2024-10-08 ENCOUNTER — OFFICE VISIT (OUTPATIENT)
Dept: SURGERY | Facility: CLINIC | Age: 59
End: 2024-10-08
Payer: MEDICARE

## 2024-10-08 VITALS
OXYGEN SATURATION: 99 % | SYSTOLIC BLOOD PRESSURE: 135 MMHG | DIASTOLIC BLOOD PRESSURE: 86 MMHG | BODY MASS INDEX: 28.14 KG/M2 | WEIGHT: 190 LBS | HEART RATE: 75 BPM | HEIGHT: 69 IN

## 2024-10-08 DIAGNOSIS — Z85.118 HISTORY OF LUNG CANCER: Primary | ICD-10-CM

## 2024-10-08 DIAGNOSIS — C34.11 PRIMARY CANCER OF RIGHT UPPER LOBE OF LUNG: ICD-10-CM

## 2024-10-08 DIAGNOSIS — R91.1 LUNG NODULE: ICD-10-CM

## 2024-10-08 DIAGNOSIS — R91.1 RIGHT UPPER LOBE PULMONARY NODULE: Primary | ICD-10-CM

## 2024-10-08 PROCEDURE — 3075F SYST BP GE 130 - 139MM HG: CPT | Performed by: SURGERY

## 2024-10-08 PROCEDURE — 99214 OFFICE O/P EST MOD 30 MIN: CPT | Performed by: SURGERY

## 2024-10-08 PROCEDURE — 3079F DIAST BP 80-89 MM HG: CPT | Performed by: SURGERY

## 2024-10-09 NOTE — SIGNIFICANT NOTE
I accompanied patient to Dr. Kearney's office visit.     1/9/2023 surgery T1bN0 surveillance 6 mo CT chest.

## 2024-11-10 NOTE — PROGRESS NOTES
THORACIC SURGERY CLINIC CONSULT NOTE    REASON FOR CONSULT: Stage I Right upper lobe adenocarcinoma s/p Robotic assisted Right Upper lobectomy       Subjective   HISTORY OF PRESENTING ILLNESS:   Rahel Ramsey is a 59 y.o. female who has significant medical problems as mentioned in the medical chart.     History of Present Illness  She has a history of tobacco abuse, 40-pack-year history of smoking and has chronic obstructive pulmonary disease.  She was getting lung cancer surveillance CT scan. CT scan of the chest on 10/6/2022 found increasing size of the right upper lobe nodule.  CT-guided biopsy of the right upper lobe nodule on 11/2/2022 revealed invasive well to moderately differentiated adenocarcinoma with focal lepidic features.  PET/CT on 11/23/2022 showed a 1.7 cm right upper lobe nodule that was not PET avid.  Also noted an 8 mm right upper lobe nodule which has been stable since 2020 and likely benign.  There was no evidence of distant metastases. She had good performance status, her functional status was limited due to chronic back pain.      On 1/9/2023, she underwent robotic assisted right upper lobectomy, partial decortication and systemic mediastinal lymph node dissection.      Intraoperative findings:  Right upper lobe adherent to the chest wall.  No evidence of pleural implants or effusion.  Large calcified and necrotic 11 R lymph node between the right upper lobe takeoff and bronchus intermedius noted.  Specimen sent for frozen section and came back as necrotizing granulomatous inflammation.  Partial complete fissures.  Right middle lobe inflated under direct vision without evidence of torsion.  Small airleak at the end of the procedure     She tolerated the procedure well.  There were no intraoperative or immediate postoperative complication.  The pathology confirmed 1.3 cm invasive lepidi well differentiated c adenocarcinoma with 0.8 cm invasive component.  All margins were negative for  carcinoma invasion.  Total of 12 lymph nodes were examined and all were negative.  The final pathological stage was stage I ([id number]).     She had a good recovery postop.  She had cholecystitis which was managed with cholecystostomy tube followed by interval cholecystectomy.  She had CT scan of the chest for cancer surveillance after 6 months which showed no evidence of recurrent intrathoracic malignancy.  There was mild fullness of the pancreatic tail and a dedicated CT abdomen with pancreatic protocol was obtained which showed interval improvement in the peripancreatic inflammation.     CT scan of the chest on 9/23/2024 did not reveal any evidence of recurrent disease.  She had stable 9 x 6 mm right lower lobe nodules.    She came to clinic for follow-up visit.  She reports no chest pain. Her last surgery was performed in 01/2023. She has been under the care of Dr. Daly for cancer treatment, but has not undergone chemotherapy. She also mentions that she was diagnosed with anemia prior to her surgery.    Supplemental Information  She had an injection in her knee because it was bothering her and was told it was probably arthritis. She also got an injection in her back. She has been using a cane since 2013.    Past Medical History:   Diagnosis Date    Allergic     Arthritis     Atrial fibrillation     Cancer 01/09/2023    lung  Right    COPD (chronic obstructive pulmonary disease)     Diabetes mellitus     type 2    Frequent UTI     Headache     Heartburn     History of biliary duct stent placement 03/23/2023    History of herniated intervertebral disc     lumbar    History of skin cancer     Left lower extremity    Hyperlipidemia     Hypertension     Injury of back     Leukocytosis     Low back pain     Lung nodule     Nausea        Past Surgical History:   Procedure Laterality Date    BACK SURGERY      lumbar    CARDIAC CATHETERIZATION      CHOLECYSTECTOMY N/A 4/10/2023    Procedure: CHOLECYSTECTOMY LAPAROSCOPIC;   Surgeon: Mich Bansal MD;  Location: [facility] MAIN OR;  Service: General;  Laterality: N/A;    COLONOSCOPY      DILATATION AND CURETTAGE      ENDOSCOPY N/A 3/30/2023    Procedure: ESOPHAGOGASTRODUODENOSCOPY WITH STENT REMOVAL;  Surgeon: Naman Blackmon MD;  Location: [facility] ENDOSCOPY;  Service: Gastroenterology;  Laterality: N/A;  normal stent removal    ERCP N/A 2023    Procedure: ENDOSCOPIC RETROGRADE CHOLANGIOPANCREATOGRAPHY with sphicterotomy and balloon guided sweeping of bile duct up to 9mm and placement of 10Fr x 9cm biliary stent;  Surgeon: Naman Blackmon MD;  Location: [facility] ENDOSCOPY;  Service: Gastroenterology;  Laterality: N/A;  post: choledocholithiasis    LOBECTOMY Right 2023    Procedure: THORASCOPIC RIGHT UPPER LOBECTOMY WITH DAVINCI ROBOT, MEDIASTINAL LYMPH NODE DISSECTION;  Surgeon: Erwin Kearney MD;  Location: [facility] MAIN OR;  Service: Robotics - DaVinci;  Laterality: Right;    LUMBAR DECOMPRESSION      L4-L5     LUNG BIOPSY Right 2022    SKIN CANCER EXCISION Left     Shin    SUBTOTAL HYSTERECTOMY         Family History   Problem Relation Age of Onset    Breast cancer Mother     Lung cancer Father     Lung cancer Other     Colon cancer Other     Skin cancer Other        Social History     Socioeconomic History    Marital status:    Tobacco Use    Smoking status: Former     Current packs/day: 0.00     Average packs/day: 1 pack/day for 40.0 years (40.0 ttl pk-yrs)     Types: Cigarettes     Start date: 1983     Quit date: 2023     Years since quittin.8     Passive exposure: Past    Smokeless tobacco: Never   Vaping Use    Vaping status: Never Used   Substance and Sexual Activity    Alcohol use: Yes     Comment: rare    Drug use: No    Sexual activity: Defer         Current Outpatient Medications:     Accu-Chek Softclix Lancets lancets, USE TO TEST BLOOD SUGAR TWICE DAILY AS NEEDED, Disp: 100 each, Rfl: 3    albuterol sulfate  (90 Base)  MCG/ACT inhaler, Inhale 2 puffs Every 4 (Four) Hours As Needed for Wheezing or Shortness of Air., Disp: 18 g, Rfl: 5    aspirin (aspirin) 81 MG EC tablet, Take 1 tablet by mouth Daily. LD: 4/5- plans to hold, Disp: , Rfl:     atorvastatin (LIPITOR) 20 MG tablet, Take 1 tablet by mouth Daily., Disp: 90 tablet, Rfl: 3    Blood Glucose Monitoring Suppl (True Metrix Meter) w/Device kit, Use 1 each Take As Directed., Disp: 1 kit, Rfl: 0    ezetimibe (ZETIA) 10 MG tablet, Take 1 tablet by mouth Daily., Disp: 90 tablet, Rfl: 3    fenofibrate (TRICOR) 145 MG tablet, TAKE 1 TABLET BY MOUTH DAILY, Disp: 90 tablet, Rfl: 1    fluticasone (FLONASE) 50 MCG/ACT nasal spray, 1 spray by Each Nare route Daily., Disp: 48 g, Rfl: 3    Fluticasone-Umeclidin-Vilant (TRELEGY) 100-62.5-25 MCG/ACT inhaler, Inhale 1 puff Every Night., Disp: , Rfl:     glucose blood (True Metrix Blood Glucose Test) test strip, Use as instructed to check blood sugar daily, Disp: 100 each, Rfl: 3    HYDROcodone-acetaminophen (NORCO)  MG per tablet, Take 1 tablet by mouth Every 6 (Six) Hours As Needed for Moderate Pain. May take dos if needed, Disp: , Rfl:     Januvia 100 MG tablet, TAKE 1 TABLET BY MOUTH DAILY, Disp: 90 tablet, Rfl: 1    metFORMIN (GLUCOPHAGE) 1000 MG tablet, TAKE 1 TABLET BY MOUTH TWICE DAILY WITH MEALS, Disp: 180 tablet, Rfl: 1    metoprolol succinate XL (TOPROL-XL) 25 MG 24 hr tablet, Take 1 tablet by mouth Daily., Disp: 90 tablet, Rfl: 3    nystatin (MYCOSTATIN) 884513 UNIT/GM cream, Apply 1 Application topically to the appropriate area as directed As Needed. None dos, Disp: , Rfl:     ondansetron (Zofran) 4 MG tablet, Take 1 tablet by mouth Every 8 (Eight) Hours As Needed for Nausea or Vomiting., Disp: 30 tablet, Rfl: 0    pregabalin (LYRICA) 100 MG capsule, Take 1 capsule by mouth 2 (Two) Times a Day., Disp: 60 capsule, Rfl: 5    vitamin D (ERGOCALCIFEROL) 1.25 MG (18582 UT) capsule capsule, TAKE 1 CAPSULE BY MOUTH EVERY 7 DAYS,  "Disp: 12 capsule, Rfl: 1    vitamin E 400 UNIT capsule, Take 1 capsule by mouth Daily., Disp: 90 capsule, Rfl: 3     Allergies   Allergen Reactions    Morphine Anaphylaxis and Nausea And Vomiting    Percocet [Oxycodone-Acetaminophen] GI Intolerance    Tramadol GI Intolerance             Objective    OBJECTIVE:     VITAL SIGNS:  /86 (BP Location: Left arm, Patient Position: Sitting, Cuff Size: Large Adult)   Pulse 75   Ht 175.3 cm (69\")   Wt 86.2 kg (190 lb)   SpO2 99%   BMI 28.06 kg/m²     PHYSICAL EXAM:  Normal appearance.   Head is normocephalic.   Nose appears normal.   No obvious deformity of the mouth and throat.  Conjunctivae normal.   Heart rate and rhythm is normal.  Pulmonary effort is normal.   Moving all 4 extremities.  Extremities warm.  No focal deficit present.   Alert and oriented to person, place, and time.     RESULTS REVIEW:  I have reviewed the patient's all relevant laboratory and imaging findings.     Assessment & Plan    ASSESSMENT & PLAN:  Rahel Ramsey is a 59 y.o. female with significant medical conditions as mentioned above presented to my clinic.    Assessment & Plan  1. Lung nodule.  Her CT scan shows a stable small spot with no concerning signs of malignancy or recurrence. The scan does not indicate any return of cancer. Regular monitoring is essential, and a follow-up scan will be scheduled in 6 months to ensure continued stability.    2. Arthritis.  She received a shot in the knee from Dr. Amaya due to arthritis-related pain. She also received a shot in her back for pain management. Continued monitoring and pain management strategies will be necessary.    3. Anemia.  She is under the care of Dr. Prabhakar for anemia, which was identified prior to her surgery. No chemotherapy was required for her cancer treatment.    Follow-up  Return in 6 months for follow up.    I discussed the patients findings and my recommendations with the patient/family/caregiver. The " patient/family/caregiver was given adequate time to ask questions and all questions were answered to patient satisfaction. Thank you for this consult and allowing us to participate in the care of your patient.      Erwin Kearney MD  Thoracic Surgeon  [facility]        Dictated utilizing Dragon dictation    I spent 30 minutes caring for Rahel on this date of service. This time includes time spent by me in the following activities:preparing for the visit, reviewing tests, obtaining and/or reviewing a separately obtained history, performing a medically appropriate examination and/or evaluation, counseling and educating the patient/family/caregiver, ordering medications, tests, or procedures, referring and communicating with other health care professionals , documenting information in the medical record, independently interpreting results and communicating that information with the patient/family/caregiver, and care coordination and more than half the time was spent in direct face to face evaluation and decision making.     Patient or patient representative verbalized consent for the use of Ambient Listening during the visit with  Erwin Kearney MD for chart documentation. 11/10/2024  12:15 EST

## 2024-11-19 ENCOUNTER — OFFICE VISIT (OUTPATIENT)
Dept: CARDIOLOGY | Facility: CLINIC | Age: 59
End: 2024-11-19
Payer: MEDICARE

## 2024-11-19 VITALS
WEIGHT: 189 LBS | DIASTOLIC BLOOD PRESSURE: 78 MMHG | BODY MASS INDEX: 27.99 KG/M2 | SYSTOLIC BLOOD PRESSURE: 118 MMHG | OXYGEN SATURATION: 95 % | HEART RATE: 89 BPM | HEIGHT: 69 IN | RESPIRATION RATE: 18 BRPM

## 2024-11-19 DIAGNOSIS — E78.2 MIXED HYPERLIPIDEMIA: ICD-10-CM

## 2024-11-19 DIAGNOSIS — I48.0 PAROXYSMAL ATRIAL FIBRILLATION: Primary | ICD-10-CM

## 2024-11-19 PROCEDURE — 3074F SYST BP LT 130 MM HG: CPT | Performed by: INTERNAL MEDICINE

## 2024-11-19 PROCEDURE — 3078F DIAST BP <80 MM HG: CPT | Performed by: INTERNAL MEDICINE

## 2024-11-19 PROCEDURE — 93000 ELECTROCARDIOGRAM COMPLETE: CPT | Performed by: INTERNAL MEDICINE

## 2024-11-19 PROCEDURE — 99214 OFFICE O/P EST MOD 30 MIN: CPT | Performed by: INTERNAL MEDICINE

## 2024-11-19 NOTE — PROGRESS NOTES
Cardiology Clinic Note  Alexandro Farah MD, PhD    Subjective:     Encounter Date:11/19/2024      Patient ID: Rahel Ramsey is a 59 y.o. female.    Chief Complaint:  Chief Complaint   Patient presents with    Follow-up       HPI:        I the pleasure to have a telehealth visit with this 58-year-old patient previously seen in follow-up with diabetes hypertension hyperlipidemia and paroxysmal atrial fibrillation historically.  She is a pack per day smoker counseled to quit.  No alcohol.  EKG reviewed and interpreted by me demonstrated sinus rhythm with normal axis and ST segments at that time.  At that time we ordered bilateral carotid Dopplers which demonstrated mild disease 15 to 45% which is nonobstructive and needs really primary and secondary prevention.  She is on fenofibrate only but no statin therapy.  She is on metoprolol XL daily with underlying sinus rhythm but no systemic anticoagulation and has had no recurrence of her A. fib in quite some time.  Her [id number] score is historically 3 but she did not want anticoagulation has been deferred since prior to my establishment of care.  She requires epidural injections for chronic back pain.  She follows up today 9 months from prior, she remains abstinent from smoking status post partial lung removal/lobectomy.  She has chronic atypical chest pain dyspnea on exertion.  No clinical heart failure today, remains in sinus rhythm with unchanged EKG.  We discussed risk ratification, lipid goals, prior carotid Dopplers which were abnormal, surveillance, CV health, paroxysmal A-fib which has had no recurrence to date.  Otherwise she is getting back on track after complex year being seen today for 9-month follow-up.  We discussed if she continues to have chest pain would recommend cardiac catheterization with severely elevated calcium score of 360 in the left main with concern for any balanced ischemia.  She appears to be doing okay at this point.  No refractory  angina, compliant with all medicines, no new complaints today    Prior testing as below  September 2023, carotid Dopplers less than 50% stenosis bilaterally, mild atherosclerotic disease     Prior EKG normal normal normal sinus rhythm nonspecific ST-T wave abnormalities     2D echo October 2023, EF 60% with normal global and regional function, grade 1 diastolic dysfunction, atrial sizes are normal, RV size and function normal, mild MR and TR with normal pulmonary pressures, bubble study was negative for right to left interatrial shunt     Stress testing October 2023, normal stress and rest perfusion, low risk study EF greater than 70%     Coronary calcium scoring September 2023, high risk study, total score 481, left main had 360, LAD 81, circumflex 0, RCA 40    Most recent lipid panel LDL less than 40 at goal on current medicines     Review of systems otherwise negative x14 point review of systems except as mentioned above     Historical data copied forward from previous encounters in EMR including the history, exam, and assessment/plan has been reviewed and is unchanged unless noted otherwise.     Cardiac medicines reviewed with risk, benefits, and necessity of each discussed.     Risk and benefit of cardiac testing reviewed including death heart attack stroke pain bleeding infection need for vascular /cardiovascular surgery were discussed and the patient      Objective:      Vitals reviewed below  Regular rate and rhythm no rubs murmurs or gallops  No heave or lift  No clubbing cyanosis or edema  Normal pulses normal cap refill  No carotid bruits on auscultation, no JVD  Intact grossly  Clear to auscultation  Unchanged from prior  Assessment:            Paroxysmal atrial fibrillation  Sinus rhythm  Continue metoprolol  Aspirin only  No systemic anticoagulation on board without recurrence     Essential hypertension well-controlled     Dyslipidemia, hyper triglyceridemia, fenofibrate, remains on board  Needs LDL  "goal less than 55 with high risk calcium score  Start atorvastatin 20 daily in addition to Zetia 10 mg daily, continue fenofibrate  Follow-up with panel in 3 months     Carotid disease mild nonobstructive 15 to 45% by carotid Dopplers bilaterally,      Coronary calcium scoring greater than 400, 360 in the left main alone  Recommendations for lipids as above, atorvastatin Zetia and fenofibrate, most recent LDL 40 at goal  Diet and exercise per AHA guidelines  Heart healthy low-sodium low-cholesterol diet       Neuropathic pain, follow-up with primary care, on Lyrica and Elavil     See her back in 1 year unless otherwise indicated       Objective:         /78 (BP Location: Right arm, Patient Position: Sitting)   Pulse 89   Resp 18   Ht 175.3 cm (69\")   Wt 85.7 kg (189 lb)   SpO2 95%   BMI 27.91 kg/m²             The pleasure to be involved in this patient's cardiovascular care.  Please call with any questions or concerns  Alexandro Farah MD, PhD    Most recent EKG as reviewed and interpreted by me:    ECG 12 Lead    Date/Time: 11/19/2024 12:04 PM  Performed by: Alexandro Farah MD    Authorized by: Alexandro Farah MD  Comparison: not compared with previous ECG   Previous ECG: no previous ECG available  Rhythm: sinus rhythm  Rate: normal  Conduction: conduction normal  QRS axis: normal  Other findings: non-specific ST-T wave changes and left ventricular hypertrophy    Clinical impression: abnormal EKG           Most recent echo as reviewed and interpreted by me:  Results for orders placed during the hospital encounter of 09/25/23    Adult Transthoracic Echo Complete W/ Color, Spectral and Contrast if Necessary Per Protocol    Interpretation Summary  Grossly normal EF 60%, normal regional and global wall motion and thickening  Grade 1 diastolic dysfunction  Left and right atrium are normal size  RV size and function is normal  No masses or effusions  Normal myocardial thickness  Mitral valve " appears grossly normal although with mildly thickened leaflet tips, mild regurgitation without stenosis  Aortic valve appears grossly normal, trileaflet, no regurgitation or stenosis  Tricuspid valve appears grossly normal, no regurgitation or stenosis significantly, normal estimated RV systolic pressure  No evidence of pulmonary hypertension  Pulmonic valve not well seen but no stenosis by Doppler imaging  Grossly normal proximal aorta  No Doppler evidence for ASD or PFO  Normal IVC  Bubble study negative for right to left interatrial shunt      Most recent stress test as reviewed and interpreted by me:  Results for orders placed during the hospital encounter of 09/25/23    Stress Test With Myocardial Perfusion One Day    Interpretation Summary    Left ventricular ejection fraction is hyperdynamic (Calculated EF > 70%).    Myocardial perfusion imaging indicates a normal myocardial perfusion study with no evidence of ischemia.    Impressions are consistent with a low risk study.    There is no prior study available for comparison.    Findings consistent with an equivocal ECG stress test.      Most recent cardiac catheterization as reviewed interpreted by me:  No results found for this or any previous visit.    The following portions of the patient's history were reviewed and updated as appropriate: allergies, current medications, past family history, past medical history, past social history, past surgical history, and problem list.      ROS:  14 point review of systems negative except as mentioned above    Current Outpatient Medications:     albuterol sulfate  (90 Base) MCG/ACT inhaler, Inhale 2 puffs Every 4 (Four) Hours As Needed for Wheezing or Shortness of Air., Disp: 18 g, Rfl: 5    atorvastatin (LIPITOR) 20 MG tablet, Take 1 tablet by mouth Daily., Disp: 90 tablet, Rfl: 3    ezetimibe (ZETIA) 10 MG tablet, Take 1 tablet by mouth Daily., Disp: 90 tablet, Rfl: 3    fenofibrate (TRICOR) 145 MG tablet,  TAKE 1 TABLET BY MOUTH DAILY, Disp: 90 tablet, Rfl: 1    fluticasone (FLONASE) 50 MCG/ACT nasal spray, 1 spray by Each Nare route Daily., Disp: 48 g, Rfl: 3    Fluticasone-Umeclidin-Vilant (TRELEGY) 100-62.5-25 MCG/ACT inhaler, Inhale 1 puff Every Night., Disp: , Rfl:     HYDROcodone-acetaminophen (NORCO)  MG per tablet, Take 1 tablet by mouth Every 6 (Six) Hours As Needed for Moderate Pain. May take dos if needed, Disp: , Rfl:     Januvia 100 MG tablet, TAKE 1 TABLET BY MOUTH DAILY, Disp: 90 tablet, Rfl: 1    metFORMIN (GLUCOPHAGE) 1000 MG tablet, TAKE 1 TABLET BY MOUTH TWICE DAILY WITH MEALS, Disp: 180 tablet, Rfl: 1    metoprolol succinate XL (TOPROL-XL) 25 MG 24 hr tablet, Take 1 tablet by mouth Daily., Disp: 90 tablet, Rfl: 3    nystatin (MYCOSTATIN) 461316 UNIT/GM cream, Apply 1 Application topically to the appropriate area as directed As Needed. None dos, Disp: , Rfl:     ondansetron (Zofran) 4 MG tablet, Take 1 tablet by mouth Every 8 (Eight) Hours As Needed for Nausea or Vomiting., Disp: 30 tablet, Rfl: 0    pregabalin (LYRICA) 100 MG capsule, Take 1 capsule by mouth 2 (Two) Times a Day., Disp: 60 capsule, Rfl: 5    vitamin D (ERGOCALCIFEROL) 1.25 MG (49131 UT) capsule capsule, TAKE 1 CAPSULE BY MOUTH EVERY 7 DAYS, Disp: 12 capsule, Rfl: 1    vitamin E 400 UNIT capsule, Take 1 capsule by mouth Daily., Disp: 90 capsule, Rfl: 3    Accu-Chek Softclix Lancets lancets, USE TO TEST BLOOD SUGAR TWICE DAILY AS NEEDED, Disp: 100 each, Rfl: 3    Blood Glucose Monitoring Suppl (True Metrix Meter) w/Device kit, Use 1 each Take As Directed., Disp: 1 kit, Rfl: 0    glucose blood (True Metrix Blood Glucose Test) test strip, Use as instructed to check blood sugar daily, Disp: 100 each, Rfl: 3    Problem List:  Patient Active Problem List   Diagnosis    Allergic rhinitis    Degeneration of lumbar or lumbosacral intervertebral disc    Degenerative joint disease of right acromioclavicular joint    Fatigue    History of  skin cancer    Hyperglycemia    Essential hypertension    Mixed hyperlipidemia    Hypertriglyceridemia    Leukocytosis    Lumbar disc disease with radiculopathy    Lumbar herniated disc    Lumbar radiculopathy    Other cervical disc degeneration, unspecified cervical region    Paroxysmal atrial fibrillation    Stenosis of right carotid artery    Tobacco use    Vitamin D deficiency    Right upper lobe pulmonary nodule    Hilar adenopathy    Urinary tract infection with hematuria    UTI symptoms    Dysuria    COPD with exacerbation    Primary cancer of right upper lobe of lung    Acute pancreatitis with infected necrosis, unspecified pancreatitis type    History of lung cancer    Diarrhea    Upper abdominal pain    Angiodysplasia of colon without hemorrhage    Dysphagia    History of biliary duct stent placement    Iron deficiency    Occult blood in stools    Personal history of colonic polyps    Pure hypercholesterolemia    Rectal polyp     Past Medical History:  Past Medical History:   Diagnosis Date    Allergic     Arthritis     Atrial fibrillation     Cancer 01/09/2023    lung  Right    COPD (chronic obstructive pulmonary disease)     Diabetes mellitus     type 2    Frequent UTI     Headache     Heartburn     History of biliary duct stent placement 03/23/2023    History of herniated intervertebral disc     lumbar    History of skin cancer     Left lower extremity    Hyperlipidemia     Hypertension     Injury of back     Leukocytosis     Low back pain     Lung nodule     Nausea      Past Surgical History:  Past Surgical History:   Procedure Laterality Date    BACK SURGERY      lumbar    CARDIAC CATHETERIZATION      CHOLECYSTECTOMY N/A 4/10/2023    Procedure: CHOLECYSTECTOMY LAPAROSCOPIC;  Surgeon: Mich Bansal MD;  Location: [facility] MAIN OR;  Service: General;  Laterality: N/A;    COLONOSCOPY      DILATATION AND CURETTAGE      ENDOSCOPY N/A 3/30/2023    Procedure: ESOPHAGOGASTRODUODENOSCOPY WITH STENT REMOVAL;   Surgeon: Naman Blackmon MD;  Location: [facility] ENDOSCOPY;  Service: Gastroenterology;  Laterality: N/A;  normal stent removal    ERCP N/A 2023    Procedure: ENDOSCOPIC RETROGRADE CHOLANGIOPANCREATOGRAPHY with sphicterotomy and balloon guided sweeping of bile duct up to 9mm and placement of 10Fr x 9cm biliary stent;  Surgeon: Naman Blackmon MD;  Location: [facility] ENDOSCOPY;  Service: Gastroenterology;  Laterality: N/A;  post: choledocholithiasis    LOBECTOMY Right 2023    Procedure: THORASCOPIC RIGHT UPPER LOBECTOMY WITH DAVINCI ROBOT, MEDIASTINAL LYMPH NODE DISSECTION;  Surgeon: Erwin Kearney MD;  Location: [facility] MAIN OR;  Service: Robotics - DaVinci;  Laterality: Right;    LUMBAR DECOMPRESSION      L4-L5     LUNG BIOPSY Right 2022    SKIN CANCER EXCISION Left     Shin    SUBTOTAL HYSTERECTOMY       Social History:  Social History     Socioeconomic History    Marital status:    Tobacco Use    Smoking status: Former     Current packs/day: 0.00     Average packs/day: 1 pack/day for 40.0 years (40.0 ttl pk-yrs)     Types: Cigarettes     Start date: 1983     Quit date: 2023     Years since quittin.8     Passive exposure: Past    Smokeless tobacco: Never   Vaping Use    Vaping status: Never Used   Substance and Sexual Activity    Alcohol use: Yes     Comment: rare    Drug use: No    Sexual activity: Defer     Allergies:  Allergies   Allergen Reactions    Morphine Anaphylaxis and Nausea And Vomiting    Percocet [Oxycodone-Acetaminophen] GI Intolerance    Tramadol GI Intolerance     Immunizations:  Immunization History   Administered Date(s) Administered    COVID-19 ([employer]) 2021    Covid-19 ([employer]) Gray Cap Monovalent 2022    Flu Vaccine Quad PF >36MO 2018    Fluzone (or Fluarix & Flulaval for VFC) >6mos 2020, 2022, 10/04/2023    Influenza, Unspecified 2016, 10/12/2017    Pneumococcal Polysaccharide (PPSV23) 2021    Tdap  05/07/2020    flucelvax quad pfs =>4 YRS 10/03/2019            In-Office Procedure(s):  No orders to display        ASCVD RIsk Score::  The ASCVD Risk score (Hosea YE, et al., 2019) failed to calculate for the following reasons:    The valid total cholesterol range is 130 to 320 mg/dL    Imaging:    Results for orders placed during the hospital encounter of 01/24/23    XR Chest 2 View    Narrative  XR CHEST 2 VW    Date of Exam: 1/24/2023 9:10 AM EST    Indication: post-op.    Comparison: 1/12/2023    Findings:  There is linear consolidation in the bases bilaterally suggesting atelectasis or scarring. Probable small amounts of pleural fluid are noted in the CP angles. Right hilar nodular density may be due to adenopathy. No pneumothorax is seen. Heart size and  mediastinal contour appear within normal limits. Calcified granulomatous changes are noted.    Impression  Impression:  Bibasilar linear consolidation suggesting atelectasis. Question small amounts of bilateral pleural fluid.    Right hilar nodular density may be due to adenopathy. This could be more definitively characterized by CT.    Electronically Signed: Alberto Nicole  1/24/2023 1:45 PM EST  Workstation ID: [id number]       Results for orders placed during the hospital encounter of 09/23/24    CT Chest Without Contrast Diagnostic    Narrative  CT CHEST WO CONTRAST DIAGNOSTIC    Date of Exam: 9/23/2024 10:50 AM EDT    Indication: hx of lung cancer, lung nodules    Comparison: CT chest 3/6/2024, 1/22/2024.    Technique: Axial CT images were obtained of the chest without contrast administration.  Sagittal and coronal reconstructions were performed.  Automated exposure control and iterative reconstruction methods were used.    Findings:  Postoperative changes are redemonstrated from right upper lobectomy. 3 mm subpleural right middle lobe nodule is stable on axial image 42. Clustered nodules in the right lower lobe are stable, measuring up to 9 x 6 mm on axial  image 56, previously 9 x 6  mm. No new or enlarging lung nodules are seen.    No pleural or pericardial effusion. Normal heart size. Coronary artery calcifications noted. No lymphadenopathy seen in the chest. The liver is lower in attenuation compared to the spleen, compatible with mild diffuse hepatic steatosis. Hypodensity in  the partially included pancreatic tail is stable, likely sequela of prior pancreatitis. No acute or worrisome osseous abnormality is identified.    Impression  Impression:  Stable postoperative changes from right upper lobectomy.  Stable size of lung nodules measuring up to 9 x 6 mm.      Electronically Signed: Marline Landaverde  9/25/2024 1:17 AM EDT  Workstation ID: [id number]      Results for orders placed during the hospital encounter of 09/23/24    CT Chest Without Contrast Diagnostic    Narrative  CT CHEST WO CONTRAST DIAGNOSTIC    Date of Exam: 9/23/2024 10:50 AM EDT    Indication: hx of lung cancer, lung nodules    Comparison: CT chest 3/6/2024, 1/22/2024.    Technique: Axial CT images were obtained of the chest without contrast administration.  Sagittal and coronal reconstructions were performed.  Automated exposure control and iterative reconstruction methods were used.    Findings:  Postoperative changes are redemonstrated from right upper lobectomy. 3 mm subpleural right middle lobe nodule is stable on axial image 42. Clustered nodules in the right lower lobe are stable, measuring up to 9 x 6 mm on axial image 56, previously 9 x 6  mm. No new or enlarging lung nodules are seen.    No pleural or pericardial effusion. Normal heart size. Coronary artery calcifications noted. No lymphadenopathy seen in the chest. The liver is lower in attenuation compared to the spleen, compatible with mild diffuse hepatic steatosis. Hypodensity in  the partially included pancreatic tail is stable, likely sequela of prior pancreatitis. No acute or worrisome osseous abnormality is  identified.    Impression  Impression:  Stable postoperative changes from right upper lobectomy.  Stable size of lung nodules measuring up to 9 x 6 mm.      Electronically Signed: Marlineaimee Landaverde  9/25/2024 1:17 AM EDT  Workstation ID: [id number]      Lab Review:   Lab on 09/25/2024   Component Date Value    WBC 09/25/2024 8.66     RBC 09/25/2024 4.89     Hemoglobin 09/25/2024 14.5     Hematocrit 09/25/2024 44.9     MCV 09/25/2024 91.8     MCH 09/25/2024 29.7     MCHC 09/25/2024 32.3     RDW 09/25/2024 13.8     RDW-SD 09/25/2024 45.7     MPV 09/25/2024 9.3     Platelets 09/25/2024 204     Neutrophil % 09/25/2024 54.2     Lymphocyte % 09/25/2024 38.5     Monocyte % 09/25/2024 5.9     Eosinophil % 09/25/2024 0.9     Basophil % 09/25/2024 0.5     Neutrophils, Absolute 09/25/2024 4.70     Lymphocytes, Absolute 09/25/2024 3.33 (H)     Monocytes, Absolute 09/25/2024 0.51     Eosinophils, Absolute 09/25/2024 0.08     Basophils, Absolute 09/25/2024 0.04     Extra Tube 09/25/2024 Hold for add-ons.     Extra Tube 09/25/2024 Hold for add-ons.    Orders Only on 06/27/2024   Component Date Value    WBC 07/01/2024 8.66     RBC 07/01/2024 4.61     Hemoglobin 07/01/2024 13.9     Hematocrit 07/01/2024 42.1     MCV 07/01/2024 91.3     MCH 07/01/2024 30.2     MCHC 07/01/2024 33.0     RDW 07/01/2024 12.5     RDW-SD 07/01/2024 41.4     MPV 07/01/2024 10.3     Platelets 07/01/2024 173     Glucose 07/01/2024 178 (H)     BUN 07/01/2024 13     Creatinine 07/01/2024 0.90     Sodium 07/01/2024 138     Potassium 07/01/2024 4.1     Chloride 07/01/2024 101     CO2 07/01/2024 26.9     Calcium 07/01/2024 9.5     Total Protein 07/01/2024 7.0     Albumin 07/01/2024 4.3     ALT (SGPT) 07/01/2024 56 (H)     AST (SGOT) 07/01/2024 40 (H)     Alkaline Phosphatase 07/01/2024 108     Total Bilirubin 07/01/2024 0.5     Globulin 07/01/2024 2.7     A/G Ratio 07/01/2024 1.6     BUN/Creatinine Ratio 07/01/2024 14.4     Anion Gap 07/01/2024 10.1     eGFR 07/01/2024  73.8     Total Cholesterol 07/01/2024 96     Triglycerides 07/01/2024 121     HDL Cholesterol 07/01/2024 37 (L)     LDL Cholesterol  07/01/2024 37     VLDL Cholesterol 07/01/2024 22     LDL/HDL Ratio 07/01/2024 0.94     Hemoglobin A1C 07/01/2024 8.60 (H)    Lab on 05/29/2024   Component Date Value    Extra Tube 05/29/2024 Hold for add-ons.     Extra Tube 05/29/2024 Hold for add-ons.    Office Visit on 05/29/2024   Component Date Value    WBC 05/29/2024 6.72     RBC 05/29/2024 4.59     Hemoglobin 05/29/2024 13.8     Hematocrit 05/29/2024 43.4     MCV 05/29/2024 94.6     MCH 05/29/2024 30.1     MCHC 05/29/2024 31.8     RDW 05/29/2024 13.4     RDW-SD 05/29/2024 45.1     MPV 05/29/2024 9.4     Platelets 05/29/2024 199     Neutrophil % 05/29/2024 56.0     Lymphocyte % 05/29/2024 36.6     Monocyte % 05/29/2024 5.4     Eosinophil % 05/29/2024 1.6     Basophil % 05/29/2024 0.4     Neutrophils, Absolute 05/29/2024 3.76     Lymphocytes, Absolute 05/29/2024 2.46     Monocytes, Absolute 05/29/2024 0.36     Eosinophils, Absolute 05/29/2024 0.11     Basophils, Absolute 05/29/2024 0.03     Glucose 05/29/2024 152 (H)     BUN 05/29/2024 12     Creatinine 05/29/2024 0.72     Sodium 05/29/2024 140     Potassium 05/29/2024 4.0     Chloride 05/29/2024 102     CO2 05/29/2024 29.2 (H)     Calcium 05/29/2024 10.4     Total Protein 05/29/2024 7.2     Albumin 05/29/2024 4.6     ALT (SGPT) 05/29/2024 71 (H)     AST (SGOT) 05/29/2024 65 (H)     Alkaline Phosphatase 05/29/2024 110     Total Bilirubin 05/29/2024 0.6     Globulin 05/29/2024 2.6     A/G Ratio 05/29/2024 1.8     BUN/Creatinine Ratio 05/29/2024 16.7     Anion Gap 05/29/2024 8.8     eGFR 05/29/2024 96.5    Clinical Support on 05/22/2024   Component Date Value    Color 05/22/2024 Dark Yellow     Clarity, UA 05/22/2024 Cloudy (A)     Glucose, UA 05/22/2024 Negative     Bilirubin 05/22/2024 Negative     Ketones, UA 05/22/2024 Negative     Specific Gravity  05/22/2024 1.030     Blood, UA  05/22/2024 Negative     pH, Urine 05/22/2024 5.5     Protein, POC 05/22/2024 Negative     Urobilinogen, UA 05/22/2024 Normal     Leukocytes 05/22/2024 Negative     Nitrite, UA 05/22/2024 Positive (A)     Urine Culture 05/22/2024 >100,000 CFU/mL Escherichia coli (A)      Recent labs reviewed and interpreted for clinical significance and application            Level of Care:           Alexandro Farah MD  11/19/24  .

## 2024-11-21 ENCOUNTER — OFFICE VISIT (OUTPATIENT)
Dept: FAMILY MEDICINE CLINIC | Facility: CLINIC | Age: 59
End: 2024-11-21
Payer: MEDICARE

## 2024-11-21 ENCOUNTER — LAB (OUTPATIENT)
Dept: FAMILY MEDICINE CLINIC | Facility: CLINIC | Age: 59
End: 2024-11-21
Payer: MEDICARE

## 2024-11-21 VITALS
OXYGEN SATURATION: 99 % | HEART RATE: 74 BPM | SYSTOLIC BLOOD PRESSURE: 126 MMHG | HEIGHT: 69 IN | BODY MASS INDEX: 28.08 KG/M2 | DIASTOLIC BLOOD PRESSURE: 81 MMHG | WEIGHT: 189.6 LBS

## 2024-11-21 DIAGNOSIS — N39.0 URINARY TRACT INFECTION WITHOUT HEMATURIA, SITE UNSPECIFIED: Primary | ICD-10-CM

## 2024-11-21 DIAGNOSIS — E11.65 TYPE 2 DIABETES MELLITUS WITH HYPERGLYCEMIA, WITHOUT LONG-TERM CURRENT USE OF INSULIN: ICD-10-CM

## 2024-11-21 DIAGNOSIS — R79.89 ELEVATED LFTS: ICD-10-CM

## 2024-11-21 DIAGNOSIS — G89.29 CHRONIC MIDLINE LOW BACK PAIN WITH BILATERAL SCIATICA: ICD-10-CM

## 2024-11-21 DIAGNOSIS — M54.41 CHRONIC MIDLINE LOW BACK PAIN WITH BILATERAL SCIATICA: ICD-10-CM

## 2024-11-21 DIAGNOSIS — E55.9 VITAMIN D DEFICIENCY: ICD-10-CM

## 2024-11-21 DIAGNOSIS — J44.9 CHRONIC OBSTRUCTIVE PULMONARY DISEASE, UNSPECIFIED COPD TYPE: ICD-10-CM

## 2024-11-21 DIAGNOSIS — M54.42 CHRONIC MIDLINE LOW BACK PAIN WITH BILATERAL SCIATICA: ICD-10-CM

## 2024-11-21 DIAGNOSIS — Z23 INFLUENZA VACCINE ADMINISTERED: ICD-10-CM

## 2024-11-21 DIAGNOSIS — C34.91 ADENOCARCINOMA OF RIGHT LUNG: ICD-10-CM

## 2024-11-21 DIAGNOSIS — I10 ESSENTIAL HYPERTENSION: ICD-10-CM

## 2024-11-21 LAB
BILIRUB BLD-MCNC: NEGATIVE MG/DL
CLARITY, POC: ABNORMAL
COLOR UR: ABNORMAL
GLUCOSE UR STRIP-MCNC: NEGATIVE MG/DL
HOLD SPECIMEN: NORMAL
KETONES UR QL: NEGATIVE
LEUKOCYTE EST, POC: NEGATIVE
NITRITE UR-MCNC: POSITIVE MG/ML
PH UR: 5 [PH] (ref 5–8)
PROT UR STRIP-MCNC: NEGATIVE MG/DL
RBC # UR STRIP: NEGATIVE /UL
SP GR UR: 1.03 (ref 1–1.03)
UROBILINOGEN UR QL: ABNORMAL

## 2024-11-21 PROCEDURE — 81002 URINALYSIS NONAUTO W/O SCOPE: CPT | Performed by: NURSE PRACTITIONER

## 2024-11-21 PROCEDURE — 99213 OFFICE O/P EST LOW 20 MIN: CPT | Performed by: NURSE PRACTITIONER

## 2024-11-21 PROCEDURE — 80061 LIPID PANEL: CPT | Performed by: NURSE PRACTITIONER

## 2024-11-21 PROCEDURE — 85027 COMPLETE CBC AUTOMATED: CPT | Performed by: NURSE PRACTITIONER

## 2024-11-21 PROCEDURE — 87088 URINE BACTERIA CULTURE: CPT | Performed by: NURSE PRACTITIONER

## 2024-11-21 PROCEDURE — 82043 UR ALBUMIN QUANTITATIVE: CPT | Performed by: NURSE PRACTITIONER

## 2024-11-21 PROCEDURE — G0008 ADMIN INFLUENZA VIRUS VAC: HCPCS | Performed by: NURSE PRACTITIONER

## 2024-11-21 PROCEDURE — 3046F HEMOGLOBIN A1C LEVEL >9.0%: CPT | Performed by: NURSE PRACTITIONER

## 2024-11-21 PROCEDURE — 81001 URINALYSIS AUTO W/SCOPE: CPT | Performed by: NURSE PRACTITIONER

## 2024-11-21 PROCEDURE — 3079F DIAST BP 80-89 MM HG: CPT | Performed by: NURSE PRACTITIONER

## 2024-11-21 PROCEDURE — 3074F SYST BP LT 130 MM HG: CPT | Performed by: NURSE PRACTITIONER

## 2024-11-21 PROCEDURE — G0439 PPPS, SUBSEQ VISIT: HCPCS | Performed by: NURSE PRACTITIONER

## 2024-11-21 PROCEDURE — 80053 COMPREHEN METABOLIC PANEL: CPT | Performed by: NURSE PRACTITIONER

## 2024-11-21 PROCEDURE — 1160F RVW MEDS BY RX/DR IN RCRD: CPT | Performed by: NURSE PRACTITIONER

## 2024-11-21 PROCEDURE — 1125F AMNT PAIN NOTED PAIN PRSNT: CPT | Performed by: NURSE PRACTITIONER

## 2024-11-21 PROCEDURE — 90656 IIV3 VACC NO PRSV 0.5 ML IM: CPT | Performed by: NURSE PRACTITIONER

## 2024-11-21 PROCEDURE — 83036 HEMOGLOBIN GLYCOSYLATED A1C: CPT | Performed by: NURSE PRACTITIONER

## 2024-11-21 PROCEDURE — 36415 COLL VENOUS BLD VENIPUNCTURE: CPT | Performed by: NURSE PRACTITIONER

## 2024-11-21 PROCEDURE — 87186 SC STD MICRODIL/AGAR DIL: CPT | Performed by: NURSE PRACTITIONER

## 2024-11-21 PROCEDURE — 1159F MED LIST DOCD IN RCRD: CPT | Performed by: NURSE PRACTITIONER

## 2024-11-21 PROCEDURE — 87086 URINE CULTURE/COLONY COUNT: CPT | Performed by: NURSE PRACTITIONER

## 2024-11-21 RX ORDER — PHENAZOPYRIDINE HYDROCHLORIDE 200 MG/1
200 TABLET, FILM COATED ORAL 3 TIMES DAILY PRN
Qty: 30 TABLET | Refills: 2 | Status: SHIPPED | OUTPATIENT
Start: 2024-11-21

## 2024-11-21 RX ORDER — PREGABALIN 100 MG/1
100 CAPSULE ORAL 2 TIMES DAILY
Qty: 60 CAPSULE | Refills: 5 | Status: SHIPPED | OUTPATIENT
Start: 2024-11-21

## 2024-11-21 RX ORDER — CIPROFLOXACIN 250 MG/1
250 TABLET, FILM COATED ORAL 2 TIMES DAILY
Qty: 10 TABLET | Refills: 0 | Status: SHIPPED | OUTPATIENT
Start: 2024-11-21

## 2024-11-21 RX ORDER — METFORMIN HYDROCHLORIDE 500 MG/1
1000 TABLET, EXTENDED RELEASE ORAL
Qty: 180 TABLET | Refills: 1 | Status: SHIPPED | OUTPATIENT
Start: 2024-11-21

## 2024-11-21 NOTE — PROGRESS NOTES
The ABCs of the Annual Wellness Visit  Subsequent Medicare Wellness Visit    Chief Complaint   Patient presents with    Medicare Wellness-subsequent    Urinary Frequency     With urgency and burning x 1 week     Subjective     History of Present Illness:  Rahel Ramsey is a 59 y.o. female who presents for a Subsequent Medicare Wellness Visit and follow up on chronic conditions.  HPI    Diabetes mellitus type II, BG this am 173. 7/1/2024 A1c improved 8.6 from 10.8, she is on metformin  xr now 500mg BID and Januvia, diet is poor but states it has improved. She is now only eating sugar-free candy, decreased carbohydrates, only drinking water, no sodas.  Denies any signs/symptoms of hyper/hypoglycemia, blurry vision, polydipsia, polyuria, nocturia, and unintentional weight loss     Lung cancer, following with Dr. Prabhakar, on 1/9/2023 patient underwent right lobectomy with mediastinal lymph node dissection per Dr. Kearney.    -CT scan of the chest 6/2/2023 showed no relapse of disease, there was a soft tissue shadow at the tail of the pancreas  -CT of the abdomen which showed improvement, thought to be pancreatic pseudocyst.    -CT chest 3/26/2024 stable pulmonary nodules  RUL/RLL, hepatic steatosis, pancreatic findings unchanged      COPD, she follows with Dr. Easton, symptoms include non-productive cough, especially with exertion.  Symptoms have been gradually improving since stopping smoking.  The pt is using Trelegy inhaler as directed and albuterol as needed.     HTN, stable on meds and takes as directed, denies chest pain, headache, shortness of air, palpitations and swelling of extremities.     Elevated LFTs/hepatic steatosis, patient is taking vitamin E daily, follows with gastroenterology  -Colonoscopy is overdue     Hyperlipidemia, follows with Dr. Farah, she is taking atorvastatin, Zetia and fenofibrate.  The patient denies muscle aches, constipation, diarrhea, GI upset, fatigue, chest pain/pressure, exercise  intolerance, dyspnea, palpitations, syncope and pedal edema.       Vitamin D deficiency, on weekly vitamin D     Chronic low back pain, follows with pain management, patient on Lyrica     Recurrent UTI, postmenopausal bleeding and vulvar mass, she saw gynecology, reports no significant abn findings except atrophy, she was offered a cream but did not get it. She has not experienced any further episodes of bleeding.  She is drinking more water, reports urgency and dysuria today          The following portions of the patient's history were reviewed and   updated as appropriate: allergies, current medications, past family history, past medical history, past social history, past surgical history, and problem list.      Compared to one year ago, the patient feels her physical   health is better.    Compared to one year ago, the patient feels her mental   health is better.    Recent Hospitalizations:  She was not admitted to the hospital during the last year.       Current Medical Providers:  Patient Care Team:  Vickie Gomez APRN as PCP - General (Nurse Practitioner)  Cheri Prabhakar MD as Consulting Physician (Hematology and Oncology)  Veronique Brothers, RN as Nurse Navigator  Erwin Kearney MD as Surgeon (Thoracic Surgery)  Clarisse Dacosta RN as Ambulatory  ([facility])    Outpatient Medications Prior to Visit   Medication Sig Dispense Refill    Accu-Chek Softclix Lancets lancets USE TO TEST BLOOD SUGAR TWICE DAILY AS NEEDED 100 each 3    albuterol sulfate  (90 Base) MCG/ACT inhaler Inhale 2 puffs Every 4 (Four) Hours As Needed for Wheezing or Shortness of Air. 18 g 5    atorvastatin (LIPITOR) 20 MG tablet Take 1 tablet by mouth Daily. 90 tablet 3    Blood Glucose Monitoring Suppl (True Metrix Meter) w/Device kit Use 1 each Take As Directed. 1 kit 0    ezetimibe (ZETIA) 10 MG tablet Take 1 tablet by mouth Daily. 90 tablet 3    fenofibrate (TRICOR) 145 MG tablet TAKE 1 TABLET BY MOUTH DAILY  90 tablet 1    fluticasone (FLONASE) 50 MCG/ACT nasal spray 1 spray by Each Nare route Daily. 48 g 3    Fluticasone-Umeclidin-Vilant (TRELEGY) 100-62.5-25 MCG/ACT inhaler Inhale 1 puff Every Night.      glucose blood (True Metrix Blood Glucose Test) test strip Use as instructed to check blood sugar daily 100 each 3    HYDROcodone-acetaminophen (NORCO)  MG per tablet Take 1 tablet by mouth Every 6 (Six) Hours As Needed for Moderate Pain. May take dos if needed      Januvia 100 MG tablet TAKE 1 TABLET BY MOUTH DAILY 90 tablet 1    metoprolol succinate XL (TOPROL-XL) 25 MG 24 hr tablet Take 1 tablet by mouth Daily. 90 tablet 3    nystatin (MYCOSTATIN) 096168 UNIT/GM cream Apply 1 Application topically to the appropriate area as directed As Needed. None dos      ondansetron (Zofran) 4 MG tablet Take 1 tablet by mouth Every 8 (Eight) Hours As Needed for Nausea or Vomiting. 30 tablet 0    vitamin D (ERGOCALCIFEROL) 1.25 MG (62041 UT) capsule capsule TAKE 1 CAPSULE BY MOUTH EVERY 7 DAYS 12 capsule 1    vitamin E 400 UNIT capsule Take 1 capsule by mouth Daily. 90 capsule 3    metFORMIN (GLUCOPHAGE) 1000 MG tablet TAKE 1 TABLET BY MOUTH TWICE DAILY WITH MEALS 180 tablet 1    pregabalin (LYRICA) 100 MG capsule Take 1 capsule by mouth 2 (Two) Times a Day. 60 capsule 5     No facility-administered medications prior to visit.       Opioid medication/s are on active medication list.  and I have evaluated her active treatment plan and pain score trends (see table).  Vitals:    11/21/24 0931   PainSc:   4   PainLoc: Generalized     I have reviewed the chart for potential of high risk medication and harmful drug interactions in the elderly.          Aspirin is not on active medication list.  Aspirin use is not indicated based on review of current medical condition/s. Risk of harm outweighs potential benefits.  .    Patient Active Problem List   Diagnosis    Allergic rhinitis    Degeneration of lumbar or lumbosacral  "intervertebral disc    Degenerative joint disease of right acromioclavicular joint    Fatigue    History of skin cancer    Hyperglycemia    Essential hypertension    Mixed hyperlipidemia    Hypertriglyceridemia    Leukocytosis    Lumbar disc disease with radiculopathy    Lumbar herniated disc    Lumbar radiculopathy    Other cervical disc degeneration, unspecified cervical region    Paroxysmal atrial fibrillation    Stenosis of right carotid artery    Tobacco use    Vitamin D deficiency    Right upper lobe pulmonary nodule    Hilar adenopathy    Urinary tract infection with hematuria    UTI symptoms    Dysuria    COPD with exacerbation    Primary cancer of right upper lobe of lung    Acute pancreatitis with infected necrosis, unspecified pancreatitis type    History of lung cancer    Diarrhea    Upper abdominal pain    Angiodysplasia of colon without hemorrhage    Dysphagia    History of biliary duct stent placement    Iron deficiency    Occult blood in stools    Personal history of colonic polyps    Pure hypercholesterolemia    Rectal polyp     Advance Care Planning   Advance Directive is not on file.  ACP discussion was held with the patient during this visit. Patient does not have an advance directive, information provided.    Reviewed chart for potential of high risk medication in the elderly: yes  Reviewed chart for potential of harmful drug interactions in the elderly:yes       Objective       Vitals:    11/21/24 0931   BP: 126/81   BP Location: Left arm   Patient Position: Sitting   Cuff Size: Adult   Pulse: 74   SpO2: 99%   Weight: 86 kg (189 lb 9.6 oz)   Height: 175.3 cm (69\")   PainSc:   4   PainLoc: Generalized     BMI Readings from Last 1 Encounters:   11/21/24 28.00 kg/m²   BMI is within normal parameters. No follow-up required.  BMI Readings from Last 1 Encounters:   11/21/24 28.00 kg/m²   BMI is above normal parameters. Recommendations include: exercise counseling and nutrition counseling    Does the " patient have evidence of cognitive impairment? No    Physical Exam  Constitutional:       General: She is not in acute distress.     Appearance: Normal appearance. She is well-developed. She is not ill-appearing or diaphoretic.   HENT:      Head: Normocephalic.   Eyes:      Conjunctiva/sclera: Conjunctivae normal.      Pupils: Pupils are equal, round, and reactive to light.   Neck:      Thyroid: No thyromegaly.      Vascular: No JVD.   Cardiovascular:      Rate and Rhythm: Normal rate and regular rhythm.      Heart sounds: Normal heart sounds. No murmur heard.  Pulmonary:      Effort: Pulmonary effort is normal. No respiratory distress.      Breath sounds: Normal breath sounds. No wheezing or rhonchi.   Abdominal:      General: Bowel sounds are normal. There is no distension.      Palpations: Abdomen is soft.      Tenderness: There is no abdominal tenderness.   Musculoskeletal:         General: Tenderness (chronic lbp, mild garcia rom) present. No swelling. Normal range of motion.      Cervical back: Normal range of motion and neck supple. No tenderness.   Lymphadenopathy:      Cervical: No cervical adenopathy.   Skin:     General: Skin is warm and dry.      Coloration: Skin is not jaundiced.      Findings: No erythema or rash.   Neurological:      General: No focal deficit present.      Mental Status: She is alert and oriented to person, place, and time. Mental status is at baseline.      Sensory: No sensory deficit.      Motor: No weakness.      Gait: Gait abnormal (Uses cane for mobility).   Psychiatric:         Mood and Affect: Mood normal.         Behavior: Behavior normal.         Thought Content: Thought content normal.         Judgment: Judgment normal.       Lab Results   Component Value Date    TRIG 145 11/21/2024    HDL 39 (L) 11/21/2024    LDL 38 11/21/2024    VLDL 25 11/21/2024    HGBA1C 10.10 (H) 11/21/2024          HEALTH RISK ASSESSMENT    Smoking Status:  Social History     Tobacco Use   Smoking Status  Former    Current packs/day: 0.00    Average packs/day: 1 pack/day for 40.0 years (40.0 ttl pk-yrs)    Types: Cigarettes    Start date: 1983    Quit date: 2023    Years since quittin.8    Passive exposure: Past   Smokeless Tobacco Never     Alcohol Consumption:  Social History     Substance and Sexual Activity   Alcohol Use Yes    Comment: rare     Fall Risk Screen:    ASHLEY Fall Risk Assessment was completed, and patient is at MODERATE risk for falls. Assessment completed on:2024    Depression Screen:       2024     9:25 AM   PHQ-2/PHQ-9 Depression Screening   Little interest or pleasure in doing things Not at all   Feeling down, depressed, or hopeless Not at all       Health Habits and Functional and Cognitive Screenin/21/2024     9:00 AM   Functional & Cognitive Status   Do you have difficulty preparing food and eating? No   Do you have difficulty bathing yourself, getting dressed or grooming yourself? No   Do you have difficulty using the toilet? No   Do you have difficulty moving around from place to place? Yes   Do you have trouble with steps or getting out of a bed or a chair? Yes   Current Diet Well Balanced Diet   Dental Exam Up to date   Eye Exam Up to date   Exercise (times per week) 0 times per week   Current Exercises Include No Regular Exercise   Do you need help using the phone?  No   Are you deaf or do you have serious difficulty hearing?  Yes   Do you need help to go to places out of walking distance? No   Do you need help shopping? No   Do you need help preparing meals?  No   Do you need help with housework?  No   Do you need help with laundry? No   Do you need help taking your medications? No   Do you need help managing money? No   Do you ever drive or ride in a car without wearing a seat belt? No   Have you felt unusual stress, anger or loneliness in the last month? No   Who do you live with? Other   If you need help, do you have trouble finding someone  available to you? No   Have you been bothered in the last four weeks by sexual problems? No   Do you have difficulty concentrating, remembering or making decisions? No       ATTENTION  What is the year: correct  What is the month of the year: correct  What is the day of the week?: correct  What is the date?: correct  MEMORY  Repeat address three times, only score third attempt: [address], [place]: 6  HOW MANY ANIMALS DID THE PATIENT NAME  Verbal Fluency -- Animal Names (0-25): 22+  CLOCK DRAWING  Clock Drawing: All Correct  MEMORY RECALL  Tell me what you remember about that name and address we were repeating at the beginnin  ACE TOTAL SCORE  Total ACE Score - <25/30 strongly suggests cognitive impairment; <21/30 almost certainly shows dementia: 26    Age-appropriate Screening Schedule:  Refer to the list below for future screening recommendations based on patient's age, sex and/or medical conditions. Orders for these recommended tests are listed in the plan section. The patient has been provided with a written plan.    Health Maintenance   Topic Date Due    DIABETIC EYE EXAM  Never done    ZOSTER VACCINE (1 of 2) Never done    PAP SMEAR  Never done    Pneumococcal Vaccine 0-64 (2 of 2 - PCV) 2022    COLORECTAL CANCER SCREENING  10/01/2022    COVID-19 Vaccine (3 2024- season) 2024    HEMOGLOBIN A1C  2025    MAMMOGRAM  10/19/2025    ANNUAL WELLNESS VISIT  2025    LIPID PANEL  2025    URINE MICROALBUMIN  2025    BMI FOLLOWUP  2025    TDAP/TD VACCINES (2 - Td or Tdap) 2030    HEPATITIS C SCREENING  Completed    INFLUENZA VACCINE  Completed    LUNG CANCER SCREENING  Discontinued            Assessment & Plan      CMS Preventative Services Quick Reference  Risk Factors Identified During Encounter  Immunizations Discussed/Encouraged: Influenza, Prevnar 20 (Pneumococcal 20-valent conjugate), and Shingrix  The above risks/problems have  been discussed with the patient.  Follow up actions/plans if indicated are seen below in the Assessment/Plan Section.  Pertinent information has been shared with the patient in the After Visit Summary.    Diagnoses and all orders for this visit:    1. Urinary tract infection without hematuria, site unspecified (Primary)  -     POCT urinalysis dipstick, manual  -     Urinalysis With Culture If Indicated - Urine, Clean Catch  -     [place] Urine Culture Tube - Urine, Clean Catch  -     Urinalysis, Microscopic Only - Urine, Clean Catch  -     Urine Culture - Urine, Urine, Clean Catch    2. Type 2 diabetes mellitus with hyperglycemia, without long-term current use of insulin  Comments:  A1c had improved to 8.6 in july, now 10.1  continue januvia  chg metformin to xr 1000mg BID  sugarfree snacks/candy  notify for BG over >200  keep carbs <70/day  Orders:  -     Comprehensive Metabolic Panel  -     CBC (No Diff)  -     Hemoglobin A1c  -     Lipid Panel  -     MicroAlbumin, Urine, Random - Urine, Clean Catch    3. Chronic midline low back pain with bilateral sciatica  Comments:  Stable, keep follow-up with pain mgmt.   refill Lyrica to 100 mg daily   can not rocio dulox or em d/t UTI.  Orders:  -     pregabalin (LYRICA) 100 MG capsule; Take 1 capsule by mouth 2 (Two) Times a Day.  Dispense: 60 capsule; Refill: 5    4. Vitamin D deficiency    5. Essential hypertension  -     Comprehensive Metabolic Panel  -     CBC (No Diff)  -     Lipid Panel    6. Chronic obstructive pulmonary disease, unspecified COPD type    7. Elevated LFTs  -     Comprehensive Metabolic Panel    8. Adenocarcinoma of right lung    9. Influenza vaccine administered    Other orders  -     Fluzone >6mos ([id number])  -     metFORMIN ER (GLUCOPHAGE-XR) 500 MG 24 hr tablet; Take 2 tablets by mouth Daily With Breakfast.  Dispense: 180 tablet; Refill: 1  -     ciprofloxacin (Cipro) 250 MG tablet; Take 1 tablet by mouth 2 (Two) Times a Day.  Dispense: 10  tablet; Refill: 0  -     phenazopyridine (Pyridium) 200 MG tablet; Take 1 tablet by mouth 3 (Three) Times a Day As Needed for Bladder Spasms.  Dispense: 30 tablet; Refill: 2      Age appropriate preventative counseling provided, including healthy lifestyle modifications and exercise  Pt to call to schedule overdue colonoscopy      Follow Up:   Return in about 3 months (around 2/21/2025) for Recheck DM panel prior to appt .     An After Visit Summary and PPPS were given to the patient.            EMR Dragon transcription disclaimer:  Part of this note may be an electronic transcription/translation of spoken language to printed text using the Dragon Dictation System.

## 2024-11-22 LAB
ALBUMIN SERPL-MCNC: 4.4 G/DL (ref 3.5–5.2)
ALBUMIN UR-MCNC: <1.2 MG/DL
ALBUMIN/GLOB SERPL: 1.6 G/DL
ALP SERPL-CCNC: 110 U/L (ref 39–117)
ALT SERPL W P-5'-P-CCNC: 56 U/L (ref 1–33)
ANION GAP SERPL CALCULATED.3IONS-SCNC: 11 MMOL/L (ref 5–15)
AST SERPL-CCNC: 50 U/L (ref 1–32)
BACTERIA UR QL AUTO: ABNORMAL /HPF
BILIRUB SERPL-MCNC: 0.6 MG/DL (ref 0–1.2)
BILIRUB UR QL STRIP: NEGATIVE
BUN SERPL-MCNC: 10 MG/DL (ref 8–23)
BUN/CREAT SERPL: 14.7 (ref 7–25)
CALCIUM SPEC-SCNC: 10 MG/DL (ref 8.6–10.5)
CHLORIDE SERPL-SCNC: 97 MMOL/L (ref 98–107)
CHOLEST SERPL-MCNC: 102 MG/DL (ref 0–200)
CLARITY UR: ABNORMAL
CO2 SERPL-SCNC: 28 MMOL/L (ref 22–29)
COD CRY URNS QL: PRESENT /HPF
COLOR UR: ABNORMAL
CREAT SERPL-MCNC: 0.68 MG/DL (ref 0.57–1)
DEPRECATED RDW RBC AUTO: 42 FL (ref 37–54)
EGFRCR SERPLBLD CKD-EPI 2021: 99.8 ML/MIN/1.73
ERYTHROCYTE [DISTWIDTH] IN BLOOD BY AUTOMATED COUNT: 12.9 % (ref 12.3–15.4)
GLOBULIN UR ELPH-MCNC: 2.7 GM/DL
GLUCOSE SERPL-MCNC: 248 MG/DL (ref 65–99)
GLUCOSE UR STRIP-MCNC: NEGATIVE MG/DL
HBA1C MFR BLD: 10.1 % (ref 4.8–5.6)
HCT VFR BLD AUTO: 42.3 % (ref 34–46.6)
HDLC SERPL-MCNC: 39 MG/DL (ref 40–60)
HGB BLD-MCNC: 14 G/DL (ref 12–15.9)
HGB UR QL STRIP.AUTO: NEGATIVE
HYALINE CASTS UR QL AUTO: ABNORMAL /LPF
KETONES UR QL STRIP: NEGATIVE
LDLC SERPL CALC-MCNC: 38 MG/DL (ref 0–100)
LDLC/HDLC SERPL: 0.87 {RATIO}
LEUKOCYTE ESTERASE UR QL STRIP.AUTO: ABNORMAL
MCH RBC QN AUTO: 29.5 PG (ref 26.6–33)
MCHC RBC AUTO-ENTMCNC: 33.1 G/DL (ref 31.5–35.7)
MCV RBC AUTO: 89.2 FL (ref 79–97)
NITRITE UR QL STRIP: POSITIVE
PH UR STRIP.AUTO: <=5 [PH] (ref 5–8)
PLATELET # BLD AUTO: 147 10*3/MM3 (ref 140–450)
PMV BLD AUTO: 11 FL (ref 6–12)
POTASSIUM SERPL-SCNC: 3.8 MMOL/L (ref 3.5–5.2)
PROT SERPL-MCNC: 7.1 G/DL (ref 6–8.5)
PROT UR QL STRIP: NEGATIVE
RBC # BLD AUTO: 4.74 10*6/MM3 (ref 3.77–5.28)
RBC # UR STRIP: ABNORMAL /HPF
REF LAB TEST METHOD: ABNORMAL
SODIUM SERPL-SCNC: 136 MMOL/L (ref 136–145)
SP GR UR STRIP: 1.03 (ref 1–1.03)
SQUAMOUS #/AREA URNS HPF: ABNORMAL /HPF
TRIGL SERPL-MCNC: 145 MG/DL (ref 0–150)
UROBILINOGEN UR QL STRIP: ABNORMAL
VLDLC SERPL-MCNC: 25 MG/DL (ref 5–40)
WBC # UR STRIP: ABNORMAL /HPF
WBC NRBC COR # BLD AUTO: 6.25 10*3/MM3 (ref 3.4–10.8)

## 2024-11-23 LAB — BACTERIA SPEC AEROBE CULT: ABNORMAL

## 2024-11-26 ENCOUNTER — TELEPHONE (OUTPATIENT)
Dept: FAMILY MEDICINE CLINIC | Facility: CLINIC | Age: 59
End: 2024-11-26

## 2024-11-26 NOTE — TELEPHONE ENCOUNTER
Caller: Rahel Ramsey    Relationship: Self    Best call back number: [phone]    What medication are you requesting: OZEMPIC 0.25 WEEKLY        Have you had these symptoms before:    [x] Yes  [] No    Have you been treated for these symptoms before:   [x] Yes  [] No    If a prescription is needed, what is your preferred pharmacy and phone number: [id number] [address] [phone] [facility] [phone]      Additional notes:

## 2024-11-26 NOTE — TELEPHONE ENCOUNTER
Pt has not been keeping record of glucose. Advised per provider message to increase metformin and continue januvia. Call back in 2 weeks with glucose log.

## 2024-12-31 DIAGNOSIS — E11.65 TYPE 2 DIABETES MELLITUS WITH HYPERGLYCEMIA, WITHOUT LONG-TERM CURRENT USE OF INSULIN: ICD-10-CM

## 2024-12-31 RX ORDER — BLOOD SUGAR DIAGNOSTIC
STRIP MISCELLANEOUS
Qty: 100 EACH | Refills: 2 | Status: SHIPPED | OUTPATIENT
Start: 2024-12-31

## 2025-01-27 ENCOUNTER — LAB (OUTPATIENT)
Dept: LAB | Facility: HOSPITAL | Age: 60
End: 2025-01-27
Payer: MEDICARE

## 2025-01-27 ENCOUNTER — OFFICE VISIT (OUTPATIENT)
Dept: ONCOLOGY | Facility: CLINIC | Age: 60
End: 2025-01-27
Payer: MEDICARE

## 2025-01-27 VITALS
RESPIRATION RATE: 18 BRPM | HEART RATE: 75 BPM | SYSTOLIC BLOOD PRESSURE: 125 MMHG | DIASTOLIC BLOOD PRESSURE: 71 MMHG | TEMPERATURE: 97.1 F | WEIGHT: 190 LBS | OXYGEN SATURATION: 98 % | BODY MASS INDEX: 28.14 KG/M2 | HEIGHT: 69 IN

## 2025-01-27 DIAGNOSIS — C34.11 PRIMARY CANCER OF RIGHT UPPER LOBE OF LUNG: Primary | ICD-10-CM

## 2025-01-27 LAB
BASOPHILS # BLD AUTO: 0.04 10*3/MM3 (ref 0–0.2)
BASOPHILS NFR BLD AUTO: 0.5 % (ref 0–1.5)
DEPRECATED RDW RBC AUTO: 44.8 FL (ref 37–54)
EOSINOPHIL # BLD AUTO: 0.11 10*3/MM3 (ref 0–0.4)
EOSINOPHIL NFR BLD AUTO: 1.5 % (ref 0.3–6.2)
ERYTHROCYTE [DISTWIDTH] IN BLOOD BY AUTOMATED COUNT: 13.8 % (ref 12.3–15.4)
HCT VFR BLD AUTO: 45.5 % (ref 34–46.6)
HGB BLD-MCNC: 14.8 G/DL (ref 12–15.9)
HOLD SPECIMEN: NORMAL
HOLD SPECIMEN: NORMAL
LYMPHOCYTES # BLD AUTO: 2.49 10*3/MM3 (ref 0.7–3.1)
LYMPHOCYTES NFR BLD AUTO: 33.2 % (ref 19.6–45.3)
MCH RBC QN AUTO: 29.4 PG (ref 26.6–33)
MCHC RBC AUTO-ENTMCNC: 32.5 G/DL (ref 31.5–35.7)
MCV RBC AUTO: 90.3 FL (ref 79–97)
MONOCYTES # BLD AUTO: 0.39 10*3/MM3 (ref 0.1–0.9)
MONOCYTES NFR BLD AUTO: 5.2 % (ref 5–12)
NEUTROPHILS NFR BLD AUTO: 4.46 10*3/MM3 (ref 1.7–7)
NEUTROPHILS NFR BLD AUTO: 59.6 % (ref 42.7–76)
PLATELET # BLD AUTO: 203 10*3/MM3 (ref 140–450)
PMV BLD AUTO: 9 FL (ref 6–12)
RBC # BLD AUTO: 5.04 10*6/MM3 (ref 3.77–5.28)
WBC NRBC COR # BLD AUTO: 7.49 10*3/MM3 (ref 3.4–10.8)

## 2025-01-27 PROCEDURE — 85025 COMPLETE CBC W/AUTO DIFF WBC: CPT

## 2025-01-27 PROCEDURE — 1125F AMNT PAIN NOTED PAIN PRSNT: CPT | Performed by: INTERNAL MEDICINE

## 2025-01-27 PROCEDURE — 36415 COLL VENOUS BLD VENIPUNCTURE: CPT

## 2025-01-27 PROCEDURE — 99214 OFFICE O/P EST MOD 30 MIN: CPT | Performed by: INTERNAL MEDICINE

## 2025-01-27 PROCEDURE — 3074F SYST BP LT 130 MM HG: CPT | Performed by: INTERNAL MEDICINE

## 2025-01-27 PROCEDURE — 3078F DIAST BP <80 MM HG: CPT | Performed by: INTERNAL MEDICINE

## 2025-01-27 NOTE — PROGRESS NOTES
Hematology/Oncology Outpatient Follow Up    Patient name: Rahel Ramsey  : 1965  MRN: [id number]  Primary Care Physician: Vickie Gomez APRN  Referring Physician: Vickie Gomez APRN  Reason For Consult:       Chief Complaint   Patient presents with    Follow-up     Malignant neoplasm of lung, unspecified laterality, unspecified part of lung           History of Present Illness: Patient is here today for routine follow-up and does not have any specific complaints    This is a 57-year-old female who has been noted to have leukocytosis   from routine blood count.  Patient was also recently diagnosed with skin cancer on the left lower   extremity.  She underwent wire excisional biopsy of the lesion.  Her course was complicated by   wound infection.  She has been on two rounds of antibiotics and currently on Keflex which will be   finished in another three to four days.  She denies any fevers or chills, rigors.  Patient had no   prior history of any hematologic problems.  Review of her CBC from 17 revealed WBC of 12.4,   hemoglobin 15.6, platelet count 271,000.  Differentials were 65% neutrophils, 28% lymphocytes.    There was no basophilia, eosinophilia or monocytosis.  Her chemistry panel had BUN 7, creatinine   0.7.  Alkaline phosphatase was 11.  The rest was unremarkable.  Patient had a CBC repeated on   17 with WBC of 12.6, hemoglobin 15.4, platelet count of 310,000.  Differentials were   unremarkable.  B12 (N) 441.  Folate (N) 13.9.  BUN 5, creatinine 0.7.  Normal thyroid at 0.9   [range 0.3-5.5].  Patient denied any repeated history of infection in the past.  She was alone   for this appointment on 17.  · 17 - Patient had labs done to further evaluate her leukocytosis.  Her WBC was 14.4,   hemoglobin 16.6, platelet count 312,000, ANC 9.3.  Differentials were 64% neutrophils, 28%   lymphocytes.  CHRISTOPHE was negative.  B12 was 337.  LDH (N) 159.  BUN 10, creatinine 0.7.  Uric acid    5.3 (N).  MIRTA-2 was not mutated.  No evidence of BCR-abl gene rearrangement.  Sed rate 32.    Peripheral smear showed absolute neutrophilia and leukocytosis and polycythemia, reactive vs.   myeloproliferative condition.  · 5/9/17 - Erythropoietin level was low-normal at 3.92.    · 8/17/17 - Ultrasound of the abdomen showed normal spleen size at 11.3 cm.  There was no splenic   lesion identified.    · 4/2/18 - Erythropoietin level 3.79 [range 2.59-18.5].   · 8/24/18 - Bone marrow biopsy showed normocellular marrow with no morphologic evidence of   myeloproliferative neoplasm, but she was found to have low level MIRTA-2 point mutation detected at   less than 10%.  The low level of MIRTA-2 mutation in assessment of leukocytosis and erythrocytosis   is worrisome for early and evolving myeloproliferative neoplasm.  Interval repeat marrow has been   recommended.  BCR-abl RT-PCR was negative.  Flow cytometry was negative.  Cellularity was 50%.    Iron stain was present.  Cytogenetics also showed normal female karyotype.    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  MIRTA-2 analysis was not detected on the   current bone marrow.  Close follow up has been recommended.  Flowy cytometry was negative.    Histopathology had normal maturation sequence.  There were normal megakaryocytes.  Erythroid   maturation was complete.  Myeloid maturation was also complete.  Negative iron stain on the   marrow aspirate.  Recommendation is for close surveillance.  Absent iron stores on bone marrow.      Smoker 1/2 ppd, does not drink alcohol    See pulmonologist for lung nodule    Family hx of breast cancer with breast cancer age 69  Father with lung cancer      Patient has been lost to follow-up since 2019.    6/28/2022: Follow-up appointment leukocytosis, thrombocytosis, polycythemia, CHRISTOPHE positive for SLE, anti-DNA elevated at 13.  US  spleen- normal size.  October 2022 patient was found to have increasing right upper lobe nodule of.  She is followed up with Dr. Nath.  She had a CT-guided biopsy of this nodule on 11/2/2022 and pathology revealed invasive well to moderately differentiated adenocarcinoma with focal lipidic features  11/23/2022: Patient had a PET CT scan which showed a 1.7 cm right upper lobe nodule not PET avid.  There is an 8 mm right upper lobe nodule also stable since 2020 likely benign.  There was no convincing evidence of metastatic disease elsewhere.  12/20/2022: Patient was seen by Dr. Kearney.  She is being evaluated for right upper lobectomy in the next 2 to 3 weeks  1/9/2023 patient underwent right lobectomy with mediastinal lymph node dissection  3/26/2024: CT of the chest without contrast showed postsurgical changes of prior right upper lobectomy; stable pulmonary nodules within the right middle lobe, right lower lobe; hepatic steatosis and findings of prior pancreatitis    History of present illness was reviewed and is unchanged from the previous visit.     I have reviewed and confirmed the accuracy of the patient's history: Chief complaint, HPI, ROS, and Subjective as entered by the MA/LPN/RN. Cherialessio Prabhakar MD 01/27/25 1/27/25      Subjective      Patient denies any new issues today          Past Medical History:   Diagnosis Date    Allergic     Arthritis     Atrial fibrillation     Cancer 01/09/2023    lung  Right    COPD (chronic obstructive pulmonary disease)     Diabetes mellitus     type 2    Frequent UTI     Headache     Heartburn     History of biliary duct stent placement 03/23/2023    History of herniated intervertebral disc     lumbar    History of skin cancer     Left lower extremity    Hyperlipidemia     Hypertension     Injury of back     Leukocytosis     Low back pain     Lung nodule     Nausea        Past Surgical History:   Procedure Laterality Date    BACK SURGERY      lumbar    CARDIAC  CATHETERIZATION      CHOLECYSTECTOMY N/A 4/10/2023    Procedure: CHOLECYSTECTOMY LAPAROSCOPIC;  Surgeon: Mich Bansal MD;  Location: [facility] MAIN OR;  Service: General;  Laterality: N/A;    COLONOSCOPY      DILATATION AND CURETTAGE      ENDOSCOPY N/A 3/30/2023    Procedure: ESOPHAGOGASTRODUODENOSCOPY WITH STENT REMOVAL;  Surgeon: Naman Blackmon MD;  Location: [facility] ENDOSCOPY;  Service: Gastroenterology;  Laterality: N/A;  normal stent removal    ERCP N/A 02/01/2023    Procedure: ENDOSCOPIC RETROGRADE CHOLANGIOPANCREATOGRAPHY with sphicterotomy and balloon guided sweeping of bile duct up to 9mm and placement of 10Fr x 9cm biliary stent;  Surgeon: Naman Blackmon MD;  Location: [facility] ENDOSCOPY;  Service: Gastroenterology;  Laterality: N/A;  post: choledocholithiasis    LOBECTOMY Right 01/09/2023    Procedure: THORASCOPIC RIGHT UPPER LOBECTOMY WITH DAVINCI ROBOT, MEDIASTINAL LYMPH NODE DISSECTION;  Surgeon: Erwin Kearney MD;  Location: [facility] MAIN OR;  Service: Robotics - DaVinci;  Laterality: Right;    LUMBAR DECOMPRESSION      L4-L5     LUNG BIOPSY Right 11/2022    SKIN CANCER EXCISION Left     Shin    SUBTOTAL HYSTERECTOMY           Current Outpatient Medications:     Accu-Chek Softclix Lancets lancets, USE TO TEST BLOOD SUGAR TWICE DAILY AS NEEDED, Disp: 100 each, Rfl: 3    albuterol sulfate  (90 Base) MCG/ACT inhaler, Inhale 2 puffs Every 4 (Four) Hours As Needed for Wheezing or Shortness of Air., Disp: 18 g, Rfl: 5    atorvastatin (LIPITOR) 20 MG tablet, Take 1 tablet by mouth Daily., Disp: 90 tablet, Rfl: 3    Blood Glucose Monitoring Suppl (True Metrix Meter) w/Device kit, Use 1 each Take As Directed., Disp: 1 kit, Rfl: 0    ciprofloxacin (Cipro) 250 MG tablet, Take 1 tablet by mouth 2 (Two) Times a Day., Disp: 10 tablet, Rfl: 0    ezetimibe (ZETIA) 10 MG tablet, Take 1 tablet by mouth Daily., Disp: 90 tablet, Rfl: 3    fenofibrate (TRICOR) 145 MG tablet, TAKE 1 TABLET BY MOUTH  DAILY, Disp: 90 tablet, Rfl: 1    fluticasone (FLONASE) 50 MCG/ACT nasal spray, 1 spray by Each Nare route Daily., Disp: 48 g, Rfl: 3    Fluticasone-Umeclidin-Vilant (TRELEGY) 100-62.5-25 MCG/ACT inhaler, Inhale 1 puff Every Night., Disp: , Rfl:     glucose blood (Accu-Chek Guide Test) test strip, USE AS INSTRUCTED TO CHECK BLOOD SUGAR DAILY, Disp: 100 each, Rfl: 2    HYDROcodone-acetaminophen (NORCO)  MG per tablet, Take 1 tablet by mouth Every 6 (Six) Hours As Needed for Moderate Pain. May take dos if needed, Disp: , Rfl:     Januvia 100 MG tablet, TAKE 1 TABLET BY MOUTH DAILY, Disp: 90 tablet, Rfl: 1    metFORMIN ER (GLUCOPHAGE-XR) 500 MG 24 hr tablet, Take 2 tablets by mouth Daily With Breakfast., Disp: 180 tablet, Rfl: 1    metoprolol succinate XL (TOPROL-XL) 25 MG 24 hr tablet, Take 1 tablet by mouth Daily., Disp: 90 tablet, Rfl: 3    nystatin (MYCOSTATIN) 893197 UNIT/GM cream, Apply 1 Application topically to the appropriate area as directed As Needed. None dos, Disp: , Rfl:     ondansetron (Zofran) 4 MG tablet, Take 1 tablet by mouth Every 8 (Eight) Hours As Needed for Nausea or Vomiting., Disp: 30 tablet, Rfl: 0    phenazopyridine (Pyridium) 200 MG tablet, Take 1 tablet by mouth 3 (Three) Times a Day As Needed for Bladder Spasms., Disp: 30 tablet, Rfl: 2    pregabalin (LYRICA) 100 MG capsule, Take 1 capsule by mouth 2 (Two) Times a Day., Disp: 60 capsule, Rfl: 5    vitamin D (ERGOCALCIFEROL) 1.25 MG (20804 UT) capsule capsule, TAKE 1 CAPSULE BY MOUTH EVERY 7 DAYS, Disp: 12 capsule, Rfl: 1    vitamin E 400 UNIT capsule, Take 1 capsule by mouth Daily., Disp: 90 capsule, Rfl: 3    Allergies   Allergen Reactions    Morphine Anaphylaxis and Nausea And Vomiting    Percocet [Oxycodone-Acetaminophen] GI Intolerance    Tramadol GI Intolerance       Immunization History   Administered Date(s) Administered    COVID-19 (Enish) 03/12/2021    Covid-19 (Pfizer) Gray Cap Monovalent 01/31/2022    Flu Vaccine Quad PF  ">36MO 2018    Fluzone  >6mos 2024    Fluzone (or Fluarix & Flulaval for VFC) >6mos 2020, 2022, 10/04/2023    Influenza, Unspecified 2016, 10/12/2017    Pneumococcal Polysaccharide (PPSV23) 2021    Tdap 2020    flucelvax quad pfs =>4 YRS 10/03/2019       Family History   Problem Relation Age of Onset    Breast cancer Mother     Lung cancer Father     Lung cancer Other     Colon cancer Other     Skin cancer Other        Cancer-related family history includes Breast cancer in her mother; Colon cancer in an other family member; Lung cancer in her father and another family member; Skin cancer in an other family member.    Social History     Tobacco Use    Smoking status: Former     Current packs/day: 0.00     Average packs/day: 1 pack/day for 40.0 years (40.0 ttl pk-yrs)     Types: Cigarettes     Start date: 1983     Quit date: 2023     Years since quittin.0     Passive exposure: Past    Smokeless tobacco: Never   Vaping Use    Vaping status: Never Used   Substance Use Topics    Alcohol use: Yes     Comment: rare    Drug use: No              ROS:    Review of Systems   Constitutional:  Positive for fatigue. Negative for chills and fever.   HENT: Negative.     Eyes:  Negative for visual disturbance.   Respiratory:  Negative for shortness of breath.    Cardiovascular:  Negative for chest pain and palpitations.   Gastrointestinal:  Negative for abdominal pain.   Endocrine: Negative.    Genitourinary: Negative.    Skin:  Negative for rash and wound.   Neurological:  Positive for weakness.   Hematological:  Negative for adenopathy.   Psychiatric/Behavioral:  Negative for agitation and confusion.    Chronic back pain due to disc disease    Objective:    Vitals:    25 0944   BP: 125/71   Pulse: 75   Resp: 18   Temp: 97.1 °F (36.2 °C)   TempSrc: Infrared   SpO2: 98%   Weight: 86.2 kg (190 lb)   Height: 175.3 cm (69\")   PainSc:   7   PainLoc: Generalized             Body " mass index is 28.06 kg/m².    ECOG  (1) Restricted in physically strenuous activity, ambulatory and able to do work of light nature    Physical Exam:  Physical Exam  Vitals and nursing note reviewed.   Constitutional:       General: She is not in acute distress.     Appearance: She is not diaphoretic.   HENT:      Head: Normocephalic and atraumatic.   Eyes:      General: No scleral icterus.        Right eye: No discharge.         Left eye: No discharge.      Conjunctiva/sclera: Conjunctivae normal.   Neck:      Thyroid: No thyromegaly.   Cardiovascular:      Rate and Rhythm: Normal rate and regular rhythm.      Heart sounds: Normal heart sounds.      No friction rub. No gallop.   Pulmonary:      Effort: Pulmonary effort is normal. No respiratory distress.      Breath sounds: No stridor. No wheezing.   Abdominal:      General: Bowel sounds are normal.      Palpations: Abdomen is soft. There is no mass.      Tenderness: There is no abdominal tenderness. There is no guarding or rebound.   Musculoskeletal:         General: No tenderness. Normal range of motion.      Cervical back: Normal range of motion and neck supple.      Comments: Uses quad cane for ambulation   Lymphadenopathy:      Cervical: No cervical adenopathy.   Skin:     General: Skin is warm.      Findings: No erythema or rash.   Neurological:      Mental Status: She is alert and oriented to person, place, and time.      Motor: No abnormal muscle tone.   Psychiatric:         Behavior: Behavior normal.       I have reexamined the patient and the results are consistent with the previously documented exam. Cheri Prabhakar MD      RECENT LABS    WBC   Date Value Ref Range Status   01/27/2025 7.49 3.40 - 10.80 10*3/mm3 Final     RBC   Date Value Ref Range Status   01/27/2025 5.04 3.77 - 5.28 10*6/mm3 Final     Hemoglobin   Date Value Ref Range Status   01/27/2025 14.8 12.0 - 15.9 g/dL Final     Hematocrit   Date Value Ref Range Status   01/27/2025 45.5  34.0 - 46.6 % Final     MCV   Date Value Ref Range Status   01/27/2025 90.3 79.0 - 97.0 fL Final     MCH   Date Value Ref Range Status   01/27/2025 29.4 26.6 - 33.0 pg Final     MCHC   Date Value Ref Range Status   01/27/2025 32.5 31.5 - 35.7 g/dL Final     RDW   Date Value Ref Range Status   01/27/2025 13.8 12.3 - 15.4 % Final     RDW-SD   Date Value Ref Range Status   01/27/2025 44.8 37.0 - 54.0 fl Final     MPV   Date Value Ref Range Status   01/27/2025 9.0 6.0 - 12.0 fL Final     Platelets   Date Value Ref Range Status   01/27/2025 203 140 - 450 10*3/mm3 Final     Neutrophil %   Date Value Ref Range Status   01/27/2025 59.6 42.7 - 76.0 % Final     Lymphocyte %   Date Value Ref Range Status   01/27/2025 33.2 19.6 - 45.3 % Final     Monocyte %   Date Value Ref Range Status   01/27/2025 5.2 5.0 - 12.0 % Final     Eosinophil %   Date Value Ref Range Status   01/27/2025 1.5 0.3 - 6.2 % Final     Basophil %   Date Value Ref Range Status   01/27/2025 0.5 0.0 - 1.5 % Final     Immature Grans %   Date Value Ref Range Status   01/03/2023 0.4 0.0 - 0.5 % Final     Neutrophils, Absolute   Date Value Ref Range Status   01/27/2025 4.46 1.70 - 7.00 10*3/mm3 Final     Lymphocytes, Absolute   Date Value Ref Range Status   01/27/2025 2.49 0.70 - 3.10 10*3/mm3 Final     Monocytes, Absolute   Date Value Ref Range Status   01/27/2025 0.39 0.10 - 0.90 10*3/mm3 Final     Eosinophils, Absolute   Date Value Ref Range Status   01/27/2025 0.11 0.00 - 0.40 10*3/mm3 Final     Basophils, Absolute   Date Value Ref Range Status   01/27/2025 0.04 0.00 - 0.20 10*3/mm3 Final     Immature Grans, Absolute   Date Value Ref Range Status   01/03/2023 0.04 0.00 - 0.05 10*3/mm3 Final     nRBC   Date Value Ref Range Status   04/10/2023 0.0 0.0 - 0.2 /100 WBC Final       Lab Results   Component Value Date    GLUCOSE 248 (H) 11/21/2024    BUN 10 11/21/2024    CREATININE 0.68 11/21/2024    EGFRIFNONA 99 12/14/2021    BCR 14.7 11/21/2024    K 3.8 11/21/2024     CO2 28.0 11/21/2024    CALCIUM 10.0 11/21/2024    ALBUMIN 4.4 11/21/2024    LABIL2 1.1 12/04/2018    AST 50 (H) 11/21/2024    ALT 56 (H) 11/21/2024         Assessment & Plan   Primary cancer of right upper lobe of lung  - CBC & Differential              Adenocarcinoma of the right lung, status post right upper lobectomy with lymph node dissection.  Tumor measured 1.3 cm, invasive well differentiated adenocarcinoma completely excised.  Enlarging right upper lobe nodule.  Clinical T1b N0 M0.  Stage I A2.  Adjuvant treatment  not recommended continued CT scan surveillance.  She has had CT scans through cardiothoracic office.  No evidence of relapsed    Postoperative pancreatitis due to gallstones.  She is actively involved with the GI group.  Status post cholecystectomy.  Follow-up CT scan shows pseudocyst patient will maintain appointments.  There has been interval removal of the common bile duct stent    Tobacco use disorder: Patient has discontinued smoking.  Reviewed      Leukocytosis and polycythemia.  Rule out myeloproliferative disease.  She has a prior history of this in the past.  Ultrasound of spleen- normal size.  Blood counts improved with WBC of 10.14, Hgb 15.8, platelets 226-improvement in counts.  JAK2 V617F negative.  No BCR ABL arrangement.  Peripheral smear shows leukocytosis, polycythemia, no blasts May of 2022.  We will obtain flow cytometry since she has relative lymphocytosis as well.  I have reviewed the lab results with patient.  I do not think we need to repeat a bone marrow biopsy at this time.  I have asked her to also remain on baby aspirin.  Patient does not have splenomegaly.  Her repeat flow cytometry completed in August 2022 does not show any myeloproliferative disease.  CBC remains normal    Possible evolving myeloproliferative neoplasm with low level MIRTA-2 positive at less than 10%. BCR-abl negative.  Low normal erythropoietin level.  Status post bone marrow aspiration and biopsy  8/24/18.  Repeat bone marrow aspiration and biopsy 2/22/19 showed translocation [2q/11q] has been associated with myeloid neoplasm, but no morphologic evidence seen on histopathology      CHRISTOPHE positive, anti-DNA elevated at 13.  Referred to rheumatology, She was seen at  rheumatology.    Plans    Labs reviewed  Continue surveillance CT scan with CTS  Follow-up 4 months  Follow-up with pulmonary  Patient to follow-up with GI  Peripheral blood flow cytometry was negative- 8/3/2022  Continue baby aspirin 81 mg p.o. daily   All questions answered      Electronically signed by Cheri Prabhakar MD, 01/27/25, 10:28 AM EST.

## 2025-01-30 RX ORDER — EZETIMIBE 10 MG/1
10 TABLET ORAL DAILY
Qty: 90 TABLET | Refills: 3 | Status: SHIPPED | OUTPATIENT
Start: 2025-01-30

## 2025-01-30 RX ORDER — ATORVASTATIN CALCIUM 20 MG/1
20 TABLET, FILM COATED ORAL DAILY
Qty: 90 TABLET | Refills: 3 | Status: SHIPPED | OUTPATIENT
Start: 2025-01-30

## 2025-02-12 DIAGNOSIS — E78.2 MIXED HYPERLIPIDEMIA: ICD-10-CM

## 2025-02-12 DIAGNOSIS — I10 ESSENTIAL HYPERTENSION: ICD-10-CM

## 2025-02-12 DIAGNOSIS — E55.9 VITAMIN D DEFICIENCY: ICD-10-CM

## 2025-02-12 DIAGNOSIS — E11.65 TYPE 2 DIABETES MELLITUS WITH HYPERGLYCEMIA, WITHOUT LONG-TERM CURRENT USE OF INSULIN: Primary | ICD-10-CM

## 2025-02-18 ENCOUNTER — LAB (OUTPATIENT)
Dept: FAMILY MEDICINE CLINIC | Facility: CLINIC | Age: 60
End: 2025-02-18
Payer: MEDICARE

## 2025-02-18 LAB
DEPRECATED RDW RBC AUTO: 42.4 FL (ref 37–54)
ERYTHROCYTE [DISTWIDTH] IN BLOOD BY AUTOMATED COUNT: 13 % (ref 12.3–15.4)
HBA1C MFR BLD: 10.9 % (ref 4.8–5.6)
HCT VFR BLD AUTO: 45.2 % (ref 34–46.6)
HGB BLD-MCNC: 14.4 G/DL (ref 12–15.9)
MCH RBC QN AUTO: 28.7 PG (ref 26.6–33)
MCHC RBC AUTO-ENTMCNC: 31.9 G/DL (ref 31.5–35.7)
MCV RBC AUTO: 90.2 FL (ref 79–97)
PLATELET # BLD AUTO: 225 10*3/MM3 (ref 140–450)
PMV BLD AUTO: 10.2 FL (ref 6–12)
RBC # BLD AUTO: 5.01 10*6/MM3 (ref 3.77–5.28)
WBC NRBC COR # BLD AUTO: 8.3 10*3/MM3 (ref 3.4–10.8)

## 2025-02-18 PROCEDURE — 82570 ASSAY OF URINE CREATININE: CPT | Performed by: NURSE PRACTITIONER

## 2025-02-18 PROCEDURE — 85027 COMPLETE CBC AUTOMATED: CPT | Performed by: NURSE PRACTITIONER

## 2025-02-18 PROCEDURE — 80061 LIPID PANEL: CPT | Performed by: NURSE PRACTITIONER

## 2025-02-18 PROCEDURE — 82306 VITAMIN D 25 HYDROXY: CPT | Performed by: NURSE PRACTITIONER

## 2025-02-18 PROCEDURE — 84443 ASSAY THYROID STIM HORMONE: CPT | Performed by: NURSE PRACTITIONER

## 2025-02-18 PROCEDURE — 36415 COLL VENOUS BLD VENIPUNCTURE: CPT | Performed by: NURSE PRACTITIONER

## 2025-02-18 PROCEDURE — 82043 UR ALBUMIN QUANTITATIVE: CPT | Performed by: NURSE PRACTITIONER

## 2025-02-18 PROCEDURE — 83036 HEMOGLOBIN GLYCOSYLATED A1C: CPT | Performed by: NURSE PRACTITIONER

## 2025-02-18 PROCEDURE — 80053 COMPREHEN METABOLIC PANEL: CPT | Performed by: NURSE PRACTITIONER

## 2025-02-19 ENCOUNTER — TELEPHONE (OUTPATIENT)
Dept: FAMILY MEDICINE CLINIC | Facility: CLINIC | Age: 60
End: 2025-02-19

## 2025-02-19 LAB
25(OH)D3 SERPL-MCNC: 57 NG/ML (ref 30–100)
ALBUMIN SERPL-MCNC: 4.4 G/DL (ref 3.5–5.2)
ALBUMIN UR-MCNC: 3.2 MG/DL
ALBUMIN/GLOB SERPL: 1.4 G/DL
ALP SERPL-CCNC: 114 U/L (ref 39–117)
ALT SERPL W P-5'-P-CCNC: 56 U/L (ref 1–33)
ANION GAP SERPL CALCULATED.3IONS-SCNC: 10.6 MMOL/L (ref 5–15)
AST SERPL-CCNC: 45 U/L (ref 1–32)
BILIRUB SERPL-MCNC: 0.7 MG/DL (ref 0–1.2)
BUN SERPL-MCNC: 13 MG/DL (ref 8–23)
BUN/CREAT SERPL: 18.3 (ref 7–25)
CALCIUM SPEC-SCNC: 9.6 MG/DL (ref 8.6–10.5)
CHLORIDE SERPL-SCNC: 101 MMOL/L (ref 98–107)
CHOLEST SERPL-MCNC: 120 MG/DL (ref 0–200)
CO2 SERPL-SCNC: 27.4 MMOL/L (ref 22–29)
CREAT SERPL-MCNC: 0.71 MG/DL (ref 0.57–1)
CREAT UR-MCNC: 285.5 MG/DL
EGFRCR SERPLBLD CKD-EPI 2021: 97.5 ML/MIN/1.73
GLOBULIN UR ELPH-MCNC: 3.1 GM/DL
GLUCOSE SERPL-MCNC: 223 MG/DL (ref 65–99)
HDLC SERPL-MCNC: 38 MG/DL (ref 40–60)
LDLC SERPL CALC-MCNC: 52 MG/DL (ref 0–100)
LDLC/HDLC SERPL: 1.19 {RATIO}
MICROALBUMIN/CREAT UR: 11.2 MG/G (ref 0–29)
POTASSIUM SERPL-SCNC: 3.9 MMOL/L (ref 3.5–5.2)
PROT SERPL-MCNC: 7.5 G/DL (ref 6–8.5)
SODIUM SERPL-SCNC: 139 MMOL/L (ref 136–145)
TRIGL SERPL-MCNC: 183 MG/DL (ref 0–150)
TSH SERPL DL<=0.05 MIU/L-ACNC: 1.99 UIU/ML (ref 0.27–4.2)
VLDLC SERPL-MCNC: 30 MG/DL (ref 5–40)

## 2025-02-20 ENCOUNTER — CLINICAL SUPPORT (OUTPATIENT)
Dept: FAMILY MEDICINE CLINIC | Facility: CLINIC | Age: 60
End: 2025-02-20
Payer: MEDICARE

## 2025-02-20 DIAGNOSIS — R39.9 UTI SYMPTOMS: Primary | ICD-10-CM

## 2025-02-20 LAB
BILIRUB BLD-MCNC: NEGATIVE MG/DL
CLARITY, POC: CLEAR
COLOR UR: YELLOW
GLUCOSE UR STRIP-MCNC: ABNORMAL MG/DL
KETONES UR QL: NEGATIVE
LEUKOCYTE EST, POC: NEGATIVE
NITRITE UR-MCNC: NEGATIVE MG/ML
PH UR: 5 [PH] (ref 5–8)
PROT UR STRIP-MCNC: NEGATIVE MG/DL
RBC # UR STRIP: NEGATIVE /UL
SP GR UR: 1.02 (ref 1–1.03)
UROBILINOGEN UR QL: ABNORMAL

## 2025-02-20 PROCEDURE — 81002 URINALYSIS NONAUTO W/O SCOPE: CPT | Performed by: NURSE PRACTITIONER

## 2025-02-20 NOTE — TELEPHONE ENCOUNTER
Hub staff attempted to follow warm transfer process and was unsuccessful     Caller: Rahel Ramsey    Relationship to patient: Self    Best call back number: [phone]    Patient is needing: CALLED AND STATED SHE HAD LABS DONE 2/18/25, DID NOT SEE WHERE SHE HAD BEEN TESTED FOR A UTI, STATED SHE IS STILL HURTING AND THE HOME TEST SHE HAD FROM HER INSURANCE COMPANY, READ AS POSITIVE.    PLEASE ADVISE, PHARMACY IS       [id number] [address] [phone] [facility] [phone]    
    Caller: Rahel Ramsey    Relationship to patient: Self    Best call back number: [phone]    Patient is needing:   CALLING FOR A STATUS UPDATE ON THIS REQUEST          
Pt notified and expressed understanding. She will come in for a ua and culture later today  
behavioral health

## 2025-02-24 ENCOUNTER — TELEPHONE (OUTPATIENT)
Dept: FAMILY MEDICINE CLINIC | Facility: CLINIC | Age: 60
End: 2025-02-24
Payer: MEDICARE

## 2025-02-24 NOTE — TELEPHONE ENCOUNTER
Attempted to call pt to confirm 2/25 appt, no answer: per verbal left msg for pt to call to confirm or if needing to reschedule

## 2025-02-25 ENCOUNTER — OFFICE VISIT (OUTPATIENT)
Dept: FAMILY MEDICINE CLINIC | Facility: CLINIC | Age: 60
End: 2025-02-25
Payer: MEDICARE

## 2025-02-25 VITALS
DIASTOLIC BLOOD PRESSURE: 77 MMHG | HEIGHT: 69 IN | BODY MASS INDEX: 27.64 KG/M2 | HEART RATE: 74 BPM | OXYGEN SATURATION: 98 % | WEIGHT: 186.6 LBS | TEMPERATURE: 97.7 F | SYSTOLIC BLOOD PRESSURE: 112 MMHG

## 2025-02-25 DIAGNOSIS — E11.65 TYPE 2 DIABETES MELLITUS WITH HYPERGLYCEMIA, WITHOUT LONG-TERM CURRENT USE OF INSULIN: Primary | ICD-10-CM

## 2025-02-25 DIAGNOSIS — Z23 NEED FOR PNEUMOCOCCAL VACCINATION: ICD-10-CM

## 2025-02-25 DIAGNOSIS — J30.1 SEASONAL ALLERGIC RHINITIS DUE TO POLLEN: ICD-10-CM

## 2025-02-25 DIAGNOSIS — R79.89 ELEVATED LFTS: ICD-10-CM

## 2025-02-25 PROCEDURE — 90677 PCV20 VACCINE IM: CPT | Performed by: NURSE PRACTITIONER

## 2025-02-25 PROCEDURE — 3078F DIAST BP <80 MM HG: CPT | Performed by: NURSE PRACTITIONER

## 2025-02-25 PROCEDURE — G0009 ADMIN PNEUMOCOCCAL VACCINE: HCPCS | Performed by: NURSE PRACTITIONER

## 2025-02-25 PROCEDURE — 3046F HEMOGLOBIN A1C LEVEL >9.0%: CPT | Performed by: NURSE PRACTITIONER

## 2025-02-25 PROCEDURE — 1160F RVW MEDS BY RX/DR IN RCRD: CPT | Performed by: NURSE PRACTITIONER

## 2025-02-25 PROCEDURE — 3074F SYST BP LT 130 MM HG: CPT | Performed by: NURSE PRACTITIONER

## 2025-02-25 PROCEDURE — 99214 OFFICE O/P EST MOD 30 MIN: CPT | Performed by: NURSE PRACTITIONER

## 2025-02-25 PROCEDURE — 1125F AMNT PAIN NOTED PAIN PRSNT: CPT | Performed by: NURSE PRACTITIONER

## 2025-02-25 PROCEDURE — 1159F MED LIST DOCD IN RCRD: CPT | Performed by: NURSE PRACTITIONER

## 2025-02-25 RX ORDER — FLUTICASONE PROPIONATE 50 MCG
1 SPRAY, SUSPENSION (ML) NASAL DAILY
Qty: 48 G | Refills: 3 | Status: SHIPPED | OUTPATIENT
Start: 2025-02-25

## 2025-02-25 RX ORDER — SEMAGLUTIDE 0.68 MG/ML
0.25 INJECTION, SOLUTION SUBCUTANEOUS WEEKLY
Qty: 3 ML | Refills: 0 | Status: SHIPPED | OUTPATIENT
Start: 2025-02-25

## 2025-02-25 RX ORDER — GLIMEPIRIDE 4 MG/1
4 TABLET ORAL
Qty: 90 TABLET | Refills: 1 | Status: SHIPPED | OUTPATIENT
Start: 2025-02-25

## 2025-02-25 NOTE — PROGRESS NOTES
Chief Complaint  Chief Complaint   Patient presents with    Follow-up     6 month  Labs 2/18/25  Pt not consistently taking metformin- diarrhea  Requesting refill flonase           Subjective          Rahel Ramsey presents to [facility] for   History of Present Illness      Patient with past medical history of lung cancer, COPD, hypertension, elevated LFTs, hyperlipidemia, vitamin D deficiency, Diabetes mellitus type II, reports hyperglycemia, she stopped metformin xr d/t severe diarrhea, is only taking Januvia.  Her diet is poor, high in carbs and potatoes. She is only drinking about 1 bottle of water per day, started drinking sodas again, trying to eat sugar-free candy.  She reports frequent dysuria, denies any signs/symptoms of hyper/hypoglycemia, blurry vision, polydipsia, polyuria, nocturia, and unintentional weight loss     Colonoscopy is overdue    The following portions of the patient's history were reviewed and updated as appropriate: allergies, current medications, past family history, past medical history, past social history, past surgical history and problem list.    Past Medical History:   Diagnosis Date    Allergic     Arthritis     Atrial fibrillation     Cancer 01/09/2023    COPD (chronic obstructive pulmonary disease)     Diabetes mellitus     Frequent UTI     Headache     Heartburn     History of biliary duct stent placement 03/23/2023    History of herniated intervertebral disc     History of skin cancer     Hyperlipidemia     Hypertension     Injury of back     Leukocytosis     Low back pain     Lung nodule     Nausea      Past Surgical History:   Procedure Laterality Date    BACK SURGERY      lumbar    CARDIAC CATHETERIZATION      CHOLECYSTECTOMY N/A 4/10/2023    Procedure: CHOLECYSTECTOMY LAPAROSCOPIC;  Surgeon: Mich Bansal MD;  Location: [facility] MAIN OR;  Service: General;  Laterality: N/A;    COLONOSCOPY      DILATATION AND CURETTAGE      ENDOSCOPY  N/A 3/30/2023    Procedure: ESOPHAGOGASTRODUODENOSCOPY WITH STENT REMOVAL;  Surgeon: Naman Blackmon MD;  Location: [facility] ENDOSCOPY;  Service: Gastroenterology;  Laterality: N/A;  normal stent removal    ERCP N/A 2023    Procedure: ENDOSCOPIC RETROGRADE CHOLANGIOPANCREATOGRAPHY with sphicterotomy and balloon guided sweeping of bile duct up to 9mm and placement of 10Fr x 9cm biliary stent;  Surgeon: Naman Blackmon MD;  Location: [facility] ENDOSCOPY;  Service: Gastroenterology;  Laterality: N/A;  post: choledocholithiasis    LOBECTOMY Right 2023    Procedure: THORASCOPIC RIGHT UPPER LOBECTOMY WITH DAVINCI ROBOT, MEDIASTINAL LYMPH NODE DISSECTION;  Surgeon: Erwin Kearney MD;  Location: [facility] MAIN OR;  Service: Robotics - DaVinci;  Laterality: Right;    LUMBAR DECOMPRESSION      L4-L5     LUNG BIOPSY Right 2022    SKIN CANCER EXCISION Left     Shin    SUBTOTAL HYSTERECTOMY       Family History   Problem Relation Age of Onset    Breast cancer Mother     Lung cancer Father     Lung cancer Other     Colon cancer Other     Skin cancer Other      Social History     Tobacco Use    Smoking status: Former     Current packs/day: 0.00     Average packs/day: 1 pack/day for 40.0 years (40.0 ttl pk-yrs)     Types: Cigarettes     Start date: 1983     Quit date: 2023     Years since quittin.1     Passive exposure: Past    Smokeless tobacco: Never   Substance Use Topics    Alcohol use: Yes     Comment: rare       Current Outpatient Medications:     Accu-Chek Softclix Lancets lancets, USE TO TEST BLOOD SUGAR TWICE DAILY AS NEEDED, Disp: 100 each, Rfl: 3    albuterol sulfate  (90 Base) MCG/ACT inhaler, Inhale 2 puffs Every 4 (Four) Hours As Needed for Wheezing or Shortness of Air., Disp: 18 g, Rfl: 5    atorvastatin (LIPITOR) 20 MG tablet, TAKE 1 TABLET BY MOUTH DAILY, Disp: 90 tablet, Rfl: 3    Blood Glucose Monitoring Suppl (True Metrix Meter) w/Device kit, Use 1 each Take As Directed.,  Disp: 1 kit, Rfl: 0    ezetimibe (ZETIA) 10 MG tablet, TAKE 1 TABLET BY MOUTH DAILY, Disp: 90 tablet, Rfl: 3    fenofibrate (TRICOR) 145 MG tablet, TAKE 1 TABLET BY MOUTH DAILY, Disp: 90 tablet, Rfl: 1    fluticasone (FLONASE) 50 MCG/ACT nasal spray, 1 spray by Each Nare route Daily., Disp: 48 g, Rfl: 3    Fluticasone-Umeclidin-Vilant (TRELEGY) 100-62.5-25 MCG/ACT inhaler, Inhale 1 puff Every Night., Disp: , Rfl:     glucose blood (Accu-Chek Guide Test) test strip, USE AS INSTRUCTED TO CHECK BLOOD SUGAR DAILY, Disp: 100 each, Rfl: 2    HYDROcodone-acetaminophen (NORCO)  MG per tablet, Take 1 tablet by mouth Every 6 (Six) Hours As Needed for Moderate Pain. May take dos if needed, Disp: , Rfl:     Januvia 100 MG tablet, TAKE 1 TABLET BY MOUTH DAILY, Disp: 90 tablet, Rfl: 1    metoprolol succinate XL (TOPROL-XL) 25 MG 24 hr tablet, Take 1 tablet by mouth Daily., Disp: 90 tablet, Rfl: 3    nystatin (MYCOSTATIN) 478498 UNIT/GM cream, Apply 1 Application topically to the appropriate area as directed As Needed. None dos, Disp: , Rfl:     phenazopyridine (Pyridium) 200 MG tablet, Take 1 tablet by mouth 3 (Three) Times a Day As Needed for Bladder Spasms., Disp: 30 tablet, Rfl: 2    pregabalin (LYRICA) 100 MG capsule, Take 1 capsule by mouth 2 (Two) Times a Day., Disp: 60 capsule, Rfl: 5    vitamin D (ERGOCALCIFEROL) 1.25 MG (89114 UT) capsule capsule, TAKE 1 CAPSULE BY MOUTH EVERY 7 DAYS, Disp: 12 capsule, Rfl: 1    vitamin E 400 UNIT capsule, Take 1 capsule by mouth Daily., Disp: 90 capsule, Rfl: 3    glimepiride (Amaryl) 4 MG tablet, Take 1 tablet by mouth Every Morning Before Breakfast., Disp: 90 tablet, Rfl: 1    Semaglutide,0.25 or 0.5MG/DOS, (Ozempic, 0.25 or 0.5 MG/DOSE,) 2 MG/3ML solution pen-injector, Inject 0.25 mg under the skin into the appropriate area as directed 1 (One) Time Per Week., Disp: 3 mL, Rfl: 0    Objective   Vital Signs:   Vitals:    02/25/25 0837   BP: 112/77   BP Location: Left arm   Patient  "Position: Sitting   Cuff Size: Adult   Pulse: 74   Temp: 97.7 °F (36.5 °C)   TempSrc: Oral   SpO2: 98%   Weight: 84.6 kg (186 lb 9.6 oz)   Height: 175.3 cm (69\")   PainSc: 6    PainLoc: Back       Body mass index is 27.56 kg/m².    Physical Exam  Constitutional:       General: She is not in acute distress.     Appearance: Normal appearance. She is well-developed. She is not ill-appearing or diaphoretic.   HENT:      Head: Normocephalic.   Eyes:      Conjunctiva/sclera: Conjunctivae normal.      Pupils: Pupils are equal, round, and reactive to light.   Neck:      Thyroid: No thyromegaly.      Vascular: No JVD.   Cardiovascular:      Rate and Rhythm: Normal rate and regular rhythm.      Heart sounds: Normal heart sounds. No murmur heard.  Pulmonary:      Effort: Pulmonary effort is normal. No respiratory distress.      Breath sounds: Normal breath sounds. No wheezing or rhonchi.   Abdominal:      General: Bowel sounds are normal. There is no distension.      Palpations: Abdomen is soft.      Tenderness: There is no abdominal tenderness.   Musculoskeletal:         General: No swelling or tenderness. Normal range of motion.      Cervical back: Normal range of motion and neck supple. No tenderness.   Lymphadenopathy:      Cervical: No cervical adenopathy.   Skin:     General: Skin is warm and dry.      Coloration: Skin is not jaundiced.      Findings: No erythema or rash.   Neurological:      General: No focal deficit present.      Mental Status: She is alert and oriented to person, place, and time. Mental status is at baseline.      Sensory: No sensory deficit.      Motor: No weakness.      Gait: Gait abnormal (Uses cane for mobility).   Psychiatric:         Mood and Affect: Mood normal.         Behavior: Behavior normal.         Thought Content: Thought content normal.         Judgment: Judgment normal.          Result Review :     Clinical Support on 02/20/2025   Component Date Value Ref Range Status    Color " 02/20/2025 Yellow  Yellow, Straw, Dark Yellow, Kim Final    Clarity, UA 02/20/2025 Clear  Clear Final    Glucose, UA 02/20/2025 500 mg/dL (A)  Negative mg/dL Final    Bilirubin 02/20/2025 Negative  Negative Final    Ketones, UA 02/20/2025 Negative  Negative Final    Specific Gravity  02/20/2025 1.020  1.005 - 1.030 Final    Blood, UA 02/20/2025 Negative  Negative Final    pH, Urine 02/20/2025 5.0  5.0 - 8.0 Final    Protein, POC 02/20/2025 Negative  Negative mg/dL Final    Urobilinogen, UA 02/20/2025 0.2 E.U./dL  Normal, 0.2 E.U./dL Final    Leukocytes 02/20/2025 Negative  Negative Final    Nitrite, UA 02/20/2025 Negative  Negative Final                              Assessment and Plan    Diagnoses and all orders for this visit:    1. Type 2 diabetes mellitus with hyperglycemia, without long-term current use of insulin (Primary)  Comments:  a1c 10.9  DC metformin d/t s/e  Start Ozempic and glimepiride, cont Januvia  pt to call for s/e  dec carbs/potatoes   increase to 4 bottles of water per day  Orders:  -     Semaglutide,0.25 or 0.5MG/DOS, (Ozempic, 0.25 or 0.5 MG/DOSE,) 2 MG/3ML solution pen-injector; Inject 0.25 mg under the skin into the appropriate area as directed 1 (One) Time Per Week.  Dispense: 3 mL; Refill: 0  -     glimepiride (Amaryl) 4 MG tablet; Take 1 tablet by mouth Every Morning Before Breakfast.  Dispense: 90 tablet; Refill: 1    2. Need for pneumococcal vaccination  -     Pneumococcal Conjugate Vaccine 20-Valent All    3. Elevated LFTs  Comments:  Patient with hepatic steatosis, also 2/2 hyperglycemia.   continue vitamin E    4. Seasonal allergic rhinitis due to pollen  -     fluticasone (FLONASE) 50 MCG/ACT nasal spray; 1 spray by Each Nare route Daily.  Dispense: 48 g; Refill: 3      Patient to call and schedule colonoscopy      I spent 30 minutes caring for Rahel Ramsey on this date of service. This time includes time spent by me in the following activities: preparing for the visit,  reviewing tests, performing a medically appropriate examination and/or evaluation , counseling and educating the patient/family/caregiver, ordering medications, tests, or procedures and documenting information in the medical record        Follow Up     Return in about 3 months (around 5/25/2025) for Recheck DM. CMP and hgba1c prior to appt.  Patient was given instructions and counseling regarding her condition or for health maintenance advice. Please see specific information pulled into the AVS if appropriate.        Part of this note may be an electronic transcription/translation of spoken language to printed text using the Dragon Dictation System

## 2025-03-05 DIAGNOSIS — E11.65 TYPE 2 DIABETES MELLITUS WITH HYPERGLYCEMIA, WITHOUT LONG-TERM CURRENT USE OF INSULIN: ICD-10-CM

## 2025-03-05 RX ORDER — BLOOD-GLUCOSE METER
EACH MISCELLANEOUS SEE ADMIN INSTRUCTIONS
Qty: 1 KIT | Refills: 0 | Status: SHIPPED | OUTPATIENT
Start: 2025-03-05

## 2025-03-06 RX ORDER — FENOFIBRATE 145 MG/1
TABLET, COATED ORAL DAILY
Qty: 90 TABLET | Refills: 1 | Status: SHIPPED | OUTPATIENT
Start: 2025-03-06

## 2025-03-06 RX ORDER — METOPROLOL SUCCINATE 25 MG/1
25 TABLET, EXTENDED RELEASE ORAL DAILY
Qty: 90 TABLET | Refills: 3 | Status: SHIPPED | OUTPATIENT
Start: 2025-03-06

## 2025-03-31 RX ORDER — SITAGLIPTIN 100 MG/1
100 TABLET, FILM COATED ORAL DAILY
Qty: 90 TABLET | Refills: 1 | Status: SHIPPED | OUTPATIENT
Start: 2025-03-31

## 2025-04-01 ENCOUNTER — HOSPITAL ENCOUNTER (OUTPATIENT)
Dept: CT IMAGING | Facility: HOSPITAL | Age: 60
Discharge: HOME | End: 2025-04-01
Admitting: SURGERY
Payer: MEDICARE

## 2025-04-01 DIAGNOSIS — Z85.118 HISTORY OF LUNG CANCER: ICD-10-CM

## 2025-04-01 DIAGNOSIS — R91.1 LUNG NODULE: ICD-10-CM

## 2025-04-01 DIAGNOSIS — C34.11 PRIMARY CANCER OF RIGHT UPPER LOBE OF LUNG: ICD-10-CM

## 2025-04-01 PROCEDURE — 71250 CT THORAX DX C-: CPT

## 2025-04-08 ENCOUNTER — OFFICE VISIT (OUTPATIENT)
Dept: SURGERY | Facility: CLINIC | Age: 60
End: 2025-04-08
Payer: MEDICARE

## 2025-04-08 VITALS
DIASTOLIC BLOOD PRESSURE: 71 MMHG | HEIGHT: 69 IN | WEIGHT: 191 LBS | SYSTOLIC BLOOD PRESSURE: 130 MMHG | OXYGEN SATURATION: 98 % | HEART RATE: 76 BPM | BODY MASS INDEX: 28.29 KG/M2

## 2025-04-08 DIAGNOSIS — Z85.118 HISTORY OF LUNG CANCER: Primary | ICD-10-CM

## 2025-04-08 DIAGNOSIS — Z87.891 FORMER SMOKER: ICD-10-CM

## 2025-04-08 DIAGNOSIS — R91.8 LUNG NODULES: ICD-10-CM

## 2025-04-08 NOTE — PROGRESS NOTES
"Chief Complaint  Follow up, RUL resection for NSCLC in January 2023    Subjective        Rahel Ramsey presents to [facility]    History of Present Illness    Ms. Ramsey is a very pleasant 58-year-old lady who is status post right upper lobe resection by Dr. Kearney in January 2023 for stage I adenocarcinoma.  She is also followed by Dr. Prabhakar of medical oncology.  She is a former smoker with greater than 40-pack-year history.  She denies new onset shortness of breath, cough, hemoptysis or voice change. She also sees Dr. Easton of pulmonary medicine.  Patient was seen in ER for gastroenteritis last month. She has since recovered and reports no residual symptoms. She presents today in her baseline state of health feeling well with CT chest.  Her partner has quit smoking since her last visit.      Objective   Vital Signs:  /71 (BP Location: Right arm, Patient Position: Sitting)   Pulse 76   Ht 175.3 cm (69.02\")   Wt 86.6 kg (191 lb)   SpO2 98%   BMI 28.19 kg/m²   Estimated body mass index is 28.19 kg/m² as calculated from the following:    Height as of this encounter: 175.3 cm (69.02\").    Weight as of this encounter: 86.6 kg (191 lb).             Physical Exam  Constitutional:       General: She is not in acute distress.     Appearance: Normal appearance.   HENT:      Head: Normocephalic and atraumatic.      Nose: Nose normal.   Eyes:      General: No scleral icterus.     Conjunctiva/sclera: Conjunctivae normal.   Cardiovascular:      Rate and Rhythm: Normal rate.   Pulmonary:      Effort: Pulmonary effort is normal. No respiratory distress.   Abdominal:      Palpations: Abdomen is soft.   Musculoskeletal:         General: Normal range of motion.      Cervical back: Normal range of motion and neck supple.   Skin:     General: Skin is warm and dry.   Neurological:      General: No focal deficit present.      Mental Status: She is alert and oriented to person, place, " and time.      Motor: No weakness.   Psychiatric:         Behavior: Behavior normal.         Thought Content: Thought content normal.         Judgment: Judgment normal.        Result Review :  CT chest 4/1/2025: Stable postsurgical changes room right upper lobectomy. Nodule cluster in right lower lobe, larges measuring at 7 mm, stable. No new or enlarging nodules, pleural effusion, lymphadenopathy, or pericardial effusion.     CT chest 3/26/2024: Postsurgical changes from right upper lobectomy as before.  Stable nodules within the right middle and lower lobe measuring up to 7 mm.  No mediastinal or hilar lymphadenopathy.  Imaging reviewed independently.      CT chest 1/22/2024: postoperative changes from a right upper lobectomy with postoperative scarring.  There is a new small cluster of nodules in the right lower lobe.  No mediastinal or hilar lymphadenopathy.  No pleural or pericardial effusion.           Assessment and Plan     Diagnoses and all orders for this visit:    1. History of lung cancer (Primary)  -     CT Chest Without Contrast; Future    2. Former smoker  -     CT Chest Without Contrast; Future    3. Lung nodules  -     CT Chest Without Contrast; Future        Patient is status post right upper lobectomy by Dr. Kearney in January 2023 for a T1bN0 adenocarcinoma.  Her most recent CT chest shows some persistent nodules in the right lower lobe without change.  Will continue surveillance with CT chest in 6 months and have her follow-up with us in the office to review this imaging.  She is in agreement with this plan.       I spent 30 minutes caring for Rahel on this date of service. This time includes time spent by me in the following activities:preparing for the visit, reviewing tests, obtaining and/or reviewing a separately obtained history, performing a medically appropriate examination and/or evaluation , counseling and educating the patient/family/caregiver, ordering medications, tests, or procedures,  documenting information in the medical record, and independently interpreting results and communicating that information with the patient/family/caregiver  Follow Up     Return in about 6 months (around 10/8/2025) for Next scheduled follow up.  Patient was given instructions and counseling regarding her condition or for health maintenance advice. Please see specific information pulled into the AVS if appropriate.

## 2025-04-23 DIAGNOSIS — E11.65 TYPE 2 DIABETES MELLITUS WITH HYPERGLYCEMIA, WITHOUT LONG-TERM CURRENT USE OF INSULIN: ICD-10-CM

## 2025-04-24 RX ORDER — SEMAGLUTIDE 0.68 MG/ML
0.5 INJECTION, SOLUTION SUBCUTANEOUS WEEKLY
Qty: 3 ML | Refills: 0 | Status: SHIPPED | OUTPATIENT
Start: 2025-04-24

## 2025-04-28 ENCOUNTER — TELEPHONE (OUTPATIENT)
Dept: FAMILY MEDICINE CLINIC | Facility: CLINIC | Age: 60
End: 2025-04-28
Payer: MEDICARE

## 2025-04-28 DIAGNOSIS — E11.65 TYPE 2 DIABETES MELLITUS WITH HYPERGLYCEMIA, WITHOUT LONG-TERM CURRENT USE OF INSULIN: Primary | ICD-10-CM

## 2025-04-28 DIAGNOSIS — E78.2 MIXED HYPERLIPIDEMIA: ICD-10-CM

## 2025-04-28 DIAGNOSIS — E55.9 VITAMIN D DEFICIENCY: ICD-10-CM

## 2025-04-28 NOTE — TELEPHONE ENCOUNTER
Could you put in labs for Rahel .. She is getting lab on the 28th for oncology, can we add to that.

## 2025-05-26 ENCOUNTER — PATIENT OUTREACH (OUTPATIENT)
Dept: ONCOLOGY | Facility: CLINIC | Age: 60
End: 2025-05-26
Payer: MEDICARE

## 2025-05-26 NOTE — SIGNIFICANT NOTE
Patient current with surveillance plan with the surgery office. She can call with any questions or concerns.     Veronique Brothers  Lung Nurse Navigator   [facility]  [phone]  [email or web address]

## 2025-05-27 NOTE — PROGRESS NOTES
Hematology/Oncology Outpatient Follow Up    Patient name: Rahel Ramsey  : 1965  MRN: [id number]  Primary Care Physician: Vickie Gomez APRN  Referring Physician: Vickie Gomez APRN  Reason For Consult:       Chief Complaint   Patient presents with    Follow-up     Malignant neoplasm of lung, unspecified laterality, unspecified part of lung       History of Present Illness: Patient is here today for routine follow-up and does not have any specific complaints    This is a 57-year-old female who has been noted to have leukocytosis   from routine blood count.  Patient was also recently diagnosed with skin cancer on the left lower   extremity.  She underwent wire excisional biopsy of the lesion.  Her course was complicated by   wound infection.  She has been on two rounds of antibiotics and currently on Keflex which will be   finished in another three to four days.  She denies any fevers or chills, rigors.  Patient had no   prior history of any hematologic problems.  Review of her CBC from 17 revealed WBC of 12.4,   hemoglobin 15.6, platelet count 271,000.  Differentials were 65% neutrophils, 28% lymphocytes.    There was no basophilia, eosinophilia or monocytosis.  Her chemistry panel had BUN 7, creatinine   0.7.  Alkaline phosphatase was 11.  The rest was unremarkable.  Patient had a CBC repeated on   17 with WBC of 12.6, hemoglobin 15.4, platelet count of 310,000.  Differentials were   unremarkable.  B12 (N) 441.  Folate (N) 13.9.  BUN 5, creatinine 0.7.  Normal thyroid at 0.9   [range 0.3-5.5].  Patient denied any repeated history of infection in the past.  She was alone   for this appointment on 17.  · 17 - Patient had labs done to further evaluate her leukocytosis.  Her WBC was 14.4,   hemoglobin 16.6, platelet count 312,000, ANC 9.3.  Differentials were 64% neutrophils, 28%   lymphocytes.  CHRISTOPHE was negative.  B12 was 337.  LDH (N) 159.  BUN 10, creatinine 0.7.  Uric acid   5.3  (N).  MIRTA-2 was not mutated.  No evidence of BCR-abl gene rearrangement.  Sed rate 32.    Peripheral smear showed absolute neutrophilia and leukocytosis and polycythemia, reactive vs.   myeloproliferative condition.  · 5/9/17 - Erythropoietin level was low-normal at 3.92.    · 8/17/17 - Ultrasound of the abdomen showed normal spleen size at 11.3 cm.  There was no splenic   lesion identified.    · 4/2/18 - Erythropoietin level 3.79 [range 2.59-18.5].   · 8/24/18 - Bone marrow biopsy showed normocellular marrow with no morphologic evidence of   myeloproliferative neoplasm, but she was found to have low level MIRTA-2 point mutation detected at   less than 10%.  The low level of MIRTA-2 mutation in assessment of leukocytosis and erythrocytosis   is worrisome for early and evolving myeloproliferative neoplasm.  Interval repeat marrow has been   recommended.  BCR-abl RT-PCR was negative.  Flow cytometry was negative.  Cellularity was 50%.    Iron stain was present.  Cytogenetics also showed normal female karyotype.    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  MIRTA-2 analysis was not detected on the   current bone marrow.  Close follow up has been recommended.  Flowy cytometry was negative.    Histopathology had normal maturation sequence.  There were normal megakaryocytes.  Erythroid   maturation was complete.  Myeloid maturation was also complete.  Negative iron stain on the   marrow aspirate.  Recommendation is for close surveillance.  Absent iron stores on bone marrow.      Smoker 1/2 ppd, does not drink alcohol    See pulmonologist for lung nodule    Family hx of breast cancer with breast cancer age 69  Father with lung cancer      Patient has been lost to follow-up since 2019.    6/28/2022: Follow-up appointment leukocytosis, thrombocytosis, polycythemia, CHRISTOPHE positive for SLE, anti-DNA elevated at 13.  US spleen-  normal size.  October 2022 patient was found to have increasing right upper lobe nodule of.  She is followed up with Dr. Nath.  She had a CT-guided biopsy of this nodule on 11/2/2022 and pathology revealed invasive well to moderately differentiated adenocarcinoma with focal lipidic features  11/23/2022: Patient had a PET CT scan which showed a 1.7 cm right upper lobe nodule not PET avid.  There is an 8 mm right upper lobe nodule also stable since 2020 likely benign.  There was no convincing evidence of metastatic disease elsewhere.  12/20/2022: Patient was seen by Dr. Kearney.  She is being evaluated for right upper lobectomy in the next 2 to 3 weeks  1/9/2023 patient underwent right lobectomy with mediastinal lymph node dissection  3/26/2024: CT of the chest without contrast showed postsurgical changes of prior right upper lobectomy; stable pulmonary nodules within the right middle lobe, right lower lobe; hepatic steatosis and findings of prior pancreatitis  4/1/2025 CT of the chest without contrast: Stable chest without findings to suggest disease recurrence or progressive metastatic disease.  5/27/2025 CT abdomen and pelvis with contrast: No acute process, hepatosplenomegaly with hepatic steatosis, diverticulosis, question of a small ill-defined nodule measuring 16 mm in the midpole of the right kidney.  Consider sonography for reevaluation.    History of present illness was reviewed and is unchanged from the previous visit.     I have reviewed and confirmed the accuracy of the patient's history: Chief complaint, HPI, ROS, and Subjective as entered by the MA/LPN/RN.  05/28/25 1/27/25      Subjective  5/28/2025: Rahel is here today for her routine 4-month follow-up for history of lung cancer and a history of polycythemia and leukocytosis.  She reports she has been having ongoing issues with abdominal pain that are believed to be related to her Ozempic.  Review of the chart showed that she has had 2 CT abdomen and  pelvis in March and in May which revealed hepatosplenomegaly which is a new finding.  She also has hepatic steatosis.  She is aware of these findings and states that the physician in the ED thought the hepatosplenomegaly might be secondary to her Ozempic which is now being held.  Otherwise her only complaint is hot flashes which have been ongoing since her lung cancer diagnosis.  She denies any other systemic symptoms.      Past Medical History:   Diagnosis Date    Allergic     Arthritis     Atrial fibrillation     Cancer 01/09/2023    lung  Right    COPD (chronic obstructive pulmonary disease)     Diabetes mellitus     type 2    Frequent UTI     Headache     Heartburn     History of biliary duct stent placement 03/23/2023    History of herniated intervertebral disc     lumbar    History of skin cancer     Left lower extremity    Hyperlipidemia     Hypertension     Injury of back     Leukocytosis     Low back pain     Lung nodule     Nausea        Past Surgical History:   Procedure Laterality Date    BACK SURGERY      lumbar    CARDIAC CATHETERIZATION      CHOLECYSTECTOMY N/A 4/10/2023    Procedure: CHOLECYSTECTOMY LAPAROSCOPIC;  Surgeon: Mich Bansal MD;  Location: [facility] MAIN OR;  Service: General;  Laterality: N/A;    COLONOSCOPY      DILATATION AND CURETTAGE      ENDOSCOPY N/A 3/30/2023    Procedure: ESOPHAGOGASTRODUODENOSCOPY WITH STENT REMOVAL;  Surgeon: Naman Blackmon MD;  Location: [facility] ENDOSCOPY;  Service: Gastroenterology;  Laterality: N/A;  normal stent removal    ERCP N/A 02/01/2023    Procedure: ENDOSCOPIC RETROGRADE CHOLANGIOPANCREATOGRAPHY with sphicterotomy and balloon guided sweeping of bile duct up to 9mm and placement of 10Fr x 9cm biliary stent;  Surgeon: Naman Blackmon MD;  Location: [facility] ENDOSCOPY;  Service: Gastroenterology;  Laterality: N/A;  post: choledocholithiasis    LOBECTOMY Right 01/09/2023    Procedure: THORASCOPIC RIGHT UPPER LOBECTOMY WITH DAVINCI ROBOT,  MEDIASTINAL LYMPH NODE DISSECTION;  Surgeon: Erwin Kearney MD;  Location: [facility] MAIN OR;  Service: Robotics - DaVinci;  Laterality: Right;    LUMBAR DECOMPRESSION      L4-L5     LUNG BIOPSY Right 11/2022    SKIN CANCER EXCISION Left     Shin    SUBTOTAL HYSTERECTOMY           Current Outpatient Medications:     Accu-Chek Softclix Lancets lancets, USE TO TEST BLOOD SUGAR TWICE DAILY AS NEEDED, Disp: 100 each, Rfl: 3    atorvastatin (LIPITOR) 20 MG tablet, TAKE 1 TABLET BY MOUTH DAILY, Disp: 90 tablet, Rfl: 3    Blood Glucose Monitoring Suppl (Accu-Chek Guide Me) w/Device kit, USE AS DIRECTED, Disp: 1 kit, Rfl: 0    ezetimibe (ZETIA) 10 MG tablet, TAKE 1 TABLET BY MOUTH DAILY, Disp: 90 tablet, Rfl: 3    fenofibrate (TRICOR) 145 MG tablet, TAKE 1 TABLET BY MOUTH DAILY, Disp: 90 tablet, Rfl: 1    glimepiride (Amaryl) 4 MG tablet, Take 1 tablet by mouth Every Morning Before Breakfast., Disp: 90 tablet, Rfl: 1    glucose blood (Accu-Chek Guide Test) test strip, USE AS INSTRUCTED TO CHECK BLOOD SUGAR DAILY, Disp: 100 each, Rfl: 2    HYDROcodone-acetaminophen (NORCO)  MG per tablet, Take 1 tablet by mouth Every 6 (Six) Hours As Needed for Moderate Pain. May take dos if needed, Disp: , Rfl:     Januvia 100 MG tablet, TAKE 1 TABLET BY MOUTH DAILY, Disp: 90 tablet, Rfl: 1    metoprolol succinate XL (TOPROL-XL) 25 MG 24 hr tablet, TAKE 1 TABLET BY MOUTH DAILY, Disp: 90 tablet, Rfl: 3    pregabalin (LYRICA) 100 MG capsule, Take 1 capsule by mouth 2 (Two) Times a Day., Disp: 60 capsule, Rfl: 5    sulfamethoxazole-trimethoprim (BACTRIM DS,SEPTRA DS) 800-160 MG per tablet, Take 1 tablet by mouth., Disp: , Rfl:     vitamin D (ERGOCALCIFEROL) 1.25 MG (79115 UT) capsule capsule, TAKE 1 CAPSULE BY MOUTH EVERY 7 DAYS, Disp: 12 capsule, Rfl: 1    vitamin E 400 UNIT capsule, Take 1 capsule by mouth Daily., Disp: 90 capsule, Rfl: 3    albuterol sulfate  (90 Base) MCG/ACT inhaler, Inhale 2 puffs Every 4 (Four) Hours As Needed  for Wheezing or Shortness of Air. (Patient not taking: Reported on 5/28/2025), Disp: 18 g, Rfl: 5    fluticasone (FLONASE) 50 MCG/ACT nasal spray, 1 spray by Each Nare route Daily. (Patient not taking: Reported on 5/28/2025), Disp: 48 g, Rfl: 3    Fluticasone-Umeclidin-Vilant (TRELEGY) 100-62.5-25 MCG/ACT inhaler, Inhale 1 puff Every Night. (Patient not taking: Reported on 5/28/2025), Disp: , Rfl:     nystatin (MYCOSTATIN) 507975 UNIT/GM cream, Apply 1 Application topically to the appropriate area as directed As Needed. None dos (Patient not taking: Reported on 5/28/2025), Disp: , Rfl:     ondansetron (ZOFRAN) 4 MG tablet, Take 1 tablet by mouth. (Patient not taking: Reported on 5/28/2025), Disp: , Rfl:     phenazopyridine (Pyridium) 200 MG tablet, Take 1 tablet by mouth 3 (Three) Times a Day As Needed for Bladder Spasms. (Patient not taking: Reported on 5/28/2025), Disp: 30 tablet, Rfl: 2    Semaglutide,0.25 or 0.5MG/DOS, (Ozempic, 0.25 or 0.5 MG/DOSE,) 2 MG/3ML solution pen-injector, Inject 0.5 mg under the skin into the appropriate area as directed 1 (One) Time Per Week. (Patient not taking: Reported on 5/28/2025), Disp: 3 mL, Rfl: 0    Allergies   Allergen Reactions    Morphine Anaphylaxis and Nausea And Vomiting    Percocet [Oxycodone-Acetaminophen] GI Intolerance    Tramadol GI Intolerance       Immunization History   Administered Date(s) Administered    COVID-19 (Pluralsight) 03/12/2021    Covid-19 (Pfizer) Gray Cap Monovalent 01/31/2022    Flu Vaccine Quad PF >36MO 09/17/2018    Fluzone  >6mos 11/21/2024    Fluzone (or Fluarix & Flulaval for VFC) >6mos 09/03/2020, 09/29/2022, 10/04/2023    Influenza, Unspecified 09/27/2016, 10/12/2017    Pneumococcal Conjugate 20-Valent (PCV20) 02/25/2025    Pneumococcal Polysaccharide (PPSV23) 09/21/2021    Tdap 05/07/2020    flucelvax quad pfs =>4 YRS 10/03/2019       Family History   Problem Relation Age of Onset    Breast cancer Mother     Lung cancer Father     Lung cancer  "Other     Colon cancer Other     Skin cancer Other        Cancer-related family history includes Breast cancer in her mother; Colon cancer in an other family member; Lung cancer in her father and another family member; Skin cancer in an other family member.    Social History     Tobacco Use    Smoking status: Former     Current packs/day: 0.00     Average packs/day: 1 pack/day for 40.0 years (40.0 ttl pk-yrs)     Types: Cigarettes     Start date: 1983     Quit date: 2023     Years since quittin.3     Passive exposure: Past    Smokeless tobacco: Never   Vaping Use    Vaping status: Never Used   Substance Use Topics    Alcohol use: Not Currently     Comment: rare    Drug use: No              ROS:    Review of Systems   Constitutional: Negative.    HENT: Negative.     Eyes: Negative.    Respiratory: Negative.     Cardiovascular: Negative.    Gastrointestinal: Negative.    Endocrine: Positive for heat intolerance.   Genitourinary: Negative.    Musculoskeletal:  Positive for gait problem.        Ambulates with a quad cane   Allergic/Immunologic: Negative.    Hematological: Negative.    Psychiatric/Behavioral: Negative.     Chronic back pain due to disc disease    Objective:    Vitals:    25 0903   BP: 109/71   Pulse: 72   Temp: 97.5 °F (36.4 °C)   SpO2: 98%   Weight: 86.2 kg (190 lb)   Height: 175.3 cm (69.02\")   PainSc: 0-No pain               Body mass index is 28.05 kg/m².    ECOG  (1) Restricted in physically strenuous activity, ambulatory and able to do work of light nature    Physical Exam:  Physical Exam  Vitals reviewed.   Constitutional:       General: She is not in acute distress.     Appearance: Normal appearance.   HENT:      Head: Normocephalic and atraumatic.   Cardiovascular:      Rate and Rhythm: Normal rate.   Pulmonary:      Effort: Pulmonary effort is normal. No respiratory distress.   Abdominal:      General: Bowel sounds are normal. There is no distension.      Palpations: Abdomen " is soft.      Tenderness: There is abdominal tenderness (Mild tenderness in the left upper quadrant).      Comments: No palpable hepatosplenomegaly   Musculoskeletal:         General: Normal range of motion.      Cervical back: Normal range of motion.      Comments: Ambulates with quad cane   Skin:     General: Skin is warm.      Coloration: Skin is not jaundiced or pale.      Findings: No bruising, erythema or rash.   Neurological:      Mental Status: She is alert.   Psychiatric:         Mood and Affect: Mood normal.         Behavior: Behavior normal.       I have reexamined the patient and the results are consistent with the previously documented exam.       RECENT LABS    WBC   Date Value Ref Range Status   05/28/2025 8.31 3.40 - 10.80 10*3/mm3 Final     RBC   Date Value Ref Range Status   05/28/2025 4.40 3.77 - 5.28 10*6/mm3 Final     Hemoglobin   Date Value Ref Range Status   05/28/2025 13.0 12.0 - 15.9 g/dL Final     Hematocrit   Date Value Ref Range Status   05/28/2025 39.1 34.0 - 46.6 % Final     MCV   Date Value Ref Range Status   05/28/2025 88.9 79.0 - 97.0 fL Final     MCH   Date Value Ref Range Status   05/28/2025 29.5 26.6 - 33.0 pg Final     MCHC   Date Value Ref Range Status   05/28/2025 33.2 31.5 - 35.7 g/dL Final     RDW   Date Value Ref Range Status   05/28/2025 13.8 12.3 - 15.4 % Final     RDW-SD   Date Value Ref Range Status   05/28/2025 44.0 37.0 - 54.0 fl Final     MPV   Date Value Ref Range Status   05/28/2025 9.5 6.0 - 12.0 fL Final     Platelets   Date Value Ref Range Status   05/28/2025 213 140 - 450 10*3/mm3 Final     Neutrophil %   Date Value Ref Range Status   05/28/2025 70.1 42.7 - 76.0 % Final     Lymphocyte %   Date Value Ref Range Status   05/28/2025 21.7 19.6 - 45.3 % Final     Monocyte %   Date Value Ref Range Status   05/28/2025 8.1 5.0 - 12.0 % Final     Eosinophil %   Date Value Ref Range Status   05/28/2025 0.0 (L) 0.3 - 6.2 % Final     Basophil %   Date Value Ref Range Status    05/28/2025 0.1 0.0 - 1.5 % Final     Immature Grans %   Date Value Ref Range Status   01/03/2023 0.4 0.0 - 0.5 % Final     Neutrophils, Absolute   Date Value Ref Range Status   05/28/2025 5.83 1.70 - 7.00 10*3/mm3 Final     Lymphocytes, Absolute   Date Value Ref Range Status   05/28/2025 1.80 0.70 - 3.10 10*3/mm3 Final     Monocytes, Absolute   Date Value Ref Range Status   05/28/2025 0.67 0.10 - 0.90 10*3/mm3 Final     Eosinophils, Absolute   Date Value Ref Range Status   05/28/2025 0.00 0.00 - 0.40 10*3/mm3 Final     Basophils, Absolute   Date Value Ref Range Status   05/28/2025 0.01 0.00 - 0.20 10*3/mm3 Final     Immature Grans, Absolute   Date Value Ref Range Status   01/03/2023 0.04 0.00 - 0.05 10*3/mm3 Final     nRBC   Date Value Ref Range Status   04/10/2023 0.0 0.0 - 0.2 /100 WBC Final       Lab Results   Component Value Date    GLUCOSE 223 (H) 02/18/2025    BUN 13 02/18/2025    CREATININE 0.71 02/18/2025    EGFRIFNONA 99 12/14/2021    BCR 18.3 02/18/2025    K 3.9 02/18/2025    CO2 27.4 02/18/2025    CALCIUM 9.6 02/18/2025    ALBUMIN 4.4 02/18/2025    AST 45 (H) 02/18/2025    ALT 56 (H) 02/18/2025         Assessment & Plan   Primary cancer of right upper lobe of lung  - CBC & Differential  - Comprehensive Metabolic Panel  - Flow Cytometry, Blood  - BCR-ABL1, CML / ALL, PCR, Quant  - Peripheral Blood Smear  - Iron Profile w/o Ferritin  - Ferritin    Hepatosplenomegaly  - Iron Profile w/o Ferritin  - Ferritin    Leukocytosis, unspecified type  - Iron Profile w/o Ferritin  - Ferritin    Nodule of kidney  - US Renal Limited      Adenocarcinoma of the right lung, status post right upper lobectomy with lymph node dissection.  Tumor measured 1.3 cm, invasive well differentiated adenocarcinoma completely excised.  Enlarging right upper lobe nodule.  Clinical T1b N0 M0.  Stage I A2.  Adjuvant treatment  not recommended continued CT scan surveillance.  Continues to have CT scans through thoracic surgery office.   Reviewed CT chest from April 2025 as above.    Postoperative pancreatitis due to gallstones.  She is actively involved with the GI group.  Status post cholecystectomy.  Follow-up CT scan shows pseudocyst patient will maintain appointments.  There has been interval removal of the common bile duct stent.    Tobacco use disorder: Patient has discontinued smoking.  Reviewed      Leukocytosis and polycythemia.  Rule out myeloproliferative disease.  She has a prior history of this in the past.  Ultrasound of spleen- normal size.  Blood counts improved with WBC of 10.14, Hgb 15.8, platelets 226-improvement in counts.  JAK2 V617F negative.  No BCR ABL arrangement.  Peripheral smear shows leukocytosis, polycythemia, no blasts May of 2022.  We will obtain flow cytometry since she has relative lymphocytosis as well.  I have reviewed the lab results with patient.  I do not think we need to repeat a bone marrow biopsy at this time.  I have asked her to also remain on baby aspirin.  Patient does not have splenomegaly.  Her repeat flow cytometry completed in August 2022 does not show any myeloproliferative disease.  CBC remains normal.    Possible evolving myeloproliferative neoplasm with low level MIRTA-2 positive at less than 10%. BCR-abl negative.  Low normal erythropoietin level.  Status post bone marrow aspiration and biopsy 8/24/18.  Repeat bone marrow aspiration and biopsy 2/22/19 showed translocation [2q/11q] has been associated with myeloid neoplasm, but no morphologic evidence seen on histopathology      CHRISTOPHE positive, anti-DNA elevated at 13.  Referred to rheumatology, She was seen at  rheumatology.    Right kidney nodule: Seen incidentally on CT.  Obtain renal ultrasound for further follow-up.        Plans    CBC today reviewed with the patient  Due to new splenomegaly repeat flow cytometry, BCR-ABL, peripheral blood smear  Continue surveillance CT scan with thoracic surgery  Continue follow-up with pulmonary  Continue  patient to follow-up with GI  Peripheral blood flow cytometry was negative- 8/3/2022  Continue baby aspirin 81 mg p.o. daily   All questions answered  Follow-up with Dr. Prabhakar in 6 weeks to review results of the above testing

## 2025-05-28 ENCOUNTER — OFFICE VISIT (OUTPATIENT)
Dept: ONCOLOGY | Facility: CLINIC | Age: 60
End: 2025-05-28
Payer: MEDICARE

## 2025-05-28 ENCOUNTER — LAB (OUTPATIENT)
Dept: LAB | Facility: HOSPITAL | Age: 60
End: 2025-05-28
Payer: MEDICARE

## 2025-05-28 VITALS
BODY MASS INDEX: 28.14 KG/M2 | HEIGHT: 69 IN | DIASTOLIC BLOOD PRESSURE: 71 MMHG | HEART RATE: 72 BPM | SYSTOLIC BLOOD PRESSURE: 109 MMHG | WEIGHT: 190 LBS | OXYGEN SATURATION: 98 % | TEMPERATURE: 97.5 F

## 2025-05-28 DIAGNOSIS — E11.65 TYPE 2 DIABETES MELLITUS WITH HYPERGLYCEMIA, WITHOUT LONG-TERM CURRENT USE OF INSULIN: ICD-10-CM

## 2025-05-28 DIAGNOSIS — D72.829 LEUKOCYTOSIS, UNSPECIFIED TYPE: ICD-10-CM

## 2025-05-28 DIAGNOSIS — C34.90 MALIGNANT NEOPLASM OF LUNG, UNSPECIFIED LATERALITY, UNSPECIFIED PART OF LUNG: ICD-10-CM

## 2025-05-28 DIAGNOSIS — C34.11 PRIMARY CANCER OF RIGHT UPPER LOBE OF LUNG: Primary | ICD-10-CM

## 2025-05-28 DIAGNOSIS — R16.2 HEPATOSPLENOMEGALY: ICD-10-CM

## 2025-05-28 DIAGNOSIS — N28.89 NODULE OF KIDNEY: ICD-10-CM

## 2025-05-28 DIAGNOSIS — E78.2 MIXED HYPERLIPIDEMIA: ICD-10-CM

## 2025-05-28 DIAGNOSIS — E55.9 VITAMIN D DEFICIENCY: ICD-10-CM

## 2025-05-28 LAB
25(OH)D3 SERPL-MCNC: 54.4 NG/ML (ref 30–100)
ALBUMIN SERPL-MCNC: 4.4 G/DL (ref 3.5–5.2)
ALBUMIN UR-MCNC: 2.2 MG/DL
ALBUMIN/GLOB SERPL: 1.3 G/DL
ALP SERPL-CCNC: 94 U/L (ref 39–117)
ALT SERPL W P-5'-P-CCNC: 27 U/L (ref 1–33)
ANION GAP SERPL CALCULATED.3IONS-SCNC: 13.9 MMOL/L (ref 5–15)
AST SERPL-CCNC: 21 U/L (ref 1–32)
BASOPHILS # BLD AUTO: 0.01 10*3/MM3 (ref 0–0.2)
BASOPHILS NFR BLD AUTO: 0.1 % (ref 0–1.5)
BILIRUB SERPL-MCNC: 0.5 MG/DL (ref 0–1.2)
BUN SERPL-MCNC: 17.6 MG/DL (ref 8–23)
BUN/CREAT SERPL: 21.2 (ref 7–25)
CALCIUM SPEC-SCNC: 9.6 MG/DL (ref 8.6–10.5)
CHLORIDE SERPL-SCNC: 104 MMOL/L (ref 98–107)
CHOLEST SERPL-MCNC: 140 MG/DL (ref 0–200)
CO2 SERPL-SCNC: 23.1 MMOL/L (ref 22–29)
CREAT SERPL-MCNC: 0.83 MG/DL (ref 0.57–1)
DEPRECATED RDW RBC AUTO: 44 FL (ref 37–54)
EGFRCR SERPLBLD CKD-EPI 2021: 80.8 ML/MIN/1.73
EOSINOPHIL # BLD AUTO: 0 10*3/MM3 (ref 0–0.4)
EOSINOPHIL NFR BLD AUTO: 0 % (ref 0.3–6.2)
ERYTHROCYTE [DISTWIDTH] IN BLOOD BY AUTOMATED COUNT: 13.8 % (ref 12.3–15.4)
FERRITIN SERPL-MCNC: 620 NG/ML (ref 13–150)
GLOBULIN UR ELPH-MCNC: 3.3 GM/DL
GLUCOSE SERPL-MCNC: 251 MG/DL (ref 65–99)
HBA1C MFR BLD: 8.99 % (ref 4.8–5.6)
HCT VFR BLD AUTO: 39.1 % (ref 34–46.6)
HDLC SERPL-MCNC: 44 MG/DL (ref 40–60)
HGB BLD-MCNC: 13 G/DL (ref 12–15.9)
HOLD SPECIMEN: NORMAL
IRON 24H UR-MRATE: 77 MCG/DL (ref 37–145)
IRON SATN MFR SERPL: 22 % (ref 20–50)
LAB AP CASE REPORT: NORMAL
LDLC SERPL CALC-MCNC: 71 MG/DL (ref 0–100)
LDLC/HDLC SERPL: 1.54 {RATIO}
LYMPHOCYTES # BLD AUTO: 1.8 10*3/MM3 (ref 0.7–3.1)
LYMPHOCYTES NFR BLD AUTO: 21.7 % (ref 19.6–45.3)
MCH RBC QN AUTO: 29.5 PG (ref 26.6–33)
MCHC RBC AUTO-ENTMCNC: 33.2 G/DL (ref 31.5–35.7)
MCV RBC AUTO: 88.9 FL (ref 79–97)
MONOCYTES # BLD AUTO: 0.67 10*3/MM3 (ref 0.1–0.9)
MONOCYTES NFR BLD AUTO: 8.1 % (ref 5–12)
NEUTROPHILS NFR BLD AUTO: 5.83 10*3/MM3 (ref 1.7–7)
NEUTROPHILS NFR BLD AUTO: 70.1 % (ref 42.7–76)
PATH REPORT.FINAL DX SPEC: NORMAL
PATHOLOGY REVIEW: YES
PLATELET # BLD AUTO: 213 10*3/MM3 (ref 140–450)
PMV BLD AUTO: 9.5 FL (ref 6–12)
POTASSIUM SERPL-SCNC: 3.8 MMOL/L (ref 3.5–5.2)
PROT SERPL-MCNC: 7.7 G/DL (ref 6–8.5)
RBC # BLD AUTO: 4.4 10*6/MM3 (ref 3.77–5.28)
SODIUM SERPL-SCNC: 141 MMOL/L (ref 136–145)
TIBC SERPL-MCNC: 353 MCG/DL (ref 298–536)
TRANSFERRIN SERPL-MCNC: 237 MG/DL (ref 200–360)
TRIGL SERPL-MCNC: 142 MG/DL (ref 0–150)
TSH SERPL DL<=0.05 MIU/L-ACNC: 0.88 UIU/ML (ref 0.27–4.2)
VLDLC SERPL-MCNC: 25 MG/DL (ref 5–40)
WBC NRBC COR # BLD AUTO: 8.31 10*3/MM3 (ref 3.4–10.8)

## 2025-05-28 PROCEDURE — 82306 VITAMIN D 25 HYDROXY: CPT | Performed by: NURSE PRACTITIONER

## 2025-05-28 PROCEDURE — 82728 ASSAY OF FERRITIN: CPT | Performed by: NURSE PRACTITIONER

## 2025-05-28 PROCEDURE — 83036 HEMOGLOBIN GLYCOSYLATED A1C: CPT | Performed by: NURSE PRACTITIONER

## 2025-05-28 PROCEDURE — 83540 ASSAY OF IRON: CPT | Performed by: NURSE PRACTITIONER

## 2025-05-28 PROCEDURE — 82043 UR ALBUMIN QUANTITATIVE: CPT | Performed by: NURSE PRACTITIONER

## 2025-05-28 PROCEDURE — 84443 ASSAY THYROID STIM HORMONE: CPT | Performed by: NURSE PRACTITIONER

## 2025-05-28 PROCEDURE — 84466 ASSAY OF TRANSFERRIN: CPT | Performed by: NURSE PRACTITIONER

## 2025-05-28 PROCEDURE — 36415 COLL VENOUS BLD VENIPUNCTURE: CPT

## 2025-05-28 PROCEDURE — 85025 COMPLETE CBC W/AUTO DIFF WBC: CPT

## 2025-05-28 PROCEDURE — 80053 COMPREHEN METABOLIC PANEL: CPT | Performed by: NURSE PRACTITIONER

## 2025-05-28 PROCEDURE — 80061 LIPID PANEL: CPT | Performed by: NURSE PRACTITIONER

## 2025-05-28 RX ORDER — ONDANSETRON 4 MG/1
4 TABLET, FILM COATED ORAL
COMMUNITY
Start: 2025-05-27

## 2025-05-28 RX ORDER — SULFAMETHOXAZOLE AND TRIMETHOPRIM 800; 160 MG/1; MG/1
1 TABLET ORAL
COMMUNITY
Start: 2025-05-27 | End: 2025-06-06

## 2025-06-02 ENCOUNTER — OFFICE VISIT (OUTPATIENT)
Dept: FAMILY MEDICINE CLINIC | Facility: CLINIC | Age: 60
End: 2025-06-02
Payer: MEDICARE

## 2025-06-02 VITALS
HEIGHT: 69 IN | DIASTOLIC BLOOD PRESSURE: 78 MMHG | HEART RATE: 61 BPM | BODY MASS INDEX: 28.29 KG/M2 | WEIGHT: 191 LBS | TEMPERATURE: 97.7 F | SYSTOLIC BLOOD PRESSURE: 122 MMHG | OXYGEN SATURATION: 99 %

## 2025-06-02 DIAGNOSIS — K13.70 ORAL MUCOSAL LESION: ICD-10-CM

## 2025-06-02 DIAGNOSIS — B37.0 THRUSH: ICD-10-CM

## 2025-06-02 DIAGNOSIS — E11.65 TYPE 2 DIABETES MELLITUS WITH HYPERGLYCEMIA, WITHOUT LONG-TERM CURRENT USE OF INSULIN: ICD-10-CM

## 2025-06-02 DIAGNOSIS — M54.41 CHRONIC MIDLINE LOW BACK PAIN WITH BILATERAL SCIATICA: ICD-10-CM

## 2025-06-02 DIAGNOSIS — E55.9 VITAMIN D DEFICIENCY: ICD-10-CM

## 2025-06-02 DIAGNOSIS — N39.0 URINARY TRACT INFECTION WITHOUT HEMATURIA, SITE UNSPECIFIED: ICD-10-CM

## 2025-06-02 DIAGNOSIS — M54.42 CHRONIC MIDLINE LOW BACK PAIN WITH BILATERAL SCIATICA: ICD-10-CM

## 2025-06-02 DIAGNOSIS — C34.91 ADENOCARCINOMA OF RIGHT LUNG: ICD-10-CM

## 2025-06-02 DIAGNOSIS — J44.9 CHRONIC OBSTRUCTIVE PULMONARY DISEASE, UNSPECIFIED COPD TYPE: ICD-10-CM

## 2025-06-02 DIAGNOSIS — R16.2 HEPATOSPLENOMEGALY: Primary | ICD-10-CM

## 2025-06-02 DIAGNOSIS — G89.29 CHRONIC MIDLINE LOW BACK PAIN WITH BILATERAL SCIATICA: ICD-10-CM

## 2025-06-02 DIAGNOSIS — M51.16 LUMBAR DISC DISEASE WITH RADICULOPATHY: ICD-10-CM

## 2025-06-02 DIAGNOSIS — J30.1 SEASONAL ALLERGIC RHINITIS DUE TO POLLEN: ICD-10-CM

## 2025-06-02 DIAGNOSIS — E78.2 MIXED HYPERLIPIDEMIA: ICD-10-CM

## 2025-06-02 PROCEDURE — 3074F SYST BP LT 130 MM HG: CPT | Performed by: NURSE PRACTITIONER

## 2025-06-02 PROCEDURE — 1125F AMNT PAIN NOTED PAIN PRSNT: CPT | Performed by: NURSE PRACTITIONER

## 2025-06-02 PROCEDURE — 3052F HG A1C>EQUAL 8.0%<EQUAL 9.0%: CPT | Performed by: NURSE PRACTITIONER

## 2025-06-02 PROCEDURE — 1160F RVW MEDS BY RX/DR IN RCRD: CPT | Performed by: NURSE PRACTITIONER

## 2025-06-02 PROCEDURE — 99214 OFFICE O/P EST MOD 30 MIN: CPT | Performed by: NURSE PRACTITIONER

## 2025-06-02 PROCEDURE — 1159F MED LIST DOCD IN RCRD: CPT | Performed by: NURSE PRACTITIONER

## 2025-06-02 PROCEDURE — 3078F DIAST BP <80 MM HG: CPT | Performed by: NURSE PRACTITIONER

## 2025-06-02 RX ORDER — NYSTATIN 100000 [USP'U]/ML
500000 SUSPENSION ORAL 4 TIMES DAILY
Qty: 473 ML | Refills: 0 | Status: SHIPPED | OUTPATIENT
Start: 2025-06-02

## 2025-06-02 NOTE — PROGRESS NOTES
Chief Complaint  Chief Complaint   Patient presents with    Follow-up     3 month  5/26/25 ED Jacobo- UTI- bactrim, stomach pain- stopped ozempic  Patient believes she has trush in mouth- has been using regular mouthwash, no relief            Subjective          Rahel Ramsey presents to [facility] for   History of Present Illness    Patient complains of thrush and tender nodule of the roof of the mouth.    Patient is currently being treated for UTI, started Bactrim 5/26/2025    Diabetes mellitus type II, BG typically <200.  Metformin discontinued due to diarrhea. She is taking Januvia and glimepiride, stopped Ozempic due to abdominal pain. Diet is poor but trying to improve. She is now only eating sugar-free candy, decreased carbohydrates, only drinking water, no sodas.  Denies any signs/symptoms of hyper/hypoglycemia, blurry vision, polydipsia, polyuria, nocturia, and unintentional weight loss     Lung cancer, following with Dr. Prabhakar, on 1/9/2023 patient underwent right lobectomy with mediastinal lymph node dissection per Dr. Kearney.    -CT scan of the chest 6/2/2023 showed no relapse of disease, there was a soft tissue shadow at the tail of the pancreas  -CT of the abdomen which showed improvement, thought to be pancreatic pseudocyst.    -CT chest 3/26/2024 stable pulmonary nodules  RUL/RLL, hepatic steatosis, pancreatic findings unchanged    -CT abdomen/pelvis 5/26/2025 hepatosplenomegaly with hepatic steatosis, diverticulosis, 16mm nodule right kidney    COPD, she follows with Dr. Easton, symptoms include non-productive cough, especially with exertion.  Symptoms have been gradually improving since stopping smoking.  The pt is using Trelegy inhaler as directed and albuterol as needed.     HTN, stable on meds and takes as directed, denies chest pain, headache, shortness of air, palpitations and swelling of extremities.     Elevated LFTs/hepatic steatosis, patient is taking vitamin E  daily, follows with gastroenterology  -Colonoscopy is overdue     Hyperlipidemia, follows with Dr. Farah, she is taking atorvastatin, Zetia and fenofibrate.  The patient denies muscle aches, constipation, diarrhea, GI upset, fatigue, chest pain/pressure, exercise intolerance, dyspnea, palpitations, syncope and pedal edema.       Vitamin D deficiency, on weekly vitamin D     Chronic low back pain, follows with pain management, patient on Lyrica and hydrocodone       The following portions of the patient's history were reviewed and updated as appropriate: allergies, current medications, past family history, past medical history, past social history, past surgical history and problem list.    Past Medical History:   Diagnosis Date    Allergic     Arthritis     Atrial fibrillation     Cancer 01/09/2023    COPD (chronic obstructive pulmonary disease)     Diabetes mellitus     Frequent UTI     Headache     Heartburn     History of biliary duct stent placement 03/23/2023    History of herniated intervertebral disc     History of skin cancer     Hyperlipidemia     Hypertension     Injury of back     Leukocytosis     Low back pain     Lung nodule     Nausea      Past Surgical History:   Procedure Laterality Date    BACK SURGERY      lumbar    CARDIAC CATHETERIZATION      CHOLECYSTECTOMY N/A 4/10/2023    Procedure: CHOLECYSTECTOMY LAPAROSCOPIC;  Surgeon: Mich Bansal MD;  Location: [facility] MAIN OR;  Service: General;  Laterality: N/A;    COLONOSCOPY      DILATATION AND CURETTAGE      ENDOSCOPY N/A 3/30/2023    Procedure: ESOPHAGOGASTRODUODENOSCOPY WITH STENT REMOVAL;  Surgeon: Naman Blackmon MD;  Location: [facility] ENDOSCOPY;  Service: Gastroenterology;  Laterality: N/A;  normal stent removal    ERCP N/A 02/01/2023    Procedure: ENDOSCOPIC RETROGRADE CHOLANGIOPANCREATOGRAPHY with sphicterotomy and balloon guided sweeping of bile duct up to 9mm and placement of 10Fr x 9cm biliary stent;  Surgeon: Naman Blackmon  MD [place];  Location: [facility] ENDOSCOPY;  Service: Gastroenterology;  Laterality: N/A;  post: choledocholithiasis    LOBECTOMY Right 2023    Procedure: THORASCOPIC RIGHT UPPER LOBECTOMY WITH DAVINCI ROBOT, MEDIASTINAL LYMPH NODE DISSECTION;  Surgeon: Erwin Kearney MD;  Location: [facility] MAIN OR;  Service: Robotics - DaVinci;  Laterality: Right;    LUMBAR DECOMPRESSION      L4-L5     LUNG BIOPSY Right 2022    SKIN CANCER EXCISION Left     Shin    SUBTOTAL HYSTERECTOMY       Family History   Problem Relation Age of Onset    Breast cancer Mother     Lung cancer Father     Lung cancer Other     Colon cancer Other     Skin cancer Other      Social History     Tobacco Use    Smoking status: Former     Current packs/day: 0.00     Average packs/day: 1 pack/day for 40.0 years (40.0 ttl pk-yrs)     Types: Cigarettes     Start date: 1983     Quit date: 2023     Years since quittin.4     Passive exposure: Past    Smokeless tobacco: Never   Substance Use Topics    Alcohol use: Not Currently     Comment: rare       Current Outpatient Medications:     Accu-Chek Softclix Lancets lancets, USE TO TEST BLOOD SUGAR TWICE DAILY AS NEEDED, Disp: 100 each, Rfl: 3    albuterol sulfate  (90 Base) MCG/ACT inhaler, Inhale 2 puffs Every 4 (Four) Hours As Needed for Wheezing or Shortness of Air., Disp: 18 g, Rfl: 5    atorvastatin (LIPITOR) 20 MG tablet, TAKE 1 TABLET BY MOUTH DAILY, Disp: 90 tablet, Rfl: 3    Blood Glucose Monitoring Suppl (Accu-Chek Guide Me) w/Device kit, USE AS DIRECTED, Disp: 1 kit, Rfl: 0    ezetimibe (ZETIA) 10 MG tablet, TAKE 1 TABLET BY MOUTH DAILY, Disp: 90 tablet, Rfl: 3    fenofibrate (TRICOR) 145 MG tablet, TAKE 1 TABLET BY MOUTH DAILY, Disp: 90 tablet, Rfl: 1    fluticasone (FLONASE) 50 MCG/ACT nasal spray, 1 spray by Each Nare route Daily., Disp: 48 g, Rfl: 3    Fluticasone-Umeclidin-Vilant (TRELEGY) 100-62.5-25 MCG/ACT inhaler, Inhale 1 puff Every Night., Disp: , Rfl:     glimepiride  (Amaryl) 4 MG tablet, Take 1 tablet by mouth Every Morning Before Breakfast., Disp: 90 tablet, Rfl: 1    glucose blood (Accu-Chek Guide Test) test strip, USE AS INSTRUCTED TO CHECK BLOOD SUGAR DAILY, Disp: 100 each, Rfl: 2    HYDROcodone-acetaminophen (NORCO)  MG per tablet, Take 1 tablet by mouth Every 6 (Six) Hours As Needed for Moderate Pain. May take dos if needed, Disp: , Rfl:     Januvia 100 MG tablet, TAKE 1 TABLET BY MOUTH DAILY, Disp: 90 tablet, Rfl: 1    metoprolol succinate XL (TOPROL-XL) 25 MG 24 hr tablet, TAKE 1 TABLET BY MOUTH DAILY, Disp: 90 tablet, Rfl: 3    nystatin (MYCOSTATIN) 945446 UNIT/GM cream, Apply 1 Application topically to the appropriate area as directed As Needed. None dos, Disp: , Rfl:     ondansetron (ZOFRAN) 4 MG tablet, Take 1 tablet by mouth., Disp: , Rfl:     phenazopyridine (Pyridium) 200 MG tablet, Take 1 tablet by mouth 3 (Three) Times a Day As Needed for Bladder Spasms., Disp: 30 tablet, Rfl: 2    pregabalin (LYRICA) 100 MG capsule, Take 1 capsule by mouth 2 (Two) Times a Day., Disp: 60 capsule, Rfl: 5    sulfamethoxazole-trimethoprim (BACTRIM DS,SEPTRA DS) 800-160 MG per tablet, Take 1 tablet by mouth., Disp: , Rfl:     vitamin D (ERGOCALCIFEROL) 1.25 MG (02642 UT) capsule capsule, TAKE 1 CAPSULE BY MOUTH EVERY 7 DAYS, Disp: 12 capsule, Rfl: 1    vitamin E 400 UNIT capsule, Take 1 capsule by mouth Daily., Disp: 90 capsule, Rfl: 3    nystatin (MYCOSTATIN) 100,000 unit/mL suspension, Swish and swallow 5 mL 4 (Four) Times a Day. May stop 48 hours after symptoms resolve, Disp: 473 mL, Rfl: 0    Tirzepatide 2.5 MG/0.5ML solution auto-injector, Inject 2.5 mg under the skin into the appropriate area as directed 1 (One) Time Per Week., Disp: 2 mL, Rfl: 0    Objective   Vital Signs:   Vitals:    06/02/25 0932   BP: 122/78   BP Location: Left arm   Patient Position: Sitting   Cuff Size: Adult   Pulse: 61   Temp: 97.7 °F (36.5 °C)   TempSrc: Oral   SpO2: 99%   Weight: 86.6 kg (191  "lb)   Height: 175.3 cm (69\")   PainSc: 4    PainLoc: Back       Body mass index is 28.21 kg/m².    Physical Exam  Constitutional:       General: She is not in acute distress.     Appearance: Normal appearance. She is well-developed. She is ill-appearing (chronic). She is not diaphoretic.   HENT:      Head: Normocephalic.      Mouth/Throat:      Comments: Red lesion of mid hard palate, thrush of tongue and lower gums     Eyes:      Conjunctiva/sclera: Conjunctivae normal.      Pupils: Pupils are equal, round, and reactive to light.   Neck:      Thyroid: No thyromegaly.      Vascular: No JVD.   Cardiovascular:      Rate and Rhythm: Normal rate and regular rhythm.      Heart sounds: Normal heart sounds. No murmur heard.  Pulmonary:      Effort: Pulmonary effort is normal. No respiratory distress.      Breath sounds: Normal breath sounds. No wheezing or rhonchi.   Abdominal:      General: Bowel sounds are normal. There is no distension.      Palpations: Abdomen is soft.      Tenderness: There is no abdominal tenderness.   Musculoskeletal:         General: Tenderness (chronic pain multi joints and low back) present. No swelling. Normal range of motion.      Cervical back: Normal range of motion and neck supple. No tenderness.   Lymphadenopathy:      Cervical: No cervical adenopathy.   Skin:     General: Skin is warm and dry.      Coloration: Skin is not jaundiced.      Findings: No erythema or rash.   Neurological:      General: No focal deficit present.      Mental Status: She is alert and oriented to person, place, and time. Mental status is at baseline.      Sensory: No sensory deficit.      Motor: No weakness.      Gait: Gait abnormal (uses quad cane for mobility).   Psychiatric:         Mood and Affect: Mood normal.         Behavior: Behavior normal.         Thought Content: Thought content normal.         Judgment: Judgment normal.          Result Review :     Lab on 05/28/2025   Component Date Value Ref Range " Status    Glucose 05/28/2025 251 (H)  65 - 99 mg/dL Final    BUN 05/28/2025 17.6  8.0 - 23.0 mg/dL Final    Creatinine 05/28/2025 0.83  0.57 - 1.00 mg/dL Final    Sodium 05/28/2025 141  136 - 145 mmol/L Final    Potassium 05/28/2025 3.8  3.5 - 5.2 mmol/L Final    Chloride 05/28/2025 104  98 - 107 mmol/L Final    CO2 05/28/2025 23.1  22.0 - 29.0 mmol/L Final    Calcium 05/28/2025 9.6  8.6 - 10.5 mg/dL Final    Total Protein 05/28/2025 7.7  6.0 - 8.5 g/dL Final    Albumin 05/28/2025 4.4  3.5 - 5.2 g/dL Final    ALT (SGPT) 05/28/2025 27  1 - 33 U/L Final    AST (SGOT) 05/28/2025 21  1 - 32 U/L Final    Alkaline Phosphatase 05/28/2025 94  39 - 117 U/L Final    Total Bilirubin 05/28/2025 0.5  0.0 - 1.2 mg/dL Final    Globulin 05/28/2025 3.3  gm/dL Final    A/G Ratio 05/28/2025 1.3  g/dL Final    BUN/Creatinine Ratio 05/28/2025 21.2  7.0 - 25.0 Final    Anion Gap 05/28/2025 13.9  5.0 - 15.0 mmol/L Final    eGFR 05/28/2025 80.8  >60.0 mL/min/1.73 Final    Hemoglobin A1C 05/28/2025 8.99 (H)  4.80 - 5.60 % Final    Total Cholesterol 05/28/2025 140  0 - 200 mg/dL Final    Triglycerides 05/28/2025 142  0 - 150 mg/dL Final    HDL Cholesterol 05/28/2025 44  40 - 60 mg/dL Final    LDL Cholesterol  05/28/2025 71  0 - 100 mg/dL Final    VLDL Cholesterol 05/28/2025 25  5 - 40 mg/dL Final    LDL/HDL Ratio 05/28/2025 1.54   Final    Microalbumin, Urine 05/28/2025 2.2  mg/dL Final    TSH 05/28/2025 0.882  0.270 - 4.200 uIU/mL Final    25 Hydroxy, Vitamin D 05/28/2025 54.4  30.0 - 100.0 ng/ml Final    WBC 05/28/2025 8.31  3.40 - 10.80 10*3/mm3 Final    RBC 05/28/2025 4.40  3.77 - 5.28 10*6/mm3 Final    Hemoglobin 05/28/2025 13.0  12.0 - 15.9 g/dL Final    Hematocrit 05/28/2025 39.1  34.0 - 46.6 % Final    MCV 05/28/2025 88.9  79.0 - 97.0 fL Final    MCH 05/28/2025 29.5  26.6 - 33.0 pg Final    MCHC 05/28/2025 33.2  31.5 - 35.7 g/dL Final    RDW 05/28/2025 13.8  12.3 - 15.4 % Final    RDW-SD 05/28/2025 44.0  37.0 - 54.0 fl Final    MPV  05/28/2025 9.5  6.0 - 12.0 fL Final    Platelets 05/28/2025 213  140 - 450 10*3/mm3 Final    Neutrophil % 05/28/2025 70.1  42.7 - 76.0 % Final    Lymphocyte % 05/28/2025 21.7  19.6 - 45.3 % Final    Monocyte % 05/28/2025 8.1  5.0 - 12.0 % Final    Eosinophil % 05/28/2025 0.0 (L)  0.3 - 6.2 % Final    Basophil % 05/28/2025 0.1  0.0 - 1.5 % Final    Neutrophils, Absolute 05/28/2025 5.83  1.70 - 7.00 10*3/mm3 Final    Lymphocytes, Absolute 05/28/2025 1.80  0.70 - 3.10 10*3/mm3 Final    Monocytes, Absolute 05/28/2025 0.67  0.10 - 0.90 10*3/mm3 Final    Eosinophils, Absolute 05/28/2025 0.00  0.00 - 0.40 10*3/mm3 Final    Basophils, Absolute 05/28/2025 0.01  0.00 - 0.20 10*3/mm3 Final    Extra Tube 05/28/2025 Hold for add-ons.   Final    Auto resulted.    Pathology Review 05/28/2025 Yes   Final    Final Diagnosis 05/28/2025    Final                    Value:Unremarkable peripheral blood smear  No blasts identified      Case Report 05/28/2025    Final                    Value:Surgical Pathology Report                         Case: [id number]                                  Authorizing Provider:  Cleo Henry APRN Collected:           05/28/2025 08:54 AM          Ordering Location:     [facility]   Received:            05/28/2025 10:35 AM                                 ONCOLOGY LAB                                                                 Pathologist:           South Reyna MD                                                             Specimen:    Blood, Venous Line                                                                        Office Visit on 05/28/2025   Component Date Value Ref Range Status    Iron 05/28/2025 77  37 - 145 mcg/dL Final    Iron Saturation (TSAT) 05/28/2025 22  20 - 50 % Final    Transferrin 05/28/2025 237  200 - 360 mg/dL Final    TIBC 05/28/2025 353  298 - 536 mcg/dL Final    Ferritin 05/28/2025 620.00 (H)  13.00 - 150.00 ng/mL Final                               Assessment and Plan    Diagnoses and all orders for this visit:    1. Hepatosplenomegaly (Primary)    2. Type 2 diabetes mellitus with hyperglycemia, without long-term current use of insulin  -     Tirzepatide 2.5 MG/0.5ML solution auto-injector; Inject 2.5 mg under the skin into the appropriate area as directed 1 (One) Time Per Week.  Dispense: 2 mL; Refill: 0    3. Mixed hyperlipidemia    4. Seasonal allergic rhinitis due to pollen    5. Vitamin D deficiency    6. Chronic midline low back pain with bilateral sciatica    7. Thrush    8. Oral mucosal lesion    9. Chronic obstructive pulmonary disease, unspecified COPD type    10. Adenocarcinoma of right lung    11. Lumbar disc disease with radiculopathy    12. Urinary tract infection without hematuria, site unspecified    Other orders  -     nystatin (MYCOSTATIN) 100,000 unit/mL suspension; Swish and swallow 5 mL 4 (Four) Times a Day. May stop 48 hours after symptoms resolve  Dispense: 473 mL; Refill: 0      Overall doing well  Cont current med regimen, refills when needed   Abd pain resolved after d/c of ozempic.   Hgba1c improved, LFT's wnl, will try mounjaro, pt will notify if develops s/e  Labs reviewed with patient.    Recommend patient to call and schedule colonoscopy  F/u oncology, thoracic surgery, pulm, pain mgmt, cardiology as directed      I spent 30 minutes caring for Rahel Ramsey on this date of service. This time includes time spent by me in the following activities: preparing for the visit, reviewing tests, performing a medically appropriate examination and/or evaluation , counseling and educating the patient/family/caregiver, ordering medications, tests, or procedures and documenting information in the medical record        Follow Up     Return in about 6 months (around 12/2/2025) for Recheck, Medicare Wellness. DM panel prior to appt .  Patient was given instructions and counseling regarding her condition or for health maintenance advice.  Please see specific information pulled into the AVS if appropriate.        Part of this note may be an electronic transcription/translation of spoken language to printed text using the Dragon Dictation System

## 2025-06-12 ENCOUNTER — HOSPITAL ENCOUNTER (OUTPATIENT)
Dept: ULTRASOUND IMAGING | Facility: HOSPITAL | Age: 60
Discharge: HOME | End: 2025-06-12
Admitting: NURSE PRACTITIONER
Payer: MEDICARE

## 2025-06-12 DIAGNOSIS — N28.89 NODULE OF KIDNEY: ICD-10-CM

## 2025-06-12 PROCEDURE — 76775 US EXAM ABDO BACK WALL LIM: CPT

## 2025-07-02 DIAGNOSIS — E55.9 VITAMIN D DEFICIENCY: ICD-10-CM

## 2025-07-02 RX ORDER — ERGOCALCIFEROL 1.25 MG/1
50000 CAPSULE, LIQUID FILLED ORAL WEEKLY
Qty: 12 CAPSULE | Refills: 1 | Status: SHIPPED | OUTPATIENT
Start: 2025-07-02

## 2025-07-28 DIAGNOSIS — M54.41 CHRONIC MIDLINE LOW BACK PAIN WITH BILATERAL SCIATICA: ICD-10-CM

## 2025-07-28 DIAGNOSIS — M54.42 CHRONIC MIDLINE LOW BACK PAIN WITH BILATERAL SCIATICA: ICD-10-CM

## 2025-07-28 DIAGNOSIS — E11.65 TYPE 2 DIABETES MELLITUS WITH HYPERGLYCEMIA, WITHOUT LONG-TERM CURRENT USE OF INSULIN: ICD-10-CM

## 2025-07-28 DIAGNOSIS — G89.29 CHRONIC MIDLINE LOW BACK PAIN WITH BILATERAL SCIATICA: ICD-10-CM

## 2025-07-28 RX ORDER — PREGABALIN 100 MG/1
100 CAPSULE ORAL 2 TIMES DAILY
Qty: 60 CAPSULE | Refills: 2 | Status: SHIPPED | OUTPATIENT
Start: 2025-07-28

## 2025-07-28 RX ORDER — TIRZEPATIDE 2.5 MG/.5ML
INJECTION, SOLUTION SUBCUTANEOUS
Qty: 2 ML | Refills: 2 | Status: SHIPPED | OUTPATIENT
Start: 2025-07-28

## (undated) DEVICE — ST TBG AIRSEAL FLTR TRI LUM

## (undated) DEVICE — 2, DISPOSABLE SUCTION/IRRIGATOR WITH DISPOSABLE TIP: Brand: STRYKEFLOW

## (undated) DEVICE — TUBING, SUCTION, 1/4" X 12', STRAIGHT: Brand: MEDLINE

## (undated) DEVICE — REDUCER: Brand: ENDOWRIST

## (undated) DEVICE — SNAR POLYP HOTSNARE/BRAIDED OVL/MINI 7F 2.8X10MM 230CM 1P/U

## (undated) DEVICE — GLV SURG BIOGEL LTX PF 8

## (undated) DEVICE — BLANKT WARM LOWR/BDY 100X120CM

## (undated) DEVICE — BLANKT WARM UPPR/BDY ARM/OUT 57X196CM

## (undated) DEVICE — SPECIMEN RETRIEVAL POUCH: Brand: ENDO CATCH II

## (undated) DEVICE — SUT MONOCRYL 4/0 PS2 27IN Y426H ETY426H

## (undated) DEVICE — GLV SURG BIOGEL LTX PF 7

## (undated) DEVICE — APPL HEMO ENDO SURGICEL 2IN1 1P/U STRL

## (undated) DEVICE — DEV POSITION LK AND BIOP CAP SYS

## (undated) DEVICE — CONTAINER,SPECIMEN,OR STERILE,4OZ: Brand: MEDLINE

## (undated) DEVICE — ENDOPATH 5MM CURVED SCISSORS WITH MONOPOLAR CAUTERY: Brand: ENDOPATH

## (undated) DEVICE — SOL IRR H2O BTL 1000ML STRL

## (undated) DEVICE — GLV SURG PREMIERPRO ORTHO LTX PF SZ8 BRN

## (undated) DEVICE — EXTENSION SET, MALE LUER LOCK ADAPTER WITH RETRACTABLE COLLAR

## (undated) DEVICE — PK ENDO GI 50

## (undated) DEVICE — BLADELESS OBTURATOR: Brand: WECK VISTA

## (undated) DEVICE — ORG INST STRIP/TS ADHS 2X10IN YEL STRL

## (undated) DEVICE — APPL CHLORAPREP HI/LITE 26ML ORNG

## (undated) DEVICE — RETRIEVAL BALLOON CATHETER: Brand: EXTRACTOR™ PRO RX

## (undated) DEVICE — BITEBLOCK ENDO W/STRAP 60F A/ LF DISP

## (undated) DEVICE — INSUFFLATION TUBING SET, ENDOFLATOR 50: Brand: N.A.

## (undated) DEVICE — LAPAROVUE VISIBILITY SYSTEM LAPAROSCOPIC SOLUTIONS: Brand: LAPAROVUE

## (undated) DEVICE — TROC ANCHORPORT BLADELES W/GRIP 12X120MM

## (undated) DEVICE — ADHS SKIN SURG TISS VISC PREMIERPRO EXOFIN HI/VISC FAST/DRY

## (undated) DEVICE — STAPLER SHEATH: Brand: ENDOWRIST

## (undated) DEVICE — PK MAJ LAPAROTOMY 50

## (undated) DEVICE — TOTAL TRAY, 16FR 10ML SIL FOLEY, URN: Brand: MEDLINE

## (undated) DEVICE — CATH BRONCHO 37F LT

## (undated) DEVICE — KT SURG TURNOVER 050

## (undated) DEVICE — SUT VIC 0 UR6 27IN VCP603H

## (undated) DEVICE — PENCL HND ROCKRSWTCH HOLSTR EZ CLEAN TP CRD 10FT

## (undated) DEVICE — CVR HNDL HARMONYAIR 4K/CAM SURG STRL

## (undated) DEVICE — PASS SUT PRO BARIATRIC XL W/TROC SWABS

## (undated) DEVICE — OASIS DRAIN, SINGLE, INLINE & ATS COMPATIBLE: Brand: OASIS

## (undated) DEVICE — GENERAL LAPAROSCOPY CDS: Brand: MEDLINE INDUSTRIES, INC.

## (undated) DEVICE — CANNULA SEAL

## (undated) DEVICE — SUT VIC 0 CT1 36IN J946H

## (undated) DEVICE — SPHINCTEROTOME: Brand: DREAMTOME™ RX 44

## (undated) DEVICE — NDL INJ WILLIAMS CYSTO 5F 23G 8MM

## (undated) DEVICE — MARKR SKIN W/RULR 2TP

## (undated) DEVICE — ADHS SKIN PREMIERPRO EXOFIN TOPICAL HI/VISC .5ML

## (undated) DEVICE — SOL NACL 0.9PCT 1000ML

## (undated) DEVICE — SLV SCD CALF HEMOFORCE DVT THERP REPROC MD

## (undated) DEVICE — SUT ETHIB 2 CV V37 MS/4 30IN MX69G

## (undated) DEVICE — ARM DRAPE

## (undated) DEVICE — BG RETRV TISS SUPERBAG INTRO RIP/STOP NLY 10MM 240ML MD

## (undated) DEVICE — 3M™ IOBAN™ 2 ANTIMICROBIAL INCISE DRAPE 6650EZ: Brand: IOBAN™ 2

## (undated) DEVICE — 28 FR STRAIGHT – SOFT PVC CATHETER: Brand: PVC THORACIC CATHETERS

## (undated) DEVICE — LP VESL MAXI 2.5X1MM RED 2PK

## (undated) DEVICE — SPNG LAP CIGARETTE KITTNER 5MM STRL PK/5

## (undated) DEVICE — ELECTRD BLD EZ CLN MOD XLNG 2.75IN

## (undated) DEVICE — COLUMN DRAPE

## (undated) DEVICE — THE STERILE LIGHT HANDLE COVER IS USED WITH STERIS SURGICAL LIGHTING AND VISUALIZATION SYSTEMS.

## (undated) DEVICE — SEAL

## (undated) DEVICE — ANTIBACTERIAL UNDYED BRAIDED (POLYGLACTIN 910), SYNTHETIC ABSORBABLE SUTURE: Brand: COATED VICRYL

## (undated) DEVICE — SUREFORM 45: Brand: SUREFORM

## (undated) DEVICE — ENDOPATH XCEL WITH OPTIVIEW TECHNOLOGY BLADELESS TROCARS WITH STABILITY SLEEVES: Brand: ENDOPATH XCEL OPTIVIEW

## (undated) DEVICE — Device

## (undated) DEVICE — ENDOPATH XCEL WITH OPTIVIEW TECHNOLOGY UNIVERSAL TROCAR STABILITY SLEEVES: Brand: ENDOPATH XCEL OPTIVIEW

## (undated) DEVICE — SPNG GZ AVANT 6PLY 4X4IN STRL PK/2

## (undated) DEVICE — SUT VIC 2/0 CT2 27IN J269H

## (undated) DEVICE — ENDOPATH XCEL BLADELESS TROCARS WITH STABILITY SLEEVES: Brand: ENDOPATH XCEL

## (undated) DEVICE — UNDERGLV SURG BIOGEL INDICATOR LTX PF 7

## (undated) DEVICE — ST TBG AIRSEAL BIF FLTR W/ACT/CHARCOAL/FLTR